# Patient Record
Sex: FEMALE | Race: WHITE | HISPANIC OR LATINO | Employment: OTHER | URBAN - METROPOLITAN AREA
[De-identification: names, ages, dates, MRNs, and addresses within clinical notes are randomized per-mention and may not be internally consistent; named-entity substitution may affect disease eponyms.]

---

## 2017-03-08 RX ORDER — VERAPAMIL HYDROCHLORIDE 240 MG/1
240 TABLET, FILM COATED, EXTENDED RELEASE ORAL NIGHTLY
Qty: 90 TABLET | Refills: 3 | Status: SHIPPED | OUTPATIENT
Start: 2017-03-08 | End: 2017-03-09 | Stop reason: SDUPTHER

## 2017-03-08 NOTE — TELEPHONE ENCOUNTER
----- Message from Elo Almanzar sent at 3/8/2017  8:51 AM CST -----  Contact: /[phone]  Patient needs refill on her verapamil (CALAN-SR) 240 MG CR tablet. Please advise

## 2017-03-09 RX ORDER — VERAPAMIL HYDROCHLORIDE 240 MG/1
240 TABLET, FILM COATED, EXTENDED RELEASE ORAL NIGHTLY
Qty: 90 TABLET | Refills: 3 | Status: SHIPPED | OUTPATIENT
Start: 2017-03-09 | End: 2018-02-27 | Stop reason: SDUPTHER

## 2017-03-09 NOTE — TELEPHONE ENCOUNTER
----- Message from Janell Schmitt sent at 3/8/2017  4:34 PM CST -----  Contact: self, [phone]  States he is calling back in requesting patient's verapamil medication refill. Please advise.

## 2017-07-20 DIAGNOSIS — T50.905A DRUG-INDUCED BRADYCARDIA: ICD-10-CM

## 2017-07-20 DIAGNOSIS — I47.10 SVT (SUPRAVENTRICULAR TACHYCARDIA): ICD-10-CM

## 2017-07-20 DIAGNOSIS — I10 ESSENTIAL HYPERTENSION: ICD-10-CM

## 2017-07-20 DIAGNOSIS — K81.9 CHOLECYSTITIS: ICD-10-CM

## 2017-07-20 DIAGNOSIS — J47.9 BRONCHIECTASIS WITHOUT COMPLICATION: ICD-10-CM

## 2017-07-20 DIAGNOSIS — R00.1 DRUG-INDUCED BRADYCARDIA: ICD-10-CM

## 2017-07-20 NOTE — TELEPHONE ENCOUNTER
----- Message from Janell Schmitt sent at 7/20/2017  4:40 PM CDT -----  Contact: spouse, [phone]  States they checked with pharmacy and they still have not received Atenolol prescription. Please advise.

## 2017-07-21 RX ORDER — ATENOLOL 25 MG/1
25 TABLET ORAL DAILY
Qty: 90 TABLET | Refills: 3 | Status: SHIPPED | OUTPATIENT
Start: 2017-07-21 | End: 2017-07-21 | Stop reason: SDUPTHER

## 2017-07-24 RX ORDER — ATENOLOL 25 MG/1
25 TABLET ORAL DAILY
Qty: 90 TABLET | Refills: 3 | Status: SHIPPED | OUTPATIENT
Start: 2017-07-24 | End: 2018-09-25 | Stop reason: SDUPTHER

## 2017-11-21 DIAGNOSIS — Z12.31 SCREENING MAMMOGRAM, ENCOUNTER FOR: Primary | ICD-10-CM

## 2017-12-04 ENCOUNTER — OFFICE VISIT (OUTPATIENT)
Dept: CARDIOLOGY | Facility: CLINIC | Age: 82
End: 2017-12-04
Payer: MEDICARE

## 2017-12-04 VITALS
SYSTOLIC BLOOD PRESSURE: 138 MMHG | HEART RATE: 74 BPM | OXYGEN SATURATION: 99 % | HEIGHT: 60 IN | BODY MASS INDEX: 17.35 KG/M2 | WEIGHT: 88.38 LBS | DIASTOLIC BLOOD PRESSURE: 69 MMHG

## 2017-12-04 DIAGNOSIS — J47.9 BRONCHIECTASIS WITHOUT COMPLICATION: ICD-10-CM

## 2017-12-04 DIAGNOSIS — R00.1 DRUG-INDUCED BRADYCARDIA: ICD-10-CM

## 2017-12-04 DIAGNOSIS — I47.10 SVT (SUPRAVENTRICULAR TACHYCARDIA): ICD-10-CM

## 2017-12-04 DIAGNOSIS — I10 ESSENTIAL HYPERTENSION: Primary | ICD-10-CM

## 2017-12-04 DIAGNOSIS — T50.905A DRUG-INDUCED BRADYCARDIA: ICD-10-CM

## 2017-12-04 PROCEDURE — 99214 OFFICE O/P EST MOD 30 MIN: CPT | Mod: S$GLB,,, | Performed by: INTERNAL MEDICINE

## 2017-12-04 PROCEDURE — 99999 PR PBB SHADOW E&M-EST. PATIENT-LVL III: CPT | Mod: PBBFAC,,, | Performed by: INTERNAL MEDICINE

## 2017-12-04 RX ORDER — ALBUTEROL SULFATE 0.83 MG/ML
SOLUTION RESPIRATORY (INHALATION)
Status: ON HOLD | COMMUNITY
Start: 2017-11-29 | End: 2019-06-17 | Stop reason: HOSPADM

## 2017-12-04 NOTE — PROGRESS NOTES
Subjective:    Patient ID:  Siomara Flores is a 84 y.o. female who presents for follow-up of Hypertension      HPI  85 y/o Yoruba speaking female, Anabaptist former pt of Dr. Killian. She has a hx of HTN, bronchiectasis treated medically with recent exacerbation, hx SSS with 2* AV block and junctional rhythms with PAC's and short SVT on Holter 2014 while on Dig and improved/resolved once Dig stopped, diastolic dysfunction, mild low TSH who presents for evaluation and f/u. Recently had what she thinks is the flu but is much improved. She is doing well from a cardiac perspective. She denies CP, orthopnea, PND, syncope, LE edema. She has intermittent chronic LUNA, unchanged. She is compliant with her meds.     Review of Systems   Constitution: Positive for weakness. Negative for malaise/fatigue.   HENT: Negative for congestion.    Eyes: Negative for blurred vision.   Cardiovascular: Positive for dyspnea on exertion. Negative for chest pain, claudication, cyanosis, irregular heartbeat, leg swelling, near-syncope, orthopnea, palpitations, paroxysmal nocturnal dyspnea and syncope.   Respiratory: Negative for shortness of breath.    Endocrine: Negative for polyuria.   Hematologic/Lymphatic: Negative for bleeding problem.   Skin: Negative for itching and rash.   Musculoskeletal: Negative for joint swelling, muscle cramps and muscle weakness.   Gastrointestinal: Negative for abdominal pain, hematemesis, hematochezia, melena, nausea and vomiting.   Genitourinary: Negative for dysuria and hematuria.   Neurological: Negative for dizziness, focal weakness, headaches, light-headedness and loss of balance.   Psychiatric/Behavioral: Negative for depression. The patient is not nervous/anxious.         Objective:    Physical Exam   Constitutional: She is oriented to person, place, and time. She appears well-developed and well-nourished.   HENT:   Head: Normocephalic and atraumatic.   Neck: Neck supple. No JVD present.    Cardiovascular: Normal rate and regular rhythm.    Murmur heard.   Systolic murmur is present with a grade of 2/6   Pulses:       Carotid pulses are 2+ on the right side, and 2+ on the left side.       Radial pulses are 2+ on the right side, and 2+ on the left side.        Femoral pulses are 2+ on the right side, and 2+ on the left side.       Dorsalis pedis pulses are 2+ on the right side, and 2+ on the left side.        Posterior tibial pulses are 2+ on the right side, and 2+ on the left side.   Pulmonary/Chest: Effort normal and breath sounds normal.   Abdominal: Soft. Bowel sounds are normal.   Musculoskeletal: She exhibits no edema.   Neurological: She is alert and oriented to person, place, and time.   Skin: Skin is warm and dry.   Psychiatric: She has a normal mood and affect. Her behavior is normal. Thought content normal.         Assessment:       1. Essential hypertension    2. Bronchiectasis without complication    3. SVT (supraventricular tachycardia)    4. Drug-induced bradycardia      85 y/o female with hx and presentation as above. Doing well from a cardiac perspective with no active complaints.       Plan:       -Continue current medical management  -F/u in 1 year

## 2017-12-16 ENCOUNTER — HOSPITAL ENCOUNTER (OUTPATIENT)
Dept: RADIOLOGY | Facility: HOSPITAL | Age: 82
Discharge: HOME | End: 2017-12-16
Attending: SPECIALIST
Payer: MEDICARE

## 2017-12-16 VITALS — BODY MASS INDEX: 17.28 KG/M2 | HEIGHT: 60 IN | WEIGHT: 88 LBS

## 2017-12-16 DIAGNOSIS — Z12.31 SCREENING MAMMOGRAM, ENCOUNTER FOR: ICD-10-CM

## 2017-12-16 PROCEDURE — 77063 BREAST TOMOSYNTHESIS BI: CPT | Mod: 26,,, | Performed by: RADIOLOGY

## 2017-12-16 PROCEDURE — 77067 SCR MAMMO BI INCL CAD: CPT | Mod: 26,,, | Performed by: RADIOLOGY

## 2017-12-16 PROCEDURE — 77067 SCR MAMMO BI INCL CAD: CPT | Mod: TC

## 2018-01-08 ENCOUNTER — HOSPITAL ENCOUNTER (OUTPATIENT)
Dept: RADIOLOGY | Facility: HOSPITAL | Age: 83
Discharge: HOME | End: 2018-01-08
Attending: INTERNAL MEDICINE
Payer: MEDICARE

## 2018-01-08 DIAGNOSIS — J06.9 ACUTE UPPER RESPIRATORY INFECTION: Primary | ICD-10-CM

## 2018-01-08 DIAGNOSIS — J06.9 ACUTE UPPER RESPIRATORY INFECTION: ICD-10-CM

## 2018-01-08 PROCEDURE — 71046 X-RAY EXAM CHEST 2 VIEWS: CPT | Mod: TC,FY

## 2018-01-08 PROCEDURE — 71046 X-RAY EXAM CHEST 2 VIEWS: CPT | Mod: 26,,, | Performed by: RADIOLOGY

## 2018-02-27 RX ORDER — VERAPAMIL HYDROCHLORIDE 240 MG/1
240 TABLET, FILM COATED, EXTENDED RELEASE ORAL NIGHTLY
Qty: 90 TABLET | Refills: 3 | Status: SHIPPED | OUTPATIENT
Start: 2018-02-27 | End: 2019-02-11 | Stop reason: SDUPTHER

## 2018-02-27 NOTE — TELEPHONE ENCOUNTER
----- Message from Alie Gayle sent at 2/27/2018 12:23 PM CST -----  Patient's , Raad, called.   No. [phone]   [facility] in [place] requested refill on Verapamil  240mg, 1 daily, #90.  What is the status.

## 2018-09-25 DIAGNOSIS — R00.1 DRUG-INDUCED BRADYCARDIA: ICD-10-CM

## 2018-09-25 DIAGNOSIS — K81.9 CHOLECYSTITIS: ICD-10-CM

## 2018-09-25 DIAGNOSIS — T50.905A DRUG-INDUCED BRADYCARDIA: ICD-10-CM

## 2018-09-25 DIAGNOSIS — I10 ESSENTIAL HYPERTENSION: ICD-10-CM

## 2018-09-25 DIAGNOSIS — J47.9 BRONCHIECTASIS WITHOUT COMPLICATION: ICD-10-CM

## 2018-09-25 DIAGNOSIS — I47.10 SVT (SUPRAVENTRICULAR TACHYCARDIA): ICD-10-CM

## 2018-09-25 RX ORDER — ATENOLOL 25 MG/1
25 TABLET ORAL DAILY
Qty: 90 TABLET | Refills: 3 | Status: ON HOLD | OUTPATIENT
Start: 2018-09-25 | End: 2019-06-17 | Stop reason: HOSPADM

## 2018-09-25 NOTE — TELEPHONE ENCOUNTER
----- Message from Mayte Almazan sent at 9/25/2018  8:42 AM CDT -----  Contact: [phone]/self  Patient would like a refill of  atenolol (TENORMIN) 25 MG tablet sent to [facility] in [place].. Please advise.

## 2018-12-07 DIAGNOSIS — Z12.31 SCREENING MAMMOGRAM, ENCOUNTER FOR: Primary | ICD-10-CM

## 2019-01-25 ENCOUNTER — OFFICE VISIT (OUTPATIENT)
Dept: CARDIOLOGY | Facility: CLINIC | Age: 84
End: 2019-01-25
Payer: MEDICARE

## 2019-01-25 VITALS
SYSTOLIC BLOOD PRESSURE: 108 MMHG | BODY MASS INDEX: 16.88 KG/M2 | HEIGHT: 60 IN | OXYGEN SATURATION: 99 % | DIASTOLIC BLOOD PRESSURE: 69 MMHG | WEIGHT: 86 LBS | HEART RATE: 83 BPM

## 2019-01-25 DIAGNOSIS — J47.9 BRONCHIECTASIS WITHOUT COMPLICATION: ICD-10-CM

## 2019-01-25 DIAGNOSIS — I10 ESSENTIAL HYPERTENSION: Primary | ICD-10-CM

## 2019-01-25 DIAGNOSIS — I47.10 SVT (SUPRAVENTRICULAR TACHYCARDIA): ICD-10-CM

## 2019-01-25 DIAGNOSIS — K81.9 CHOLECYSTITIS: ICD-10-CM

## 2019-01-25 PROCEDURE — 3074F SYST BP LT 130 MM HG: CPT | Mod: CPTII,S$GLB,, | Performed by: INTERNAL MEDICINE

## 2019-01-25 PROCEDURE — 99214 OFFICE O/P EST MOD 30 MIN: CPT | Mod: S$GLB,,, | Performed by: INTERNAL MEDICINE

## 2019-01-25 PROCEDURE — 1101F PR PT FALLS ASSESS DOC 0-1 FALLS W/OUT INJ PAST YR: ICD-10-PCS | Mod: CPTII,S$GLB,, | Performed by: INTERNAL MEDICINE

## 2019-01-25 PROCEDURE — 1101F PT FALLS ASSESS-DOCD LE1/YR: CPT | Mod: CPTII,S$GLB,, | Performed by: INTERNAL MEDICINE

## 2019-01-25 PROCEDURE — 99999 PR PBB SHADOW E&M-EST. PATIENT-LVL III: ICD-10-PCS | Mod: PBBFAC,,, | Performed by: INTERNAL MEDICINE

## 2019-01-25 PROCEDURE — 3078F DIAST BP <80 MM HG: CPT | Mod: CPTII,S$GLB,, | Performed by: INTERNAL MEDICINE

## 2019-01-25 PROCEDURE — 99214 PR OFFICE/OUTPT VISIT, EST, LEVL IV, 30-39 MIN: ICD-10-PCS | Mod: S$GLB,,, | Performed by: INTERNAL MEDICINE

## 2019-01-25 PROCEDURE — 3078F PR MOST RECENT DIASTOLIC BLOOD PRESSURE < 80 MM HG: ICD-10-PCS | Mod: CPTII,S$GLB,, | Performed by: INTERNAL MEDICINE

## 2019-01-25 PROCEDURE — 99999 PR PBB SHADOW E&M-EST. PATIENT-LVL III: CPT | Mod: PBBFAC,,, | Performed by: INTERNAL MEDICINE

## 2019-01-25 PROCEDURE — 3074F PR MOST RECENT SYSTOLIC BLOOD PRESSURE < 130 MM HG: ICD-10-PCS | Mod: CPTII,S$GLB,, | Performed by: INTERNAL MEDICINE

## 2019-01-26 NOTE — PROGRESS NOTES
Subjective:    Patient ID:  Siomara Flores is a 85 y.o. female who presents for follow-up of Hypertension      HPI     86 y/o Maori speaking female, Catholic former pt of Dr. Killian. She has a hx of HTN, bronchiectasis treated medically, hx SSS with 2* AV block and junctional rhythms with PAC's and short SVT on Holter 2014 while on Dig and improved/resolved once Dig stopped, diastolic dysfunction, mild low TSH who presents for f/u. She is doing well from a cardiac perspective. She denies CP, orthopnea, PND, syncope, LE edema. She has intermittent chronic LUNA, unchanged. She is compliant with her meds.     Review of Systems   Constitution: Negative for weakness and malaise/fatigue.   HENT: Negative for congestion.    Eyes: Negative for blurred vision.   Cardiovascular: Positive for dyspnea on exertion. Negative for chest pain, claudication, cyanosis, irregular heartbeat, leg swelling, near-syncope, orthopnea, palpitations, paroxysmal nocturnal dyspnea and syncope.   Respiratory: Negative for shortness of breath.    Endocrine: Negative for polyuria.   Hematologic/Lymphatic: Negative for bleeding problem.   Skin: Negative for itching and rash.   Musculoskeletal: Negative for joint swelling, muscle cramps and muscle weakness.   Gastrointestinal: Negative for abdominal pain, hematemesis, hematochezia, melena, nausea and vomiting.   Genitourinary: Negative for dysuria and hematuria.   Neurological: Negative for dizziness, focal weakness, headaches, light-headedness and loss of balance.   Psychiatric/Behavioral: Negative for depression. The patient is not nervous/anxious.         Objective:    Physical Exam   Constitutional: She is oriented to person, place, and time. She appears well-developed and well-nourished.   HENT:   Head: Normocephalic and atraumatic.   Neck: Neck supple. No JVD present.   Cardiovascular: Normal rate, regular rhythm and normal heart sounds.   Pulses:       Carotid pulses are 2+ on the  right side, and 2+ on the left side.       Radial pulses are 2+ on the right side, and 2+ on the left side.        Femoral pulses are 2+ on the right side, and 2+ on the left side.       Dorsalis pedis pulses are 2+ on the right side, and 2+ on the left side.        Posterior tibial pulses are 2+ on the right side, and 2+ on the left side.   Pulmonary/Chest: Effort normal and breath sounds normal.   Abdominal: Soft. Bowel sounds are normal.   Musculoskeletal: She exhibits no edema.   Neurological: She is alert and oriented to person, place, and time.   Skin: Skin is warm and dry.   Psychiatric: She has a normal mood and affect. Her behavior is normal. Thought content normal.         Assessment:       1. Essential hypertension    2. SVT (supraventricular tachycardia)    3. Bronchiectasis without complication    4. Cholecystitis      86 y/o pt with hx and presentation as above. Doing well from a cardiac perspective and compensated from a HF perspective. No change to regimen. Discussed the importance of med compliance, heart healthy diet, and regular exercise.          Plan:       -Continue current medical management  -f/u in 1 year

## 2019-02-11 RX ORDER — VERAPAMIL HYDROCHLORIDE 240 MG/1
TABLET, FILM COATED, EXTENDED RELEASE ORAL
Qty: 90 TABLET | Refills: 3 | Status: ON HOLD | OUTPATIENT
Start: 2019-02-11 | End: 2019-06-17 | Stop reason: HOSPADM

## 2019-05-02 ENCOUNTER — HOSPITAL ENCOUNTER (INPATIENT)
Facility: HOSPITAL | Age: 84
LOS: 7 days | Discharge: NURSING, REHAB OR LONG-TERM CARE | DRG: 480 | End: 2019-05-09
Attending: EMERGENCY MEDICINE | Admitting: EMERGENCY MEDICINE
Payer: MEDICARE

## 2019-05-02 DIAGNOSIS — S72.142A CLOSED DISPLACED INTERTROCHANTERIC FRACTURE OF LEFT FEMUR, INITIAL ENCOUNTER: ICD-10-CM

## 2019-05-02 DIAGNOSIS — I10 ESSENTIAL HYPERTENSION: ICD-10-CM

## 2019-05-02 DIAGNOSIS — Z53.1 REFUSAL OF BLOOD TRANSFUSIONS AS PATIENT IS JEHOVAH'S WITNESS: ICD-10-CM

## 2019-05-02 DIAGNOSIS — D62 ACUTE BLOOD LOSS ANEMIA: ICD-10-CM
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
## 2019-05-02 DIAGNOSIS — I95.81 POSTPROCEDURAL HYPOTENSION: ICD-10-CM

## 2019-05-02 DIAGNOSIS — J47.9 BRONCHIECTASIS WITHOUT COMPLICATION: ICD-10-CM

## 2019-05-02 DIAGNOSIS — S72.009A HIP FRACTURE: ICD-10-CM
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
## 2019-05-02 DIAGNOSIS — Z01.811 PRE-OP CHEST EXAM: ICD-10-CM

## 2019-05-02 DIAGNOSIS — D50.9 MICROCYTIC ANEMIA: ICD-10-CM

## 2019-05-02 DIAGNOSIS — S72.002A CLOSED FRACTURE OF LEFT HIP, INITIAL ENCOUNTER: Primary | ICD-10-CM

## 2019-05-02 PROBLEM — S52.502A CLOSED FRACTURE OF LEFT DISTAL RADIUS: Status: ACTIVE | Noted: 2019-05-02

## 2019-05-02 PROBLEM — S42.202A CLOSED FRACTURE OF LEFT PROXIMAL HUMERUS: Status: ACTIVE | Noted: 2019-05-02

## 2019-05-02 LAB
25(OH)D3+25(OH)D2 SERPL-MCNC: 46 NG/ML (ref 30–96)
ABO + RH BLD: NORMAL
ALBUMIN SERPL BCP-MCNC: 3.3 G/DL (ref 3.5–5.2)
ALP SERPL-CCNC: 63 U/L (ref 55–135)
ALT SERPL W/O P-5'-P-CCNC: 9 U/L (ref 10–44)
ANION GAP SERPL CALC-SCNC: 11 MMOL/L (ref 8–16)
AST SERPL-CCNC: 32 U/L (ref 10–40)
BASOPHILS # BLD AUTO: 0.06 K/UL (ref 0–0.2)
BASOPHILS NFR BLD: 0.7 % (ref 0–1.9)
BILIRUB SERPL-MCNC: 0.4 MG/DL (ref 0.1–1)
BILIRUB UR QL STRIP: NEGATIVE
BLD GP AB SCN CELLS X3 SERPL QL: NORMAL
BUN SERPL-MCNC: 14 MG/DL (ref 8–23)
CALCIUM SERPL-MCNC: 9 MG/DL (ref 8.7–10.5)
CHLORIDE SERPL-SCNC: 100 MMOL/L (ref 95–110)
CLARITY UR REFRACT.AUTO: CLEAR
CO2 SERPL-SCNC: 24 MMOL/L (ref 23–29)
COLOR UR AUTO: YELLOW
CREAT SERPL-MCNC: 0.8 MG/DL (ref 0.5–1.4)
DIFFERENTIAL METHOD: ABNORMAL
EOSINOPHIL # BLD AUTO: 0 K/UL (ref 0–0.5)
EOSINOPHIL NFR BLD: 0.5 % (ref 0–8)
ERYTHROCYTE [DISTWIDTH] IN BLOOD BY AUTOMATED COUNT: 12.8 % (ref 11.5–14.5)
EST. GFR  (AFRICAN AMERICAN): >60 ML/MIN/1.73 M^2
EST. GFR  (NON AFRICAN AMERICAN): >60 ML/MIN/1.73 M^2
ESTIMATED AVG GLUCOSE: 114 MG/DL (ref 68–131)
GLUCOSE SERPL-MCNC: 104 MG/DL (ref 70–110)
GLUCOSE UR QL STRIP: NEGATIVE
HBA1C MFR BLD HPLC: 5.6 % (ref 4–5.6)
HCT VFR BLD AUTO: 26.9 % (ref 37–48.5)
HGB BLD-MCNC: 8.4 G/DL (ref 12–16)
HGB UR QL STRIP: NEGATIVE
IMM GRANULOCYTES # BLD AUTO: 0.05 K/UL (ref 0–0.04)
IMM GRANULOCYTES NFR BLD AUTO: 0.6 % (ref 0–0.5)
INR PPP: 1 (ref 0.8–1.2)
KETONES UR QL STRIP: ABNORMAL
LEUKOCYTE ESTERASE UR QL STRIP: NEGATIVE
LYMPHOCYTES # BLD AUTO: 0.9 K/UL (ref 1–4.8)
LYMPHOCYTES NFR BLD: 10.4 % (ref 18–48)
MAGNESIUM SERPL-MCNC: 2.1 MG/DL (ref 1.6–2.6)
MCH RBC QN AUTO: 25.9 PG (ref 27–31)
MCHC RBC AUTO-ENTMCNC: 31.2 G/DL (ref 32–36)
MCV RBC AUTO: 83 FL (ref 82–98)
MONOCYTES # BLD AUTO: 0.5 K/UL (ref 0.3–1)
MONOCYTES NFR BLD: 5.8 % (ref 4–15)
NEUTROPHILS # BLD AUTO: 7.3 K/UL (ref 1.8–7.7)
NEUTROPHILS NFR BLD: 82 % (ref 38–73)
NITRITE UR QL STRIP: NEGATIVE
NRBC BLD-RTO: 0 /100 WBC
PH UR STRIP: 5 [PH] (ref 5–8)
PHOSPHATE SERPL-MCNC: 2.7 MG/DL (ref 2.7–4.5)
PLATELET # BLD AUTO: 232 K/UL (ref 150–350)
PMV BLD AUTO: 9.9 FL (ref 9.2–12.9)
POTASSIUM SERPL-SCNC: 4.1 MMOL/L (ref 3.5–5.1)
PREALB SERPL-MCNC: 13 MG/DL (ref 20–43)
PROT SERPL-MCNC: 7.2 G/DL (ref 6–8.4)
PROT UR QL STRIP: NEGATIVE
PROTHROMBIN TIME: 10.4 SEC (ref 9–12.5)
RBC # BLD AUTO: 3.24 M/UL (ref 4–5.4)
SODIUM SERPL-SCNC: 135 MMOL/L (ref 136–145)
SP GR UR STRIP: 1.01 (ref 1–1.03)
TRANSFERRIN SERPL-MCNC: 320 MG/DL (ref 200–375)
URN SPEC COLLECT METH UR: ABNORMAL
WBC # BLD AUTO: 8.83 K/UL (ref 3.9–12.7)

## 2019-05-02 PROCEDURE — 82306 VITAMIN D 25 HYDROXY: CPT

## 2019-05-02 PROCEDURE — 85025 COMPLETE CBC W/AUTO DIFF WBC: CPT

## 2019-05-02 PROCEDURE — 84466 ASSAY OF TRANSFERRIN: CPT

## 2019-05-02 PROCEDURE — 83735 ASSAY OF MAGNESIUM: CPT

## 2019-05-02 PROCEDURE — 93010 ELECTROCARDIOGRAM REPORT: CPT | Mod: ,,, | Performed by: INTERNAL MEDICINE

## 2019-05-02 PROCEDURE — 93010 EKG 12-LEAD: ICD-10-PCS | Mod: ,,, | Performed by: INTERNAL MEDICINE

## 2019-05-02 PROCEDURE — 99285 PR EMERGENCY DEPT VISIT,LEVEL V: ICD-10-PCS | Mod: ,,, | Performed by: EMERGENCY MEDICINE

## 2019-05-02 PROCEDURE — 93005 ELECTROCARDIOGRAM TRACING: CPT

## 2019-05-02 PROCEDURE — 99285 EMERGENCY DEPT VISIT HI MDM: CPT | Mod: ,,, | Performed by: EMERGENCY MEDICINE

## 2019-05-02 PROCEDURE — 84100 ASSAY OF PHOSPHORUS: CPT

## 2019-05-02 PROCEDURE — 84134 ASSAY OF PREALBUMIN: CPT

## 2019-05-02 PROCEDURE — 12000002 HC ACUTE/MED SURGE SEMI-PRIVATE ROOM

## 2019-05-02 PROCEDURE — 99223 PR INITIAL HOSPITAL CARE,LEVL III: ICD-10-PCS | Mod: ,,, | Performed by: HOSPITALIST

## 2019-05-02 PROCEDURE — 80053 COMPREHEN METABOLIC PANEL: CPT

## 2019-05-02 PROCEDURE — 25000003 PHARM REV CODE 250: Performed by: EMERGENCY MEDICINE

## 2019-05-02 PROCEDURE — 99285 EMERGENCY DEPT VISIT HI MDM: CPT

## 2019-05-02 PROCEDURE — 83036 HEMOGLOBIN GLYCOSYLATED A1C: CPT

## 2019-05-02 PROCEDURE — 86901 BLOOD TYPING SEROLOGIC RH(D): CPT

## 2019-05-02 PROCEDURE — 85610 PROTHROMBIN TIME: CPT

## 2019-05-02 PROCEDURE — 99223 1ST HOSP IP/OBS HIGH 75: CPT | Mod: ,,, | Performed by: HOSPITALIST

## 2019-05-02 PROCEDURE — 81003 URINALYSIS AUTO W/O SCOPE: CPT

## 2019-05-02 RX ORDER — SODIUM CHLORIDE 0.9 % (FLUSH) 0.9 %
10 SYRINGE (ML) INJECTION
Status: DISCONTINUED | OUTPATIENT
Start: 2019-05-02 | End: 2019-05-09 | Stop reason: HOSPADM

## 2019-05-02 RX ORDER — ACETAMINOPHEN 500 MG
1000 TABLET ORAL
Status: COMPLETED | OUTPATIENT
Start: 2019-05-02 | End: 2019-05-02

## 2019-05-02 RX ORDER — ACETAMINOPHEN 500 MG
1000 TABLET ORAL EVERY 8 HOURS
Status: DISPENSED | OUTPATIENT
Start: 2019-05-02 | End: 2019-05-03

## 2019-05-02 RX ORDER — OXYCODONE HYDROCHLORIDE 5 MG/1
10 TABLET ORAL
Status: DISCONTINUED | OUTPATIENT
Start: 2019-05-02 | End: 2019-05-03 | Stop reason: DRUGHIGH

## 2019-05-02 RX ORDER — ONDANSETRON 8 MG/1
8 TABLET, ORALLY DISINTEGRATING ORAL EVERY 8 HOURS PRN
Status: DISCONTINUED | OUTPATIENT
Start: 2019-05-02 | End: 2019-05-09 | Stop reason: HOSPADM

## 2019-05-02 RX ORDER — LOSARTAN POTASSIUM 25 MG/1
100 TABLET ORAL DAILY
Status: DISCONTINUED | OUTPATIENT
Start: 2019-05-03 | End: 2019-05-09 | Stop reason: HOSPADM

## 2019-05-02 RX ORDER — ONDANSETRON 2 MG/ML
4 INJECTION INTRAMUSCULAR; INTRAVENOUS EVERY 12 HOURS PRN
Status: DISCONTINUED | OUTPATIENT
Start: 2019-05-02 | End: 2019-05-09 | Stop reason: HOSPADM

## 2019-05-02 RX ORDER — TIOTROPIUM BROMIDE 18 UG/1
1 CAPSULE ORAL; RESPIRATORY (INHALATION) DAILY
Status: DISCONTINUED | OUTPATIENT
Start: 2019-05-03 | End: 2019-05-09 | Stop reason: HOSPADM

## 2019-05-02 RX ORDER — IBUPROFEN 200 MG
24 TABLET ORAL
Status: DISCONTINUED | OUTPATIENT
Start: 2019-05-02 | End: 2019-05-09 | Stop reason: HOSPADM

## 2019-05-02 RX ORDER — PREGABALIN 75 MG/1
75 CAPSULE ORAL NIGHTLY
Status: DISCONTINUED | OUTPATIENT
Start: 2019-05-02 | End: 2019-05-09 | Stop reason: HOSPADM

## 2019-05-02 RX ORDER — BISACODYL 10 MG
10 SUPPOSITORY, RECTAL RECTAL DAILY PRN
Status: DISCONTINUED | OUTPATIENT
Start: 2019-05-02 | End: 2019-05-09 | Stop reason: HOSPADM

## 2019-05-02 RX ORDER — GLUCAGON 1 MG
1 KIT INJECTION
Status: DISCONTINUED | OUTPATIENT
Start: 2019-05-02 | End: 2019-05-09 | Stop reason: HOSPADM

## 2019-05-02 RX ORDER — VERAPAMIL HYDROCHLORIDE 120 MG/1
120 TABLET, FILM COATED, EXTENDED RELEASE ORAL 2 TIMES DAILY
Status: DISCONTINUED | OUTPATIENT
Start: 2019-05-02 | End: 2019-05-04

## 2019-05-02 RX ORDER — ACETAMINOPHEN 325 MG/1
650 TABLET ORAL EVERY 8 HOURS PRN
Status: DISCONTINUED | OUTPATIENT
Start: 2019-05-02 | End: 2019-05-06

## 2019-05-02 RX ORDER — ATENOLOL 25 MG/1
25 TABLET ORAL DAILY
Status: DISCONTINUED | OUTPATIENT
Start: 2019-05-03 | End: 2019-05-09 | Stop reason: HOSPADM

## 2019-05-02 RX ORDER — IPRATROPIUM BROMIDE AND ALBUTEROL SULFATE 2.5; .5 MG/3ML; MG/3ML
3 SOLUTION RESPIRATORY (INHALATION)
Status: DISCONTINUED | OUTPATIENT
Start: 2019-05-03 | End: 2019-05-09 | Stop reason: HOSPADM

## 2019-05-02 RX ORDER — OXYCODONE HYDROCHLORIDE 5 MG/1
5 TABLET ORAL
Status: DISCONTINUED | OUTPATIENT
Start: 2019-05-02 | End: 2019-05-03

## 2019-05-02 RX ORDER — SODIUM CHLORIDE 0.9 % (FLUSH) 0.9 %
5 SYRINGE (ML) INJECTION
Status: DISCONTINUED | OUTPATIENT
Start: 2019-05-02 | End: 2019-05-09 | Stop reason: HOSPADM

## 2019-05-02 RX ORDER — AMOXICILLIN 250 MG
1 CAPSULE ORAL 2 TIMES DAILY PRN
Status: DISCONTINUED | OUTPATIENT
Start: 2019-05-02 | End: 2019-05-09 | Stop reason: HOSPADM

## 2019-05-02 RX ORDER — ALBUTEROL SULFATE 2.5 MG/.5ML
2.5 SOLUTION RESPIRATORY (INHALATION)
Status: DISCONTINUED | OUTPATIENT
Start: 2019-05-03 | End: 2019-05-02

## 2019-05-02 RX ORDER — RAMELTEON 8 MG/1
8 TABLET ORAL NIGHTLY PRN
Status: DISCONTINUED | OUTPATIENT
Start: 2019-05-02 | End: 2019-05-09 | Stop reason: HOSPADM

## 2019-05-02 RX ORDER — IBUPROFEN 200 MG
16 TABLET ORAL
Status: DISCONTINUED | OUTPATIENT
Start: 2019-05-02 | End: 2019-05-09 | Stop reason: HOSPADM

## 2019-05-02 RX ADMIN — ACETAMINOPHEN 1000 MG: 500 TABLET ORAL at 08:05

## 2019-05-03 ENCOUNTER — ANESTHESIA (OUTPATIENT)
Dept: SURGERY | Facility: HOSPITAL | Age: 84
DRG: 480 | End: 2019-05-03
Payer: MEDICARE

## 2019-05-03 ENCOUNTER — ANESTHESIA EVENT (OUTPATIENT)
Dept: SURGERY | Facility: HOSPITAL | Age: 84
DRG: 480 | End: 2019-05-03
Payer: MEDICARE

## 2019-05-03 PROBLEM — D50.9 MICROCYTIC ANEMIA: Status: ACTIVE | Noted: 2019-05-03

## 2019-05-03 PROBLEM — D62 ACUTE BLOOD LOSS ANEMIA: Status: ACTIVE | Noted: 2019-05-03

## 2019-05-03 LAB
ALBUMIN SERPL BCP-MCNC: 3 G/DL (ref 3.5–5.2)
ALP SERPL-CCNC: 61 U/L (ref 55–135)
ALT SERPL W/O P-5'-P-CCNC: 7 U/L (ref 10–44)
ANION GAP SERPL CALC-SCNC: 7 MMOL/L (ref 8–16)
AST SERPL-CCNC: 24 U/L (ref 10–40)
BASOPHILS # BLD AUTO: 0.04 K/UL (ref 0–0.2)
BASOPHILS # BLD AUTO: 0.05 K/UL (ref 0–0.2)
BASOPHILS NFR BLD: 0.4 % (ref 0–1.9)
BASOPHILS NFR BLD: 0.5 % (ref 0–1.9)
BILIRUB SERPL-MCNC: 0.5 MG/DL (ref 0.1–1)
BUN SERPL-MCNC: 11 MG/DL (ref 8–23)
CALCIUM SERPL-MCNC: 9.2 MG/DL (ref 8.7–10.5)
CHLORIDE SERPL-SCNC: 98 MMOL/L (ref 95–110)
CO2 SERPL-SCNC: 29 MMOL/L (ref 23–29)
CREAT SERPL-MCNC: 0.7 MG/DL (ref 0.5–1.4)
DIFFERENTIAL METHOD: ABNORMAL
DIFFERENTIAL METHOD: ABNORMAL
EOSINOPHIL # BLD AUTO: 0 K/UL (ref 0–0.5)
EOSINOPHIL # BLD AUTO: 0 K/UL (ref 0–0.5)
EOSINOPHIL NFR BLD: 0.1 % (ref 0–8)
EOSINOPHIL NFR BLD: 0.3 % (ref 0–8)
ERYTHROCYTE [DISTWIDTH] IN BLOOD BY AUTOMATED COUNT: 12.8 % (ref 11.5–14.5)
ERYTHROCYTE [DISTWIDTH] IN BLOOD BY AUTOMATED COUNT: 12.9 % (ref 11.5–14.5)
EST. GFR  (AFRICAN AMERICAN): >60 ML/MIN/1.73 M^2
EST. GFR  (NON AFRICAN AMERICAN): >60 ML/MIN/1.73 M^2
GLUCOSE SERPL-MCNC: 108 MG/DL (ref 70–110)
HCT VFR BLD AUTO: 21.8 % (ref 37–48.5)
HCT VFR BLD AUTO: 25.2 % (ref 37–48.5)
HGB BLD-MCNC: 6.9 G/DL (ref 12–16)
HGB BLD-MCNC: 8 G/DL (ref 12–16)
IMM GRANULOCYTES # BLD AUTO: 0.05 K/UL (ref 0–0.04)
IMM GRANULOCYTES # BLD AUTO: 0.1 K/UL (ref 0–0.04)
IMM GRANULOCYTES NFR BLD AUTO: 0.6 % (ref 0–0.5)
IMM GRANULOCYTES NFR BLD AUTO: 0.7 % (ref 0–0.5)
LYMPHOCYTES # BLD AUTO: 1.1 K/UL (ref 1–4.8)
LYMPHOCYTES # BLD AUTO: 1.2 K/UL (ref 1–4.8)
LYMPHOCYTES NFR BLD: 13.6 % (ref 18–48)
LYMPHOCYTES NFR BLD: 8.3 % (ref 18–48)
MAGNESIUM SERPL-MCNC: 2.1 MG/DL (ref 1.6–2.6)
MCH RBC QN AUTO: 25.8 PG (ref 27–31)
MCH RBC QN AUTO: 26.2 PG (ref 27–31)
MCHC RBC AUTO-ENTMCNC: 31.7 G/DL (ref 32–36)
MCHC RBC AUTO-ENTMCNC: 31.7 G/DL (ref 32–36)
MCV RBC AUTO: 81 FL (ref 82–98)
MCV RBC AUTO: 83 FL (ref 82–98)
MONOCYTES # BLD AUTO: 0.8 K/UL (ref 0.3–1)
MONOCYTES # BLD AUTO: 1.1 K/UL (ref 0.3–1)
MONOCYTES NFR BLD: 8.3 % (ref 4–15)
MONOCYTES NFR BLD: 8.5 % (ref 4–15)
NEUTROPHILS # BLD AUTO: 11 K/UL (ref 1.8–7.7)
NEUTROPHILS # BLD AUTO: 6.8 K/UL (ref 1.8–7.7)
NEUTROPHILS NFR BLD: 76.5 % (ref 38–73)
NEUTROPHILS NFR BLD: 82.2 % (ref 38–73)
NRBC BLD-RTO: 0 /100 WBC
NRBC BLD-RTO: 0 /100 WBC
PHOSPHATE SERPL-MCNC: 3.4 MG/DL (ref 2.7–4.5)
PLATELET # BLD AUTO: 182 K/UL (ref 150–350)
PLATELET # BLD AUTO: 202 K/UL (ref 150–350)
PMV BLD AUTO: 10.1 FL (ref 9.2–12.9)
PMV BLD AUTO: 9.9 FL (ref 9.2–12.9)
POTASSIUM SERPL-SCNC: 4.1 MMOL/L (ref 3.5–5.1)
PROT SERPL-MCNC: 6.7 G/DL (ref 6–8.4)
RBC # BLD AUTO: 2.63 M/UL (ref 4–5.4)
RBC # BLD AUTO: 3.1 M/UL (ref 4–5.4)
SODIUM SERPL-SCNC: 134 MMOL/L (ref 136–145)
WBC # BLD AUTO: 13.42 K/UL (ref 3.9–12.7)
WBC # BLD AUTO: 8.83 K/UL (ref 3.9–12.7)

## 2019-05-03 PROCEDURE — 25000003 PHARM REV CODE 250: Performed by: HOSPITALIST

## 2019-05-03 PROCEDURE — 27245 TREAT THIGH FRACTURE: CPT | Mod: LT,,, | Performed by: ORTHOPAEDIC SURGERY

## 2019-05-03 PROCEDURE — D9220A PRA ANESTHESIA: ICD-10-PCS | Mod: ANES,,, | Performed by: ANESTHESIOLOGY

## 2019-05-03 PROCEDURE — 85025 COMPLETE CBC W/AUTO DIFF WBC: CPT

## 2019-05-03 PROCEDURE — D9220A PRA ANESTHESIA: ICD-10-PCS | Mod: CRNA,,, | Performed by: NURSE ANESTHETIST, CERTIFIED REGISTERED

## 2019-05-03 PROCEDURE — C1713 ANCHOR/SCREW BN/BN,TIS/BN: HCPCS | Performed by: ORTHOPAEDIC SURGERY

## 2019-05-03 PROCEDURE — 27800517 HC TRAY,EPIDURAL-CONTINUOUS: Performed by: ANESTHESIOLOGY

## 2019-05-03 PROCEDURE — 99232 PR SUBSEQUENT HOSPITAL CARE,LEVL II: ICD-10-PCS | Mod: ,,, | Performed by: HOSPITALIST

## 2019-05-03 PROCEDURE — 99900035 HC TECH TIME PER 15 MIN (STAT)

## 2019-05-03 PROCEDURE — 63600175 PHARM REV CODE 636 W HCPCS

## 2019-05-03 PROCEDURE — D9220A PRA ANESTHESIA: Mod: CRNA,,, | Performed by: NURSE ANESTHETIST, CERTIFIED REGISTERED

## 2019-05-03 PROCEDURE — 36415 COLL VENOUS BLD VENIPUNCTURE: CPT

## 2019-05-03 PROCEDURE — 27000221 HC OXYGEN, UP TO 24 HOURS

## 2019-05-03 PROCEDURE — 76942 PR U/S GUIDANCE FOR NEEDLE GUIDANCE: ICD-10-PCS | Mod: 26,,, | Performed by: ANESTHESIOLOGY

## 2019-05-03 PROCEDURE — 94761 N-INVAS EAR/PLS OXIMETRY MLT: CPT

## 2019-05-03 PROCEDURE — 25000003 PHARM REV CODE 250: Performed by: NURSE ANESTHETIST, CERTIFIED REGISTERED

## 2019-05-03 PROCEDURE — 99223 PR INITIAL HOSPITAL CARE,LEVL III: ICD-10-PCS | Mod: 57,,, | Performed by: ORTHOPAEDIC SURGERY

## 2019-05-03 PROCEDURE — 27245 PR OPEN FIX INTER/SUBTROCH FX,IMPLNT: ICD-10-PCS | Mod: LT,,, | Performed by: ORTHOPAEDIC SURGERY

## 2019-05-03 PROCEDURE — C1769 GUIDE WIRE: HCPCS | Performed by: ORTHOPAEDIC SURGERY

## 2019-05-03 PROCEDURE — 63600175 PHARM REV CODE 636 W HCPCS: Performed by: STUDENT IN AN ORGANIZED HEALTH CARE EDUCATION/TRAINING PROGRAM

## 2019-05-03 PROCEDURE — 94640 AIRWAY INHALATION TREATMENT: CPT

## 2019-05-03 PROCEDURE — D9220A PRA ANESTHESIA: Mod: ANES,,, | Performed by: ANESTHESIOLOGY

## 2019-05-03 PROCEDURE — 37000008 HC ANESTHESIA 1ST 15 MINUTES: Performed by: ORTHOPAEDIC SURGERY

## 2019-05-03 PROCEDURE — 71000039 HC RECOVERY, EACH ADD'L HOUR: Performed by: ORTHOPAEDIC SURGERY

## 2019-05-03 PROCEDURE — 99223 1ST HOSP IP/OBS HIGH 75: CPT | Mod: 57,,, | Performed by: ORTHOPAEDIC SURGERY

## 2019-05-03 PROCEDURE — 71000033 HC RECOVERY, INTIAL HOUR: Performed by: ORTHOPAEDIC SURGERY

## 2019-05-03 PROCEDURE — 25000242 PHARM REV CODE 250 ALT 637 W/ HCPCS: Performed by: HOSPITALIST

## 2019-05-03 PROCEDURE — 27201423 OPTIME MED/SURG SUP & DEVICES STERILE SUPPLY: Performed by: ORTHOPAEDIC SURGERY

## 2019-05-03 PROCEDURE — 37000009 HC ANESTHESIA EA ADD 15 MINS: Performed by: ORTHOPAEDIC SURGERY

## 2019-05-03 PROCEDURE — 76942 ECHO GUIDE FOR BIOPSY: CPT | Mod: 26,,, | Performed by: ANESTHESIOLOGY

## 2019-05-03 PROCEDURE — 64999 SUPRAINGUINAL FASCIA ILIACA CATHETER: ICD-10-PCS | Mod: 59,LT,, | Performed by: ANESTHESIOLOGY

## 2019-05-03 PROCEDURE — 64999 UNLISTED PX NERVOUS SYSTEM: CPT | Mod: 59,LT,, | Performed by: ANESTHESIOLOGY

## 2019-05-03 PROCEDURE — 63600175 PHARM REV CODE 636 W HCPCS: Performed by: HOSPITALIST

## 2019-05-03 PROCEDURE — 94770 HC EXHALED C02 TEST: CPT

## 2019-05-03 PROCEDURE — 36000711: Performed by: ORTHOPAEDIC SURGERY

## 2019-05-03 PROCEDURE — 36000710: Performed by: ORTHOPAEDIC SURGERY

## 2019-05-03 PROCEDURE — 20000000 HC ICU ROOM

## 2019-05-03 PROCEDURE — 80053 COMPREHEN METABOLIC PANEL: CPT

## 2019-05-03 PROCEDURE — 84100 ASSAY OF PHOSPHORUS: CPT

## 2019-05-03 PROCEDURE — 63600175 PHARM REV CODE 636 W HCPCS: Performed by: NURSE ANESTHETIST, CERTIFIED REGISTERED

## 2019-05-03 PROCEDURE — 99232 SBSQ HOSP IP/OBS MODERATE 35: CPT | Mod: ,,, | Performed by: HOSPITALIST

## 2019-05-03 PROCEDURE — 83735 ASSAY OF MAGNESIUM: CPT

## 2019-05-03 DEVICE — SCREW STRDRV REC T25 5X36 TTNM: Type: IMPLANTABLE DEVICE | Site: FEMUR | Status: FUNCTIONAL

## 2019-05-03 DEVICE — NAIL IM CANN 130 DEG 11X360 L: Type: IMPLANTABLE DEVICE | Site: FEMUR | Status: FUNCTIONAL

## 2019-05-03 RX ORDER — MUPIROCIN 20 MG/G
1 OINTMENT TOPICAL
Status: COMPLETED | OUTPATIENT
Start: 2019-05-03 | End: 2019-05-03

## 2019-05-03 RX ORDER — LIDOCAINE HCL/PF 100 MG/5ML
SYRINGE (ML) INTRAVENOUS
Status: DISCONTINUED | OUTPATIENT
Start: 2019-05-03 | End: 2019-05-03

## 2019-05-03 RX ORDER — ACETAMINOPHEN 500 MG
1000 TABLET ORAL EVERY 6 HOURS
Status: DISPENSED | OUTPATIENT
Start: 2019-05-04 | End: 2019-05-05

## 2019-05-03 RX ORDER — MIDAZOLAM HYDROCHLORIDE 1 MG/ML
INJECTION INTRAMUSCULAR; INTRAVENOUS
Status: DISCONTINUED | OUTPATIENT
Start: 2019-05-03 | End: 2019-05-03

## 2019-05-03 RX ORDER — ROPIVACAINE HYDROCHLORIDE 2 MG/ML
2 INJECTION, SOLUTION EPIDURAL; INFILTRATION; PERINEURAL CONTINUOUS
Status: DISCONTINUED | OUTPATIENT
Start: 2019-05-03 | End: 2019-05-08

## 2019-05-03 RX ORDER — MIDAZOLAM HYDROCHLORIDE 1 MG/ML
0.5 INJECTION INTRAMUSCULAR; INTRAVENOUS
Status: DISCONTINUED | OUTPATIENT
Start: 2019-05-03 | End: 2019-05-03 | Stop reason: HOSPADM

## 2019-05-03 RX ORDER — POLYETHYLENE GLYCOL 3350 17 G/17G
17 POWDER, FOR SOLUTION ORAL DAILY
Status: DISCONTINUED | OUTPATIENT
Start: 2019-05-03 | End: 2019-05-09 | Stop reason: HOSPADM

## 2019-05-03 RX ORDER — FENTANYL CITRATE 50 UG/ML
INJECTION, SOLUTION INTRAMUSCULAR; INTRAVENOUS
Status: DISCONTINUED | OUTPATIENT
Start: 2019-05-03 | End: 2019-05-03

## 2019-05-03 RX ORDER — OXYCODONE HYDROCHLORIDE 5 MG/1
5 TABLET ORAL
Status: DISCONTINUED | OUTPATIENT
Start: 2019-05-03 | End: 2019-05-06

## 2019-05-03 RX ORDER — LIDOCAINE HYDROCHLORIDE 10 MG/ML
1 INJECTION, SOLUTION EPIDURAL; INFILTRATION; INTRACAUDAL; PERINEURAL
Status: DISCONTINUED | OUTPATIENT
Start: 2019-05-03 | End: 2019-05-03 | Stop reason: HOSPADM

## 2019-05-03 RX ORDER — SODIUM CHLORIDE 9 MG/ML
INJECTION, SOLUTION INTRAVENOUS CONTINUOUS PRN
Status: DISCONTINUED | OUTPATIENT
Start: 2019-05-03 | End: 2019-05-03

## 2019-05-03 RX ORDER — FENTANYL CITRATE 50 UG/ML
25 INJECTION, SOLUTION INTRAMUSCULAR; INTRAVENOUS EVERY 5 MIN PRN
Status: DISCONTINUED | OUTPATIENT
Start: 2019-05-03 | End: 2019-05-03 | Stop reason: HOSPADM

## 2019-05-03 RX ORDER — MUPIROCIN 20 MG/G
1 OINTMENT TOPICAL 2 TIMES DAILY
Status: COMPLETED | OUTPATIENT
Start: 2019-05-03 | End: 2019-05-08

## 2019-05-03 RX ORDER — CEFAZOLIN SODIUM 1 G/3ML
2 INJECTION, POWDER, FOR SOLUTION INTRAMUSCULAR; INTRAVENOUS ONCE
Status: DISCONTINUED | OUTPATIENT
Start: 2019-05-03 | End: 2019-05-03 | Stop reason: HOSPADM

## 2019-05-03 RX ORDER — AMOXICILLIN 250 MG
1 CAPSULE ORAL 2 TIMES DAILY
Status: DISCONTINUED | OUTPATIENT
Start: 2019-05-03 | End: 2019-05-09 | Stop reason: HOSPADM

## 2019-05-03 RX ORDER — ROCURONIUM BROMIDE 10 MG/ML
INJECTION, SOLUTION INTRAVENOUS
Status: DISCONTINUED | OUTPATIENT
Start: 2019-05-03 | End: 2019-05-03

## 2019-05-03 RX ORDER — PROPOFOL 10 MG/ML
VIAL (ML) INTRAVENOUS
Status: DISCONTINUED | OUTPATIENT
Start: 2019-05-03 | End: 2019-05-03

## 2019-05-03 RX ORDER — TRANEXAMIC ACID 100 MG/ML
INJECTION, SOLUTION INTRAVENOUS
Status: DISCONTINUED | OUTPATIENT
Start: 2019-05-03 | End: 2019-05-03

## 2019-05-03 RX ORDER — ROPIVACAINE HYDROCHLORIDE 2 MG/ML
INJECTION, SOLUTION EPIDURAL; INFILTRATION; PERINEURAL
Status: COMPLETED
Start: 2019-05-03 | End: 2019-05-03

## 2019-05-03 RX ORDER — MIDAZOLAM HYDROCHLORIDE 1 MG/ML
1 INJECTION INTRAMUSCULAR; INTRAVENOUS EVERY 5 MIN PRN
Status: DISCONTINUED | OUTPATIENT
Start: 2019-05-03 | End: 2019-05-03 | Stop reason: HOSPADM

## 2019-05-03 RX ORDER — PHENYLEPHRINE HYDROCHLORIDE 10 MG/ML
INJECTION INTRAVENOUS
Status: DISCONTINUED | OUTPATIENT
Start: 2019-05-03 | End: 2019-05-03

## 2019-05-03 RX ORDER — METHOCARBAMOL 500 MG/1
1000 TABLET, FILM COATED ORAL EVERY 6 HOURS PRN
Status: DISCONTINUED | OUTPATIENT
Start: 2019-05-03 | End: 2019-05-06

## 2019-05-03 RX ORDER — PHENYLEPHRINE HCL IN 0.9% NACL 20MG/250ML
0.5 PLASTIC BAG, INJECTION (ML) INTRAVENOUS CONTINUOUS
Status: DISCONTINUED | OUTPATIENT
Start: 2019-05-03 | End: 2019-05-07

## 2019-05-03 RX ORDER — ACETAMINOPHEN 10 MG/ML
1000 INJECTION, SOLUTION INTRAVENOUS ONCE
Status: COMPLETED | OUTPATIENT
Start: 2019-05-03 | End: 2019-05-03

## 2019-05-03 RX ADMIN — ROPIVACAINE HYDROCHLORIDE 2 ML/HR: 2 INJECTION, SOLUTION EPIDURAL; INFILTRATION at 03:05

## 2019-05-03 RX ADMIN — PROPOFOL 40 MG: 10 INJECTION, EMULSION INTRAVENOUS at 12:05

## 2019-05-03 RX ADMIN — DEXTROSE 1.5 G: 50 INJECTION, SOLUTION INTRAVENOUS at 01:05

## 2019-05-03 RX ADMIN — FENTANYL CITRATE 25 MCG: 50 INJECTION, SOLUTION INTRAMUSCULAR; INTRAVENOUS at 12:05

## 2019-05-03 RX ADMIN — IPRATROPIUM BROMIDE AND ALBUTEROL SULFATE 3 ML: .5; 3 SOLUTION RESPIRATORY (INHALATION) at 03:05

## 2019-05-03 RX ADMIN — EPOETIN ALFA-EPBX 40000 UNITS: 40000 INJECTION, SOLUTION INTRAVENOUS; SUBCUTANEOUS at 10:05

## 2019-05-03 RX ADMIN — TRANEXAMIC ACID 500 MG: 100 INJECTION, SOLUTION INTRAVENOUS at 02:05

## 2019-05-03 RX ADMIN — SODIUM CHLORIDE 0.5 MCG/KG/MIN: 9 INJECTION, SOLUTION INTRAVENOUS at 01:05

## 2019-05-03 RX ADMIN — ACETAMINOPHEN 1000 MG: 10 INJECTION, SOLUTION INTRAVENOUS at 03:05

## 2019-05-03 RX ADMIN — Medication 0.8 MCG/KG/MIN: at 08:05

## 2019-05-03 RX ADMIN — PHENYLEPHRINE HYDROCHLORIDE 200 MCG: 10 INJECTION INTRAVENOUS at 01:05

## 2019-05-03 RX ADMIN — ROCURONIUM BROMIDE 30 MG: 10 INJECTION, SOLUTION INTRAVENOUS at 12:05

## 2019-05-03 RX ADMIN — SODIUM CHLORIDE: 0.9 INJECTION, SOLUTION INTRAVENOUS at 12:05

## 2019-05-03 RX ADMIN — OXYCODONE HYDROCHLORIDE 10 MG: 10 TABLET ORAL at 08:05

## 2019-05-03 RX ADMIN — VERAPAMIL HYDROCHLORIDE 120 MG: 120 TABLET, FILM COATED, EXTENDED RELEASE ORAL at 08:05

## 2019-05-03 RX ADMIN — PHENYLEPHRINE HYDROCHLORIDE 200 MCG: 10 INJECTION INTRAVENOUS at 12:05

## 2019-05-03 RX ADMIN — MUPIROCIN 1 G: 20 OINTMENT TOPICAL at 09:05

## 2019-05-03 RX ADMIN — LIDOCAINE HYDROCHLORIDE 60 MG: 20 INJECTION, SOLUTION INTRAVENOUS at 12:05

## 2019-05-03 RX ADMIN — MIDAZOLAM HYDROCHLORIDE 0.5 MG: 1 INJECTION, SOLUTION INTRAMUSCULAR; INTRAVENOUS at 12:05

## 2019-05-03 RX ADMIN — ATENOLOL 25 MG: 25 TABLET ORAL at 08:05

## 2019-05-03 RX ADMIN — ROPIVACAINE HYDROCHLORIDE 2 ML/HR: 2 INJECTION, SOLUTION EPIDURAL; INFILTRATION at 09:05

## 2019-05-03 RX ADMIN — MUPIROCIN 1 G: 20 OINTMENT TOPICAL at 12:05

## 2019-05-03 RX ADMIN — SUGAMMADEX 100 MG: 100 INJECTION, SOLUTION INTRAVENOUS at 02:05

## 2019-05-03 RX ADMIN — LOSARTAN POTASSIUM 100 MG: 50 TABLET, FILM COATED ORAL at 08:05

## 2019-05-03 RX ADMIN — SODIUM CHLORIDE, SODIUM GLUCONATE, SODIUM ACETATE, POTASSIUM CHLORIDE, MAGNESIUM CHLORIDE, SODIUM PHOSPHATE, DIBASIC, AND POTASSIUM PHOSPHATE: .53; .5; .37; .037; .03; .012; .00082 INJECTION, SOLUTION INTRAVENOUS at 01:05

## 2019-05-03 RX ADMIN — TRANEXAMIC ACID 500 MG: 100 INJECTION, SOLUTION INTRAVENOUS at 01:05

## 2019-05-03 NOTE — ASSESSMENT & PLAN NOTE
Siomara Flores is a 86 y.o. female with left hip fracture, left distal radius fracture, left proximal humerus fracture closed, NVI. Patient was explained in detail the severity of the injury that was suffered. Patient was explained the risks/benefits/and alternatives to operative management in detail including infection, bleeding, pain, nerve and vascular damage, heterotopic ossification, leg length discrepancies, rotational deformities and they express full understanding. Also, the patient was explained the high mortality, over 30 percent, associated with these fractures. The patient is also  At higher risk as she has a history of SVT and a history of bronchiectasis. She has previously coughed up blood with her lung condition. The patient is a [employer] so she was explained in detail possible requirements of blood products and she declines them. She expresses full understanding of the condition and expresses that they want to proceed with surgery. The patient is admitted to the medicine hip fracture service for optimization of medical comorbidities. Will plan for OR tomorrow. No Guarantees were made, informed consent was obtained. All questions were answered to patient's and family's satisfaction.    -Admitted to medicine hip fracture service   -NPO midnight   -Pain control per primary   -Marked, booked, and consented for surgery   -PT/OT: Bed rest  -DVT PPx: Hold anticoagulation  -Abx: Preop abx ordered   -Labs: pending   -Cuevas: In place, cuevas care   -Iv: ordered for contralateral arm

## 2019-05-03 NOTE — ASSESSMENT & PLAN NOTE
-history of this at home, has had occasional hemoptysis in the past, denies any recently. Continue spiriva, continue duobebs.

## 2019-05-03 NOTE — SUBJECTIVE & OBJECTIVE
Principal Problem:Closed displaced intertrochanteric fracture of left femur    Principal Orthopedic Problem: same    Interval History: Patient seen and examined at bedside.   No acute events overnight.  Pain controlled.  To OR roday for CMN left intertrocjh fx. Also with left prox humerus fracture, left distal radius fracture. Jehovas witness. Hb 8.4       Review of patient's allergies indicates:  No Known Allergies    Current Facility-Administered Medications   Medication    acetaminophen tablet 1,000 mg    acetaminophen tablet 650 mg    albuterol-ipratropium 2.5 mg-0.5 mg/3 mL nebulizer solution 3 mL    atenolol tablet 25 mg    bisacodyl suppository 10 mg    dextrose 50% injection 12.5 g    dextrose 50% injection 25 g    epoetin elizabeth-epbx injection 40,000 Units    glucagon (human recombinant) injection 1 mg    glucose chewable tablet 16 g    glucose chewable tablet 24 g    losartan tablet 100 mg    ondansetron disintegrating tablet 8 mg    ondansetron injection 4 mg    oxyCODONE immediate release tablet 10 mg    oxyCODONE immediate release tablet 5 mg    pregabalin capsule 75 mg    promethazine (PHENERGAN) 6.25 mg in dextrose 5 % 50 mL IVPB    ramelteon tablet 8 mg    senna-docusate 8.6-50 mg per tablet 1 tablet    sodium chloride 0.9% flush 10 mL    sodium chloride 0.9% flush 10 mL    sodium chloride 0.9% flush 5 mL    tiotropium inhalation capsule 18 mcg    verapamil CR tablet 120 mg     Objective:     Vital Signs (Most Recent):  Temp: 96.9 °F (36.1 °C) (05/03/19 0052)  Pulse: 82 (05/03/19 0052)  Resp: 18 (05/03/19 0052)  BP: 128/61 (05/03/19 0052)  SpO2: 95 % (05/03/19 0052) Vital Signs (24h Range):  Temp:  [96.9 °F (36.1 °C)-98.3 °F (36.8 °C)] 96.9 °F (36.1 °C)  Pulse:  [73-88] 82  Resp:  [16-18] 18  SpO2:  [95 %-98 %] 95 %  BP: (110-137)/(60-62) 128/61     Weight: 43.1 kg (95 lb)  Height: 5' (152.4 cm)  Body mass index is 18.55 kg/m².    No intake or output data in the 24 hours ending  05/03/19 0535    Ortho/SPM Exam  Physical Exam:  NAD, A/O x 3.  No focal motor or sensory deficits noted.   Left upper extremity in sling and swathe, removable wrist splint in place    Significant Labs:   CBC:   Recent Labs   Lab 05/02/19 2025   WBC 8.83   HGB 8.4*   HCT 26.9*        CMP:   Recent Labs   Lab 05/02/19 2025   *   K 4.1      CO2 24      BUN 14   CREATININE 0.8   CALCIUM 9.0   PROT 7.2   ALBUMIN 3.3*   BILITOT 0.4   ALKPHOS 63   AST 32   ALT 9*   ANIONGAP 11   EGFRNONAA >60.0     All pertinent labs within the past 24 hours have been reviewed.    Significant Imaging: I have reviewed and interpreted all pertinent imaging results/findings.

## 2019-05-03 NOTE — HOSPITAL COURSE
5/3: admitted overnight for Left femur/Left radial/ Left humerus fracture after mechanical fall. Plan for left hip repair today. Pain still present after only receiving tylenol overnight per patient, didn't know to ask for other pain meds on MAR. Discussed Hg lower than normal baseline (at 8, baseline previously was 12). Received Epo overnight, will check iron/b12/folate tomorrow. Endorses poor nutrition recently due to ill  who passed away a month ago. Discussed risk of bleeding after surgery with low counts to start, discussed she will inevitably drop below Hg of 7 post op where most would be transfused but that she has endorsed not wanting transfusions due to [employer]ology, which still endorses. Received Epo overnight pre-op. Anticoagulation will be risky post op, states she had gi ulcers with aspirin years ago as well. Will discuss more with ortho in regards to risk/benefits post op. NWB LUE to left radial fx in splint. Vit D 46.    5/7 -  POD 4, pt aware Hb is low, and repeats that she does not want a blood transfusion due to [employer] reasons.  I assured her that we would honor her wishes.  She denies SOB, chest pain, or pain related to fractures.  Left arm in sling.   Hb 6/2 -> 6.7.   5/8 - Hb 6.0, stable.  Asymptomatic,  No SOB, No chest pain. Medically ready for dc.  Waiting on placement.    5/9 - walked 5 steps yesterday w PT.  Eating well, slept well,  Hb 6.6.  All is stable.  Accepted to OSNF and will dc today

## 2019-05-03 NOTE — ED PROVIDER NOTES
Encounter Date: 5/2/2019       History     Chief Complaint   Patient presents with    Fall     via EMS, trip and fall 20 minutes ago, left leg shortened and rotated; denies hitting head, LOC; c/o left shoulder pain, left hip pain     Siomara Flores is a 86 y.o. female with past medical history of Bronchiectasis, Hypertension, and Supraventricular tachycardia who presents for mechanical fall. Pt's cement drive way has several shallow holes and her foot got caught in a hole. She tripped and landed on her left shoulder and hip. Denies head injury. Denies LOC, dizziness, HA, vision changes, palpitations, SOB, N/V.         Review of patient's allergies indicates:  No Known Allergies  Past Medical History:   Diagnosis Date    Bronchiectasis without complication     Hypertension     Supraventricular tachycardia      Past Surgical History:   Procedure Laterality Date    CHOLECYSTECTOMY      TONSILLECTOMY       History reviewed. No pertinent family history.  Social History     Tobacco Use    Smoking status: Never Smoker    Smokeless tobacco: Never Used   Substance Use Topics    Alcohol use: No    Drug use: No     Review of Systems   Constitutional:  No Fever  Eyes: No Vision Changes  ENT/Mouth: No sore throat  Cardiovascular:  No Chest Pain  Respiratory:  No SOB  Gastrointestinal:  No Nausea, No Vomiting, No Diarrhea, No abdo pain.  Genitourinary:  No  pain  Musculoskeletal:   + Hip pain and L shoulder pain, No Back Pain, No Neck Pain  Skin:  No skin Lesions  Neuro:  No Weakness, No Numbness, No Paresthesias, No Dizziness, No Headache        Physical Exam     Initial Vitals [05/02/19 1723]   BP Pulse Resp Temp SpO2   110/60 84 16 98.3 °F (36.8 °C) 98 %      MAP       --         Physical Exam    Nursing note and vitals reviewed.  Constitutional: She appears well-developed. No distress.   HENT:   Head: Normocephalic and atraumatic.   Mouth/Throat: Oropharynx is clear and moist.   Eyes: EOM are normal. Pupils are  equal, round, and reactive to light.   Neck: Neck supple. No tracheal deviation present.   No cervical tenderness   Cardiovascular: Normal rate, regular rhythm and intact distal pulses.   Pulses:       Radial pulses are 2+ on the right side, and 2+ on the left side.        Dorsalis pedis pulses are 2+ on the right side, and 2+ on the left side.   Pulmonary/Chest: Breath sounds normal. No respiratory distress. She exhibits no tenderness.   Abdominal: Soft. There is no tenderness.   Musculoskeletal: She exhibits no edema.        Right shoulder: Normal.        Left shoulder: She exhibits decreased range of motion (Pain with passive ROM) and tenderness. She exhibits no swelling and no deformity.        Right elbow: Normal.       Left elbow: She exhibits decreased range of motion (Pain with passive ROM). She exhibits no swelling, no deformity and no laceration. Tenderness found.        Right wrist: Normal.        Left wrist: She exhibits decreased range of motion (mild pain with passive ROM) and bony tenderness. She exhibits no swelling and no laceration.        Left hip: She exhibits decreased range of motion (pain with passive ROM) and tenderness.        Right knee: Normal.        Left knee: She exhibits decreased range of motion (Pain with passive ROM). She exhibits no swelling, no deformity and no laceration. Tenderness found.        Right ankle: Normal.        Left ankle: She exhibits decreased range of motion (Mild Pain with passive ROM). Tenderness.        Cervical back: She exhibits no tenderness.        Thoracic back: She exhibits no tenderness.        Lumbar back: She exhibits no tenderness.   Neurological: She is alert and oriented to person, place, and time. GCS score is 15. GCS eye subscore is 4. GCS verbal subscore is 5. GCS motor subscore is 6.   Skin: Skin is warm and dry. No abrasion and no laceration noted.   Psychiatric: She has a normal mood and affect. Her behavior is normal.         ED Course    Procedures  Labs Reviewed - No data to display  EKG Readings: (Independently Interpreted)   Normal sinus rhythm at 82. No significant ST abnormalities.       Imaging Results    None            Medical Decision Making:   ED Management:  8:25 PM: Discussed case with Ortho and Hospital Medicine. Will admit to Hip Service - Dr. Bowman                Attending Attestation:   Physician Attestation Statement for Resident:  As the supervising MD   Physician Attestation Statement: I have personally seen and examined this patient.   I agree with the above history. -: HPI:   86 year old female with PMHx of supraventricular tachycardia, hypertension, and bronchiectasis presents to the ED following a mechanical fall. Patient states that her cement driveway has multiple holes, one of which she caught her foot in and tripped. Patient states that she landed on her left shoulder and hip, but did not hit her head. Patient denies chest pain, shortness of breath, and abdominal pain.     Patient denies currently taking any anti-coagulants.        As the supervising MD I agree with the above PE.   -: Physical Exam:   GENERAL APPEARANCE: Well developed, well nourished, in no acute distress.   HENT: Normocephalic, atraumatic     EYES: Sclerae anicteric   NECK: Supple, no thyroid enlargement   CARDIOVASCULAR: Regular rate and rhythm without any murmurs, gallops, rubs.   LUNGS: Speaking in full sentences. Breathing comfortably. Auscultation of the lungs revealed normal breath sounds b/l   ABDOMEN: Soft and nontender, no masses, no rebound or guarding   NEUROLOGIC: Alert, interacting normally. No facial droop. Distally neuro intact to left lower and upper extremity.   MSK: Left shoulder and left hip tenderness. Distally neuro-vasc intact. There is no C-T-L spine TTP. No tenderness or deformities along the right upper and lower extremities.   Skin: Warm and dry. No visible rash on exposed areas of skin.     Psych: Mood and affect normal.         As the supervising MD I agree with the above treatment, course, plan, and disposition.   -: MDM:   Mechanical fall with left shoulder and left hip pain. Will xray as appropriate and reassess.     10:40 PM   Found to have left humoral hip fracture.  Also with left proximal humerus fracture.  Will get pre-op labs, consult ortho, and admit to hip fracture team.      MDM Complexity Points:   Problem Points:  1.New problem, with no additional ED work-up planned (maximum of 1) - left hip, left shoulder pain    Data Points:  Review or order clinical lab tests, Review or order radiology test, Decision to obtain old records (in the EHR) and Discuss test with performing physician/consulting physician    Risk:  High Risk                           Clinical Impression:       ICD-10-CM ICD-9-CM   1. Closed fracture of left hip, initial encounter S72.002A 820.8   2. Fall W19.XXXA E888.9   3. Hip fracture S72.009A 820.8   4. Pre-op chest exam Z01.811 V72.82   5. Closed displaced intertrochanteric fracture of left femur, initial encounter S72.142A 820.21   6. Acute blood loss anemia D62 285.1   7. Bronchiectasis without complication J47.9 494.0   8. Fall, initial encounter W19.XXXA E888.9   9. Essential hypertension I10 401.9   10. Microcytic anemia D50.9 280.9   11. Refusal of blood transfusions as patient is Tenriism Z53.1 V62.6   12. Postprocedural hypotension I95.81 458.29                                Andrade Sky MD  05/06/19 1534

## 2019-05-03 NOTE — ASSESSMENT & PLAN NOTE
-baseline Hg of 12 in 2016, admitted with mild microcytosis and Hg of 8.4, patient endorses poor oral intake recently but denies dark stools, blood in stools, hemoptysis from bronchiectasis prior to admit.  -ferritin/b12/folate added on for labs tomorrow, minimize lab draws due to refusal of blood products  -is going to inevitably drop below 7 post op from blood loss anemia. If iron deficient will start replacement pending labs.   -supportive care per [facility] policies and patient preference/wishes. Currently does not want transfusions if needed when discussed with mds in ER and with me today

## 2019-05-03 NOTE — PLAN OF CARE
Patient Hg drop from 8.3 to 6.9 post op. Not unexpected post op. Due to jehovahs witness does not want transfusion. Will give epo again now.

## 2019-05-03 NOTE — SUBJECTIVE & OBJECTIVE
Past Medical History:   Diagnosis Date    Bronchiectasis without complication     Hypertension     Supraventricular tachycardia        Past Surgical History:   Procedure Laterality Date    CHOLECYSTECTOMY      TONSILLECTOMY         Review of patient's allergies indicates:  No Known Allergies    Current Facility-Administered Medications   Medication    sodium chloride 0.9% flush 10 mL     Current Outpatient Medications   Medication Sig    albuterol (PROVENTIL) 2 MG tablet Take 2 mg by mouth 3 (three) times daily.      albuterol (PROVENTIL) 2.5 mg /3 mL (0.083 %) nebulizer solution     atenolol (TENORMIN) 25 MG tablet Take 1 tablet (25 mg total) by mouth once daily.    CALCIUM CARBONATE/VITAMIN D3 (CALCIUM 600 + D,3, ORAL) Take 1 tablet by mouth once daily.    doxycycline (VIBRA-TABS) 100 MG tablet Take 1 tablet by mouth 2 (two) times daily.    IPRATROPIUM BROMIDE, BULK, MISC by Misc.(Non-Drug; Combo Route) route.    losartan (COZAAR) 100 MG tablet Take 100 mg by mouth once daily.      verapamil (CALAN-SR) 240 MG CR tablet TAKE 1 TABLET BY MOUTH IN THE EVENING     Family History     None        Tobacco Use    Smoking status: Never Smoker    Smokeless tobacco: Never Used   Substance and Sexual Activity    Alcohol use: No    Drug use: No    Sexual activity: Not on file     ROS   Per ED ROS 5/2/19  Objective:     Vital Signs (Most Recent):  Temp: 98.3 °F (36.8 °C) (05/02/19 1723)  Pulse: 84 (05/02/19 1723)  Resp: 16 (05/02/19 1723)  BP: 110/60 (05/02/19 1723)  SpO2: 98 % (05/02/19 1723) Vital Signs (24h Range):  Temp:  [98.3 °F (36.8 °C)] 98.3 °F (36.8 °C)  Pulse:  [84] 84  Resp:  [16] 16  SpO2:  [98 %] 98 %  BP: (110)/(60) 110/60     Weight: 43.1 kg (95 lb)  Height: 5' (152.4 cm)  Body mass index is 18.55 kg/m².    No intake or output data in the 24 hours ending 05/02/19 2055    Ortho/SPM Exam    Vitals: Afebrile.  Vital signs stable.  General: No acute distress.  Cardio: Regular rate.  Chest: No  increased work of breathing.    LLE  Shortened, skin intact with mod swelling over lateral hip  Pain with Log roll of leg  No bony TTP throughout  Compartments soft  Painless ROM ankle   SILT Sa/[place]/DP/SP/T  Motor intact TA/SP/DP  1+ DP and PT     RLE:  Skin intact, no deformity  No TTP  Compartments soft  Full painless ROM throughout lower extremity  SILT Sa/[place]/DP/SP/T  Motor intact TA/SP/DP  1+ DP/PT    LUE:   Skin intact  no deformity  no ecchymoses  no swelling  TTP diffusely over shoudler  Compartments soft and compressible  Pain with ROM left wrist, left shoulder, left elbow  SILT M/R/U  Motor intact AIN/PIN/U  Brisk cap refill  Warm well perfused extremities  2+ Radial palpable    RUE:  Skin intact, no deformity noted  No open wounds/abrasions/crepitus  No bony TTP  FROM shoulder, elbow and wrist  SILT M/U/R  Motor intact AIN/PIN/M/U/R   Cap refill < 2s  2+ RP      Spine: No TTP along spine, no step offs palpated. 2+ sacral decubitus ulcers      Significant Labs:   CBC:   Recent Labs   Lab 05/02/19 2025   WBC 8.83   HGB 8.4*   HCT 26.9*        CMP:pending  All pertinent labs within the past 24 hours have been reviewed.    Significant Imaging: I have reviewed all pertinent imaging results/findings.   Xray left hip: Displaced intertrochanteric fracture  Xray left shoulder: Non displaced proximal humerus fracture  Xray left wrist: non displaced distal radius fracture  Xray left forearm, left humerus, left femur left ankle WNL except as above.

## 2019-05-03 NOTE — ANESTHESIA PROCEDURE NOTES
Suprainguinal Fascia Iliaca Catheter    Patient location during procedure: OR   Block not for primary anesthetic.  Reason for block: at surgeon's request and post-op pain management   Post-op Pain Location: L hip pain  Start time: 5/3/2019 12:51 PM  Timeout: 5/3/2019 12:49 PM   End time: 5/3/2019 12:56 PM  Staffing  Anesthesiologist: Campos Jiang MD  Performed: anesthesiologist   Preanesthetic Checklist  Completed: patient identified, site marked, surgical consent, pre-op evaluation, timeout performed, IV checked, risks and benefits discussed and monitors and equipment checked  Peripheral Block  Patient position: supine  Prep: ChloraPrep and site prepped and draped  Patient monitoring: heart rate, cardiac monitor, continuous pulse ox, continuous capnometry and frequent blood pressure checks  Block type: fascia iliaca (Suprainguinal fascia iliaca)  Laterality: left  Injection technique: continuous  Needle  Needle type: Tuohy   Needle gauge: 17 G  Needle length: 3.5 in  Needle localization: anatomical landmarks and ultrasound guidance  Catheter type: spring wound  Catheter size: 19 G  Test dose: lidocaine 1.5% with Epi 1-to-200,000 and negative   -ultrasound image captured on disc.  Assessment  Injection assessment: negative aspiration, negative parasthesia and local visualized surrounding nerve  Paresthesia pain: none  Heart rate change: no  Slow fractionated injection: yes  Additional Notes  VSS.  Patient tolerated procedure well.  30 mL 0.25% ropivicaine used

## 2019-05-03 NOTE — PROGRESS NOTES
Pt arrived to SICU with PACU team. Pt connected to monitor and assessed. Pt is awake and alert to person but not place or time. Pt will follow commands and move bilateral lower extremities, but only fingers on upper extremities. Pt has a sling on the left arm and a perineural catheter in her left hip. Gtts: phenylephrine at 0.8 mck/kg/min and ropivacaine at 2 mg/ml. Pt has thick secretions that require suctioning often. Will continue to monitor pt.

## 2019-05-03 NOTE — OP NOTE
DATE OF PROCEDURE:  05/03/2019    PREOPERATIVE DIAGNOSIS:  Left intertrochanteric femur fracture.    POSTOPERATIVE DIAGNOSIS:  Left intertrochanteric femur fracture.    PROCEDURE PERFORMED:  Cephalomedullary nail fixation of left intertrochanteric   femur fracture, 95028.    SURGEON:  Teodoro Bruce M.D.    ASSISTANT:  Kye Black.    ANESTHESIA:  General endotracheal.    ESTIMATED BLOOD LOSS:  75 mL.    IV FLUIDS:  1500 mL crystalloid.    IMPLANTS:  Synthes trochanteric fixation nail advanced 11 x 360 mm with 80 mm   hip screw and single distal interlocking screw.    INDICATIONS FOR PROCEDURE:  The patient is an 86-year-old female who fell from a   standing height resulting in an osteoporotic pathologic left intertrochanteric   femur fracture.  The patient is a Restorationism and adamantly refuses any   type of blood transfusion and has hemoglobin of 8.4 preoperatively.  We   discussed at length with the patient and she wishes no blood products.  The   risks, benefits, and alternatives of surgery including increased risk for   perioperative complications and death given the desire to not receive blood if   necessary were discussed at length with the patient's family prior to going to   the Operating Room.  Informed consent was obtained.    PROCEDURE IN DETAIL:  The patient was identified in the preoperative holding   area and the site was marked.  Regional analgesia was performed.  The patient   was wheeled in the Operating Room and general endotracheal anesthesia was   induced in the patient's hospital bed.  Preoperative antibiotics were   administered.  The patient was then placed on to the [place] fracture table.  All   bony prominences were well padded and both lower extremities were in traction   boots.  A timeout was undertaken to confirm patient, site, side, surgery,   surgeon and administration of preoperative antibiotics.  All agreed and we   proceeded.    Closed reduction maneuver was performed on the  table getting alignment of the   femur.  The left hip and lower extremity were prepped and draped in sterile   fashion.  A starting incision was made proximal to the greater trochanter.  The   entry guidewire was placed and the entry reamer used.  A reaming marc was passed   distally, measured and a 380 mm nail was appropriate.  A single pass was made   with a 12.5 mm reamer and then the 380 mm nail was placed, but appear to be long   up top going little bit anterior in the femur.  I removed this and placed 360   mm nail, which fit quite nicely and direct lateral visualization distally in the   canal.  I turned my attention proximally to the hip, placed the nail in its   final height and made a separate lateral incision, placed the guidewire for the   hip screw in a center-center position in the femoral head.  This was remeasured   and a screw was placed and the fracture compressed through the insertion handle.    Proximal cephalic anti-rotation screw was tightened and backed off a little   bit to allow for controlled collapse.    Attention was then turned distally and a single distal locking screw was placed   without difficulty.  The wounds were all copiously irrigated with normal saline   solution.  Deep fascia proximally closed with 0 Vicryl suture and all wounds   closed with 3-0 Vicryl inverted sutures in the subcutaneous tissue and 3-0 nylon   suture in the skin.  Sterile dressings were applied.    All instrument and sponge counts were reported correct at the end of the case.    There were no complications.  The patient was returned to supine position on the   hospital bed, extubated, awakened, and taken to Recovery in stable condition.    PLAN FOR THE PATIENT:  She may need physical and occupational therapy,   weightbearing as tolerated.      JOVANA/IN  dd: 05/03/2019 14:47:37 (CDT)  td: 05/03/2019 17:52:55 (CDT)  Doc ID   #[id number]  Job ID #[id number]    CC:

## 2019-05-03 NOTE — ASSESSMENT & PLAN NOTE
-will be an issue post op. epo is allowed, given pre op. Minimize blood draws. Use pediatric tubes if needed

## 2019-05-03 NOTE — SUBJECTIVE & OBJECTIVE
[facility]  Critical Care - Surgery  History & Physical        Code Status: Full Code      SURGERY/PROCEDURE: Procedure(s) (LRB):  INSERTION, INTRAMEDULLARY MARTHA, FEMUR (Left)     CC: Closed displaced intertrochanteric fracture of left femur     SUBJECTIVE:      History of Present Illness:  Ms. 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      She presented to [facility] ED after a fall, and was found to have a left intertrochanteric, left proximal humerus, and left distal radius fracture.      Post-op H/H 6.9/21.8 ( baseline 8.4/26.9).     Interval history  Patient seen and examined at bedside.  No acute events overnight.  Pain controlled.  Off pressors and HDS    PTA Medications   Medication Sig    atenolol (TENORMIN) 25 MG tablet Take 1 tablet (25 mg total) by mouth once daily.    albuterol (PROVENTIL) 2 MG tablet Take 2 mg by mouth 3 (three) times daily.      albuterol (PROVENTIL) 2.5 mg /3 mL (0.083 %) nebulizer solution      CALCIUM CARBONATE/VITAMIN D3 (CALCIUM 600 + D,3, ORAL) Take 1 tablet by mouth once daily.    doxycycline (VIBRA-TABS) 100 MG tablet Take 1 tablet by mouth 2 (two) times daily.    IPRATROPIUM BROMIDE, BULK, MISC by Misc.(Non-Drug; Combo Route) route.    losartan (COZAAR) 100 MG tablet Take 100 mg by mouth once daily.      verapamil (CALAN-SR) 240 MG CR tablet TAKE 1 TABLET BY MOUTH IN THE EVENING     Continuous Infusions:    phenylephrine Stopped (05/04/19 0200)    ropivacaine (PF) 2 mg/ml (0.2%) 2 mL/hr (05/05/19 0600)       Scheduled Meds:    acetaminophen  1,000 mg Oral Q6H    albuterol-ipratropium  3 mL Nebulization Q4H WAKE    atenolol  25 mg Oral Daily    epoetin elizabeth-ebpx (RETACRIT) injection  40,000 Units Subcutaneous Once    losartan  100 mg Oral Daily    mupirocin  1 g Nasal BID    polyethylene glycol  17 g Oral Daily     pregabalin  75 mg Oral QHS    senna-docusate 8.6-50 mg  1 tablet Oral BID    tiotropium  1 capsule Inhalation Daily    verapamil  80 mg Oral TID       PRN Meds: acetaminophen, bisacodyl, dextrose 50%, dextrose 50%, glucagon (human recombinant), glucose, glucose, methocarbamol, ondansetron, ondansetron, oxyCODONE, promethazine (PHENERGAN) IVPB, ramelteon, senna-docusate 8.6-50 mg, sodium chloride 0.9%, sodium chloride 0.9%, sodium chloride 0.9%    Review of patient's allergies indicates:   Allergen Reactions    Aspirin (bulk) Other (See Comments)     Developed gastric ulcer in past when taking aspirin per patient       Past Medical History:   Diagnosis Date    Bronchiectasis without complication     Hypertension     Supraventricular tachycardia      Past Surgical History:   Procedure Laterality Date    CHOLECYSTECTOMY      TONSILLECTOMY       History reviewed. No pertinent family history.  Social History     Tobacco Use    Smoking status: Never Smoker    Smokeless tobacco: Never Used   Substance Use Topics    Alcohol use: No    Drug use: No          Review of Systems:  Constitutional: no fever or chills  Eyes: no visual changes  ENT: no nasal congestion or sore throat  Respiratory: no cough or shortness of breath  Cardiovascular: no chest pain or palpitations  Gastrointestinal: no nausea or vomiting, no abdominal pain or change in bowel habits  Genitourinary: no hematuria or dysuria  Musculoskeletal: no arthralgias or myalgias  Neurological: no seizures or tremors  Behavioral/Psych: no auditory or visual hallucinations    OBJECTIVE:     Vital Signs (Most Recent)  Temp: 98.3 °F (36.8 °C) (05/05/19 0300)  Pulse: 92 (05/05/19 0645)  Resp: (!) 22 (05/05/19 0645)  BP: 138/60 (05/05/19 0600)  SpO2: 100 % (05/05/19 0645)         Physical Exam:  Physical Exam   Constitutional: She is oriented to person, place, and time. She appears well-developed and well-nourished.   Eyes: Pupils are equal, round, and reactive  to light. EOM are normal.   Cardiovascular: Normal rate, regular rhythm and normal heart sounds.   Pulmonary/Chest: Effort normal and breath sounds normal.   Abdominal: Soft. Bowel sounds are normal.   Musculoskeletal: incision dressings c/d/i. Left upper extremity in sling. M/R/U sensation intact. AIN/PIN/ intact  Neurological: She is alert and oriented to person, place, and time.   Skin: Skin is warm and dry.   Psychiatric: She has a normal mood and affect. Her behavior is normal.         Lines/Drains:       Peripheral IV - Single Lumen 05/02/19 2048 20 G Right Forearm (Active)   Site Assessment Clean;Dry;Intact;No redness;No swelling 5/4/2019  3:01 AM   Line Status Infusing 5/4/2019  3:01 AM   Dressing Status Clean;Dry;Intact 5/4/2019  3:01 AM   Dressing Intervention Dressing reinforced 5/4/2019  3:01 AM   Dressing Change Due 05/06/19 5/4/2019  3:01 AM   Site Change Due 05/06/19 5/3/2019  7:01 PM   Reason Not Rotated Not due 5/4/2019  3:01 AM   Number of days: 1            Urethral Catheter 05/03/19 1200 (Active)   Site Assessment Clean;Intact 5/4/2019  3:01 AM   Collection Container Urimeter 5/4/2019  3:01 AM   Securement Method secured to top of thigh w/ adhesive device 5/4/2019  3:01 AM   Reason for Continuing Urinary Catheterization Post operative 5/4/2019  3:01 AM   Output (mL) 35 mL 5/4/2019  6:00 AM   Number of days: 0       Laboratory  ABG: No results for input(s): PH, PO2, PCO2, HCO3, POCSATURATED, BE in the last 24 hours.  BMP:  Recent Labs   Lab 05/04/19  2223      K 4.1      CO2 27   BUN 17   CREATININE 0.7      MG 2.0   PHOS 2.8     LFT:   Lab Results   Component Value Date     (H) 05/04/2019    ALT 51 (H) 05/04/2019    ALKPHOS 193 (H) 05/04/2019    BILITOT 0.6 05/04/2019    ALBUMIN 2.5 (L) 05/04/2019    PROT 6.0 05/04/2019     CBC:   Lab Results   Component Value Date    WBC 11.53 05/04/2019    HGB 6.6 (L) 05/04/2019    HCT 20.9 (L) 05/04/2019    MCV 81 (L) 05/04/2019      05/04/2019     Microbiology last 7d:   Microbiology Results (last 7 days)     ** No results found for the last 168 hours. **          I

## 2019-05-03 NOTE — ED NOTES
Telemetry Verification   Patient placed on Telemetry Box  Verified with War Room  Box # [id number]   Monitor Tech [place]   Rate 90   Rhythm NSR

## 2019-05-03 NOTE — PLAN OF CARE
To OR roday for CMN left intertroch fx. This CM met with patient at bedside. Patient is a  who lives alone has no children, she has a sister-in-law by marriage(Jesi). Patient could not remember Jesi phone number, she ask that I call her friend Minal on file to obtain Jesi number. This CM contact Minal who states she would call back with Jesi ([phone]) number. Patient states she has nieces that check on her but no one to care for her at home. SNF consult  sent OSNF.      [facility] - [place], [place] - [address]  [address]  [place] [address]  Phone: [phone] Fax: [phone]    Payor: [employer] / Plan: [employer] 65 / Product Type: Medicare Advantage /        05/03/19 0905   Discharge Assessment   Assessment Type Discharge Planning Assessment   Confirmed/corrected address and phone number on facesheet? Yes   Assessment information obtained from? Patient   Expected Length of Stay (days) 3   Communicated expected length of stay with patient/caregiver no   Prior to hospitilization cognitive status: Alert/Oriented   Prior to hospitalization functional status: Independent   Current cognitive status: Alert/Oriented   Current Functional Status: Needs Assistance;Assistive Equipment   Facility Arrived From:   (arrived via ambulance from home)   Lives With alone   Able to Return to Prior Arrangements no   Is patient able to care for self after discharge? Unable to determine at this time (comments)   Who are your caregiver(s) and their phone number(s)?   (friend Minal [phone])   Patient's perception of discharge disposition other (comments)  (placement in a facility)   Readmission Within the Last 30 Days no previous admission in last 30 days   Patient currently being followed by outpatient case management? No   Patient currently receives any other outside agency services? No   Equipment Currently Used at Home none   Do you have any problems affording any  of your prescribed medications? No   Is the patient taking medications as prescribed? yes   Does the patient have transportation home? Yes   Transportation Anticipated family or friend will provide   Does the patient receive services at the [facility]mad[facility] Clinic? No   Discharge Plan A Skilled Nursing Facility   Discharge Plan B Rehab   DME Needed Upon Discharge  other (see comments)  (TBD)   Patient/Family in Agreement with Plan yes     Alisa Nguyen RN/BSN/CM  [facility]  [phone]  Ortho/[facility]   SNF.

## 2019-05-03 NOTE — PROGRESS NOTES
[facility]  Orthopedics  Progress Note    Patient Name: Siomara Flores  MRN: [id number]  Admission Date: 5/2/2019  Hospital Length of Stay: 1 days  Attending Provider: Lily Bowman MD  Primary Care Provider: Vince Wray MD  Follow-up For: Procedure(s) (LRB):  ORIF, FRACTURE, FEMUR, INTERTROCHANTERIC- left intertroch- hana table- C arm clock side - synthes (Left)    Post-Operative Day:    Subjective:     Principal Problem:Closed displaced intertrochanteric fracture of left femur    Principal Orthopedic Problem: same    Interval History: Patient seen and examined at bedside.   No acute events overnight.  Pain controlled.  To OR roday for CMN left intertrocjh fx. Also with left prox humerus fracture, left distal radius fracture. Jehovas witness. Hb 8.4       Review of patient's allergies indicates:  No Known Allergies    Current Facility-Administered Medications   Medication    acetaminophen tablet 1,000 mg    acetaminophen tablet 650 mg    albuterol-ipratropium 2.5 mg-0.5 mg/3 mL nebulizer solution 3 mL    atenolol tablet 25 mg    bisacodyl suppository 10 mg    dextrose 50% injection 12.5 g    dextrose 50% injection 25 g    epoetin elizabeth-epbx injection 40,000 Units    glucagon (human recombinant) injection 1 mg    glucose chewable tablet 16 g    glucose chewable tablet 24 g    losartan tablet 100 mg    ondansetron disintegrating tablet 8 mg    ondansetron injection 4 mg    oxyCODONE immediate release tablet 10 mg    oxyCODONE immediate release tablet 5 mg    pregabalin capsule 75 mg    promethazine (PHENERGAN) 6.25 mg in dextrose 5 % 50 mL IVPB    ramelteon tablet 8 mg    senna-docusate 8.6-50 mg per tablet 1 tablet    sodium chloride 0.9% flush 10 mL    sodium chloride 0.9% flush 10 mL    sodium chloride 0.9% flush 5 mL    tiotropium inhalation capsule 18 mcg    verapamil CR tablet 120 mg     Objective:     Vital Signs (Most Recent):  Temp: 96.9 °F (36.1 °C)  (05/03/19 0052)  Pulse: 82 (05/03/19 0052)  Resp: 18 (05/03/19 0052)  BP: 128/61 (05/03/19 0052)  SpO2: 95 % (05/03/19 0052) Vital Signs (24h Range):  Temp:  [96.9 °F (36.1 °C)-98.3 °F (36.8 °C)] 96.9 °F (36.1 °C)  Pulse:  [73-88] 82  Resp:  [16-18] 18  SpO2:  [95 %-98 %] 95 %  BP: (110-137)/(60-62) 128/61     Weight: 43.1 kg (95 lb)  Height: 5' (152.4 cm)  Body mass index is 18.55 kg/m².    No intake or output data in the 24 hours ending 05/03/19 0535    Ortho/SPM Exam  Physical Exam:  NAD, A/O x 3.  No focal motor or sensory deficits noted.   Left upper extremity in sling and swathe, removable wrist splint in place    Significant Labs:   CBC:   Recent Labs   Lab 05/02/19 2025   WBC 8.83   HGB 8.4*   HCT 26.9*        CMP:   Recent Labs   Lab 05/02/19 2025   *   K 4.1      CO2 24      BUN 14   CREATININE 0.8   CALCIUM 9.0   PROT 7.2   ALBUMIN 3.3*   BILITOT 0.4   ALKPHOS 63   AST 32   ALT 9*   ANIONGAP 11   EGFRNONAA >60.0     All pertinent labs within the past 24 hours have been reviewed.    Significant Imaging: I have reviewed and interpreted all pertinent imaging results/findings.    Assessment/Plan:     * Closed displaced intertrochanteric fracture of left femur  Siomara Flores is a 86 y.o. female with left hip fracture, left distal radius fracture, left proximal humerus fracture closed, NVI.     -Admitted to medicine hip fracture service   - Marked and consented, to OR today for CMN left hip.  - NPO, NWB  - Jehovas witness, refuses blood products. Is at higher rish for surgical complicationg given inability to transfuse if needed.   - Labs: Hb 8.4 plt 232 INR 1      Closed fracture of left proximal humerus  See above    Closed fracture of left distal radius  See above          Kye Black MD  Orthopedics  [facility]

## 2019-05-03 NOTE — ASSESSMENT & PLAN NOTE
Assessment & Plan     86 y.o. female w/ a significant PMHx of HTN, bronchiectasis, SVT(stable on verapamil) who presented to ED after a fall; found to have left intertrochanteric, left proximal humerus, and left distal radius fracture. Pt is now s/p Left intramedullary marc for closed displaced intertrochanteric fracture.      Neuro:   -  AAOx3  - Pain control with tylenol, lyrica, oxy 5 prn  - SIFI PNC w/ ropiv     Pulmonary:   - Hx bronchiectasis  - SpO2 100% on RA  - Continue Duonebs     Cardiac:   - HDS  - Hx SVT, HTN  - Cont verapamil, losartan  - pressors off     Renal:    - BUN/Cr 11/0.7  - Monitor UOP          ID:   - Afebrile  - WBC 11; likely 2/2 post-op inflammation  - Cont to monitor     Hem/Onc:   - H/H  6.69 ( baseline 8.4/26.9)  - Avoid IVF if possible per primary  - Pediatric blood draws only     Endocrine:    - BG stable  - Cont to monitor  -  (24); ALT 82 (7) - monitor     Fluids/Electrolytes/Nutrition/GI:   - Replace electrolytes PRN  - Advance diet as tolerated     PPx:  - Bowel: bisacodyl prn, senna doc prn        DISPO:  - Continue SICU care        Critical care was time spent personally by me on the following activities: development of treatment plan with patient or surrogate and bedside caregivers, discussions with consultants, evaluation of patient's response to treatment, examination of patient, ordering and performing treatments and interventions, ordering and review of laboratory studies, ordering and review of radiographic studies, pulse oximetry, re-evaluation of patient's condition. This critical care time did not overlap with that of any other provider or involve time for any procedures.

## 2019-05-03 NOTE — HPI
Siomara Flores is a 86 y.o. female  with history of bronchiectasis, osteoporosis untreated, and SVT and Temple presents with Left intertrochanteric, left proximal humerus, left distal radius fx after fall from standing onto the driveway while checking her mail. she reported immediate pain and inability to bear weight with the left arm or left leg. the patient denies head trauma or LOC. Patient denies numbness or tingling in extremitiy. She denies pain anywhere else. Patient lives at home alone, makes her own medical decisions, and ambulates independently. Does not take anticoagulation medication.  Of note, patient's   one month ago and patient is a Temple and refuses blood products should they be necessary.

## 2019-05-03 NOTE — ASSESSMENT & PLAN NOTE
Siomara Flores is a 86 y.o. female with left hip fracture, left distal radius fracture, left proximal humerus fracture closed, NVI.     -Admitted to medicine hip fracture service   - Marked and consented, to OR today for CMN left hip.  - NPO, NWB  - Jehovas witness, refuses blood products. Is at higher rish for surgical complicationg given inability to transfuse if needed.   - Labs: Hb 8.4 plt 232 INR 1

## 2019-05-03 NOTE — SUBJECTIVE & OBJECTIVE
Interval History: pain not controlled overnight with tylenol. To OR today, see hosp course.    Review of Systems   Constitutional: Positive for activity change and appetite change. Negative for chills, diaphoresis, fever and unexpected weight change.   HENT: Negative for congestion, dental problem, facial swelling, postnasal drip, rhinorrhea, sinus pressure and sinus pain.    Eyes: Negative for photophobia, pain and redness.   Respiratory: Positive for cough. Negative for shortness of breath and wheezing.    Cardiovascular: Negative for chest pain, palpitations and leg swelling.   Gastrointestinal: Negative for abdominal distention, abdominal pain, anal bleeding and blood in stool.   Endocrine: Negative for cold intolerance and heat intolerance.   Genitourinary: Negative for difficulty urinating, dysuria, hematuria, menstrual problem and pelvic pain.   Musculoskeletal: Positive for arthralgias, gait problem and myalgias.   Skin: Positive for wound. Negative for color change and pallor.   Neurological: Negative for dizziness, light-headedness, numbness and headaches.   Hematological: Negative for adenopathy. Does not bruise/bleed easily.   Psychiatric/Behavioral: Positive for dysphoric mood and sleep disturbance. The patient is not nervous/anxious.      Objective:     Vital Signs (Most Recent):  Temp: 96.9 °F (36.1 °C) (05/03/19 0725)  Pulse: 85 (05/03/19 0813)  Resp: 18 (05/03/19 0725)  BP: (!) 142/63 (05/03/19 0725)  SpO2: 97 % (05/03/19 0725) Vital Signs (24h Range):  Temp:  [96.9 °F (36.1 °C)-98.3 °F (36.8 °C)] 96.9 °F (36.1 °C)  Pulse:  [73-88] 85  Resp:  [16-18] 18  SpO2:  [95 %-98 %] 97 %  BP: (110-142)/(60-63) 142/63     Weight: 43.1 kg (95 lb)  Body mass index is 18.55 kg/m².  No intake or output data in the 24 hours ending 05/03/19 1019   Physical Exam   Constitutional: She is oriented to person, place, and time. No distress.   Thin body habitus.    HENT:   Head: Normocephalic and atraumatic.    Mouth/Throat: No oropharyngeal exudate.   Eyes: Pupils are equal, round, and reactive to light. Conjunctivae and EOM are normal. No scleral icterus.   Neck: Normal range of motion. Neck supple. No JVD present. No tracheal deviation present. No thyromegaly present.   Cardiovascular: Normal rate, regular rhythm, normal heart sounds and intact distal pulses.   No murmur heard.  Pulmonary/Chest: Effort normal and breath sounds normal. No stridor. No respiratory distress. She has no wheezes. She exhibits no tenderness.   Abdominal: Soft. Bowel sounds are normal. She exhibits no distension and no mass. There is no tenderness. There is no guarding.   Musculoskeletal:   LUE in sling, LLE shortened compared to RLE, some bruising along hip, tender to palpation.pulses intact. Warm to touch   Lymphadenopathy:     She has no cervical adenopathy.   Neurological: She is alert and oriented to person, place, and time. She displays normal reflexes. No cranial nerve deficit or sensory deficit. She exhibits normal muscle tone.   Skin: Skin is warm and dry. Capillary refill takes less than 2 seconds. She is not diaphoretic. No erythema. No pallor.   Psychiatric: She has a normal mood and affect. Her behavior is normal. Judgment and thought content normal.   Sad when talking about recent death of  but appropriate.       Significant Labs:   CBC:   Recent Labs   Lab 05/02/19 2025 05/03/19  0604   WBC 8.83 8.83   HGB 8.4* 8.0*   HCT 26.9* 25.2*    202     CMP:   Recent Labs   Lab 05/02/19 2025 05/03/19  0604   * 134*   K 4.1 4.1    98   CO2 24 29    108   BUN 14 11   CREATININE 0.8 0.7   CALCIUM 9.0 9.2   PROT 7.2 6.7   ALBUMIN 3.3* 3.0*   BILITOT 0.4 0.5   ALKPHOS 63 61   AST 32 24   ALT 9* 7*   ANIONGAP 11 7*   EGFRNONAA >60.0 >60.0     Coagulation:   Recent Labs   Lab 05/02/19 2025   INR 1.0     Urine Studies:   Recent Labs   Lab 05/02/19 2242   COLORU Yellow   APPEARANCEUA Clear   PHUR 5.0    SPECGRAV 1.010   PROTEINUA Negative   GLUCUA Negative   KETONESU Trace*   BILIRUBINUA Negative   OCCULTUA Negative   NITRITE Negative   LEUKOCYTESUR Negative       Significant Imaging: I have reviewed all pertinent imaging results/findings within the past 24 hours.

## 2019-05-03 NOTE — ED NOTES
Patient identifiers verified and correct for Siomara Flores.  LOC: The patient is awake, alert and aware of environment with an appropriate affect, the patient is oriented x 3 and speaking appropriately.   APPEARANCE: Patient appears comfortable and in no acute distress, patient is clean and well groomed.  SKIN: The skin is warm and dry, color consistent with ethnicity, patient has normal skin turgor and moist mucus membranes, skin intact, no breakdown noted. Pt has petechiae to BLE.   MUSCULOSKELETAL: Patient LLE shortened and rotated pulses intact, no swelling noted, c/o L shoulder pain.   RESPIRATORY: Airway is open and patent, respirations are spontaneous, patient has a normal effort and rate, no accessory muscle use noted, pt placed on continuous pulse ox with O2 sats noted at 98% on 2L/min NC.  CARDIAC: Pt in hallway.  Patient has a normal rate, no edema noted, capillary refill < 3 seconds.   GASTRO: Soft and non tender to palpation, no distention noted, normoactive bowel sounds present in all four quadrants. Pt states bowel movements have been regular.  : Pt denies any pain or frequency with urination.  NEURO: Pt opens eyes spontaneously, behavior appropriate to situation, follows commands, facial expression symmetrical, bilateral hand grasp equal and even, purposeful motor response noted, normal sensation in all extremities when touched with a finger.

## 2019-05-03 NOTE — TRANSFER OF CARE
Anesthesia Transfer of Care Note    Patient: Siomara Flores    Procedure(s) Performed: Procedure(s) (LRB):  INSERTION, INTRAMEDULLARY MARTHA, FEMUR (Left)    Patient location: PACU    Anesthesia Type: general    Transport from OR: Transported from OR on 6-10 L/min O2 by face mask with adequate spontaneous ventilation    Post pain: adequate analgesia    Post assessment: no apparent anesthetic complications and tolerated procedure well    Post vital signs: stable    Level of consciousness: sedated    Nausea/Vomiting: no nausea/vomiting    Complications: none    Transfer of care protocol was followed      Last vitals:   Visit Vitals  /62 (BP Location: Right arm, Patient Position: Lying)   Pulse 87   Temp 36.3 °C (97.4 °F) (Oral)   Resp 16   Ht 5' (1.524 m)   Wt 43.1 kg (95 lb)   SpO2 95%   Breastfeeding? No   BMI 18.55 kg/m²

## 2019-05-03 NOTE — H&P
Hospital Medicine  History and Physical      Patient Name: Siomara Flores  MRN:  [id number]  [facility] Medicine Team: [facility] Irma Jauregui MD  Date of Admission:  5/2/2019     Principal Problem:  Closed displaced intertrochanteric fracture of left femur   Primary Care Physician: Vince Wray MD       History of Present Illness:    Ms. Siomara Flores is a 86 y.o. female Voodoo with bronchiectasis and SVT who presents to the ED for evaluation of left hip pain sustained after a fall.  She was walking in her driveway which is full of cement holes, when she lost her footing and fell, landing on her left hip and left shoulder.  After, she was unable to bear weight or stand.  She denies hitting her head of any LOC.  She denies any numbness or tingling in her extremities.  She endorses pain in her shoulder and her hip.  She denies any chest pain or SOB.  She is a Voodoo, and refuses any blood products.    In the ED she was evaluated by Ortho, where she was found to have a left intertrochanteric, left proximal humerus, and left distal radius fracture.  She was admitted to Hospital Medicine for further management.      Review Of Systems:   Constitutional: Negative for chills, fatigue, fever.   HENT: Negative for sore throat, trouble swallowing.    Eyes: Negative for photophobia, visual disturbance.   Respiratory: Negative for cough, shortness of breath.    Cardiovascular: Negative for chest pain, palpitations, leg swelling.   Gastrointestinal: Negative for abdominal pain, constipation, diarrhea, nausea, vomiting.   Endocrine: Negative for cold intolerance, heat intolerance.   Genitourinary: Negative for dysuria, frequency.   Musculoskeletal: + hip pain  Skin: Negative for rash, wound, erythema   Neurological: Negative for dizziness, syncope, weakness, light-headedness.   Psychiatric/Behavioral: Negative for confusion, hallucinations, anxiety  All other systems reviewed and are  negative.      Past Medical History: Patient has a past medical history of Bronchiectasis without complication, Hypertension, and Supraventricular tachycardia.      Past Surgical History: Patient has a past surgical history that includes Cholecystectomy and Tonsillectomy.      Social History: Patient reports that she has never smoked. She has never used smokeless tobacco. She reports that she does not drink alcohol or use drugs.      Family History: Patient's family history is not on file.      Medications: Scheduled Meds:   acetaminophen  1,000 mg Oral Q8H    [START ON 5/3/2019] albuterol sulfate  2.5 mg Nebulization Q4H WAKE    [START ON 5/3/2019] atenolol  25 mg Oral Daily    epoetin elizabeth-ebpx (RETACRIT) injection  40,000 Units Subcutaneous Once    [START ON 5/3/2019] losartan  100 mg Oral Daily    pregabalin  75 mg Oral QHS    [START ON 5/3/2019] tiotropium  1 capsule Inhalation Daily    verapamil  120 mg Oral BID     Continuous Infusions:  PRN Meds:.acetaminophen, bisacodyl, dextrose 50%, dextrose 50%, glucagon (human recombinant), glucose, glucose, ondansetron, ondansetron, oxyCODONE, oxyCODONE, promethazine (PHENERGAN) IVPB, ramelteon, senna-docusate 8.6-50 mg, sodium chloride 0.9%, sodium chloride 0.9%, sodium chloride 0.9%      Allergies: Patient has No Known Allergies.      Physical Exam:    Temp:  [98 °F (36.7 °C)-98.3 °F (36.8 °C)]   Pulse:  [73-84]   Resp:  [16-18]   BP: (110-128)/(60)   SpO2:  [95 %-98 %]     Constitutional: Appears stated age  Head: Normocephalic and atraumatic.   Mouth/Throat: Oropharynx is clear and moist.   Eyes: EOM are normal. Pupils are equal, round, and reactive to light. No scleral icterus.   Neck: Normal range of motion. Neck supple.   Cardiovascular: Normal heart rate.  Regular heart rhythm.  No murmur heard.  Pulmonary/Chest: Effort normal. No respiratory distress. No wheezes, rales, or rhonchi  Abdominal: Soft. Bowel sounds are normal.  No distension.  No  tenderness  Musculoskeletal: Left hip shortened and externally rotated  Neurological: Alert and oriented to person, place, and time.   Skin: Skin is warm and dry.   Psychiatric: Normal mood and affect. Behavior is normal.       No intake or output data in the 24 hours ending 05/02/19 2145  Recent Labs   Lab 05/02/19 2025   WBC 8.83   HGB 8.4*   HCT 26.9*        Recent Labs   Lab 05/02/19 2025   *   K 4.1      CO2 24   BUN 14   CREATININE 0.8      CALCIUM 9.0   MG 2.1   PHOS 2.7     Recent Labs   Lab 05/02/19 2025   ALKPHOS 63   ALT 9*   AST 32   ALBUMIN 3.3*   PROT 7.2   BILITOT 0.4   INR 1.0      No results for input(s): POCTGLUCOSE in the last 168 hours.  No results for input(s): CPK, CPKMB, MB, TROPONINI in the last 72 hours.    No results for input(s): LACTATE in the last 72 hours.       Surgical Risk Assessment:    Active Cardiac Issues:  Active decompensated heart failure? No   Unstable angina?  No   Significant uncontrolled arrhythmias? No   Severe valvular heart disease-Aortic or Mitral Stenosis? No   Recent MI or coronary revascularization < 30 days? No     Cardiac Risk Factors:  High risk surgery? No   History of CAD/ischemic heart disease? No   History of cerebrovascular disease? No   History of compensated heart failure? No   Type 2 diabetes requiring insulin? No   Serum Creatinine > 2? No   Total cardiac risk factors 0     Functional mets >4    < 4 METs -unable to walk > 2 blocks on level ground without stopping due to symptoms  - eating, dressing, toileting, walking indoors, light housework. POOR   > 4 METs -climbing > 1 flight of stairs without stopping  -walking up hill > 1-2 blocks  -scrubbing floors  -moving furniture  - golf, bowling, dancing or tennis  -running short distance MODERATE to EXCELLENT         Assessment and Plan:    Ms. Siomara Flores is a 86 y.o. female who presented to [facility] on 5/2/2019 with a left femur fracture.    Left Intertrochanteric Femur  Fracture  Left Distal Radius Fracture  Left Proximal Humerus  Fall  · Hip Fracture Pathway initiated  · Orthopedics consulted.  Plan to go to the OR on 5/3.  Ok to proceed to Surgery.  · For high risk of post-op pulmonary complications, scheduled duonebs and incentive spirometry to start after surgery  · DVT prophylaxis for 4 weeks post-op - need to consider lowest dose possible given Congregation and refusal of blood products if needed  · PT/OT to start on POD 1  · Esposito to be removed on POD 2  · For probable senile osteoporosis, check Vitamin D level.  If/for Vit D deficiency and probable senile osteopenia/osteoporosis, start Vit D and Ca, follow up in Ortho clinic per new protocol  · Pain control:  Pregabalin, Oxy prn    Perioperative Risk Assessment:  Patient is at intermediate risk for perioperative cardiopulmonary complications.  Recommend proceed to surgery as planned. We will follow with you during the perioperative period  to manage any active medical issues and prevent postoperative complications.      SVT  · Chronic and stable  · Continue Verapamil 120mg PO BID (home dose 240mg that she takes 1/2 a tablet)  · Continue tele    Bronchietasis  · Chronic and stable  · Continue Spiriva  · Continue duonebs q4h awake  · Incentive spirometry    Essential HTN  · Chronic and stable  · Continue Atenolol 25mg PO daily  · Continue Verapmil 120mg PO BID  · Continue Losartan 100mg PO daily    Congregation and Refusal of Blood Products  · Again refuses blood transfusions - Hgb 8.4 on admit  · Attempt to limit blood draws  · Epo to assist in bone marrow production     Diet:  Regular then NPO at midnight for surgery  GI PPx:  Not indicated  DVT PPx:   No pharmacologic prophylaxis for now since upcoming surgery; SCDs; start Lovenox/heparin/home anticoagulation on POD 1 as above pending blood counts with [facility]  Goals of Care:  Full Code    Disposition:  Placement after surgery when ok with  Ortho    Irma Jauregui MD  [facility]  Cell:  [phone]  Spectra:  [phone]  Pager:  [phone]

## 2019-05-03 NOTE — HPI
Siomara Flores is a 86 y.o. female  with history of HTN and SVT presents with left hip fx after fall from standing. She reported immediate pain and inability to bear weight. The patient denies head trauma. Patient denies numbness or tingling in extremitiy.  Patient lives at home., makes her own medical decisions, and ambulates indepndently. Does not take anticoagulation medication.

## 2019-05-03 NOTE — PROGRESS NOTES
[facility] Hospital Medicine  Progress Note    Patient Name: Siomara Flores  MRN: [id number]  Patient Class: IP- Inpatient   Admission Date: 5/2/2019  Length of Stay: 1 days  Attending Physician: Lily Bowman MD  Primary Care Provider: Vince Wray MD    [facility] Medicine Team: [facility] HOSP MED H Lily Bowman MD    Subjective:     Principal Problem:Closed displaced intertrochanteric fracture of left femur    HPI:  No notes on file    Hospital Course:  5/3: admitted overnight for Left femur/Left radial/ Left humerus fracture after mechanical fall. Plan for left hip repair today. Pain still present after only receiving tylenol overnight per patient, didn't know to ask for other pain meds on MAR. Discussed Hg lower than normal baseline (at 8, baseline previously was 12). Received Epo overnight, will check iron/b12/folate tomorrow. Endorses poor nutrition recently due to ill  who passed away a month ago. Discussed risk of bleeding after surgery with low counts to start, discussed she will inevitably drop below Hg of 7 post op where most would be transfused but that she has endorsed not wanting transfusions due to [employer]ond[employer], which still endorses. Received Epo overnight pre-op. Anticoagulation will be risky post op, states she had gi ulcers with aspirin years ago as well. Will discuss more with ortho in regards to risk/benefits post op. NWB LUE to left radial fx in splint. Vit D 46.    Interval History: pain not controlled overnight with tylenol. To OR today, see hosp course.    Review of Systems   Constitutional: Positive for activity change and appetite change. Negative for chills, diaphoresis, fever and unexpected weight change.   HENT: Negative for congestion, dental problem, facial swelling, postnasal drip, rhinorrhea, sinus pressure and sinus pain.    Eyes: Negative for photophobia, pain and redness.   Respiratory: Positive for cough. Negative for shortness of breath and  wheezing.    Cardiovascular: Negative for chest pain, palpitations and leg swelling.   Gastrointestinal: Negative for abdominal distention, abdominal pain, anal bleeding and blood in stool.   Endocrine: Negative for cold intolerance and heat intolerance.   Genitourinary: Negative for difficulty urinating, dysuria, hematuria, menstrual problem and pelvic pain.   Musculoskeletal: Positive for arthralgias, gait problem and myalgias.   Skin: Positive for wound. Negative for color change and pallor.   Neurological: Negative for dizziness, light-headedness, numbness and headaches.   Hematological: Negative for adenopathy. Does not bruise/bleed easily.   Psychiatric/Behavioral: Positive for dysphoric mood and sleep disturbance. The patient is not nervous/anxious.      Objective:     Vital Signs (Most Recent):  Temp: 96.9 °F (36.1 °C) (05/03/19 0725)  Pulse: 85 (05/03/19 0813)  Resp: 18 (05/03/19 0725)  BP: (!) 142/63 (05/03/19 0725)  SpO2: 97 % (05/03/19 0725) Vital Signs (24h Range):  Temp:  [96.9 °F (36.1 °C)-98.3 °F (36.8 °C)] 96.9 °F (36.1 °C)  Pulse:  [73-88] 85  Resp:  [16-18] 18  SpO2:  [95 %-98 %] 97 %  BP: (110-142)/(60-63) 142/63     Weight: 43.1 kg (95 lb)  Body mass index is 18.55 kg/m².  No intake or output data in the 24 hours ending 05/03/19 1019   Physical Exam   Constitutional: She is oriented to person, place, and time. No distress.   Thin body habitus.    HENT:   Head: Normocephalic and atraumatic.   Mouth/Throat: No oropharyngeal exudate.   Eyes: Pupils are equal, round, and reactive to light. Conjunctivae and EOM are normal. No scleral icterus.   Neck: Normal range of motion. Neck supple. No JVD present. No tracheal deviation present. No thyromegaly present.   Cardiovascular: Normal rate, regular rhythm, normal heart sounds and intact distal pulses.   No murmur heard.  Pulmonary/Chest: Effort normal and breath sounds normal. No stridor. No respiratory distress. She has no wheezes. She exhibits no  tenderness.   Abdominal: Soft. Bowel sounds are normal. She exhibits no distension and no mass. There is no tenderness. There is no guarding.   Musculoskeletal:   LUE in sling, LLE shortened compared to RLE, some bruising along hip, tender to palpation.pulses intact. Warm to touch   Lymphadenopathy:     She has no cervical adenopathy.   Neurological: She is alert and oriented to person, place, and time. She displays normal reflexes. No cranial nerve deficit or sensory deficit. She exhibits normal muscle tone.   Skin: Skin is warm and dry. Capillary refill takes less than 2 seconds. She is not diaphoretic. No erythema. No pallor.   Psychiatric: She has a normal mood and affect. Her behavior is normal. Judgment and thought content normal.   Sad when talking about recent death of  but appropriate.       Significant Labs:   CBC:   Recent Labs   Lab 05/02/19 2025 05/03/19  0604   WBC 8.83 8.83   HGB 8.4* 8.0*   HCT 26.9* 25.2*    202     CMP:   Recent Labs   Lab 05/02/19 2025 05/03/19  0604   * 134*   K 4.1 4.1    98   CO2 24 29    108   BUN 14 11   CREATININE 0.8 0.7   CALCIUM 9.0 9.2   PROT 7.2 6.7   ALBUMIN 3.3* 3.0*   BILITOT 0.4 0.5   ALKPHOS 63 61   AST 32 24   ALT 9* 7*   ANIONGAP 11 7*   EGFRNONAA >60.0 >60.0     Coagulation:   Recent Labs   Lab 05/02/19 2025   INR 1.0     Urine Studies:   Recent Labs   Lab 05/02/19  2242   COLORU Yellow   APPEARANCEUA Clear   PHUR 5.0   SPECGRAV 1.010   PROTEINUA Negative   GLUCUA Negative   KETONESU Trace*   BILIRUBINUA Negative   OCCULTUA Negative   NITRITE Negative   LEUKOCYTESUR Negative       Significant Imaging: I have reviewed all pertinent imaging results/findings within the past 24 hours.    Assessment/Plan:      * Closed displaced intertrochanteric fracture of left femur  -left femur fracture after mechanical fall  -on hip fracture pathway now- tylenol, lyrica, PRN oxycodone for pain control. Surgery planned today with orthopedics.  PT/OT tomorrow  -patient with significant anemia to start with Hg of 8 and refusing blood products due to Pent[employer]al, this will be significant issue in post op period and with expected anticoagulation for 28 days post op. Will have to monitor closely for blood loss and balance risk of DVT ppx vs acute bleeding/risk of blood loss anemia and lack of supportive care with transfusions if active bleeding post op as a potential higher risk situation in post op period that could arise, which I discussed with patient again today  -vit D adequate at 46.  -SNF next week pending medical stability, ref sent to [facility] today    Microcytic anemia  -baseline Hg of 12 in 2016, admitted with mild microcytosis and Hg of 8.4, patient endorses poor oral intake recently but denies dark stools, blood in stools, hemoptysis from bronchiectasis prior to admit.  -ferritin/b12/folate added on for labs tomorrow, minimize lab draws due to refusal of blood products  -is going to inevitably drop below 7 post op from blood loss anemia. If iron deficient will start replacement pending labs.   -supportive care per [facility] policies and patient preference/wishes. Currently does not want transfusions if needed when discussed with mds in ER and with me today      Refusal of blood transfusions as patient is Buddhism  -will be an issue post op. epo is allowed, given pre op. Minimize blood draws. Use pediatric tubes if needed       Closed fracture of left proximal humerus  -splint. nonweight bearing      Closed fracture of left distal radius  -splint in place, nonweight bearing to LUE      Hypertension  -continue home meds- verapamil, atenolol, losartan      Bronchiectasis without complication  -history of this at home, has had occasional hemoptysis in the past, denies any recently. Continue spiriva, continue duobebs.      SVT (supraventricular tachycardia)  -follows with Dr Jiang as outpatient  -continue home verapamil, BB      VTE Risk Mitigation  (From admission, onward)        Ordered     Place CORA hose  Until discontinued      05/02/19 2143     IP VTE HIGH RISK PATIENT  Once      05/02/19 2143        DIspo: to OR today. Hg likely will drop below 7 post op, patient refusing blood due to Mosque. Supportive care in interim, IVF/Epo, checking ferritin- if iron low can consider iron replacement for chronic anemia. Okay for reg diet post op.       Lily Bowman MD  Department of Hospital Medicine   [facility]

## 2019-05-03 NOTE — ED NOTES
Bed: [facility]  Expected date:   Expected time:   Means of arrival:   Comments:  Mark

## 2019-05-03 NOTE — CARE UPDATE
Post op Hg 6.9.  Patient will not accept blood products and needs pressors at present.  Permissive use of pressors with hip fractures has bee shown to be effective compared to large fluid boluses and in this patient, we will need to avoid severely diluting her.  Pediatric/small dose blood draw for labs.  We will put her in the SICU for now.  I've explained to the family and patient that this can severely increase the risk of mortality in her case, but this is their wish.      Teodoro Bruce MD

## 2019-05-03 NOTE — H&P
[facility]  Critical Care - Surgery  History & Physical      Code Status: Full Code     SURGERY/PROCEDURE: Procedure(s) (LRB):  INSERTION, INTRAMEDULLARY MARTHA, FEMUR (Left)    CC: Closed displaced intertrochanteric fracture of left femur    SUBJECTIVE:     History of Present Illness:  Ms. Siomara Flores is a 86 y.o. female Hindu with bronchiectasis and SVT who is s/p Left intramedullary martha for closed displaced intertrochanteric fracture.      She presented to [facility] ED after a fall, and was found to have a left intertrochanteric, left proximal humerus, and left distal radius fracture.     Post-op H/H 6.9/21.8 ( baseline 8.4/26.9). Currently on Smith 0.8mcg/kg/h.    PTA Medications   Medication Sig    atenolol (TENORMIN) 25 MG tablet Take 1 tablet (25 mg total) by mouth once daily.    albuterol (PROVENTIL) 2 MG tablet Take 2 mg by mouth 3 (three) times daily.      albuterol (PROVENTIL) 2.5 mg /3 mL (0.083 %) nebulizer solution     CALCIUM CARBONATE/VITAMIN D3 (CALCIUM 600 + D,3, ORAL) Take 1 tablet by mouth once daily.    doxycycline (VIBRA-TABS) 100 MG tablet Take 1 tablet by mouth 2 (two) times daily.    IPRATROPIUM BROMIDE, BULK, MISC by Misc.(Non-Drug; Combo Route) route.    losartan (COZAAR) 100 MG tablet Take 100 mg by mouth once daily.      verapamil (CALAN-SR) 240 MG CR tablet TAKE 1 TABLET BY MOUTH IN THE EVENING       Continuous Infusions:    phenylephrine 0.8 mcg/kg/min (05/03/19 1600)    ropivacaine (PF) 2 mg/ml (0.2%) 2 mL/hr (05/03/19 1534)       Scheduled Meds:    acetaminophen  1,000 mg Oral Q8H    [START ON 5/4/2019] acetaminophen  1,000 mg Oral Q6H    albuterol-ipratropium  3 mL Nebulization Q4H WAKE    atenolol  25 mg Oral Daily    ceFAZolin (ANCEF) IVPB  2 g Intravenous Once    epoetin elizabeth-ebpx (RETACRIT) injection  40,000 Units Subcutaneous Once    epoetin elizabeth-ebpx (RETACRIT) injection  40,000 Units Subcutaneous Once    losartan  100 mg  Oral Daily    mupirocin  1 g Nasal BID    polyethylene glycol  17 g Oral Daily    pregabalin  75 mg Oral QHS    senna-docusate 8.6-50 mg  1 tablet Oral BID    tiotropium  1 capsule Inhalation Daily    verapamil  120 mg Oral BID       PRN Meds: acetaminophen, bisacodyl, dextrose 50%, dextrose 50%, fentaNYL, fentaNYL, glucagon (human recombinant), glucose, glucose, lidocaine (PF) 10 mg/ml (1%), methocarbamol, midazolam, midazolam, ondansetron, ondansetron, oxyCODONE, promethazine (PHENERGAN) IVPB, ramelteon, senna-docusate 8.6-50 mg, sodium chloride 0.9%, sodium chloride 0.9%, sodium chloride 0.9%, tranexamic acid (CYKLOKAPRON) 3,000 mg in sodium chloride 0.9% 100 mL    Review of patient's allergies indicates:   Allergen Reactions    Aspirin (bulk) Other (See Comments)     Developed gastric ulcer in past when taking aspirin per patient       Past Medical History:   Diagnosis Date    Bronchiectasis without complication     Hypertension     Supraventricular tachycardia      Past Surgical History:   Procedure Laterality Date    CHOLECYSTECTOMY      TONSILLECTOMY       History reviewed. No pertinent family history.  Social History     Tobacco Use    Smoking status: Never Smoker    Smokeless tobacco: Never Used   Substance Use Topics    Alcohol use: No    Drug use: No          Review of Systems:  Constitutional: no fever or chills  Eyes: no visual changes  ENT: no nasal congestion or sore throat  Respiratory: no cough or shortness of breath  Cardiovascular: no chest pain or palpitations  Gastrointestinal: no nausea or vomiting, no abdominal pain or change in bowel habits  Genitourinary: no hematuria or dysuria  Integument/Breast: no rash or pruritis  Hematologic/Lymphatic: no easy bruising or lymphadenopathy  Musculoskeletal: no arthralgias or myalgias  Neurological: no seizures or tremors  Behavioral/Psych: no auditory or visual hallucinations    OBJECTIVE:     Vital Signs (Most Recent)  Temp: 97.9 °F  (36.6 °C) (05/03/19 1445)  Pulse: (!) 56 (05/03/19 1645)  Resp: (!) 7 (05/03/19 1645)  BP: (!) 90/45 (05/03/19 1645)  SpO2: 96 % (05/03/19 1645)      Physical Exam:  Physical Exam   Constitutional: She appears well-developed and well-nourished.   Eyes: Pupils are equal, round, and reactive to light.   Cardiovascular: Normal rate and regular rhythm.   Pulmonary/Chest: Effort normal and breath sounds normal.   Abdominal: Soft. Bowel sounds are normal.   Musculoskeletal: Normal range of motion.   Neurological: She is alert.   Oriented to person only   Skin: Skin is warm and dry.   Psychiatric: She has a normal mood and affect.         Lines/Drains:       Peripheral IV - Single Lumen 05/02/19 2048 20 G Right Forearm (Active)   Site Assessment Clean;Dry;Intact;No redness;No swelling 5/3/2019  3:45 PM   Line Status Infusing 5/3/2019  3:45 PM   Dressing Status Clean;Dry;Intact 5/3/2019  3:45 PM   Dressing Intervention New dressing 5/3/2019 12:05 PM   Number of days: 0       Laboratory  ABG: No results for input(s): PH, PO2, PCO2, HCO3, POCSATURATED, BE in the last 24 hours.  BMP:  Recent Labs   Lab 05/03/19  0604   *   K 4.1   CL 98   CO2 29   BUN 11   CREATININE 0.7      MG 2.1   PHOS 3.4     LFT:   Lab Results   Component Value Date    AST 24 05/03/2019    ALT 7 (L) 05/03/2019    ALKPHOS 61 05/03/2019    BILITOT 0.5 05/03/2019    ALBUMIN 3.0 (L) 05/03/2019    PROT 6.7 05/03/2019     CBC:   Lab Results   Component Value Date    WBC 13.42 (H) 05/03/2019    HGB 6.9 (L) 05/03/2019    HCT 21.8 (L) 05/03/2019    MCV 83 05/03/2019     05/03/2019     Microbiology last 7d:   Microbiology Results (last 7 days)     ** No results found for the last 168 hours. **          Imaging/Diagnostic Results:    ASSESSMENT/PLAN:   86 y.o. female w/ a significant PMHx of HTN, bronchiectasis, SVT(stable on verapamil) who presented to ED after a fall; found to have left intertrochanteric, left proximal humerus, and left distal  radius fracture. Pt is now s/p Left intramedullary marc for closed displaced intertrochanteric fracture.     Neuro:   - Alert, oriented to person only  - Pain control with tylenol, lyrica, oxy 5 prn    Pulmonary:   - Hx bronchiectasis  - SpO2 100% on RA  - Continue Duonebs    Cardiac:   - HDS  - Hx SVT, HTN  - Cont verapamil, losartan  - Smith 0.8mcg/kg/h    Renal:    - BUN/Cr 11/0.7  - Monitor UOP      Intake/Output Summary (Last 24 hours) at 5/3/2019 2016  Last data filed at 5/3/2019 1900  Gross per 24 hour   Intake 2000 ml   Output 855 ml   Net 1145 ml         ID:   - Afebrile  - WBC 13.42; likely 2/2 post-op inflammation  - Cont to monitor    Hem/Onc:   - H/H 6.9/21.8 ( baseline 8.4/26.9)  - Avoid IVF if possible per primary  - Pediatric blood draws only    Endocrine:    - BG stable  - Cont to monitor    Fluids/Electrolytes/Nutrition/GI:   - Replace electrolytes PRN  - Advance diet as tolerated    PPx:  - Bowel: bisacodyl prn, senna doc prn      DISPO:  - Continue SICU care      Critical care was time spent personally by me on the following activities: development of treatment plan with patient or surrogate and bedside caregivers, discussions with consultants, evaluation of patient's response to treatment, examination of patient, ordering and performing treatments and interventions, ordering and review of laboratory studies, ordering and review of radiographic studies, pulse oximetry, re-evaluation of patient's condition. This critical care time did not overlap with that of any other provider or involve time for any procedures.    Diego Guzmán MD  PGY2/CA1  Department of Anesthesiology  Pager: [phone]

## 2019-05-03 NOTE — BRIEF OP NOTE
BRIEF OP NOTE    Preop Dx: Left intertrochanteric femur fracture    Postop Dx: Left intertrochanteric femur fracture    Procedure: Cephalomedullary nail fixation of left intertrochanteric femur fracture - 71874    Surgeon: Teodoro Bruce M.D.    Asst:  Kye Black M.D    Anesthesia: GETA    EBL:  75cc    IVF:  1500cc crystalloid    Implants:   Implant Name Type Inv. Item Serial No.  Lot No. LRB No. Used   NAIL TFNA 130DEG 11MM L STRL - [id number]  NAIL TFNA 130DEG 11MM L STRL  [employer] [id number] Left 1   NAIL IM  DEG 11X360 L - [id number]  NAIL IM  DEG 11X360 L  [employer] [id number] Left 1   TFNA fenestrated screw     [id number] Left 1   SCREW STRDRV REC T25 5X36 TTNM - [id number]  SCREW STRDRV REC T25 5X36 TTNM  [employer]  Left 1   WIRE GUIDE 3.2MM 400MM - [id number]  WIRE GUIDE 3.2MM 400MM  [employer]  Left 2         Specimens: None    Findings: Stable pattern with good compression    Dispo:  To PACU extubated/stable     Dict#  [id number]

## 2019-05-03 NOTE — CONSULTS
[facility]  Orthopedics  Consult Note    Patient Name: Siomara Flores  MRN: [id number]  Admission Date: 2019  Hospital Length of Stay: 0 days  Attending Provider: Lily Bowman MD  Primary Care Provider: Vince Wray MD        Inpatient consult to Orthopedic Surgery  Consult performed by: Jboy Reynolds MD  Consult ordered by: Andrade Sky MD        Subjective:     Principal Problem:Closed displaced intertrochanteric fracture of left femur    Chief Complaint:   Chief Complaint   Patient presents with    Fall     via EMS, trip and fall 20 minutes ago, left leg shortened and rotated; denies hitting head, LOC; c/o left shoulder pain, left hip pain        HPI: Siomara Flores is a 86 y.o. female  with history of bronchiectasis, osteoporosis untreated, HTN controlled with medication, and SVT and is a  Adventist presents with Left intertrochanteric, left proximal humerus, left distal radius fxs after fall from standing onto the driveway while checking her mail. she reported immediate pain and inability to bear weight with the left arm or left leg. the patient denies head trauma or LOC. Patient denies numbness or tingling in extremitiy.  She denies pain anywhere else. Patient lives at home alone, makes her own medical decisions, and ambulates independently. Does  not take anticoagulation medication.  Of note, patient's   one month ago and patient is a Adventist and refuses blood products should they be necessary. Patient does not use home oxygen and denies tobacco or alcohol use.       Past Medical History:   Diagnosis Date    Bronchiectasis without complication     Hypertension     Supraventricular tachycardia        Past Surgical History:   Procedure Laterality Date    CHOLECYSTECTOMY      TONSILLECTOMY         Review of patient's allergies indicates:  No Known Allergies    Current Facility-Administered Medications   Medication    sodium  chloride 0.9% flush 10 mL     Current Outpatient Medications   Medication Sig    albuterol (PROVENTIL) 2 MG tablet Take 2 mg by mouth 3 (three) times daily.      albuterol (PROVENTIL) 2.5 mg /3 mL (0.083 %) nebulizer solution     atenolol (TENORMIN) 25 MG tablet Take 1 tablet (25 mg total) by mouth once daily.    CALCIUM CARBONATE/VITAMIN D3 (CALCIUM 600 + D,3, ORAL) Take 1 tablet by mouth once daily.    doxycycline (VIBRA-TABS) 100 MG tablet Take 1 tablet by mouth 2 (two) times daily.    IPRATROPIUM BROMIDE, BULK, MISC by Misc.(Non-Drug; Combo Route) route.    losartan (COZAAR) 100 MG tablet Take 100 mg by mouth once daily.      verapamil (CALAN-SR) 240 MG CR tablet TAKE 1 TABLET BY MOUTH IN THE EVENING     Family History     None        Tobacco Use    Smoking status: Never Smoker    Smokeless tobacco: Never Used   Substance and Sexual Activity    Alcohol use: No    Drug use: No    Sexual activity: Not on file     ROS   Per ED ROS 5/2/19  Objective:     Vital Signs (Most Recent):  Temp: 98.3 °F (36.8 °C) (05/02/19 1723)  Pulse: 84 (05/02/19 1723)  Resp: 16 (05/02/19 1723)  BP: 110/60 (05/02/19 1723)  SpO2: 98 % (05/02/19 1723) Vital Signs (24h Range):  Temp:  [98.3 °F (36.8 °C)] 98.3 °F (36.8 °C)  Pulse:  [84] 84  Resp:  [16] 16  SpO2:  [98 %] 98 %  BP: (110)/(60) 110/60     Weight: 43.1 kg (95 lb)  Height: 5' (152.4 cm)  Body mass index is 18.55 kg/m².    No intake or output data in the 24 hours ending 05/02/19 2055    Ortho/SPM Exam    Vitals: Afebrile.  Vital signs stable.  General: No acute distress.  Cardio: Regular rate.  Chest: No increased work of breathing.    LLE  Shortened, skin intact with mod swelling over lateral hip  Pain with Log roll of leg  No bony TTP throughout  Compartments soft  Painless ROM ankle   SILT Sa/[place]/DP/SP/T  Motor intact TA/SP/DP  1+ DP and PT     RLE:  Skin intact, no deformity  No TTP  Compartments soft  Full painless ROM throughout lower extremity  SILT  Sa/[place]/DP/SP/T  Motor intact TA/SP/DP  1+ DP/PT    LUE:   Skin intact  no deformity  no ecchymoses  no swelling  TTP diffusely over shoudler  Compartments soft and compressible  Pain with ROM left wrist, left shoulder, left elbow  SILT M/R/U  Motor intact AIN/PIN/U  Brisk cap refill  Warm well perfused extremities  2+ Radial palpable    RUE:  Skin intact, no deformity noted  No open wounds/abrasions/crepitus  No bony TTP  FROM shoulder, elbow and wrist  SILT M/U/R  Motor intact AIN/PIN/M/U/R   Cap refill < 2s  2+ RP      Spine: No TTP along spine, no step offs palpated. no sacral decubitus ulcers      Significant Labs:   CBC:   Recent Labs   Lab 05/02/19 2025   WBC 8.83   HGB 8.4*   HCT 26.9*        CMP:pending  All pertinent labs within the past 24 hours have been reviewed.    Significant Imaging: I have reviewed all pertinent imaging results/findings.   Xray left hip: Displaced intertrochanteric fracture  Xray left shoulder: Non displaced proximal humerus fracture  Xray left wrist: non displaced distal radius fracture  Xray left forearm, left humerus, left femur left ankle WNL except as above.     Assessment/Plan:     * Closed displaced intertrochanteric fracture of left femur  Siomara Flores is a 86 y.o. female with left hip fracture, left distal radius fracture, left proximal humerus fracture closed, NVI. Patient was explained in detail the severity of the injury that was suffered. Patient was explained the risks/benefits/and alternatives to operative management in detail including infection, bleeding, pain, nerve and vascular damage, heterotopic ossification, leg length discrepancies, rotational deformities and they express full understanding. Also, the patient was explained the high mortality, over 30 percent, associated with these fractures. The patient is also  At higher risk as she has a history of SVT and a history of bronchiectasis. She has previously coughed up blood with her lung condition. The  patient is a Druze so she was explained in detail possible requirements of blood products and she declines them. She expresses full understanding of the condition and expresses that they want to proceed with surgery. The patient is admitted to the medicine hip fracture service for optimization of medical comorbidities. Will plan for OR tomorrow. No Guarantees were made, informed consent was obtained. All questions were answered to patient's and family's satisfaction. Left arm placed in sling. Left wrist placed in removable wrist splint and will be NWB LUE.     -Admitted to medicine hip fracture service   -NPO midnight   -Pain control per primary  -Marked, booked, and consented for surgery   -PT/OT: Bed rest, NWB LUE  -DVT PPx: Hold anticoagulation  -Abx: Preop abx ordered   -Labs: H and H 8/26. Other labs pending  -Cuevas: In place, cuevas care   -Iv: ordered for contralateral arm          Joby Reynolds MD  Orthopedics  [facility]

## 2019-05-03 NOTE — ED TRIAGE NOTES
Siomara ELIANA Mark, a 86 y.o. female presents to the ED w/ complaint of L leg shortening and rotation s/p fall in driveway today. Pt reports she landed on her L side. Pt reports pain 8/10. Pt denies LOC, blood thinner use, h/a, or changes in vision, SOB, CP.     Triage note:  Chief Complaint   Patient presents with    Fall     via EMS, trip and fall 20 minutes ago, left leg shortened and rotated; denies hitting head, LOC; c/o left shoulder pain, left hip pain     Review of patient's allergies indicates:  No Known Allergies  Past Medical History:   Diagnosis Date    Bronchiectasis without complication     Hypertension     Supraventricular tachycardia

## 2019-05-03 NOTE — ED NOTES
"During exam with Dr. Reynolds pt voices she's a "faithful" Hinduism and cannot receive blood products if the need for a transfusion should arise. This statement witnessed per this nurse and family.  "

## 2019-05-03 NOTE — ASSESSMENT & PLAN NOTE
-left femur fracture after mechanical fall  -on hip fracture pathway now- tylenol, lyrica, PRN oxycodone for pain control. Surgery planned today with orthopedics. PT/OT tomorrow  -patient with significant anemia to start with Hg of 8 and refusing blood products due to Lutheran, this will be significant issue in post op period and with expected anticoagulation for 28 days post op. Will have to monitor closely for blood loss and balance risk of DVT ppx vs acute bleeding/risk of blood loss anemia and lack of supportive care with transfusions if active bleeding post op as a potential higher risk situation in post op period that could arise, which I discussed with patient again today  -vit D adequate at 46.  -SNF next week pending medical stability, ref sent to [facility] today

## 2019-05-03 NOTE — ED NOTES
Patient on stretcher, Bed placed in low/locked position, side rails up x 2, pt within view of this nurse in hallway,  Patient placed into position of comfort. Patient in NAD and offers no complaints at this time. Will continue to monitor.

## 2019-05-03 NOTE — NURSING TRANSFER
Nursing Transfer Note      5/3/2019     Transfer To: [id number]    Transfer via bed    Transfer with cardiac monitoring and 2L NC    Transported by pct and rn    Medicines sent: IV rosa    Chart send with patient: Yes    Notified: friend    Patient reassessed at: 5/3/19 see flowsheets

## 2019-05-04 LAB
ALBUMIN SERPL BCP-MCNC: 2.5 G/DL (ref 3.5–5.2)
ALBUMIN SERPL BCP-MCNC: 2.5 G/DL (ref 3.5–5.2)
ALP SERPL-CCNC: 193 U/L (ref 55–135)
ALP SERPL-CCNC: 226 U/L (ref 55–135)
ALT SERPL W/O P-5'-P-CCNC: 51 U/L (ref 10–44)
ALT SERPL W/O P-5'-P-CCNC: 82 U/L (ref 10–44)
ANION GAP SERPL CALC-SCNC: 10 MMOL/L (ref 8–16)
ANION GAP SERPL CALC-SCNC: 8 MMOL/L (ref 8–16)
ANISOCYTOSIS BLD QL SMEAR: SLIGHT
AST SERPL-CCNC: 116 U/L (ref 10–40)
AST SERPL-CCNC: 299 U/L (ref 10–40)
BASOPHILS # BLD AUTO: 0.04 K/UL (ref 0–0.2)
BASOPHILS # BLD AUTO: 0.05 K/UL (ref 0–0.2)
BASOPHILS NFR BLD: 0.3 % (ref 0–1.9)
BASOPHILS NFR BLD: 0.4 % (ref 0–1.9)
BILIRUB SERPL-MCNC: 0.6 MG/DL (ref 0.1–1)
BILIRUB SERPL-MCNC: 0.9 MG/DL (ref 0.1–1)
BUN SERPL-MCNC: 14 MG/DL (ref 8–23)
BUN SERPL-MCNC: 17 MG/DL (ref 8–23)
BURR CELLS BLD QL SMEAR: ABNORMAL
CALCIUM SERPL-MCNC: 8.1 MG/DL (ref 8.7–10.5)
CALCIUM SERPL-MCNC: 8.7 MG/DL (ref 8.7–10.5)
CHLORIDE SERPL-SCNC: 103 MMOL/L (ref 95–110)
CHLORIDE SERPL-SCNC: 104 MMOL/L (ref 95–110)
CO2 SERPL-SCNC: 23 MMOL/L (ref 23–29)
CO2 SERPL-SCNC: 27 MMOL/L (ref 23–29)
CREAT SERPL-MCNC: 0.7 MG/DL (ref 0.5–1.4)
CREAT SERPL-MCNC: 0.8 MG/DL (ref 0.5–1.4)
DIFFERENTIAL METHOD: ABNORMAL
DIFFERENTIAL METHOD: ABNORMAL
EOSINOPHIL # BLD AUTO: 0 K/UL (ref 0–0.5)
EOSINOPHIL # BLD AUTO: 0 K/UL (ref 0–0.5)
EOSINOPHIL NFR BLD: 0 % (ref 0–8)
EOSINOPHIL NFR BLD: 0.2 % (ref 0–8)
ERYTHROCYTE [DISTWIDTH] IN BLOOD BY AUTOMATED COUNT: 13.1 % (ref 11.5–14.5)
ERYTHROCYTE [DISTWIDTH] IN BLOOD BY AUTOMATED COUNT: 13.1 % (ref 11.5–14.5)
EST. GFR  (AFRICAN AMERICAN): >60 ML/MIN/1.73 M^2
EST. GFR  (AFRICAN AMERICAN): >60 ML/MIN/1.73 M^2
EST. GFR  (NON AFRICAN AMERICAN): >60 ML/MIN/1.73 M^2
EST. GFR  (NON AFRICAN AMERICAN): >60 ML/MIN/1.73 M^2
FERRITIN SERPL-MCNC: 23 NG/ML (ref 20–300)
GLUCOSE SERPL-MCNC: 103 MG/DL (ref 70–110)
GLUCOSE SERPL-MCNC: 79 MG/DL (ref 70–110)
HCT VFR BLD AUTO: 20.9 % (ref 37–48.5)
HCT VFR BLD AUTO: 21.6 % (ref 37–48.5)
HGB BLD-MCNC: 6.6 G/DL (ref 12–16)
HGB BLD-MCNC: 7 G/DL (ref 12–16)
HYPOCHROMIA BLD QL SMEAR: ABNORMAL
IMM GRANULOCYTES # BLD AUTO: 0.05 K/UL (ref 0–0.04)
IMM GRANULOCYTES # BLD AUTO: 0.08 K/UL (ref 0–0.04)
IMM GRANULOCYTES NFR BLD AUTO: 0.4 % (ref 0–0.5)
IMM GRANULOCYTES NFR BLD AUTO: 0.6 % (ref 0–0.5)
LYMPHOCYTES # BLD AUTO: 1.1 K/UL (ref 1–4.8)
LYMPHOCYTES # BLD AUTO: 1.2 K/UL (ref 1–4.8)
LYMPHOCYTES NFR BLD: 10.4 % (ref 18–48)
LYMPHOCYTES NFR BLD: 8.6 % (ref 18–48)
MAGNESIUM SERPL-MCNC: 2 MG/DL (ref 1.6–2.6)
MAGNESIUM SERPL-MCNC: 2.4 MG/DL (ref 1.6–2.6)
MCH RBC QN AUTO: 25.6 PG (ref 27–31)
MCH RBC QN AUTO: 26.8 PG (ref 27–31)
MCHC RBC AUTO-ENTMCNC: 31.6 G/DL (ref 32–36)
MCHC RBC AUTO-ENTMCNC: 32.4 G/DL (ref 32–36)
MCV RBC AUTO: 81 FL (ref 82–98)
MCV RBC AUTO: 83 FL (ref 82–98)
MONOCYTES # BLD AUTO: 1 K/UL (ref 0.3–1)
MONOCYTES # BLD AUTO: 1.3 K/UL (ref 0.3–1)
MONOCYTES NFR BLD: 11.3 % (ref 4–15)
MONOCYTES NFR BLD: 8.1 % (ref 4–15)
NEUTROPHILS # BLD AUTO: 10.5 K/UL (ref 1.8–7.7)
NEUTROPHILS # BLD AUTO: 8.9 K/UL (ref 1.8–7.7)
NEUTROPHILS NFR BLD: 77.6 % (ref 38–73)
NEUTROPHILS NFR BLD: 82.1 % (ref 38–73)
NRBC BLD-RTO: 0 /100 WBC
NRBC BLD-RTO: 0 /100 WBC
PHOSPHATE SERPL-MCNC: 2.8 MG/DL (ref 2.7–4.5)
PHOSPHATE SERPL-MCNC: 4.2 MG/DL (ref 2.7–4.5)
PLATELET # BLD AUTO: 129 K/UL (ref 150–350)
PLATELET # BLD AUTO: 168 K/UL (ref 150–350)
PLATELET BLD QL SMEAR: ABNORMAL
PMV BLD AUTO: 10.6 FL (ref 9.2–12.9)
PMV BLD AUTO: 11.5 FL (ref 9.2–12.9)
POIKILOCYTOSIS BLD QL SMEAR: ABNORMAL
POTASSIUM SERPL-SCNC: 4.1 MMOL/L (ref 3.5–5.1)
POTASSIUM SERPL-SCNC: 4.7 MMOL/L (ref 3.5–5.1)
PROT SERPL-MCNC: 6 G/DL (ref 6–8.4)
PROT SERPL-MCNC: 6.1 G/DL (ref 6–8.4)
RBC # BLD AUTO: 2.58 M/UL (ref 4–5.4)
RBC # BLD AUTO: 2.61 M/UL (ref 4–5.4)
SODIUM SERPL-SCNC: 136 MMOL/L (ref 136–145)
SODIUM SERPL-SCNC: 139 MMOL/L (ref 136–145)
TARGETS BLD QL SMEAR: ABNORMAL
WBC # BLD AUTO: 11.53 K/UL (ref 3.9–12.7)
WBC # BLD AUTO: 12.75 K/UL (ref 3.9–12.7)

## 2019-05-04 PROCEDURE — 25000003 PHARM REV CODE 250: Performed by: STUDENT IN AN ORGANIZED HEALTH CARE EDUCATION/TRAINING PROGRAM

## 2019-05-04 PROCEDURE — 25000242 PHARM REV CODE 250 ALT 637 W/ HCPCS: Performed by: HOSPITALIST

## 2019-05-04 PROCEDURE — 94640 AIRWAY INHALATION TREATMENT: CPT

## 2019-05-04 PROCEDURE — 25000003 PHARM REV CODE 250: Performed by: HOSPITALIST

## 2019-05-04 PROCEDURE — 63600175 PHARM REV CODE 636 W HCPCS: Performed by: STUDENT IN AN ORGANIZED HEALTH CARE EDUCATION/TRAINING PROGRAM

## 2019-05-04 PROCEDURE — 84100 ASSAY OF PHOSPHORUS: CPT

## 2019-05-04 PROCEDURE — 99223 1ST HOSP IP/OBS HIGH 75: CPT | Mod: GC,,, | Performed by: SURGERY

## 2019-05-04 PROCEDURE — 27000221 HC OXYGEN, UP TO 24 HOURS

## 2019-05-04 PROCEDURE — 20000000 HC ICU ROOM

## 2019-05-04 PROCEDURE — 25000003 PHARM REV CODE 250: Performed by: ANESTHESIOLOGY

## 2019-05-04 PROCEDURE — 83735 ASSAY OF MAGNESIUM: CPT | Mod: 91

## 2019-05-04 PROCEDURE — 85025 COMPLETE CBC W/AUTO DIFF WBC: CPT | Mod: 91

## 2019-05-04 PROCEDURE — 94761 N-INVAS EAR/PLS OXIMETRY MLT: CPT

## 2019-05-04 PROCEDURE — 99223 PR INITIAL HOSPITAL CARE,LEVL III: ICD-10-PCS | Mod: GC,,, | Performed by: SURGERY

## 2019-05-04 PROCEDURE — 80053 COMPREHEN METABOLIC PANEL: CPT

## 2019-05-04 PROCEDURE — 82728 ASSAY OF FERRITIN: CPT

## 2019-05-04 RX ORDER — VERAPAMIL HYDROCHLORIDE 80 MG/1
80 TABLET ORAL 3 TIMES DAILY
Status: DISCONTINUED | OUTPATIENT
Start: 2019-05-04 | End: 2019-05-09 | Stop reason: HOSPADM

## 2019-05-04 RX ADMIN — SODIUM CHLORIDE 250 ML: 0.9 INJECTION, SOLUTION INTRAVENOUS at 10:05

## 2019-05-04 RX ADMIN — STANDARDIZED SENNA CONCENTRATE AND DOCUSATE SODIUM 1 TABLET: 8.6; 5 TABLET, FILM COATED ORAL at 08:05

## 2019-05-04 RX ADMIN — IPRATROPIUM BROMIDE AND ALBUTEROL SULFATE 3 ML: .5; 3 SOLUTION RESPIRATORY (INHALATION) at 07:05

## 2019-05-04 RX ADMIN — ATENOLOL 25 MG: 25 TABLET ORAL at 09:05

## 2019-05-04 RX ADMIN — VERAPAMIL HYDROCHLORIDE 80 MG: 40 TABLET ORAL at 08:05

## 2019-05-04 RX ADMIN — ROPIVACAINE HYDROCHLORIDE 2 ML/HR: 2 INJECTION, SOLUTION EPIDURAL; INFILTRATION at 06:05

## 2019-05-04 RX ADMIN — IPRATROPIUM BROMIDE AND ALBUTEROL SULFATE 3 ML: .5; 3 SOLUTION RESPIRATORY (INHALATION) at 08:05

## 2019-05-04 RX ADMIN — VERAPAMIL HYDROCHLORIDE 80 MG: 40 TABLET ORAL at 03:05

## 2019-05-04 RX ADMIN — MUPIROCIN 1 G: 20 OINTMENT TOPICAL at 09:05

## 2019-05-04 RX ADMIN — VERAPAMIL HYDROCHLORIDE 80 MG: 40 TABLET ORAL at 10:05

## 2019-05-04 RX ADMIN — IPRATROPIUM BROMIDE AND ALBUTEROL SULFATE 3 ML: .5; 3 SOLUTION RESPIRATORY (INHALATION) at 03:05

## 2019-05-04 RX ADMIN — TIOTROPIUM BROMIDE 18 MCG: 18 CAPSULE ORAL; RESPIRATORY (INHALATION) at 12:05

## 2019-05-04 RX ADMIN — LOSARTAN POTASSIUM 100 MG: 50 TABLET, FILM COATED ORAL at 09:05

## 2019-05-04 RX ADMIN — IPRATROPIUM BROMIDE AND ALBUTEROL SULFATE 3 ML: .5; 3 SOLUTION RESPIRATORY (INHALATION) at 11:05

## 2019-05-04 RX ADMIN — MUPIROCIN 1 G: 20 OINTMENT TOPICAL at 08:05

## 2019-05-04 RX ADMIN — OXYCODONE HYDROCHLORIDE 5 MG: 5 TABLET ORAL at 04:05

## 2019-05-04 NOTE — ASSESSMENT & PLAN NOTE
Siomara Flores is a 86 y.o. female POD 1 s/p left CMN doing well now off pressors in SICU with Hb 7 this AM. Mentating well with minimal pain.        - Weight bearing status: WBAT LLE, NWB LUE.   - Pain control: Per APS  - Antibiotics: po ancef  - DVT Prophylaxis: Lovenox to begin today , SCD's at all times when not ambulating.  - PT/OT, will require aggressive PT/OT. 7 days a week  - Lines/Drains: PNC in place  - Dispo: Continue to treat recommend 1 additional night in SICU for close observation

## 2019-05-04 NOTE — PLAN OF CARE
Pt VSS at this time. BP goal maintained, afebrile. AAOx4, following commands and moving all extremities well. Family member at bedside, updated on current condition and POC for am shift. All questions answered and emotional support provided. Pt assisted with weight shift, and encouraged to cough/deep breathe. Remains free of falls and injury for am shift. MD Marnie updated on current condition, vitals, labs, and gtts. No new orders received, will continue to monitor.

## 2019-05-04 NOTE — PROGRESS NOTES
[facility]  Orthopedics  Progress Note    Patient Name: Siomara Flores  MRN: [id number]  Admission Date: 5/2/2019  Hospital Length of Stay: 2 days  Attending Provider: Lily Bowman MD  Primary Care Provider: Vince Wray MD  Follow-up For: Procedure(s) (LRB):  INSERTION, INTRAMEDULLARY MARTHA, FEMUR (Left)    Post-Operative Day: 1 Day Post-Op  Subjective:     Principal Problem:Closed displaced intertrochanteric fracture of left femur    Principal Orthopedic Problem: same    Interval History: Patient seen and examined at bedside. Day 1 s/p left CMN for intertrochanteric hip fracture.   No acute events overnight.  Pain controlled.  Was on .8 robin to start the night but by am was no longer requiring pressor support to maintain MAP > 60. Pediatric blood draw this am with Hb 7. Transaminases mildly elevated but Cr. WNL. Anabaptist members at bedside.       Review of patient's allergies indicates:   Allergen Reactions    Aspirin (bulk) Other (See Comments)     Developed gastric ulcer in past when taking aspirin per patient       Current Facility-Administered Medications   Medication    acetaminophen tablet 1,000 mg    acetaminophen tablet 650 mg    albuterol-ipratropium 2.5 mg-0.5 mg/3 mL nebulizer solution 3 mL    atenolol tablet 25 mg    bisacodyl suppository 10 mg    dextrose 50% injection 12.5 g    dextrose 50% injection 25 g    epoetin elizabeth-epbx injection 40,000 Units    glucagon (human recombinant) injection 1 mg    glucose chewable tablet 16 g    glucose chewable tablet 24 g    losartan tablet 100 mg    methocarbamol tablet 1,000 mg    mupirocin 2 % ointment 1 g    ondansetron disintegrating tablet 8 mg    ondansetron injection 4 mg    oxyCODONE immediate release tablet 5 mg    PHENYLephrine (ROBIN-SYNEPHRINE) 20 mg in sodium chloride 0.9% 250ml (titrating)    polyethylene glycol packet 17 g    pregabalin capsule 75 mg    promethazine (PHENERGAN) 6.25 mg in dextrose 5 % 50  mL IVPB    ramelteon tablet 8 mg    ropivacaine (PF) 2 mg/ml (0.2%) infusion    senna-docusate 8.6-50 mg per tablet 1 tablet    senna-docusate 8.6-50 mg per tablet 1 tablet    sodium chloride 0.9% flush 10 mL    sodium chloride 0.9% flush 10 mL    sodium chloride 0.9% flush 5 mL    tiotropium inhalation capsule 18 mcg    verapamil CR tablet 120 mg     Objective:     Vital Signs (Most Recent):  Temp: 98.6 °F (37 °C) (05/04/19 0300)  Pulse: 85 (05/04/19 0600)  Resp: (!) 29 (05/04/19 0600)  BP: (!) 121/55 (05/04/19 0600)  SpO2: (!) 92 % (05/04/19 0600) Vital Signs (24h Range):  Temp:  [96.9 °F (36.1 °C)-98.6 °F (37 °C)] 98.6 °F (37 °C)  Pulse:  [51-92] 85  Resp:  [7-35] 29  SpO2:  [92 %-100 %] 92 %  BP: ()/(38-63) 121/55     Weight: 43.1 kg (95 lb)  Height: 5' (152.4 cm)  Body mass index is 18.55 kg/m².      Intake/Output Summary (Last 24 hours) at 5/4/2019 0706  Last data filed at 5/4/2019 0600  Gross per 24 hour   Intake 2000 ml   Output 1190 ml   Net 810 ml       Ortho/SPM Exam  Physical Exam:  NAD, A/O x 3.  Wound c/d/i with clean dressing.  No focal motor or sensory deficits noted.    Significant Labs:   CBC:   Recent Labs   Lab 05/03/19  0604 05/03/19  1443 05/04/19  0324   WBC 8.83 13.42* 12.75*   HGB 8.0* 6.9* 7.0*   HCT 25.2* 21.8* 21.6*    182 129*     CMP:   Recent Labs   Lab 05/02/19 2025 05/03/19  0604 05/04/19  0324   * 134* 136   K 4.1 4.1 4.7    98 103   CO2 24 29 23    108 79   BUN 14 11 14   CREATININE 0.8 0.7 0.8   CALCIUM 9.0 9.2 8.1*   PROT 7.2 6.7 6.1   ALBUMIN 3.3* 3.0* 2.5*   BILITOT 0.4 0.5 0.9   ALKPHOS 63 61 226*   AST 32 24 299*   ALT 9* 7* 82*   ANIONGAP 11 7* 10   EGFRNONAA >60.0 >60.0 >60.0     All pertinent labs within the past 24 hours have been reviewed.    Significant Imaging: I have reviewed and interpreted all pertinent imaging results/findings.    Assessment/Plan:     * Closed displaced intertrochanteric fracture of left femur  Siomara MONROY  Mark is a 86 y.o. female POD 1 s/p left CMN doing well now off pressors in SICU with Hb 7 this AM. Mentating well with minimal pain.        - Weight bearing status: WBAT LLE, NWB LUE.   - Pain control: Per APS  - Antibiotics: po ancef  - DVT Prophylaxis: Lovenox to begin today , SCD's at all times when not ambulating.  - PT/OT, will require aggressive PT/OT. 7 days a week  - Lines/Drains: PNC in place  - Dispo: Continue to treat recommend 1 additional night in SICU for close observation. Minimize IVF. Pediatric blood draws only.             Closed fracture of left proximal humerus  See above    Closed fracture of left distal radius  See above          Kye Black MD  Orthopedics  [facility]

## 2019-05-04 NOTE — SUBJECTIVE & OBJECTIVE
Principal Problem:Closed displaced intertrochanteric fracture of left femur    Principal Orthopedic Problem: same    Interval History: Patient seen and examined at bedside. Day 1 s/p left CMN for intertrochanteric hip fracture.   No acute events overnight.  Pain controlled.  Was on .8 robin to start the night but by am was no longer requiring pressor support to maintain MAP > 60. Pediatric blood draw this am with Hb 7. Transaminases mildly elevated but Cr. WNL. Roman Catholic members at bedside.       Review of patient's allergies indicates:   Allergen Reactions    Aspirin (bulk) Other (See Comments)     Developed gastric ulcer in past when taking aspirin per patient       Current Facility-Administered Medications   Medication    acetaminophen tablet 1,000 mg    acetaminophen tablet 650 mg    albuterol-ipratropium 2.5 mg-0.5 mg/3 mL nebulizer solution 3 mL    atenolol tablet 25 mg    bisacodyl suppository 10 mg    dextrose 50% injection 12.5 g    dextrose 50% injection 25 g    epoetin elizabeth-epbx injection 40,000 Units    glucagon (human recombinant) injection 1 mg    glucose chewable tablet 16 g    glucose chewable tablet 24 g    losartan tablet 100 mg    methocarbamol tablet 1,000 mg    mupirocin 2 % ointment 1 g    ondansetron disintegrating tablet 8 mg    ondansetron injection 4 mg    oxyCODONE immediate release tablet 5 mg    PHENYLephrine (ROBIN-SYNEPHRINE) 20 mg in sodium chloride 0.9% 250ml (titrating)    polyethylene glycol packet 17 g    pregabalin capsule 75 mg    promethazine (PHENERGAN) 6.25 mg in dextrose 5 % 50 mL IVPB    ramelteon tablet 8 mg    ropivacaine (PF) 2 mg/ml (0.2%) infusion    senna-docusate 8.6-50 mg per tablet 1 tablet    senna-docusate 8.6-50 mg per tablet 1 tablet    sodium chloride 0.9% flush 10 mL    sodium chloride 0.9% flush 10 mL    sodium chloride 0.9% flush 5 mL    tiotropium inhalation capsule 18 mcg    verapamil CR tablet 120 mg     Objective:     Vital Signs  (Most Recent):  Temp: 98.6 °F (37 °C) (05/04/19 0300)  Pulse: 85 (05/04/19 0600)  Resp: (!) 29 (05/04/19 0600)  BP: (!) 121/55 (05/04/19 0600)  SpO2: (!) 92 % (05/04/19 0600) Vital Signs (24h Range):  Temp:  [96.9 °F (36.1 °C)-98.6 °F (37 °C)] 98.6 °F (37 °C)  Pulse:  [51-92] 85  Resp:  [7-35] 29  SpO2:  [92 %-100 %] 92 %  BP: ()/(38-63) 121/55     Weight: 43.1 kg (95 lb)  Height: 5' (152.4 cm)  Body mass index is 18.55 kg/m².      Intake/Output Summary (Last 24 hours) at 5/4/2019 0706  Last data filed at 5/4/2019 0600  Gross per 24 hour   Intake 2000 ml   Output 1190 ml   Net 810 ml       Ortho/SPM Exam  Physical Exam:  NAD, A/O x 3.  Wound c/d/i with clean dressing.  No focal motor or sensory deficits noted.    Significant Labs:   CBC:   Recent Labs   Lab 05/03/19  0604 05/03/19  1443 05/04/19  0324   WBC 8.83 13.42* 12.75*   HGB 8.0* 6.9* 7.0*   HCT 25.2* 21.8* 21.6*    182 129*     CMP:   Recent Labs   Lab 05/02/19 2025 05/03/19  0604 05/04/19  0324   * 134* 136   K 4.1 4.1 4.7    98 103   CO2 24 29 23    108 79   BUN 14 11 14   CREATININE 0.8 0.7 0.8   CALCIUM 9.0 9.2 8.1*   PROT 7.2 6.7 6.1   ALBUMIN 3.3* 3.0* 2.5*   BILITOT 0.4 0.5 0.9   ALKPHOS 63 61 226*   AST 32 24 299*   ALT 9* 7* 82*   ANIONGAP 11 7* 10   EGFRNONAA >60.0 >60.0 >60.0     All pertinent labs within the past 24 hours have been reviewed.    Significant Imaging: I have reviewed and interpreted all pertinent imaging results/findings.

## 2019-05-04 NOTE — PROGRESS NOTES
Assessment & Plan    86 y.o. female w/ a significant PMHx of HTN, bronchiectasis, SVT(stable on verapamil) who presented to ED after a fall; found to have left intertrochanteric, left proximal humerus, and left distal radius fracture. Pt is now s/p Left intramedullary marc for closed displaced intertrochanteric fracture.      Neuro:   -  AAOx3  - Pain control with tylenol, lyrica, oxy 5 prn  - SIFI PNC w/ ropiv     Pulmonary:   - Hx bronchiectasis  - SpO2 100% on RA  - Continue Duonebs     Cardiac:   - HDS  - Hx SVT, HTN  - Cont verapamil, losartan  - Simth 0.8mcg/kg/h     Renal:    - BUN/Cr 11/0.7  - Monitor UOP          Intake/Output Summary (Last 24 hours) at 5/4/2019 0809  Last data filed at 5/4/2019 0600  Gross per 24 hour   Intake 2000 ml   Output 1190 ml   Net 810 ml           ID:   - Afebrile  - WBC 13.42; likely 2/2 post-op inflammation  - Cont to monitor     Hem/Onc:   - H/H  7.0 ( baseline 8.4/26.9)  - Avoid IVF if possible per primary  - Pediatric blood draws only     Endocrine:    - BG stable  - Cont to monitor  -  (24); ALT 82 (7) - monitor     Fluids/Electrolytes/Nutrition/GI:   - Replace electrolytes PRN  - Advance diet as tolerated     PPx:  - Bowel: bisacodyl prn, senna doc prn        DISPO:  - Continue SICU care        Critical care was time spent personally by me on the following activities: development of treatment plan with patient or surrogate and bedside caregivers, discussions with consultants, evaluation of patient's response to treatment, examination of patient, ordering and performing treatments and interventions, ordering and review of laboratory studies, ordering and review of radiographic studies, pulse oximetry, re-evaluation of patient's condition. This critical care time did not overlap with that of any other provider or involve time for any procedures.     Diego Guzmán MD  PGY2/CA1  Department of Anesthesiology  Pager: [phone]

## 2019-05-04 NOTE — PLAN OF CARE
Care update 1938: Patient seen and examined at the bedside. She is POD 0 from left hip CMN resting comfortably in SICU. She is on .8 rosa with MAP greater than 60. NVI on exam with dressing c/d/i.     Plan: Patient to remain on permissive pressors overnight to avoid excess fluids and hemodilution. Only pediatric blood draws. Discussed with SICU resident on call as well as patient's nurse.  Please call with questions.    Abilio Black M.D. PGY2  Orthopaedic Surgery

## 2019-05-04 NOTE — SUBJECTIVE & OBJECTIVE
[facility]  Critical Care - Surgery  History & Physical        Code Status: Full Code      SURGERY/PROCEDURE: Procedure(s) (LRB):  INSERTION, INTRAMEDULLARY MARTHA, FEMUR (Left)     CC: Closed displaced intertrochanteric fracture of left femur     SUBJECTIVE:      History of Present Illness:  Ms. 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      She presented to [facility] ED after a fall, and was found to have a left intertrochanteric, left proximal humerus, and left distal radius fracture.      Post-op H/H 6.9/21.8 ( baseline 8.4/26.9). Currently on Smith 0.8mcg/kg/h.    PTA Medications   Medication Sig    atenolol (TENORMIN) 25 MG tablet Take 1 tablet (25 mg total) by mouth once daily.    albuterol (PROVENTIL) 2 MG tablet Take 2 mg by mouth 3 (three) times daily.      albuterol (PROVENTIL) 2.5 mg /3 mL (0.083 %) nebulizer solution      CALCIUM CARBONATE/VITAMIN D3 (CALCIUM 600 + D,3, ORAL) Take 1 tablet by mouth once daily.    doxycycline (VIBRA-TABS) 100 MG tablet Take 1 tablet by mouth 2 (two) times daily.    IPRATROPIUM BROMIDE, BULK, MISC by Misc.(Non-Drug; Combo Route) route.    losartan (COZAAR) 100 MG tablet Take 100 mg by mouth once daily.      verapamil (CALAN-SR) 240 MG CR tablet TAKE 1 TABLET BY MOUTH IN THE EVENING     Continuous Infusions:    phenylephrine Stopped (05/04/19 0200)    ropivacaine (PF) 2 mg/ml (0.2%) 2 mL/hr (05/04/19 0700)       Scheduled Meds:    acetaminophen  1,000 mg Oral Q6H    albuterol-ipratropium  3 mL Nebulization Q4H WAKE    atenolol  25 mg Oral Daily    epoetin elizabeth-ebpx (RETACRIT) injection  40,000 Units Subcutaneous Once    losartan  100 mg Oral Daily    mupirocin  1 g Nasal BID    polyethylene glycol  17 g Oral Daily    pregabalin  75 mg Oral QHS    senna-docusate 8.6-50 mg  1 tablet Oral BID    tiotropium  1 capsule  Inhalation Daily    verapamil  120 mg Oral BID       PRN Meds: acetaminophen, bisacodyl, dextrose 50%, dextrose 50%, glucagon (human recombinant), glucose, glucose, methocarbamol, ondansetron, ondansetron, oxyCODONE, promethazine (PHENERGAN) IVPB, ramelteon, senna-docusate 8.6-50 mg, sodium chloride 0.9%, sodium chloride 0.9%, sodium chloride 0.9%    Review of patient's allergies indicates:   Allergen Reactions    Aspirin (bulk) Other (See Comments)     Developed gastric ulcer in past when taking aspirin per patient       Past Medical History:   Diagnosis Date    Bronchiectasis without complication     Hypertension     Supraventricular tachycardia      Past Surgical History:   Procedure Laterality Date    CHOLECYSTECTOMY      TONSILLECTOMY       History reviewed. No pertinent family history.  Social History     Tobacco Use    Smoking status: Never Smoker    Smokeless tobacco: Never Used   Substance Use Topics    Alcohol use: No    Drug use: No          Review of Systems:  Constitutional: no fever or chills  Eyes: no visual changes  ENT: no nasal congestion or sore throat  Respiratory: no cough or shortness of breath  Cardiovascular: no chest pain or palpitations  Gastrointestinal: no nausea or vomiting, no abdominal pain or change in bowel habits  Genitourinary: no hematuria or dysuria  Musculoskeletal: no arthralgias or myalgias  Neurological: no seizures or tremors  Behavioral/Psych: no auditory or visual hallucinations    OBJECTIVE:     Vital Signs (Most Recent)  Temp: 98.6 °F (37 °C) (05/04/19 0300)  Pulse: 85 (05/04/19 0600)  Resp: (!) 29 (05/04/19 0600)  BP: (!) 121/55 (05/04/19 0600)  SpO2: (!) 92 % (05/04/19 0600)         Physical Exam:  Physical Exam   Constitutional: She is oriented to person, place, and time. She appears well-developed and well-nourished.   Eyes: Pupils are equal, round, and reactive to light. EOM are normal.   Cardiovascular: Normal rate, regular rhythm and normal heart  sounds.   Pulmonary/Chest: Effort normal and breath sounds normal.   Abdominal: Soft. Bowel sounds are normal.   Musculoskeletal: Normal range of motion.   Neurological: She is alert and oriented to person, place, and time.   Skin: Skin is warm and dry.   Psychiatric: She has a normal mood and affect. Her behavior is normal.         Lines/Drains:       Peripheral IV - Single Lumen 05/02/19 2048 20 G Right Forearm (Active)   Site Assessment Clean;Dry;Intact;No redness;No swelling 5/4/2019  3:01 AM   Line Status Infusing 5/4/2019  3:01 AM   Dressing Status Clean;Dry;Intact 5/4/2019  3:01 AM   Dressing Intervention Dressing reinforced 5/4/2019  3:01 AM   Dressing Change Due 05/06/19 5/4/2019  3:01 AM   Site Change Due 05/06/19 5/3/2019  7:01 PM   Reason Not Rotated Not due 5/4/2019  3:01 AM   Number of days: 1            Urethral Catheter 05/03/19 1200 (Active)   Site Assessment Clean;Intact 5/4/2019  3:01 AM   Collection Container Urimeter 5/4/2019  3:01 AM   Securement Method secured to top of thigh w/ adhesive device 5/4/2019  3:01 AM   Reason for Continuing Urinary Catheterization Post operative 5/4/2019  3:01 AM   Output (mL) 35 mL 5/4/2019  6:00 AM   Number of days: 0       Laboratory  ABG: No results for input(s): PH, PO2, PCO2, HCO3, POCSATURATED, BE in the last 24 hours.  BMP:  Recent Labs   Lab 05/04/19  0324      K 4.7      CO2 23   BUN 14   CREATININE 0.8   GLU 79   MG 2.4   PHOS 4.2     LFT:   Lab Results   Component Value Date     (H) 05/04/2019    ALT 82 (H) 05/04/2019    ALKPHOS 226 (H) 05/04/2019    BILITOT 0.9 05/04/2019    ALBUMIN 2.5 (L) 05/04/2019    PROT 6.1 05/04/2019     CBC:   Lab Results   Component Value Date    WBC 12.75 (H) 05/04/2019    HGB 7.0 (L) 05/04/2019    HCT 21.6 (L) 05/04/2019    MCV 83 05/04/2019     (L) 05/04/2019     Microbiology last 7d:   Microbiology Results (last 7 days)     ** No results found for the last 168 hours. **          Imaging/Diagnostic  Results:    ASSESSMENT/PLAN:

## 2019-05-04 NOTE — ASSESSMENT & PLAN NOTE
86 y.o. female w/ a significant PMHx of HTN, bronchiectasis, SVT(stable on verapamil) who presented to ED after a fall; found to have left intertrochanteric, left proximal humerus, and left distal radius fracture. Pt is now s/p Left intramedullary marc for closed displaced intertrochanteric fracture.      Neuro:   - AAOx3  - Pain control with tylenol, lyrica, oxy 5 prn  - SIFI PNC w/ ropiv     Pulmonary:   - Hx bronchiectasis  - SpO2 100% on RA  - Continue Duonebs     Cardiac:   - HDS  - Hx SVT, HTN  - Cont verapamil, losartan  - Smith 0.8mcg/kg/h     Renal:    - BUN/Cr 11/0.7  - Monitor UOP          Intake/Output Summary (Last 24 hours) at 5/4/2019 0809  Last data filed at 5/4/2019 0600  Gross per 24 hour   Intake 2000 ml   Output 1190 ml   Net 810 ml           ID:   - Afebrile  - WBC 13.42; likely 2/2 post-op inflammation  - Cont to monitor     Hem/Onc:   - H/H 7.0 ( baseline 8.4/26.9)  - Avoid IVF if possible per primary  - Pediatric blood draws only     Endocrine:    - BG stable  - Cont to monitor  -  (24); ALT 82 (7) - monitor     Fluids/Electrolytes/Nutrition/GI:   - Replace electrolytes PRN  - Advance diet as tolerated     PPx:  - Bowel: bisacodyl prn, senna doc prn        DISPO:  - Continue SICU care        Critical care was time spent personally by me on the following activities: development of treatment plan with patient or surrogate and bedside caregivers, discussions with consultants, evaluation of patient's response to treatment, examination of patient, ordering and performing treatments and interventions, ordering and review of laboratory studies, ordering and review of radiographic studies, pulse oximetry, re-evaluation of patient's condition. This critical care time did not overlap with that of any other provider or involve time for any procedures.     Diego Guzmán MD  PGY2/CA1  Department of Anesthesiology  Pager: [phone]

## 2019-05-04 NOTE — PLAN OF CARE
Outcome: Ongoing (interventions implemented as appropriate)  Dx: Closed displaced intertrochanteric fracture of left femur    Shift Events: Pt. Currently off pressor requirements to maintain MAP >60. and VSS. Pt. On 1L NC sats >92%. Pt. Reoriented this shift, follows commands, and moves all extremities.  Ribocaine remains through epidural.     Neuro: AAO x4    Vital Signs: BP (!) 102/46   Pulse 79   Temp 98.6 °F (37 °C) (Oral)   Resp 15   Ht 5' (1.524 m)   Wt 43.1 kg (95 lb)   SpO2 100%   Breastfeeding? No   BMI 18.55 kg/m²     Diet: NPO    Urine Output: Urinary Catheter ~30 cc/hour - MD aware of UO    Labs/Accuchecks: Labs reviewed with MD Diego. MD aware of inability to obtain folate & B12 lab value due to not enough blood in blood tubes, per lab. . MD aware.    Skin: Elbows, heels, and sacrum free from breakdown.     Pt. Updated on POC. All questions answered. Emotional support provided.

## 2019-05-05 LAB
ANISOCYTOSIS BLD QL SMEAR: SLIGHT
BASOPHILS # BLD AUTO: 0.06 K/UL (ref 0–0.2)
BASOPHILS NFR BLD: 0.5 % (ref 0–1.9)
DIFFERENTIAL METHOD: ABNORMAL
EOSINOPHIL # BLD AUTO: 0 K/UL (ref 0–0.5)
EOSINOPHIL NFR BLD: 0.1 % (ref 0–8)
ERYTHROCYTE [DISTWIDTH] IN BLOOD BY AUTOMATED COUNT: 13.2 % (ref 11.5–14.5)
HCT VFR BLD AUTO: 21.1 % (ref 37–48.5)
HGB BLD-MCNC: 6.8 G/DL (ref 12–16)
HYPOCHROMIA BLD QL SMEAR: ABNORMAL
IMM GRANULOCYTES # BLD AUTO: 0.05 K/UL (ref 0–0.04)
IMM GRANULOCYTES NFR BLD AUTO: 0.4 % (ref 0–0.5)
LYMPHOCYTES # BLD AUTO: 1.1 K/UL (ref 1–4.8)
LYMPHOCYTES NFR BLD: 10 % (ref 18–48)
MCH RBC QN AUTO: 26.4 PG (ref 27–31)
MCHC RBC AUTO-ENTMCNC: 32.2 G/DL (ref 32–36)
MCV RBC AUTO: 82 FL (ref 82–98)
MONOCYTES # BLD AUTO: 1 K/UL (ref 0.3–1)
MONOCYTES NFR BLD: 8.5 % (ref 4–15)
NEUTROPHILS # BLD AUTO: 9.2 K/UL (ref 1.8–7.7)
NEUTROPHILS NFR BLD: 80.5 % (ref 38–73)
NRBC BLD-RTO: 0 /100 WBC
OVALOCYTES BLD QL SMEAR: ABNORMAL
PLATELET # BLD AUTO: ABNORMAL K/UL (ref 150–350)
PMV BLD AUTO: ABNORMAL FL (ref 9.2–12.9)
POIKILOCYTOSIS BLD QL SMEAR: SLIGHT
POLYCHROMASIA BLD QL SMEAR: ABNORMAL
RBC # BLD AUTO: 2.58 M/UL (ref 4–5.4)
WBC # BLD AUTO: 11.4 K/UL (ref 3.9–12.7)

## 2019-05-05 PROCEDURE — 25000003 PHARM REV CODE 250: Performed by: HOSPITALIST

## 2019-05-05 PROCEDURE — 99233 PR SUBSEQUENT HOSPITAL CARE,LEVL III: ICD-10-PCS | Mod: GC,,, | Performed by: SURGERY

## 2019-05-05 PROCEDURE — 63600175 PHARM REV CODE 636 W HCPCS: Performed by: STUDENT IN AN ORGANIZED HEALTH CARE EDUCATION/TRAINING PROGRAM

## 2019-05-05 PROCEDURE — 99233 SBSQ HOSP IP/OBS HIGH 50: CPT | Mod: GC,,, | Performed by: SURGERY

## 2019-05-05 PROCEDURE — 27000221 HC OXYGEN, UP TO 24 HOURS

## 2019-05-05 PROCEDURE — 85025 COMPLETE CBC W/AUTO DIFF WBC: CPT

## 2019-05-05 PROCEDURE — 25000003 PHARM REV CODE 250: Performed by: STUDENT IN AN ORGANIZED HEALTH CARE EDUCATION/TRAINING PROGRAM

## 2019-05-05 PROCEDURE — 99900035 HC TECH TIME PER 15 MIN (STAT)

## 2019-05-05 PROCEDURE — 94640 AIRWAY INHALATION TREATMENT: CPT

## 2019-05-05 PROCEDURE — 94761 N-INVAS EAR/PLS OXIMETRY MLT: CPT

## 2019-05-05 PROCEDURE — 20000000 HC ICU ROOM

## 2019-05-05 PROCEDURE — 25000242 PHARM REV CODE 250 ALT 637 W/ HCPCS: Performed by: HOSPITALIST

## 2019-05-05 RX ORDER — ENOXAPARIN SODIUM 100 MG/ML
30 INJECTION SUBCUTANEOUS EVERY 24 HOURS
Status: DISCONTINUED | OUTPATIENT
Start: 2019-05-05 | End: 2019-05-09 | Stop reason: HOSPADM

## 2019-05-05 RX ADMIN — LOSARTAN POTASSIUM 100 MG: 50 TABLET, FILM COATED ORAL at 08:05

## 2019-05-05 RX ADMIN — VERAPAMIL HYDROCHLORIDE 80 MG: 40 TABLET ORAL at 08:05

## 2019-05-05 RX ADMIN — VERAPAMIL HYDROCHLORIDE 80 MG: 40 TABLET ORAL at 03:05

## 2019-05-05 RX ADMIN — IPRATROPIUM BROMIDE AND ALBUTEROL SULFATE 3 ML: .5; 3 SOLUTION RESPIRATORY (INHALATION) at 09:05

## 2019-05-05 RX ADMIN — MUPIROCIN 1 G: 20 OINTMENT TOPICAL at 08:05

## 2019-05-05 RX ADMIN — POLYETHYLENE GLYCOL 3350 17 G: 17 POWDER, FOR SOLUTION ORAL at 08:05

## 2019-05-05 RX ADMIN — STANDARDIZED SENNA CONCENTRATE AND DOCUSATE SODIUM 1 TABLET: 8.6; 5 TABLET, FILM COATED ORAL at 08:05

## 2019-05-05 RX ADMIN — IPRATROPIUM BROMIDE AND ALBUTEROL SULFATE 3 ML: .5; 3 SOLUTION RESPIRATORY (INHALATION) at 11:05

## 2019-05-05 RX ADMIN — ENOXAPARIN SODIUM 30 MG: 100 INJECTION SUBCUTANEOUS at 05:05

## 2019-05-05 RX ADMIN — ROPIVACAINE HYDROCHLORIDE 2 ML/HR: 2 INJECTION, SOLUTION EPIDURAL; INFILTRATION at 10:05

## 2019-05-05 RX ADMIN — IPRATROPIUM BROMIDE AND ALBUTEROL SULFATE 3 ML: .5; 3 SOLUTION RESPIRATORY (INHALATION) at 08:05

## 2019-05-05 RX ADMIN — IPRATROPIUM BROMIDE AND ALBUTEROL SULFATE 3 ML: .5; 3 SOLUTION RESPIRATORY (INHALATION) at 03:05

## 2019-05-05 RX ADMIN — TIOTROPIUM BROMIDE 18 MCG: 18 CAPSULE ORAL; RESPIRATORY (INHALATION) at 09:05

## 2019-05-05 RX ADMIN — ATENOLOL 25 MG: 25 TABLET ORAL at 08:05

## 2019-05-05 NOTE — PLAN OF CARE
Outcome: Ongoing (interventions implemented as appropriate)  Dx: Closed displaced intertrochanteric fracture of left femur    Shift Events: VSS. Hbg 6.6. Rpt to MD no new orders. Sats 100% on 1L nc. 250cc NS bolus for UO. UO ~30cc/hr.    Neuro: AAO x4    Vital Signs: BP (!) 129/58 (BP Location: Right arm, Patient Position: Lying)   Pulse 89   Temp 98.3 °F (36.8 °C) (Oral)   Resp 19   Ht 5' (1.524 m)   Wt 43.1 kg (95 lb)   SpO2 100%   Breastfeeding? No   BMI 18.55 kg/m²     Diet: Regular Diet     Skin: Elbows, heels free from breakdown.     All questions answered. Emotional support provided.

## 2019-05-05 NOTE — PROGRESS NOTES
[facility]  Orthopedics  Progress Note    Patient Name: Siomara Flores  MRN: [id number]  Admission Date: 5/2/2019  Hospital Length of Stay: 3 days  Attending Provider: Lily Bowman MD  Primary Care Provider: Vince Wray MD  Follow-up For: Procedure(s) (LRB):  INSERTION, INTRAMEDULLARY MARTHA, FEMUR (Left)    Post-Operative Day: 2 Days Post-Op  Subjective:     Principal Problem:Closed displaced intertrochanteric fracture of left femur    Principal Orthopedic Problem: same    Interval History: Patient seen and examined at bedside. Day 2 s/p left CMN for intertrochanteric hip fracture.   No acute events overnight.  Pain controlled.  Hb 6.6 overnight. Did not work with PT yesterday.     Review of patient's allergies indicates:   Allergen Reactions    Aspirin (bulk) Other (See Comments)     Developed gastric ulcer in past when taking aspirin per patient       Current Facility-Administered Medications   Medication    acetaminophen tablet 1,000 mg    acetaminophen tablet 650 mg    albuterol-ipratropium 2.5 mg-0.5 mg/3 mL nebulizer solution 3 mL    atenolol tablet 25 mg    bisacodyl suppository 10 mg    dextrose 50% injection 12.5 g    dextrose 50% injection 25 g    epoetin elizabeth-epbx injection 40,000 Units    glucagon (human recombinant) injection 1 mg    glucose chewable tablet 16 g    glucose chewable tablet 24 g    losartan tablet 100 mg    methocarbamol tablet 1,000 mg    mupirocin 2 % ointment 1 g    ondansetron disintegrating tablet 8 mg    ondansetron injection 4 mg    oxyCODONE immediate release tablet 5 mg    PHENYLephrine (ROBIN-SYNEPHRINE) 20 mg in sodium chloride 0.9% 250ml (titrating)    polyethylene glycol packet 17 g    pregabalin capsule 75 mg    promethazine (PHENERGAN) 6.25 mg in dextrose 5 % 50 mL IVPB    ramelteon tablet 8 mg    ropivacaine (PF) 2 mg/ml (0.2%) infusion    senna-docusate 8.6-50 mg per tablet 1 tablet    senna-docusate 8.6-50 mg per  tablet 1 tablet    sodium chloride 0.9% flush 10 mL    sodium chloride 0.9% flush 10 mL    sodium chloride 0.9% flush 5 mL    tiotropium inhalation capsule 18 mcg    verapamil tablet 80 mg     Objective:     Vital Signs (Most Recent):  Temp: 98.7 °F (37.1 °C) (05/05/19 0700)  Pulse: 94 (05/05/19 0700)  Resp: 20 (05/05/19 0700)  BP: (!) 142/63 (05/05/19 0700)  SpO2: 100 % (05/05/19 0700) Vital Signs (24h Range):  Temp:  [98.3 °F (36.8 °C)-98.8 °F (37.1 °C)] 98.7 °F (37.1 °C)  Pulse:  [] 94  Resp:  [15-50] 20  SpO2:  [94 %-100 %] 100 %  BP: (107-143)/() 142/63     Weight: 43.1 kg (95 lb)  Height: 5' (152.4 cm)  Body mass index is 18.55 kg/m².      Intake/Output Summary (Last 24 hours) at 5/5/2019 0730  Last data filed at 5/5/2019 0700  Gross per 24 hour   Intake 754 ml   Output 895 ml   Net -141 ml       Ortho/SPM Exam    Physical Exam:  NAD, A/O x 3.  Wound c/d/i with clean dressing.  No focal motor or sensory deficits noted.    Significant Labs:   CBC:   Recent Labs   Lab 05/03/19  1443 05/04/19  0324 05/04/19  2223   WBC 13.42* 12.75* 11.53   HGB 6.9* 7.0* 6.6*   HCT 21.8* 21.6* 20.9*    129* 168     CMP:   Recent Labs   Lab 05/04/19  0324 05/04/19  2223    139   K 4.7 4.1    104   CO2 23 27   GLU 79 103   BUN 14 17   CREATININE 0.8 0.7   CALCIUM 8.1* 8.7   PROT 6.1 6.0   ALBUMIN 2.5* 2.5*   BILITOT 0.9 0.6   ALKPHOS 226* 193*   * 116*   ALT 82* 51*   ANIONGAP 10 8   EGFRNONAA >60.0 >60.0     All pertinent labs within the past 24 hours have been reviewed.    Significant Imaging: I have reviewed and interpreted all pertinent imaging results/findings.    Assessment/Plan:     * Closed displaced intertrochanteric fracture of left femur  Siomara Flores is a 86 y.o. female POD 2 s/p left CMN doing well now off pressors in SICU with Hb 6.6 this AM. Mentating well with minimal pain.        - Weight bearing status: WBAT LLE, NWB LUE.   - Pain control: Per APS  - Antibiotics: po  ancef complete  - DVT Prophylaxis: Lovenox 40mg daily, SCD's at all times when not ambulating.  - PT/OT, will require aggressive PT/OT. 7 days a week  - Lines/Drains: PNC in place  - Dispo: Continue to treat, will step down to progressive bed today. Discussed blood products again with patient this am as her HB was 6.6 overnight. Patient does not want to receive blood products and is aware of risk of Hb this low and does not agree to transfusion.   - Pediatric blood draws          Closed fracture of left proximal humerus  See above    Closed fracture of left distal radius  See above          Kye Black MD  Orthopedics  [facility]

## 2019-05-05 NOTE — PLAN OF CARE
[facility] Medicine ICU Acceptance Note    Date of Admit: 5/2/2019  Date of Transfer / Stepdown: 5/5/2019  Andrzej, LOIS/J, L, Onc (IV chemo w/in 1 month), Gyn/Onc, or other special case?: yes back to -  ICU team stepping patient down:N/A  ICU team member giving verbal handoff: N/A  Accepting  team: IM-H    Brief History of Present Illness:      Siomara Flores is a 86F who sustained a left hip fracture from a mechanical fall. Admitted to [facility] prior to admission. Had pre-op anemia baseline Hg of 8, refusal to accept blood products due to Worship, hx of SVT/HTN/ bronchiectasis on BP meds at home prior to admit. Hip fx pathway initiated, went to OR with concerns for blood loss post op in setting of inability to transfuse post op, high risk due to these variables    Hospital/ICU Course:     Left hip fracture- s/p left CMN for hip fracture on 5/3, drop in Hg to 6.9 post op-- decision to watch in ICU due to inability to give blood products, data shows pressors better than IVF to resuscitate in this situation and avoid hemodiluation/third spacing with excess IVF. In ICU patient did well and Hg has leveled off around 6-7 since then. Pain controlled continue hip fracture pathway. WBAT to LLE. On lovenox daily for ppx for 28 days post op- very risk with anticoagulation given if bleed cant give supportive care with blood, lovenox is safest as opposed to NOAC so would continue this, end date June 1. Continue PT/OT. SNF options next week with SW    Left humerus/radial fracture= NWB to LUE. Continue sling    Acute blood loss anemia with new iron deficiency anemia- Hg between 6-7 post op, came in with HG of 8, previous baseline was 12 in 2016. Is iron deficient- ferritin is 23. Will start oral iron replacement. Denies blood in stool or bleeding, has stated a very poor appetite as  was ill, recheck In 6-8 weeks as outpatient and if not improved with oral iron then would consider workup for other causes if patient  wants to pursue. epo PRN for acute blood loss is okay per Critical access hospital policies.   -minimize lab draws, pediatric tubes    HTN- continue losartan, atenolol    Hx of SVT- cont atenolol, verapamil    Hx of bronchiectasis- spiriva, PRN duonebs      Consultants and Procedures:     Consultants:  Ortho, ICU    Procedures:    Hip fx repair    Transfer Information:     Diet:  regular    Physical Activity:  WBAT to LLE, NWB to LUE        To Do / Pending Studies / Follow ups:    Start iron  EPO PRN  Minimize lab draws, peds tubes        Patient has been accepted by Hospital Medicine Team H, who will assume care of the patient upon arrival to the floor from the ICU. Please contact ICU team with any concerns prior to arrival. Please contact Hospital Medicine at [phone] or [phone] (please do NOT leave a voicemail) when patient arrives to the floor.    Lily Bowman MD

## 2019-05-05 NOTE — PLAN OF CARE
Problem: Adult Inpatient Plan of Care  Goal: Plan of Care Review  Johava Witness    Hx: HTN, CVA, SVT    5/2: Fell @ home - ED  5/3: Left marc for closed displaced hip fracture - admit SICU on rosa.    MAP > 60   Outcome: Ongoing (interventions implemented as appropriate)  No acute events this shift. Pt remains AOx4, GAMEZ, generalized weakness. NSR 90s, BP WNL, Hgb 6.8. Sats % on 1L O2 via NC. Esposito remains in place per MD order, 30-60cc UOP per hr. No BM this shift. Assisted w q2hr turning, L arm sling remains in place, non-WB to L arm. Transfer orders remain in place pending available bed. POC reviewed with patient, all questions/concerns addressed.

## 2019-05-05 NOTE — SUBJECTIVE & OBJECTIVE
Principal Problem:Closed displaced intertrochanteric fracture of left femur    Principal Orthopedic Problem: same    Interval History: Patient seen and examined at bedside. Day 2 s/p left CMN for intertrochanteric hip fracture.   No acute events overnight.  Pain controlled.  Hb 6.6 overnight. Did not work with PT yesterday.     Review of patient's allergies indicates:   Allergen Reactions    Aspirin (bulk) Other (See Comments)     Developed gastric ulcer in past when taking aspirin per patient       Current Facility-Administered Medications   Medication    acetaminophen tablet 1,000 mg    acetaminophen tablet 650 mg    albuterol-ipratropium 2.5 mg-0.5 mg/3 mL nebulizer solution 3 mL    atenolol tablet 25 mg    bisacodyl suppository 10 mg    dextrose 50% injection 12.5 g    dextrose 50% injection 25 g    epoetin elizabeth-epbx injection 40,000 Units    glucagon (human recombinant) injection 1 mg    glucose chewable tablet 16 g    glucose chewable tablet 24 g    losartan tablet 100 mg    methocarbamol tablet 1,000 mg    mupirocin 2 % ointment 1 g    ondansetron disintegrating tablet 8 mg    ondansetron injection 4 mg    oxyCODONE immediate release tablet 5 mg    PHENYLephrine (ROBIN-SYNEPHRINE) 20 mg in sodium chloride 0.9% 250ml (titrating)    polyethylene glycol packet 17 g    pregabalin capsule 75 mg    promethazine (PHENERGAN) 6.25 mg in dextrose 5 % 50 mL IVPB    ramelteon tablet 8 mg    ropivacaine (PF) 2 mg/ml (0.2%) infusion    senna-docusate 8.6-50 mg per tablet 1 tablet    senna-docusate 8.6-50 mg per tablet 1 tablet    sodium chloride 0.9% flush 10 mL    sodium chloride 0.9% flush 10 mL    sodium chloride 0.9% flush 5 mL    tiotropium inhalation capsule 18 mcg    verapamil tablet 80 mg     Objective:     Vital Signs (Most Recent):  Temp: 98.7 °F (37.1 °C) (05/05/19 0700)  Pulse: 94 (05/05/19 0700)  Resp: 20 (05/05/19 0700)  BP: (!) 142/63 (05/05/19 0700)  SpO2: 100 % (05/05/19 0700)  Vital Signs (24h Range):  Temp:  [98.3 °F (36.8 °C)-98.8 °F (37.1 °C)] 98.7 °F (37.1 °C)  Pulse:  [] 94  Resp:  [15-50] 20  SpO2:  [94 %-100 %] 100 %  BP: (107-143)/() 142/63     Weight: 43.1 kg (95 lb)  Height: 5' (152.4 cm)  Body mass index is 18.55 kg/m².      Intake/Output Summary (Last 24 hours) at 5/5/2019 0730  Last data filed at 5/5/2019 0700  Gross per 24 hour   Intake 754 ml   Output 895 ml   Net -141 ml       Ortho/SPM Exam    Physical Exam:  NAD, A/O x 3.  Wound c/d/i with clean dressing.  No focal motor or sensory deficits noted.    Significant Labs:   CBC:   Recent Labs   Lab 05/03/19  1443 05/04/19  0324 05/04/19  2223   WBC 13.42* 12.75* 11.53   HGB 6.9* 7.0* 6.6*   HCT 21.8* 21.6* 20.9*    129* 168     CMP:   Recent Labs   Lab 05/04/19  0324 05/04/19  2223    139   K 4.7 4.1    104   CO2 23 27   GLU 79 103   BUN 14 17   CREATININE 0.8 0.7   CALCIUM 8.1* 8.7   PROT 6.1 6.0   ALBUMIN 2.5* 2.5*   BILITOT 0.9 0.6   ALKPHOS 226* 193*   * 116*   ALT 82* 51*   ANIONGAP 10 8   EGFRNONAA >60.0 >60.0     All pertinent labs within the past 24 hours have been reviewed.    Significant Imaging: I have reviewed and interpreted all pertinent imaging results/findings.

## 2019-05-05 NOTE — ANESTHESIA POSTPROCEDURE EVALUATION
Anesthesia Post Evaluation    Patient: Siomara Flores    Procedure(s) Performed: Procedure(s) (LRB):  INSERTION, INTRAMEDULLARY MARTHA, FEMUR (Left)    Final Anesthesia Type: general  Patient location during evaluation: PACU  Patient participation: Yes- Able to Participate  Level of consciousness: awake and alert  Post-procedure vital signs: reviewed and stable  Pain management: adequate  Airway patency: patent  PONV status at discharge: No PONV  Anesthetic complications: no      Cardiovascular status: blood pressure returned to baseline  Respiratory status: unassisted  Hydration status: euvolemic  Follow-up not needed.          Vitals Value Taken Time   /61 5/5/2019  9:02 AM   Temp 37.1 °C (98.7 °F) 5/5/2019  7:00 AM   Pulse 89 5/5/2019  9:47 AM   Resp 18 5/5/2019  9:47 AM   SpO2 100 % 5/5/2019  9:47 AM   Vitals shown include unvalidated device data.      Event Time     Out of Recovery 05/03/2019 18:07:20          Pain/Darnell Score: Pain Rating Prior to Med Admin: 0 (5/5/2019  9:00 AM)

## 2019-05-05 NOTE — ANESTHESIA POST-OP PAIN MANAGEMENT
Acute Pain Service Progress Note    Siomara Flores is a 86 y.o., female, [id number].    Surgery:  Insertion Intramedullary Ever, Femur (Left)    Post Op Day #: 2    Catheter type: perineural  SIFI    Infusion type: Ropivacaine 0.2%  2 mL/hr basal with 10 mL boluses per hour    Problem List:    Active Hospital Problems    Diagnosis  POA    *Closed displaced intertrochanteric fracture of left femur [S72.142A]  Yes    Microcytic anemia [D50.9]  Yes    Acute blood loss anemia [D62]  No    Fall [W19.XXXA]  Yes    Closed fracture of left distal radius [S52.502A]  Yes    Closed fracture of left proximal humerus [S42.202A]  Yes    Refusal of blood transfusions as patient is Buddhist [Z53.1]  Not Applicable    Hypertension [I10]  Yes    SVT (supraventricular tachycardia) [I47.1]  Yes    Bronchiectasis without complication [J47.9]  Yes      Resolved Hospital Problems   No resolved problems to display.       Subjective:     General appearance of alert, oriented, no complaints   Pain with rest: 2    Numbers   Pain with movement: 2    Numbers   Side Effects    1. Pruritis No    2. Nausea No    3. Motor Blockade No, 0=Ability to raise lower extremities off bed    4. Sedation No, 1=awake and alert    Objective:     Catheter site clean, dry, intact      Vitals   Vitals:    05/05/19 1000   BP: 132/60   Pulse: 87   Resp: 17   Temp:         Labs    Admission on 05/02/2019   No results displayed because visit has over 200 results.           Meds   Current Facility-Administered Medications   Medication Dose Route Frequency Provider Last Rate Last Dose    acetaminophen tablet 1,000 mg  1,000 mg Oral Q6H Irma Jauregui MD        acetaminophen tablet 650 mg  650 mg Oral Q8H PRN Irma Jauregui MD        albuterol-ipratropium 2.5 mg-0.5 mg/3 mL nebulizer solution 3 mL  3 mL Nebulization Q4H WAKE Irma Jauregui MD   3 mL at 05/05/19 0805    atenolol tablet 25 mg  25 mg Oral Daily Irma Jauregui MD   25 mg at  05/05/19 0836    bisacodyl suppository 10 mg  10 mg Rectal Daily PRN Irma Jauregui MD        dextrose 50% injection 12.5 g  12.5 g Intravenous PRN Irma Jauregui MD        dextrose 50% injection 25 g  25 g Intravenous PRN Irma Jauregui MD        enoxaparin injection 30 mg  30 mg Subcutaneous Daily Kye Black MD        epoetin elizabeth-epbx injection 40,000 Units  40,000 Units Subcutaneous Once Irma Jauregui MD        glucagon (human recombinant) injection 1 mg  1 mg Intramuscular PRN Irma Jauregui MD        glucose chewable tablet 16 g  16 g Oral PRN Irma Jauregui MD        glucose chewable tablet 24 g  24 g Oral PRN Irma Jauregui MD        losartan tablet 100 mg  100 mg Oral Daily Irma Jauregui MD   100 mg at 05/05/19 0836    methocarbamol tablet 1,000 mg  1,000 mg Oral Q6H PRN Irma Jauregui MD        mupirocin 2 % ointment 1 g  1 g Nasal BID Irma Jauregui MD   1 g at 05/05/19 0839    ondansetron disintegrating tablet 8 mg  8 mg Oral Q8H PRN Irma Jauergui MD        ondansetron injection 4 mg  4 mg Intravenous Q12H PRN Irma Jauregui MD        oxyCODONE immediate release tablet 5 mg  5 mg Oral Q3H PRN Campos Jiang MD   5 mg at 05/04/19 1600    PHENYLephrine (ROBIN-SYNEPHRINE) 20 mg in sodium chloride 0.9% 250ml (titrating)  0.5 mcg/kg/min Intravenous Continuous Diego Guzmán MD   Stopped at 05/04/19 0200    polyethylene glycol packet 17 g  17 g Oral Daily Irma Jauregui MD   17 g at 05/05/19 0839    pregabalin capsule 75 mg  75 mg Oral QHS Irma Jauregui MD   Stopped at 05/03/19 2100    promethazine (PHENERGAN) 6.25 mg in dextrose 5 % 50 mL IVPB  6.25 mg Intravenous Q6H PRN Irma Jauregui MD        ramelteon tablet 8 mg  8 mg Oral Nightly PRN Irma Jauregui MD        ropivacaine (PF) 2 mg/ml (0.2%) infusion  2 mL/hr Perineural Continuous Vy Radha George MD 2 mL/hr at 05/05/19 1000 2 mL/hr at 05/05/19 1000    senna-docusate 8.6-50 mg per tablet 1  tablet  1 tablet Oral BID PRN Irma Jauregui MD        senna-docusate 8.6-50 mg per tablet 1 tablet  1 tablet Oral BID Irma Jauregui MD   1 tablet at 05/05/19 0839    sodium chloride 0.9% flush 10 mL  10 mL Intravenous PRN Lanette Barton MD        sodium chloride 0.9% flush 10 mL  10 mL Intravenous PRN Irma Jauregui MD        sodium chloride 0.9% flush 5 mL  5 mL Intravenous PRN Irma Jauregui MD        tiotropium inhalation capsule 18 mcg  1 capsule Inhalation Daily Irma Jauregui MD   18 mcg at 05/05/19 0921    verapamil tablet 80 mg  80 mg Oral TID Fausto Santos MD   80 mg at 05/05/19 0836        Anticoagulant dose: Lovenox 30 mg daily     Assessment:     Pain control adequate    Plan:     Patient doing well, continue present treatment. Continue PNC with multimodals    Evaluator Jeevan Lua

## 2019-05-05 NOTE — ASSESSMENT & PLAN NOTE
Siomara Flores is a 86 y.o. female POD 2 s/p left CMN doing well now off pressors in SICU with Hb 6.6 this AM. Mentating well with minimal pain.        - Weight bearing status: WBAT LLE, NWB LUE.   - Pain control: Per APS  - Antibiotics: po ancef complete  - DVT Prophylaxis: Lovenox 40mg daily, SCD's at all times when not ambulating.  - PT/OT, will require aggressive PT/OT. 7 days a week  - Lines/Drains: PNC in place  - Dispo: Continue to treat, will step down to progressive bed today. Discussed blood products again with patient this am as her HB was 6.6 overnight. Patient does not want to receive blood products and is aware of risk of Hb this low and does not agree to transfusion.

## 2019-05-05 NOTE — PROGRESS NOTES
[facility]  Critical Care - Surgery  Progress Note    Patient Name: Siomara Flores  MRN: [id number]  Admission Date: 5/2/2019  Hospital Length of Stay: 3 days  Code Status: Full Code  Attending Provider: Lily Bowman MD  Primary Care Provider: Vince Wray MD   Principal Problem: Closed displaced intertrochanteric fracture of left femur    Subjective:     Hospital/ICU Course:  No notes on file    [facility]  Critical Care - Surgery  History & Physical        Code Status: Full Code      SURGERY/PROCEDURE: Procedure(s) (LRB):  INSERTION, INTRAMEDULLARY MARTHA, FEMUR (Left)     CC: Closed displaced intertrochanteric fracture of left femur     SUBJECTIVE:      History of Present Illness:  Ms. 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      [place] [facility] [place], and was found to have a left intertrochanteric, left proximal humerus, and left distal radius fracture.      Post-op H/H 6.9/21.8 ( baseline 8.4/26.9).      Interval history  Patient seen and examined at bedside.  No acute events overnight.  Pain controlled.  Off pressors and HDS    PTA Medications   Medication Sig    atenolol (TENORMIN) 25 MG tablet Take 1 tablet (25 mg total) by mouth once daily.    albuterol (PROVENTIL) 2 MG tablet Take 2 mg by mouth 3 (three) times daily.      albuterol (PROVENTIL) 2.5 mg /3 mL (0.083 %) nebulizer solution      CALCIUM CARBONATE/VITAMIN D3 (CALCIUM 600 + D,3, ORAL) Take 1 tablet by mouth once daily.    doxycycline (VIBRA-TABS) 100 MG tablet Take 1 tablet by mouth 2 (two) times daily.    IPRATROPIUM BROMIDE, BULK, MISC by Misc.(Non-Drug; Combo Route) route.    losartan (COZAAR) 100 MG tablet Take 100 mg by mouth once daily.      verapamil (CALAN-SR) 240 MG CR tablet TAKE 1 TABLET BY MOUTH IN THE EVENING     Continuous  Infusions:    phenylephrine Stopped (05/04/19 0200)    ropivacaine (PF) 2 mg/ml (0.2%) 2 mL/hr (05/05/19 0600)       Scheduled Meds:    acetaminophen  1,000 mg Oral Q6H    albuterol-ipratropium  3 mL Nebulization Q4H WAKE    atenolol  25 mg Oral Daily    epoetin elizabeth-ebpx (RETACRIT) injection  40,000 Units Subcutaneous Once    losartan  100 mg Oral Daily    mupirocin  1 g Nasal BID    polyethylene glycol  17 g Oral Daily    pregabalin  75 mg Oral QHS    senna-docusate 8.6-50 mg  1 tablet Oral BID    tiotropium  1 capsule Inhalation Daily    verapamil  80 mg Oral TID       PRN Meds: acetaminophen, bisacodyl, dextrose 50%, dextrose 50%, glucagon (human recombinant), glucose, glucose, methocarbamol, ondansetron, ondansetron, oxyCODONE, promethazine (PHENERGAN) IVPB, ramelteon, senna-docusate 8.6-50 mg, sodium chloride 0.9%, sodium chloride 0.9%, sodium chloride 0.9%    Review of patient's allergies indicates:   Allergen Reactions    Aspirin (bulk) Other (See Comments)     Developed gastric ulcer in past when taking aspirin per patient       Past Medical History:   Diagnosis Date    Bronchiectasis without complication     Hypertension     Supraventricular tachycardia      Past Surgical History:   Procedure Laterality Date    CHOLECYSTECTOMY      TONSILLECTOMY       History reviewed. No pertinent family history.  Social History     Tobacco Use    Smoking status: Never Smoker    Smokeless tobacco: Never Used   Substance Use Topics    Alcohol use: No    Drug use: No          Review of Systems:  Constitutional: no fever or chills  Eyes: no visual changes  ENT: no nasal congestion or sore throat  Respiratory: no cough or shortness of breath  Cardiovascular: no chest pain or palpitations  Gastrointestinal: no nausea or vomiting, no abdominal pain or change in bowel habits  Genitourinary: no hematuria or dysuria  Musculoskeletal: no arthralgias or myalgias  Neurological: no seizures or  tremors  Behavioral/Psych: no auditory or visual hallucinations    OBJECTIVE:     Vital Signs (Most Recent)  Temp: 98.3 °F (36.8 °C) (05/05/19 0300)  Pulse: 92 (05/05/19 0645)  Resp: (!) 22 (05/05/19 0645)  BP: 138/60 (05/05/19 0600)  SpO2: 100 % (05/05/19 0645)         Physical Exam:  Physical Exam   Constitutional: She is oriented to person, place, and time. She appears well-developed and well-nourished.   Eyes: Pupils are equal, round, and reactive to light. EOM are normal.   Cardiovascular: Normal rate, regular rhythm and normal heart sounds.   Pulmonary/Chest: Effort normal and breath sounds normal.   Abdominal: Soft. Bowel sounds are normal.   Musculoskeletal: incision dressings c/d/i. Left upper extremity in sling. M/R/U sensation intact. AIN/PIN/ intact  Neurological: She is alert and oriented to person, place, and time.   Skin: Skin is warm and dry.   Psychiatric: She has a normal mood and affect. Her behavior is normal.         Lines/Drains:       Peripheral IV - Single Lumen 05/02/19 2048 20 G Right Forearm (Active)   Site Assessment Clean;Dry;Intact;No redness;No swelling 5/4/2019  3:01 AM   Line Status Infusing 5/4/2019  3:01 AM   Dressing Status Clean;Dry;Intact 5/4/2019  3:01 AM   Dressing Intervention Dressing reinforced 5/4/2019  3:01 AM   Dressing Change Due 05/06/19 5/4/2019  3:01 AM   Site Change Due 05/06/19 5/3/2019  7:01 PM   Reason Not Rotated Not due 5/4/2019  3:01 AM   Number of days: 1            Urethral Catheter 05/03/19 1200 (Active)   Site Assessment Clean;Intact 5/4/2019  3:01 AM   Collection Container Urimeter 5/4/2019  3:01 AM   Securement Method secured to top of thigh w/ adhesive device 5/4/2019  3:01 AM   Reason for Continuing Urinary Catheterization Post operative 5/4/2019  3:01 AM   Output (mL) 35 mL 5/4/2019  6:00 AM   Number of days: 0       Laboratory  ABG: No results for input(s): PH, PO2, PCO2, HCO3, POCSATURATED, BE in the last 24 hours.  BMP:  Recent Labs   Lab  05/04/19  2223      K 4.1      CO2 27   BUN 17   CREATININE 0.7      MG 2.0   PHOS 2.8     LFT:   Lab Results   Component Value Date     (H) 05/04/2019    ALT 51 (H) 05/04/2019    ALKPHOS 193 (H) 05/04/2019    BILITOT 0.6 05/04/2019    ALBUMIN 2.5 (L) 05/04/2019    PROT 6.0 05/04/2019     CBC:   Lab Results   Component Value Date    WBC 11.53 05/04/2019    HGB 6.6 (L) 05/04/2019    HCT 20.9 (L) 05/04/2019    MCV 81 (L) 05/04/2019     05/04/2019     Microbiology last 7d:   Microbiology Results (last 7 days)     ** No results found for the last 168 hours. **          I    Assessment/Plan:     * Closed displaced intertrochanteric fracture of left femur  Assessment & Plan     86 y.o. female w/ a significant PMHx of HTN, bronchiectasis, SVT(stable on verapamil) who presented to ED after a fall; found to have left intertrochanteric, left proximal humerus, and left distal radius fracture. Pt is now s/p Left intramedullary marc for closed displaced intertrochanteric fracture.      Neuro:   -  AAOx3  - Pain control with tylenol, lyrica, oxy 5 prn  - SIFI PNC w/ ropiv     Pulmonary:   - Hx bronchiectasis  - SpO2 100% on RA  - Continue Duonebs     Cardiac:   - HDS  - Hx SVT, HTN  - Cont verapamil, losartan  - pressors off     Renal:    - BUN/Cr 11/0.7  - Monitor UOP          ID:   - Afebrile  - WBC 11; likely 2/2 post-op inflammation  - Cont to monitor     Hem/Onc:   - H/H   6.69 ( baseline 8.4/26.9)  - Avoid IVF if possible per primary  - Pediatric blood draws only     Endocrine:    - BG stable  - Cont to monitor  -  (24); ALT 82 (7) - monitor     Fluids/Electrolytes/Nutrition/GI:   - Replace electrolytes PRN  - Advance diet as tolerated     PPx:  - Bowel: bisacodyl prn, senna doc prn        DISPO:  - Continue SICU care        Critical care was time spent personally by me on the following activities: development of treatment plan with patient or surrogate and bedside caregivers,  discussions with consultants, evaluation of patient's response to treatment, examination of patient, ordering and performing treatments and interventions, ordering and review of laboratory studies, ordering and review of radiographic studies, pulse oximetry, re-evaluation of patient's condition. This critical care time did not overlap with that of any other provider or involve time for any procedures.                 Critical care was time spent personally by me on the following activities: development of treatment plan with patient or surrogate and bedside caregivers, discussions with consultants, evaluation of patient's response to treatment, examination of patient, ordering and performing treatments and interventions, ordering and review of laboratory studies, ordering and review of radiographic studies, pulse oximetry, re-evaluation of patient's condition.  This critical care time did not overlap with that of any other provider or involve time for any procedures.     Joby Reynolds MD  Critical Care - Surgery  [facility]

## 2019-05-05 NOTE — PROGRESS NOTES
MD Luisito notified of Hbg of 6.6. No new orders given at this time. VSS. MD aware of early lab draw due to patient wishes to not be stuck multiple times and lack of access.

## 2019-05-06 LAB
ALBUMIN SERPL BCP-MCNC: 2.2 G/DL (ref 3.5–5.2)
ALP SERPL-CCNC: 146 U/L (ref 55–135)
ALT SERPL W/O P-5'-P-CCNC: 30 U/L (ref 10–44)
ANION GAP SERPL CALC-SCNC: 9 MMOL/L (ref 8–16)
AST SERPL-CCNC: 45 U/L (ref 10–40)
BASOPHILS # BLD AUTO: 0.04 K/UL (ref 0–0.2)
BASOPHILS NFR BLD: 0.4 % (ref 0–1.9)
BILIRUB SERPL-MCNC: 0.5 MG/DL (ref 0.1–1)
BUN SERPL-MCNC: 12 MG/DL (ref 8–23)
CALCIUM SERPL-MCNC: 8.3 MG/DL (ref 8.7–10.5)
CHLORIDE SERPL-SCNC: 101 MMOL/L (ref 95–110)
CO2 SERPL-SCNC: 29 MMOL/L (ref 23–29)
CREAT SERPL-MCNC: 0.5 MG/DL (ref 0.5–1.4)
DIFFERENTIAL METHOD: ABNORMAL
EOSINOPHIL # BLD AUTO: 0.1 K/UL (ref 0–0.5)
EOSINOPHIL NFR BLD: 0.7 % (ref 0–8)
ERYTHROCYTE [DISTWIDTH] IN BLOOD BY AUTOMATED COUNT: 13.1 % (ref 11.5–14.5)
EST. GFR  (AFRICAN AMERICAN): >60 ML/MIN/1.73 M^2
EST. GFR  (NON AFRICAN AMERICAN): >60 ML/MIN/1.73 M^2
GLUCOSE SERPL-MCNC: 98 MG/DL (ref 70–110)
HCT VFR BLD AUTO: 18.8 % (ref 37–48.5)
HGB BLD-MCNC: 6.2 G/DL (ref 12–16)
IMM GRANULOCYTES # BLD AUTO: 0.07 K/UL (ref 0–0.04)
IMM GRANULOCYTES NFR BLD AUTO: 0.6 % (ref 0–0.5)
LACTATE SERPL-SCNC: 2.1 MMOL/L (ref 0.5–2.2)
LYMPHOCYTES # BLD AUTO: 1.2 K/UL (ref 1–4.8)
LYMPHOCYTES NFR BLD: 11 % (ref 18–48)
MAGNESIUM SERPL-MCNC: 1.8 MG/DL (ref 1.6–2.6)
MCH RBC QN AUTO: 25.9 PG (ref 27–31)
MCHC RBC AUTO-ENTMCNC: 33 G/DL (ref 32–36)
MCV RBC AUTO: 79 FL (ref 82–98)
MONOCYTES # BLD AUTO: 1.1 K/UL (ref 0.3–1)
MONOCYTES NFR BLD: 10 % (ref 4–15)
NEUTROPHILS # BLD AUTO: 8.7 K/UL (ref 1.8–7.7)
NEUTROPHILS NFR BLD: 77.3 % (ref 38–73)
NRBC BLD-RTO: 0 /100 WBC
PHOSPHATE SERPL-MCNC: 1.8 MG/DL (ref 2.7–4.5)
PLATELET # BLD AUTO: ABNORMAL K/UL (ref 150–350)
PMV BLD AUTO: ABNORMAL FL (ref 9.2–12.9)
POTASSIUM SERPL-SCNC: 3.5 MMOL/L (ref 3.5–5.1)
PROT SERPL-MCNC: 5.7 G/DL (ref 6–8.4)
RBC # BLD AUTO: 2.39 M/UL (ref 4–5.4)
SODIUM SERPL-SCNC: 139 MMOL/L (ref 136–145)
WBC # BLD AUTO: 11.19 K/UL (ref 3.9–12.7)

## 2019-05-06 PROCEDURE — 11000001 HC ACUTE MED/SURG PRIVATE ROOM

## 2019-05-06 PROCEDURE — 25000003 PHARM REV CODE 250: Performed by: STUDENT IN AN ORGANIZED HEALTH CARE EDUCATION/TRAINING PROGRAM

## 2019-05-06 PROCEDURE — 63600175 PHARM REV CODE 636 W HCPCS: Performed by: STUDENT IN AN ORGANIZED HEALTH CARE EDUCATION/TRAINING PROGRAM

## 2019-05-06 PROCEDURE — 99231 PR SUBSEQUENT HOSPITAL CARE,LEVL I: ICD-10-PCS | Mod: ,,, | Performed by: ANESTHESIOLOGY

## 2019-05-06 PROCEDURE — 99231 SBSQ HOSP IP/OBS SF/LOW 25: CPT | Mod: ,,, | Performed by: ANESTHESIOLOGY

## 2019-05-06 PROCEDURE — 99900035 HC TECH TIME PER 15 MIN (STAT)

## 2019-05-06 PROCEDURE — 80053 COMPREHEN METABOLIC PANEL: CPT

## 2019-05-06 PROCEDURE — 83605 ASSAY OF LACTIC ACID: CPT

## 2019-05-06 PROCEDURE — 25000003 PHARM REV CODE 250: Performed by: ANESTHESIOLOGY

## 2019-05-06 PROCEDURE — 25000003 PHARM REV CODE 250: Performed by: HOSPITALIST

## 2019-05-06 PROCEDURE — 97166 OT EVAL MOD COMPLEX 45 MIN: CPT

## 2019-05-06 PROCEDURE — 99900026 HC AIRWAY MAINTENANCE (STAT)

## 2019-05-06 PROCEDURE — 94640 AIRWAY INHALATION TREATMENT: CPT

## 2019-05-06 PROCEDURE — 25000242 PHARM REV CODE 250 ALT 637 W/ HCPCS: Performed by: HOSPITALIST

## 2019-05-06 PROCEDURE — 97530 THERAPEUTIC ACTIVITIES: CPT

## 2019-05-06 PROCEDURE — 94761 N-INVAS EAR/PLS OXIMETRY MLT: CPT

## 2019-05-06 PROCEDURE — 85025 COMPLETE CBC W/AUTO DIFF WBC: CPT

## 2019-05-06 PROCEDURE — 97162 PT EVAL MOD COMPLEX 30 MIN: CPT

## 2019-05-06 PROCEDURE — 83735 ASSAY OF MAGNESIUM: CPT

## 2019-05-06 PROCEDURE — 84100 ASSAY OF PHOSPHORUS: CPT

## 2019-05-06 RX ORDER — POTASSIUM CHLORIDE 20 MEQ/1
40 TABLET, EXTENDED RELEASE ORAL ONCE
Status: COMPLETED | OUTPATIENT
Start: 2019-05-06 | End: 2019-05-06

## 2019-05-06 RX ORDER — METHOCARBAMOL 500 MG/1
1000 TABLET, FILM COATED ORAL EVERY 6 HOURS PRN
Status: DISCONTINUED | OUTPATIENT
Start: 2019-05-06 | End: 2019-05-09 | Stop reason: HOSPADM

## 2019-05-06 RX ORDER — ACETAMINOPHEN 500 MG
1000 TABLET ORAL EVERY 8 HOURS
Status: DISCONTINUED | OUTPATIENT
Start: 2019-05-06 | End: 2019-05-06

## 2019-05-06 RX ORDER — ACETAMINOPHEN 500 MG
1000 TABLET ORAL EVERY 8 HOURS
Status: COMPLETED | OUTPATIENT
Start: 2019-05-06 | End: 2019-05-09

## 2019-05-06 RX ORDER — FERROUS SULFATE 325(65) MG
325 TABLET, DELAYED RELEASE (ENTERIC COATED) ORAL 3 TIMES DAILY
Status: DISCONTINUED | OUTPATIENT
Start: 2019-05-06 | End: 2019-05-09 | Stop reason: HOSPADM

## 2019-05-06 RX ADMIN — PREGABALIN 75 MG: 75 CAPSULE ORAL at 09:05

## 2019-05-06 RX ADMIN — POTASSIUM CHLORIDE 40 MEQ: 1500 TABLET, EXTENDED RELEASE ORAL at 08:05

## 2019-05-06 RX ADMIN — FERROUS SULFATE TAB EC 325 MG (65 MG FE EQUIVALENT) 325 MG: 325 (65 FE) TABLET DELAYED RESPONSE at 09:05

## 2019-05-06 RX ADMIN — IPRATROPIUM BROMIDE AND ALBUTEROL SULFATE 3 ML: .5; 3 SOLUTION RESPIRATORY (INHALATION) at 07:05

## 2019-05-06 RX ADMIN — VERAPAMIL HYDROCHLORIDE 80 MG: 40 TABLET ORAL at 09:05

## 2019-05-06 RX ADMIN — MUPIROCIN 1 G: 20 OINTMENT TOPICAL at 09:05

## 2019-05-06 RX ADMIN — STANDARDIZED SENNA CONCENTRATE AND DOCUSATE SODIUM 1 TABLET: 8.6; 5 TABLET, FILM COATED ORAL at 08:05

## 2019-05-06 RX ADMIN — VERAPAMIL HYDROCHLORIDE 80 MG: 40 TABLET ORAL at 02:05

## 2019-05-06 RX ADMIN — LOSARTAN POTASSIUM 100 MG: 50 TABLET, FILM COATED ORAL at 08:05

## 2019-05-06 RX ADMIN — ATENOLOL 25 MG: 25 TABLET ORAL at 08:05

## 2019-05-06 RX ADMIN — MUPIROCIN 1 G: 20 OINTMENT TOPICAL at 08:05

## 2019-05-06 RX ADMIN — POLYETHYLENE GLYCOL 3350 17 G: 17 POWDER, FOR SOLUTION ORAL at 08:05

## 2019-05-06 RX ADMIN — STANDARDIZED SENNA CONCENTRATE AND DOCUSATE SODIUM 1 TABLET: 8.6; 5 TABLET, FILM COATED ORAL at 09:05

## 2019-05-06 RX ADMIN — IPRATROPIUM BROMIDE AND ALBUTEROL SULFATE 3 ML: .5; 3 SOLUTION RESPIRATORY (INHALATION) at 11:05

## 2019-05-06 RX ADMIN — IPRATROPIUM BROMIDE AND ALBUTEROL SULFATE 3 ML: .5; 3 SOLUTION RESPIRATORY (INHALATION) at 08:05

## 2019-05-06 RX ADMIN — VERAPAMIL HYDROCHLORIDE 80 MG: 40 TABLET ORAL at 08:05

## 2019-05-06 RX ADMIN — TIOTROPIUM BROMIDE 18 MCG: 18 CAPSULE ORAL; RESPIRATORY (INHALATION) at 11:05

## 2019-05-06 RX ADMIN — ACETAMINOPHEN 1000 MG: 500 TABLET ORAL at 01:05

## 2019-05-06 RX ADMIN — ACETAMINOPHEN 1000 MG: 500 TABLET ORAL at 09:05

## 2019-05-06 RX ADMIN — ENOXAPARIN SODIUM 30 MG: 100 INJECTION SUBCUTANEOUS at 05:05

## 2019-05-06 RX ADMIN — IPRATROPIUM BROMIDE AND ALBUTEROL SULFATE 3 ML: .5; 3 SOLUTION RESPIRATORY (INHALATION) at 03:05

## 2019-05-06 RX ADMIN — ROPIVACAINE HYDROCHLORIDE 2 ML/HR: 2 INJECTION, SOLUTION EPIDURAL; INFILTRATION at 09:05

## 2019-05-06 RX ADMIN — SODIUM GLYCOLATE 20 MMOL: 216 INJECTION, SOLUTION INTRAVENOUS at 05:05

## 2019-05-06 RX ADMIN — ROPIVACAINE HYDROCHLORIDE 2 ML/HR: 2 INJECTION, SOLUTION EPIDURAL; INFILTRATION at 03:05

## 2019-05-06 NOTE — PLAN OF CARE
Problem: Physical Therapy Goal  Goal: Physical Therapy Goal  Goals to be met by: 19    Patient will increase functional independence with mobility by performin. Supine to sit with MInimal Assistance -not met  2. Sit to stand transfer with Contact Guard Assistance with AD. - not met  3. Bed to chair transfer with Contact Guard Assistance using AD -not met  4. Gait  x 50 feet with Contact Guard Assistance using AD . -not met    Evaluation completed and goals appropriate. Yanely Clay, PT 2019

## 2019-05-06 NOTE — PT/OT/SLP EVAL
Physical Therapy Evaluation and treatment    Patient Name:  Siomara Flores   MRN:  [id number]    Recommendations:     Discharge Recommendations:  nursing facility, skilled   Discharge Equipment Recommendations: (will determine DME Needs closer to discharge)   Barriers to discharge: Decreased caregiver support pt lives alone    Assessment:     Siomara Flores is a 86 y.o. female admitted with a medical diagnosis of Closed displaced intertrochanteric fracture of left femur.  She presents with the following impairments/functional limitations:  gait instability, impaired endurance, impaired balance, impaired functional mobilty, decreased lower extremity function pt beverly treatment well and will benefit from skilled PT 5x/wk to progress physically. Pt will need SNF placement to maximize rehab potential. Pt is s/p Cephalomedullary nail fixation L femur 5/3/19.    Rehab Prognosis: Good; patient would benefit from acute skilled PT services to address these deficits and reach maximum level of function.    Recent Surgery: Procedure(s) (LRB):  INSERTION, INTRAMEDULLARY MARTHA, FEMUR (Left) 3 Days Post-Op    Plan:     During this hospitalization, patient to be seen 5 x/week to address the identified rehab impairments via gait training, therapeutic activities and progress toward the following goals:    · Plan of Care Expires:  06/04/19    Subjective     Chief Complaint: pt c/o pain during treatment.   Patient/Family Comments/goals:  To get better and go home.   Pain/Comfort:  · Pain Rating 1: 8/10  · Location - Side 1: Left(arm and hip)  · Pain Rating Post-Intervention 1: 8/10    Patients cultural, spiritual, Quaker conflicts given the current situation: no    Living Environment:  Pt lives alone in 1 story slab.   Prior to admission, patients level of function was Independent.  Equipment used at home: walker, rolling, grab bar.  DME owned (not currently used): none.  Upon discharge, patient will have assistance from possibly  sister in law.    Objective:     Communicated with nurse prior to session.  Patient found supine with telemetry, blood pressure cuff, pulse ox (continuous), cuevas catheter, perineural catheter  upon PT entry to room.    General Precautions: Standard, fall   Orthopedic Precautions:LLE weight bearing as tolerated, LUE non weight bearing   Braces: Sling and swathe     Exams:  · Cognitive Exam:  Patient is oriented to Person, Place, Time and Situation  · RLE ROM: WFL  · RLE Strength: WFL  · LLE ROM: WFL  · LLE Strength: WFL    Functional Mobility:  · Bed Mobility:   Pt needed verbal cues for hand placement and sequencing for functional mobility.   · Rolling Right: total assistance  · Supine to Sit: total assistance  ·   · Transfers:     · Sit to Stand:  minimum assistance with hand-held assist  · Bed to Chair: minimum assistance with  hand-held assist  using  Stand Pivot      AM-PAC 6 CLICK MOBILITY  Total Score:13     Patient left up in chair with all lines intact and call button in reach.    GOALS:   Multidisciplinary Problems     Physical Therapy Goals        Problem: Physical Therapy Goal    Goal Priority Disciplines Outcome Goal Variances Interventions   Physical Therapy Goal     PT, PT/OT      Description:  Goals to be met by: 19    Patient will increase functional independence with mobility by performin. Supine to sit with MInimal Assistance -not met  2. Sit to stand transfer with Contact Guard Assistance with AD. - not met  3. Bed to chair transfer with Contact Guard Assistance using AD -not met  4. Gait  x 50 feet with Contact Guard Assistance using AD . -not met                      History:     Past Medical History:   Diagnosis Date    Bronchiectasis without complication     Hypertension     Supraventricular tachycardia        Past Surgical History:   Procedure Laterality Date    CHOLECYSTECTOMY      INSERTION, INTRAMEDULLARY MARTHA, FEMUR Left 5/3/2019    Performed by Teodoro Bruce MD at  [facility] OR 2ND [facility]    TONSILLECTOMY         Time Tracking:     PT Received On: 05/06/19  PT Start Time: 1249     PT Stop Time: 1305  PT Total Time (min): 16 min     Billable Minutes: Evaluation 8 min and Therapeutic Activity 8 min      Yanely Clay, PT  05/06/2019

## 2019-05-06 NOTE — PLAN OF CARE
Problem: Occupational Therapy Goal  Goal: Occupational Therapy Goal  Goals to be met by: 5/20/19     Patient will increase functional independence with ADLs by performing:    UE Dressing with Set-up Assistance.  LE Dressing with Moderate Assistance and Assistive Devices as needed.  Grooming while standing at sink with Minimal Assistance.  Toileting from bedside commode with Minimal Assistance for hygiene and clothing management.   Rolling to Bilateral with Moderate Assistance.   Supine to sit with Moderate Assistance.  Toilet transfer to bedside commode with Minimal Assistance.    Outcome: Ongoing (interventions implemented as appropriate)  Eval completed; goals established     Comments: Initiate OT MASON Thomas OT  5/6/2019

## 2019-05-06 NOTE — ANESTHESIA POST-OP PAIN MANAGEMENT
Acute Pain Service Progress Note    Siomara Flores is a 86 y.o., female, [id number].    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       Subjective:     General appearance of alert, oriented, no complaints   Pain with rest: 0   Pain with movement: 6    Side Effects    1. Pruritis No    2. Nausea No    3. Motor Blockade No, 0=Ability to raise lower extremities off bed    4. Sedation No, 1=awake and alert    Objective:     Catheter site clean, dry, intact      Vitals   Vitals:    05/06/19 1100   BP: (!) 114/56   Pulse: 87   Resp: 17   Temp: 36.9 °C (98.5 °F)        Labs    Admission on 05/02/2019   No results displayed because visit has over 200 results.           Meds   Current Facility-Administered Medications   Medication Dose Route Frequency Provider Last Rate Last Dose    acetaminophen tablet 1,000 mg  1,000 mg Oral Q8H Niki Mendoza MD        albuterol-ipratropium 2.5 mg-0.5 mg/3 mL nebulizer solution 3 mL  3 mL Nebulization Q4H WAKE Irma Jauregui MD   3 mL at 05/06/19 0735    atenolol tablet 25 mg  25 mg Oral Daily Irma Jauregui MD   25 mg at 05/06/19 0800    bisacodyl suppository  10 mg  10 mg Rectal Daily PRN Irma Jauregui MD        dextrose 50% injection 12.5 g  12.5 g Intravenous PRN Irma Jauregui MD        dextrose 50% injection 25 g  25 g Intravenous PRN Irma Jauregui MD        enoxaparin injection 30 mg  30 mg Subcutaneous Daily Kye Black MD   30 mg at 05/05/19 1733    epoetin elizabeth-epbx injection 40,000 Units  40,000 Units Subcutaneous Once Irma Jauregui MD        glucagon (human recombinant) injection 1 mg  1 mg Intramuscular PRN Irma Jauregui MD        glucose chewable tablet 16 g  16 g Oral PRN Irma Jauregui MD        glucose chewable tablet 24 g  24 g Oral PRN Irma Jauregui MD        losartan tablet 100 mg  100 mg Oral Daily Irma Jauregui MD   100 mg at 05/06/19 0800    methocarbamol tablet 1,000 mg  1,000 mg Oral Q6H PRN Niki Mendoza MD        mupirocin 2 % ointment 1 g  1 g Nasal BID Irma Jauregui MD   1 g at 05/06/19 0800    ondansetron disintegrating tablet 8 mg  8 mg Oral Q8H PRN Irma Jauregui MD        ondansetron injection 4 mg  4 mg Intravenous Q12H PRN Irma Jauregui MD        PHENYLephrine (ROBIN-SYNEPHRINE) 20 mg in sodium chloride 0.9% 250ml (titrating)  0.5 mcg/kg/min Intravenous Continuous Diego Guzmán MD   Stopped at 05/04/19 0200    polyethylene glycol packet 17 g  17 g Oral Daily Irma Jauregui MD   17 g at 05/06/19 0800    pregabalin capsule 75 mg  75 mg Oral QHS Irma Jauregui MD   Stopped at 05/03/19 2100    promethazine (PHENERGAN) 6.25 mg in dextrose 5 % 50 mL IVPB  6.25 mg Intravenous Q6H PRN Irma Jauregui MD        ramelteon tablet 8 mg  8 mg Oral Nightly PRN Irma Jauregui MD        ropivacaine (PF) 2 mg/ml (0.2%) infusion  2 mL/hr Perineural Continuous Dora George MD 2 mL/hr at 05/06/19 1100 2 mL/hr at 05/06/19 1100    senna-docusate 8.6-50 mg per tablet 1 tablet  1 tablet Oral BID PRN Irma Jauregui MD        senna-docusate 8.6-50 mg per tablet 1 tablet  1 tablet Oral BID Irma PAUL  MD Juventino   1 tablet at 05/06/19 0800    sodium chloride 0.9% flush 10 mL  10 mL Intravenous PRN Lanette Barton MD        sodium chloride 0.9% flush 10 mL  10 mL Intravenous PRN Irma Jauregui MD        sodium chloride 0.9% flush 5 mL  5 mL Intravenous PRN Irma Jauregui MD        tiotropium inhalation capsule 18 mcg  1 capsule Inhalation Daily Irma Jauregui MD   18 mcg at 05/05/19 0921    verapamil tablet 80 mg  80 mg Oral TID Fausto Santos MD   80 mg at 05/06/19 0800        Anticoagulant dose: Lovenox 30 mg daily     Assessment:     Pain control adequate    Plan:     Patient doing well, continue present treatment. Continue PNC with multimodals. Scheduled tylenol 1 g PO Q8. Discontinued oxycodone. Would consider low dose tramadol if additional pain adjunct needed.     Evaluator Maicol Garibay        I have reviewed and concur with the resident's history, physical, assessment, and plan.  I have personally interviewed and examined the patient at bedside.  See below addendum for my evaluation and additional findings.    Patient doing very well - no opioids since Saturday and she is resting comfortably on exam - oxycodone dc'd and methocarbamol ordered to be given prn pain as first line agent.  If patient needs further management would consider tramadol.  Catheter in tact - continue current therapy

## 2019-05-06 NOTE — SUBJECTIVE & OBJECTIVE
Interval History/Significant Events: Patient seen and examined at bedside.  No acute events overnight.  Pain controlled. Esposito pulled this am. hgb critically low to 6.6. Shinto refusing blood products    Follow-up For: Procedure(s) (LRB):  INSERTION, INTRAMEDULLARY MARTHA, FEMUR (Left)    Post-Operative Day: 3 Days Post-Op    Objective:     Vital Signs (Most Recent):  Temp: 99.2 °F (37.3 °C) (05/06/19 0301)  Pulse: 87 (05/06/19 0630)  Resp: 15 (05/06/19 0630)  BP: (!) 150/64 (05/06/19 0600)  SpO2: 100 % (05/06/19 0630) Vital Signs (24h Range):  Temp:  [98.4 °F (36.9 °C)-99.2 °F (37.3 °C)] 99.2 °F (37.3 °C)  Pulse:  [] 87  Resp:  [14-36] 15  SpO2:  [98 %-100 %] 100 %  BP: (116-150)/(52-64) 150/64     Weight: 43.1 kg (95 lb)  Body mass index is 18.55 kg/m².      Intake/Output Summary (Last 24 hours) at 5/6/2019 0645  Last data filed at 5/6/2019 0600  Gross per 24 hour   Intake 1219 ml   Output 1080 ml   Net 139 ml       Physical Exam  Constitutional: She is oriented to person, place, and time. She appears well-developed and well-nourished.   Eyes: Pupils are equal, round, and reactive to light. EOM are normal.   Cardiovascular: Normal rate, regular rhythm and normal heart sounds.   Pulmonary/Chest: Effort normal and breath sounds normal.   Abdominal: Soft. Bowel sounds are normal.   Musculoskeletal: incision dressings c/d/i. Left upper extremity in sling. M/R/U sensation intact. AIN/PIN/ intact  Neurological: She is alert and oriented to person, place, and time.   Skin: Skin is warm and dry.   Psychiatric: She has a normal mood and affect. Her behavior is normal.       Vents:  Oxygen Concentration (%): 24 (05/05/19 2152)    Lines/Drains/Airways     Epidural Line                 Perineural Analgesia/Anesthesia Assessment (using dermatomes) 05/03/19 1251 2 days          Peripheral Intravenous Line                 Peripheral IV - Single Lumen 05/05/19 0653 20 G Anterior;Right Upper Arm less than 1 day                 Significant Labs:    CBC/Anemia Profile:  Recent Labs   Lab 05/04/19  2223 05/05/19  1233 05/06/19  0313   WBC 11.53 11.40 11.19   HGB 6.6* 6.8* 6.2*   HCT 20.9* 21.1* 18.8*    SEE COMMENT SEE COMMENT   MCV 81* 82 79*   RDW 13.1 13.2 13.1        Chemistries:  Recent Labs   Lab 05/04/19 2223 05/06/19  0313    139   K 4.1 3.5    101   CO2 27 29   BUN 17 12   CREATININE 0.7 0.5   CALCIUM 8.7 8.3*   ALBUMIN 2.5* 2.2*   PROT 6.0 5.7*   BILITOT 0.6 0.5   ALKPHOS 193* 146*   ALT 51* 30   * 45*   MG 2.0 1.8   PHOS 2.8 1.8*       BMP:   Recent Labs   Lab 05/06/19  0313   GLU 98      K 3.5      CO2 29   BUN 12   CREATININE 0.5   CALCIUM 8.3*   MG 1.8     CMP:   Recent Labs   Lab 05/04/19 2223 05/06/19  0313    139   K 4.1 3.5    101   CO2 27 29    98   BUN 17 12   CREATININE 0.7 0.5   CALCIUM 8.7 8.3*   PROT 6.0 5.7*   ALBUMIN 2.5* 2.2*   BILITOT 0.6 0.5   ALKPHOS 193* 146*   * 45*   ALT 51* 30   ANIONGAP 8 9   EGFRNONAA >60.0 >60.0       Significant Imaging:  I have reviewed all pertinent imaging results/findings within the past 24 hours.

## 2019-05-06 NOTE — PT/OT/SLP EVAL
Occupational Therapy   Evaluation    Name: Siomara Flores  MRN: [id number]  Admitting Diagnosis:  Closed displaced intertrochanteric fracture of left femur 3 Days Post-Op    Recommendations:     Discharge Recommendations: nursing facility, skilled  Discharge Equipment Recommendations:  (TBD)  Barriers to discharge:  Decreased caregiver support    Assessment:     Siomara Flores is a 86 y.o. female with a medical diagnosis of Closed displaced intertrochanteric fracture of left femur.  She presents with WBAT LLE and NWB LUE impairing mobility and self care. Pt c/o pain with movement, however able to stand and t/f to chair with min A. Performance deficits affecting function: weakness, impaired endurance, impaired balance, gait instability, decreased upper extremity function, decreased lower extremity function, impaired self care skills, impaired functional mobilty, pain, orthopedic precautions.      Rehab Prognosis: Fair; patient would benefit from acute skilled OT services to address these deficits and reach maximum level of function.       Plan:     Patient to be seen 4 x/week to address the above listed problems via self-care/home management, therapeutic activities, therapeutic exercises  · Plan of Care Expires: 06/05/19  · Plan of Care Reviewed with: patient    Subjective     Chief Complaint: pain  Patient/Family Comments/goals: return to PLOF    Occupational Profile:  Living Environment: Pt lives alone in 1SH with 0STE. Home has tub/shower with grab bar  Previous level of function: PTA, pt reports independence with all ADL and IADL. She was not using any AD for ambulation or ADL. Injury result of fall which pt stated happened when walking on her driveway  Roles and Routines: sister-in-law  Equipment Used at Home:  walker, rolling, grab bar  Assistance upon Discharge: pt report her sister-in-law can assist as needed    Pain/Comfort:  · Pain Rating 1: 8/10  · Location - Side 1: Left  · Location - Orientation 1:  generalized  · Location 1: arm(and hip)  · Pain Addressed 1: Reposition, Distraction, Cessation of Activity  · Pain Rating Post-Intervention 1: 8/10    Patients cultural, spiritual, Methodist conflicts given the current situation: no    Objective:     Communicated with: RN prior to session.  Patient found HOB elevated with telemetry, pulse ox (continuous), blood pressure cuff, perineural catheter, cuevas catheter upon OT entry to room.    General Precautions: Standard, fall   Orthopedic Precautions:LUE non weight bearing, LLE weight bearing as tolerated   Braces: Sling and swathe     Occupational Performance:    Bed Mobility:    · Patient completed Supine to Sit with total assistance    Functional Mobility/Transfers:  · Patient completed Sit <> Stand Transfer with minimum assistance  with  no assistive device   · Patient completed Bed <> Chair Transfer using Stand Pivot technique with minimum assistance with no assistive device  · Functional Mobility: Pt able to take 2 small steps to chair with min A using no AD    Activities of Daily Living:  · Upper Body Dressing: maximal assistance to doff/don gown while seated EOB; max A to correct positioning of LUE sling    Cognitive/Visual Perceptual:  Cognitive/Psychosocial Skills:     -       Oriented to: Person, Place, Time and Situation   -       Follows Commands/attention:Follows multistep  commands  -       Communication: clear/fluent  -       Memory: No Deficits noted  -       Safety awareness/insight to disability: intact   -       Mood/Affect/Coping skills/emotional control: Appropriate to situation  Visual/Perceptual:      -Pt wears reading glasses; hearing intact    Physical Exam:  Balance:    -       good sitting balance; fair standing balance  Postural examination/scapula alignment:    -       Rounded shoulders  -       Forward head  Skin integrity: Visible skin intact  Edema:  None noted  Sensation:    -       Intact  Dominant hand:    -       R hand  Upper  Extremity Range of Motion:     -       Right Upper Extremity: WFL  -       Left Upper Extremity: SUZANNE, LUE in sling and NWB  Upper Extremity Strength:    -       Right Upper Extremity: grossly 4-/5  -       Left Upper Extremity: SUZANNE, LUE in sling and NWB   Strength:    -       Right Upper Extremity: WFL  -       Left Upper Extremity: WFL  Fine Motor Coordination:    -       Intact  Gross motor coordination:   WFL    AMPAC 6 Click ADL:  AMPAC Total Score: 15    Treatment & Education:  Pt educated on role of OT/POC  Pt educated on importance of ambulation/UIC  White board/communication board updated  Education:    Patient left up in chair with all lines intact, call button in reach and RN notified    GOALS:   Multidisciplinary Problems     Occupational Therapy Goals        Problem: Occupational Therapy Goal    Goal Priority Disciplines Outcome Interventions   Occupational Therapy Goal     OT, PT/OT Ongoing (interventions implemented as appropriate)    Description:  Goals to be met by: 5/20/19     Patient will increase functional independence with ADLs by performing:    UE Dressing with Set-up Assistance.  LE Dressing with Moderate Assistance and Assistive Devices as needed.  Grooming while standing at sink with Minimal Assistance.  Toileting from bedside commode with Minimal Assistance for hygiene and clothing management.   Rolling to Bilateral with Moderate Assistance.   Supine to sit with Moderate Assistance.  Toilet transfer to bedside commode with Minimal Assistance.                      History:     Past Medical History:   Diagnosis Date    Bronchiectasis without complication     Hypertension     Supraventricular tachycardia        Past Surgical History:   Procedure Laterality Date    CHOLECYSTECTOMY      INSERTION, INTRAMEDULLARY MARTHA, FEMUR Left 5/3/2019    Performed by Teodoro Bruce MD at [facility] OR 2ND FLR    TONSILLECTOMY         Time Tracking:     OT Date of Treatment: 05/06/19  OT Start Time:  1245  OT Stop Time: 1302  OT Total Time (min): 17 min    Billable Minutes:Evaluation 17    Nolvia Thomas OT  5/6/2019

## 2019-05-06 NOTE — SUBJECTIVE & OBJECTIVE
Principal Problem:Closed displaced intertrochanteric fracture of left femur    Principal Orthopedic Problem: same    Interval History: Patient seen and examined at bedside. Day 3 s/p left CMN for intertrochanteric hip fracture.   No acute events overnight.  Pain controlled.  Hb 6.2 this am. Stood and transferred with PT today.     Review of patient's allergies indicates:   Allergen Reactions    Aspirin (bulk) Other (See Comments)     Developed gastric ulcer in past when taking aspirin per patient       Current Facility-Administered Medications   Medication    acetaminophen tablet 1,000 mg    albuterol-ipratropium 2.5 mg-0.5 mg/3 mL nebulizer solution 3 mL    atenolol tablet 25 mg    bisacodyl suppository 10 mg    dextrose 50% injection 12.5 g    dextrose 50% injection 25 g    enoxaparin injection 30 mg    epoetin elizabeth-epbx injection 40,000 Units    ferrous sulfate EC tablet 325 mg    glucagon (human recombinant) injection 1 mg    glucose chewable tablet 16 g    glucose chewable tablet 24 g    losartan tablet 100 mg    methocarbamol tablet 1,000 mg    mupirocin 2 % ointment 1 g    ondansetron disintegrating tablet 8 mg    ondansetron injection 4 mg    PHENYLephrine (ROBIN-SYNEPHRINE) 20 mg in sodium chloride 0.9% 250ml (titrating)    polyethylene glycol packet 17 g    pregabalin capsule 75 mg    promethazine (PHENERGAN) 6.25 mg in dextrose 5 % 50 mL IVPB    ramelteon tablet 8 mg    ropivacaine (PF) 2 mg/ml (0.2%) infusion    senna-docusate 8.6-50 mg per tablet 1 tablet    senna-docusate 8.6-50 mg per tablet 1 tablet    sodium chloride 0.9% flush 10 mL    sodium chloride 0.9% flush 10 mL    sodium chloride 0.9% flush 5 mL    tiotropium inhalation capsule 18 mcg    verapamil tablet 80 mg     Objective:     Vital Signs (Most Recent):  Temp: 98.5 °F (36.9 °C) (05/06/19 1100)  Pulse: 86 (05/06/19 1300)  Resp: (!) 31 (05/06/19 1300)  BP: (!) 103/53 (05/06/19 1300)  SpO2: (!) 94 % (05/06/19  1300) Vital Signs (24h Range):  Temp:  [98.4 °F (36.9 °C)-99.2 °F (37.3 °C)] 98.5 °F (36.9 °C)  Pulse:  [] 86  Resp:  [12-44] 31  SpO2:  [84 %-100 %] 94 %  BP: ()/(46-64) 103/53     Weight: 43.1 kg (95 lb)  Height: 5' (152.4 cm)  Body mass index is 18.55 kg/m².      Intake/Output Summary (Last 24 hours) at 5/6/2019 1345  Last data filed at 5/6/2019 1200  Gross per 24 hour   Intake 1121 ml   Output 1045 ml   Net 76 ml       Ortho/SPM Exam    Physical Exam:  NAD, A/O x 3.  Wound c/d/i with clean dressing.  No focal motor or sensory deficits noted.    Significant Labs:   CBC:   Recent Labs   Lab 05/04/19  2223 05/05/19  1233 05/06/19  0313   WBC 11.53 11.40 11.19   HGB 6.6* 6.8* 6.2*   HCT 20.9* 21.1* 18.8*    SEE COMMENT SEE COMMENT     CMP:   Recent Labs   Lab 05/04/19  2223 05/06/19  0313    139   K 4.1 3.5    101   CO2 27 29    98   BUN 17 12   CREATININE 0.7 0.5   CALCIUM 8.7 8.3*   PROT 6.0 5.7*   ALBUMIN 2.5* 2.2*   BILITOT 0.6 0.5   ALKPHOS 193* 146*   * 45*   ALT 51* 30   ANIONGAP 8 9   EGFRNONAA >60.0 >60.0     All pertinent labs within the past 24 hours have been reviewed.    Significant Imaging: I have reviewed and interpreted all pertinent imaging results/findings.

## 2019-05-06 NOTE — ASSESSMENT & PLAN NOTE
Siomara Flores is a 86 y.o. female POD 2 s/p left CMN doing well now off pressors in SICU with Hb 6.2 this AM. Have not been able to get progressive bed the past couple days. She has been doing well so we will transfer to floor today.       - Weight bearing status: WBAT LLE, NWB LUE.   - Pain control: Per APS  - Antibiotics: po ancef complete  - DVT Prophylaxis: Lovenox 30mg daily, SCD's at all times when not ambulating.  - PT/OT, will require aggressive PT/OT. 7 days a week  - Lines/Drains: PNC in place  - Dispo: Continue to treat, will step down to floor today. Needs aggressive PT/OT and SNF placement.

## 2019-05-06 NOTE — PLAN OF CARE
Problem: Adult Inpatient Plan of Care  Goal: Plan of Care Review  Johava Witness    Hx: HTN, CVA, SVT    5/2: Fell @ home - ED  5/3: Left marc for closed displaced hip fracture - admit SICU on rosa.    MAP > 60   Outcome: Ongoing (interventions implemented as appropriate)  POC reviewed with Pt. AAOX4. Pt O2 sats 100% on 1L NC. Pts HR has been 80s NSR. Pts SBP has been 130s-140s. Pt Esposito intact with 30-75cc/hr. Esposito removed this AM. Pt due to void by 11:30AM. Pt Left leg CDI. Pt Left arm in sling. Perineural cath intact with ropivacaine at 2ml/hr. MD aware of low H/H and electrolytes. No new orders given. Labs daily. Phos replaced. Pt denies pain. Awaiting PT to see Pt. Transfer orders in. VSS. Will continue to monitor.

## 2019-05-06 NOTE — ADDENDUM NOTE
Addendum  created 05/06/19 1229 by Niki Mendoza MD    Charge Capture section accepted, Sign clinical note

## 2019-05-06 NOTE — PLAN OF CARE
Patient still in ICU awaiting floor bed. Per notes, pain controlled and oxycodone discontinued and robaxin 1st line for pain, and consider tramadol if needed in future if uncontrolled pain.    Hg 6.2. Crit care team aware. Refuses blood due to [employer]. Once she steps down, will likely give more Epo. Started PO iron now as she is iron deficient. This wont cause Hg to rise acutely but she needs it long term as baseline Hg was 12 and was 8 on this admission. May consider IV iron upon stepdown, need to review if is okay with [facility] policies but I believe it is.    Once patient on floor will be on med team IM-H as primary.  See step down note from 5/5 for more information.    [facility]

## 2019-05-06 NOTE — PHYSICIAN QUERY
PT Name: Siomara Flores  MR #: [id number]    Physician Query Form - Perfusion Diagnosis Clarification     CDS/: Jo Ann Dye RN               Contact information:[email or web address]  This form is a permanent document in the medical record.     Query Date: May 6, 2019    By submitting this query, we are merely seeking further clarification of documentation. Please utilize your independent clinical judgment when addressing the question(s) below.    The medical record contains the following:    Indicators   Supporting Clinical Findings   Location in Medical Record   x Acute Illness (e.g. AMI, Sepsis, etc.)  left trochanteric, left proximal humerus and left distal radius fracture, Acute blood loss anemia with new iron deficiency anemia-   Hospital Medicine note 5/5    Acidosis documented     x ABGs / Labs  came in with HG of 8, previous baseline was 12 in 2016.  Is iron deficient- ferritin is 23 Hospital Medicine note 5/5   x Vital Signs -128/60   HR 73-84  BP 80/38  BP 89/41  BP 88/44  BP 87/44  BP 99/48  /61  /53   H&P  Vital signs 5/3@1445  Vital signs 5/3@1900  Vital signs 5/3@2145  Vital signs 5/3@2330  Vital signs 5/4@0245  Vital signs 5/5@2014  Vital signs 5/6@1245   x Hypotension or Low Blood Pressure documented Postprocedural hypotension  Anesthesia post-op pain management addendum    Altered Mental Status or Confusion      Diaphoresis, Cold Extremities or Cyanosis      Oliguria      Medication/Treatment:  -Vasopressors  -Inotropic Drugs  -IV Fluids  -Cardiac Assist Devices  -Hemodynamic Monitoring  -Blood/Blood Products Phenylephrine infusion  Sodium chloride 0.9% 250 mL IVF bolus  epoetin elizabeth-epbx injection 40,000 Units  MAR 5/3-4  MAR 5/4  MAR 5/3   x Other: Mu-ism  H&P     Provider, please specify diagnosis or diagnoses associated with above clinical findings.    [ X  ] Hypovolemic Shock   [   ] Other Shock (please specify):   [   ] Shock Unspecified   [   ]  Other Condition (please specify):   [  ] Clinically Undetermined         Please document in your progress notes daily for the duration of treatment until resolved and include in your discharge summary.

## 2019-05-06 NOTE — ASSESSMENT & PLAN NOTE
Assessment & Plan     86 y.o. female w/ a significant PMHx of HTN, bronchiectasis, SVT(stable on verapamil) who presented to ED after a fall; found to have left intertrochanteric, left proximal humerus, and left distal radius fracture. Pt is now s/p Left intramedullary marc for closed displaced intertrochanteric fracture.      Neuro:   -  AAOx3  - Pain control with tylenol, lyrica, oxy 5 prn  - SIFI PNC w/ ropiv     Pulmonary:   - Hx bronchiectasis  - SpO2 100% on RA  - Continue Duonebs     Cardiac:   - HDS  - Hx SVT, HTN  - Cont verapamil, losartan  - pressors off     Renal:    - BUN/Cr 12/.5  - Monitor UOP          ID:   - Afebrile  - WBC 11; likely 2/2 post-op inflammation  - Cont to monitor     Hem/Onc:   - H/H  6.69 ( baseline 8.4/26.9)  - Avoid IVF if possible per primary  - Pediatric blood draws only     Endocrine:    - BG stable  - Cont to monitor  -  (24); ALT 82 (7) - monitor     Fluids/Electrolytes/Nutrition/GI:   - Replace electrolytes PRN  - Advance diet as tolerated  - Tolerating diet well     PPx:  - Bowel: bisacodyl prn, senna doc prn        DISPO:  - Continue SICU care, stepdown orders in place        Critical care was time spent personally by me on the following activities: development of treatment plan with patient or surrogate and bedside caregivers, discussions with consultants, evaluation of patient's response to treatment, examination of patient, ordering and performing treatments and interventions, ordering and review of laboratory studies, ordering and review of radiographic studies, pulse oximetry, re-evaluation of patient's condition. This critical care time did not overlap with that of any other provider or involve time for any procedures.

## 2019-05-06 NOTE — PROGRESS NOTES
[facility]  Orthopedics  Progress Note    Patient Name: Siomara Flores  MRN: [id number]  Admission Date: 5/2/2019  Hospital Length of Stay: 4 days  Attending Provider: Lily Bowman MD  Primary Care Provider: Vince Wray MD  Follow-up For: Procedure(s) (LRB):  INSERTION, INTRAMEDULLARY MARTHA, FEMUR (Left)    Post-Operative Day: 3 Days Post-Op  Subjective:     Principal Problem:Closed displaced intertrochanteric fracture of left femur    Principal Orthopedic Problem: same    Interval History: Patient seen and examined at bedside. Day 3 s/p left CMN for intertrochanteric hip fracture.   No acute events overnight.  Pain controlled.  Hb 6.2 this am. Stood and transferred with PT today.     Review of patient's allergies indicates:   Allergen Reactions    Aspirin (bulk) Other (See Comments)     Developed gastric ulcer in past when taking aspirin per patient       Current Facility-Administered Medications   Medication    acetaminophen tablet 1,000 mg    albuterol-ipratropium 2.5 mg-0.5 mg/3 mL nebulizer solution 3 mL    atenolol tablet 25 mg    bisacodyl suppository 10 mg    dextrose 50% injection 12.5 g    dextrose 50% injection 25 g    enoxaparin injection 30 mg    epoetin elizabeth-epbx injection 40,000 Units    ferrous sulfate EC tablet 325 mg    glucagon (human recombinant) injection 1 mg    glucose chewable tablet 16 g    glucose chewable tablet 24 g    losartan tablet 100 mg    methocarbamol tablet 1,000 mg    mupirocin 2 % ointment 1 g    ondansetron disintegrating tablet 8 mg    ondansetron injection 4 mg    PHENYLephrine (ROBIN-SYNEPHRINE) 20 mg in sodium chloride 0.9% 250ml (titrating)    polyethylene glycol packet 17 g    pregabalin capsule 75 mg    promethazine (PHENERGAN) 6.25 mg in dextrose 5 % 50 mL IVPB    ramelteon tablet 8 mg    ropivacaine (PF) 2 mg/ml (0.2%) infusion    senna-docusate 8.6-50 mg per tablet 1 tablet    senna-docusate 8.6-50 mg per tablet 1  tablet    sodium chloride 0.9% flush 10 mL    sodium chloride 0.9% flush 10 mL    sodium chloride 0.9% flush 5 mL    tiotropium inhalation capsule 18 mcg    verapamil tablet 80 mg     Objective:     Vital Signs (Most Recent):  Temp: 98.5 °F (36.9 °C) (05/06/19 1100)  Pulse: 86 (05/06/19 1300)  Resp: (!) 31 (05/06/19 1300)  BP: (!) 103/53 (05/06/19 1300)  SpO2: (!) 94 % (05/06/19 1300) Vital Signs (24h Range):  Temp:  [98.4 °F (36.9 °C)-99.2 °F (37.3 °C)] 98.5 °F (36.9 °C)  Pulse:  [] 86  Resp:  [12-44] 31  SpO2:  [84 %-100 %] 94 %  BP: ()/(46-64) 103/53     Weight: 43.1 kg (95 lb)  Height: 5' (152.4 cm)  Body mass index is 18.55 kg/m².      Intake/Output Summary (Last 24 hours) at 5/6/2019 1345  Last data filed at 5/6/2019 1200  Gross per 24 hour   Intake 1121 ml   Output 1045 ml   Net 76 ml       Ortho/SPM Exam    Physical Exam:  NAD, A/O x 3.  Wound c/d/i with clean dressing.  No focal motor or sensory deficits noted.    Significant Labs:   CBC:   Recent Labs   Lab 05/04/19  2223 05/05/19  1233 05/06/19  0313   WBC 11.53 11.40 11.19   HGB 6.6* 6.8* 6.2*   HCT 20.9* 21.1* 18.8*    SEE COMMENT SEE COMMENT     CMP:   Recent Labs   Lab 05/04/19  2223 05/06/19  0313    139   K 4.1 3.5    101   CO2 27 29    98   BUN 17 12   CREATININE 0.7 0.5   CALCIUM 8.7 8.3*   PROT 6.0 5.7*   ALBUMIN 2.5* 2.2*   BILITOT 0.6 0.5   ALKPHOS 193* 146*   * 45*   ALT 51* 30   ANIONGAP 8 9   EGFRNONAA >60.0 >60.0     All pertinent labs within the past 24 hours have been reviewed.    Significant Imaging: I have reviewed and interpreted all pertinent imaging results/findings.    Assessment/Plan:     * Closed displaced intertrochanteric fracture of left femur  Siomara ELIANA Mark is a 86 y.o. female POD 2 s/p left CMN doing well now off pressors in SICU with Hb 6.2 this AM. Have not been able to get progressive bed the past couple days. She has been doing well so we will transfer to floor today.        - Weight bearing status: WBAT LLE, NWB LUE.   - Pain control: Per APS  - Antibiotics: po ancef complete  - DVT Prophylaxis: Lovenox 30mg daily, SCD's at all times when not ambulating.  - PT/OT, will require aggressive PT/OT. 7 days a week  - Lines/Drains: PNC in place  - Dispo: Continue to treat, will step down to floor today. Needs aggressive PT/OT and SNF placement.             Closed fracture of left proximal humerus  See above    Closed fracture of left distal radius  See above          Kye Black MD  Orthopedics  [facility]

## 2019-05-06 NOTE — PHYSICIAN QUERY
PT Name: Siomara Flores  MR #: [id number]    Physician Query Form - Nutrition Clarification     CDS/: Jo Ann Dye RN               Contact information: [email or web address]    This form is a permanent document in the medical record.     Query Date: May 6, 2019    By submitting this query, we are merely seeking further clarification of documentation.. Please utilize your independent clinical judgment when addressing the question(s) below.    The Medical record contains the following:   Indicators  Supporting Clinical Findings Location in Medical Record   x % of Estimated Energy Intake over a time frame from p.o., TF, or TPN No intake recorded for 5/2-3  0% intake recorded for 0900 and 1200, 75% intake @1700  25% intake at 0900  25% intake @ 0700; 50% intake @1100 Intake/output record  Intake/output record 5/4  Intake/output record 5/5  Intake/output record 5/6     x Weight Status over a time frame poor nutrition recently due to ill  who passed away a month ago.      Hospital Medicine PN 5/3    Subcutaneous Fat and/or Muscle Loss      Fluid Accumulation or Edema      Reduced  Strength     x Wt / BMI / Usual Body Weight Weight: 43.1 kg (95 lb)  Body mass index is 18.55 kg/m². Hospital Medicine PN 5/3    Delayed Wound Healing / Failure to Thrive     x Acute or Chronic Illness Bronchiectasis, HTN, left trochanteric, left proximal humerus and left distal radius fracture H&P    Medication      Treatment      Other       AND / ASPEN Clinical Characteristics (October 2011)  A minimum of two characteristics is recommended for diagnosing either moderate or severe malnutrition   Mild Malnutrition Moderate Malnutrition Severe Malnutrition   Energy Intake from p.o., TF or TPN. < 75% intake of estimated energy needs for less than 7 days < 75% intake of estimated energy needs for greater than 7 days < 50% intake of estimated energy needs for > 5 days   Weight Loss 1-2% in 1 month  5% in 3 months  7.5% in  6 months  10% in 1 year 1-2 % in 1 week  5% in 1 month  7.5% in 3 months  10% in 6 months  20% in 1 year > 2% in 1 week  > 5% in 1 month  > 7.5% in 3 months  > 10% in 6 months  > 20% in 1 year   Physical Findings     None *Mild subcutaneous fat and/or muscle loss  *Mild fluid accumulation  *Stage II decubitus  *Surgical wound or non-healing wound *Mod/severe subcutaneous fat and/or muscle loss  *Mod/severe fluid accumulation  *Stage III or IV decubitus  *Non-healing surgical wound     Provider, please specify diagnosis or diagnoses associated with above clinical findings.    [  ] Abnormal Weight Loss   [ X ] Underweight   [  ] Other Nutritional Diagnosis (please specify):    [  ] Other:    [  ] Clinically Undetermined       Please document in your progress notes daily for the duration of treatment until resolved and include in your discharge summary.

## 2019-05-06 NOTE — PROGRESS NOTES
[facility]  Critical Care - Surgery  Progress Note    Patient Name: Siomara Flores  MRN: [id number]  Admission Date: 5/2/2019  Hospital Length of Stay: 4 days  Code Status: Full Code  Attending Provider: Lily Bowman MD  Primary Care Provider: Vince Wray MD   Principal Problem: Closed displaced intertrochanteric fracture of left femur    Subjective:     Hospital/ICU Course:  No notes on file    Interval History/Significant Events: Patient seen and examined at bedside.  No acute events overnight.  Pain controlled. Esposito pulled this am. hgb critically low to 6.6. Jain refusing blood products    Follow-up For: Procedure(s) (LRB):  INSERTION, INTRAMEDULLARY MARTHA, FEMUR (Left)    Post-Operative Day: 3 Days Post-Op    Objective:     Vital Signs (Most Recent):  Temp: 99.2 °F (37.3 °C) (05/06/19 0301)  Pulse: 87 (05/06/19 0630)  Resp: 15 (05/06/19 0630)  BP: (!) 150/64 (05/06/19 0600)  SpO2: 100 % (05/06/19 0630) Vital Signs (24h Range):  Temp:  [98.4 °F (36.9 °C)-99.2 °F (37.3 °C)] 99.2 °F (37.3 °C)  Pulse:  [] 87  Resp:  [14-36] 15  SpO2:  [98 %-100 %] 100 %  BP: (116-150)/(52-64) 150/64     Weight: 43.1 kg (95 lb)  Body mass index is 18.55 kg/m².      Intake/Output Summary (Last 24 hours) at 5/6/2019 0645  Last data filed at 5/6/2019 0600  Gross per 24 hour   Intake 1219 ml   Output 1080 ml   Net 139 ml       Physical Exam  Constitutional: She is oriented to person, place, and time. She appears well-developed and well-nourished.   Eyes: Pupils are equal, round, and reactive to light. EOM are normal.   Cardiovascular: Normal rate, regular rhythm and normal heart sounds.   Pulmonary/Chest: Effort normal and breath sounds normal.   Abdominal: Soft. Bowel sounds are normal.   Musculoskeletal: incision dressings c/d/i. Left upper extremity in sling. M/R/U sensation intact. AIN/PIN/ intact  Neurological: She is alert and oriented to person, place, and time.   Skin: Skin is warm  and dry.   Psychiatric: She has a normal mood and affect. Her behavior is normal.       Vents:  Oxygen Concentration (%): 24 (05/05/19 2152)    Lines/Drains/Airways     Epidural Line                 Perineural Analgesia/Anesthesia Assessment (using dermatomes) 05/03/19 1251 2 days          Peripheral Intravenous Line                 Peripheral IV - Single Lumen 05/05/19 0653 20 G Anterior;Right Upper Arm less than 1 day                Significant Labs:    CBC/Anemia Profile:  Recent Labs   Lab 05/04/19 2223 05/05/19  1233 05/06/19  0313   WBC 11.53 11.40 11.19   HGB 6.6* 6.8* 6.2*   HCT 20.9* 21.1* 18.8*    SEE COMMENT SEE COMMENT   MCV 81* 82 79*   RDW 13.1 13.2 13.1        Chemistries:  Recent Labs   Lab 05/04/19 2223 05/06/19  0313    139   K 4.1 3.5    101   CO2 27 29   BUN 17 12   CREATININE 0.7 0.5   CALCIUM 8.7 8.3*   ALBUMIN 2.5* 2.2*   PROT 6.0 5.7*   BILITOT 0.6 0.5   ALKPHOS 193* 146*   ALT 51* 30   * 45*   MG 2.0 1.8   PHOS 2.8 1.8*       BMP:   Recent Labs   Lab 05/06/19  0313   GLU 98      K 3.5      CO2 29   BUN 12   CREATININE 0.5   CALCIUM 8.3*   MG 1.8     CMP:   Recent Labs   Lab 05/04/19 2223 05/06/19  0313    139   K 4.1 3.5    101   CO2 27 29    98   BUN 17 12   CREATININE 0.7 0.5   CALCIUM 8.7 8.3*   PROT 6.0 5.7*   ALBUMIN 2.5* 2.2*   BILITOT 0.6 0.5   ALKPHOS 193* 146*   * 45*   ALT 51* 30   ANIONGAP 8 9   EGFRNONAA >60.0 >60.0       Significant Imaging:  I have reviewed all pertinent imaging results/findings within the past 24 hours.    Assessment/Plan:     * Closed displaced intertrochanteric fracture of left femur  Assessment & Plan     86 y.o. female w/ a significant PMHx of HTN, bronchiectasis, SVT(stable on verapamil) who presented to ED after a fall; found to have left intertrochanteric, left proximal humerus, and left distal radius fracture. Pt is now s/p Left intramedullary marc for closed displaced intertrochanteric  fracture.      Neuro:   -  AAOx3  - Pain control with tylenol, lyrica, oxy 5 prn  - SIFI PNC w/ ropiv     Pulmonary:   - Hx bronchiectasis  - SpO2 100% on RA  - Continue Duonebs     Cardiac:   - HDS  - Hx SVT, HTN  - Cont verapamil, losartan  - pressors off     Renal:    - BUN/Cr 12/.5  - Monitor UOP          ID:   - Afebrile  - WBC 11; likely 2/2 post-op inflammation  - Cont to monitor     Hem/Onc:   - H/H  6.69 ( baseline 8.4/26.9)  - Avoid IVF if possible per primary  - Pediatric blood draws only     Endocrine:    - BG stable  - Cont to monitor  -  (24); ALT 82 (7) - monitor     Fluids/Electrolytes/Nutrition/GI:   - Replace electrolytes PRN  - Advance diet as tolerated  - Tolerating diet well     PPx:  - Bowel: bisacodyl prn, senna doc prn        DISPO:  - Continue SICU care, stepdown orders in place        Critical care was time spent personally by me on the following activities: development of treatment plan with patient or surrogate and bedside caregivers, discussions with consultants, evaluation of patient's response to treatment, examination of patient, ordering and performing treatments and interventions, ordering and review of laboratory studies, ordering and review of radiographic studies, pulse oximetry, re-evaluation of patient's condition. This critical care time did not overlap with that of any other provider or involve time for any procedures.              Critical care was time spent personally by me on the following activities: development of treatment plan with patient or surrogate and bedside caregivers, discussions with consultants, evaluation of patient's response to treatment, examination of patient, ordering and performing treatments and interventions, ordering and review of laboratory studies, ordering and review of radiographic studies, pulse oximetry, re-evaluation of patient's condition.  This critical care time did not overlap with that of any other provider or involve time for  any procedures.     Joby Reynolds MD  Critical Care - Surgery  [facility]

## 2019-05-07 LAB
ALBUMIN SERPL BCP-MCNC: 2 G/DL (ref 3.5–5.2)
ALP SERPL-CCNC: 120 U/L (ref 55–135)
ALT SERPL W/O P-5'-P-CCNC: 20 U/L (ref 10–44)
ANION GAP SERPL CALC-SCNC: 10 MMOL/L (ref 8–16)
AST SERPL-CCNC: 30 U/L (ref 10–40)
BASOPHILS # BLD AUTO: 0.03 K/UL (ref 0–0.2)
BASOPHILS NFR BLD: 0.5 % (ref 0–1.9)
BILIRUB SERPL-MCNC: 0.4 MG/DL (ref 0.1–1)
BUN SERPL-MCNC: 10 MG/DL (ref 8–23)
CALCIUM SERPL-MCNC: 8.5 MG/DL (ref 8.7–10.5)
CHLORIDE SERPL-SCNC: 101 MMOL/L (ref 95–110)
CO2 SERPL-SCNC: 27 MMOL/L (ref 23–29)
CREAT SERPL-MCNC: 0.5 MG/DL (ref 0.5–1.4)
DIFFERENTIAL METHOD: ABNORMAL
EOSINOPHIL # BLD AUTO: 0.2 K/UL (ref 0–0.5)
EOSINOPHIL NFR BLD: 2.8 % (ref 0–8)
ERYTHROCYTE [DISTWIDTH] IN BLOOD BY AUTOMATED COUNT: 13.5 % (ref 11.5–14.5)
EST. GFR  (AFRICAN AMERICAN): >60 ML/MIN/1.73 M^2
EST. GFR  (NON AFRICAN AMERICAN): >60 ML/MIN/1.73 M^2
GLUCOSE SERPL-MCNC: 113 MG/DL (ref 70–110)
HCT VFR BLD AUTO: 21.9 % (ref 37–48.5)
HGB BLD-MCNC: 6.7 G/DL (ref 12–16)
IMM GRANULOCYTES # BLD AUTO: 0.01 K/UL (ref 0–0.04)
IMM GRANULOCYTES NFR BLD AUTO: 0.2 % (ref 0–0.5)
LYMPHOCYTES # BLD AUTO: 0.8 K/UL (ref 1–4.8)
LYMPHOCYTES NFR BLD: 12.1 % (ref 18–48)
MAGNESIUM SERPL-MCNC: 1.7 MG/DL (ref 1.6–2.6)
MCH RBC QN AUTO: 25.9 PG (ref 27–31)
MCHC RBC AUTO-ENTMCNC: 30.6 G/DL (ref 32–36)
MCV RBC AUTO: 85 FL (ref 82–98)
MONOCYTES # BLD AUTO: 0.6 K/UL (ref 0.3–1)
MONOCYTES NFR BLD: 9.8 % (ref 4–15)
NEUTROPHILS # BLD AUTO: 4.7 K/UL (ref 1.8–7.7)
NEUTROPHILS NFR BLD: 74.6 % (ref 38–73)
NRBC BLD-RTO: 0 /100 WBC
PHOSPHATE SERPL-MCNC: 2 MG/DL (ref 2.7–4.5)
PLATELET # BLD AUTO: 213 K/UL (ref 150–350)
PMV BLD AUTO: 10.7 FL (ref 9.2–12.9)
POTASSIUM SERPL-SCNC: 3.6 MMOL/L (ref 3.5–5.1)
PROT SERPL-MCNC: 5.6 G/DL (ref 6–8.4)
RBC # BLD AUTO: 2.59 M/UL (ref 4–5.4)
SODIUM SERPL-SCNC: 138 MMOL/L (ref 136–145)
WBC # BLD AUTO: 6.35 K/UL (ref 3.9–12.7)

## 2019-05-07 PROCEDURE — 97530 THERAPEUTIC ACTIVITIES: CPT

## 2019-05-07 PROCEDURE — 94640 AIRWAY INHALATION TREATMENT: CPT

## 2019-05-07 PROCEDURE — 63600175 PHARM REV CODE 636 W HCPCS: Performed by: STUDENT IN AN ORGANIZED HEALTH CARE EDUCATION/TRAINING PROGRAM

## 2019-05-07 PROCEDURE — 25000003 PHARM REV CODE 250: Performed by: HOSPITALIST

## 2019-05-07 PROCEDURE — 25000003 PHARM REV CODE 250: Performed by: STUDENT IN AN ORGANIZED HEALTH CARE EDUCATION/TRAINING PROGRAM

## 2019-05-07 PROCEDURE — 84100 ASSAY OF PHOSPHORUS: CPT

## 2019-05-07 PROCEDURE — 11000001 HC ACUTE MED/SURG PRIVATE ROOM

## 2019-05-07 PROCEDURE — 99233 PR SUBSEQUENT HOSPITAL CARE,LEVL III: ICD-10-PCS | Mod: ,,, | Performed by: HOSPITALIST

## 2019-05-07 PROCEDURE — 99900035 HC TECH TIME PER 15 MIN (STAT)

## 2019-05-07 PROCEDURE — 83735 ASSAY OF MAGNESIUM: CPT

## 2019-05-07 PROCEDURE — 25000242 PHARM REV CODE 250 ALT 637 W/ HCPCS: Performed by: STUDENT IN AN ORGANIZED HEALTH CARE EDUCATION/TRAINING PROGRAM

## 2019-05-07 PROCEDURE — 97535 SELF CARE MNGMENT TRAINING: CPT

## 2019-05-07 PROCEDURE — 80053 COMPREHEN METABOLIC PANEL: CPT

## 2019-05-07 PROCEDURE — 36415 COLL VENOUS BLD VENIPUNCTURE: CPT

## 2019-05-07 PROCEDURE — 94799 UNLISTED PULMONARY SVC/PX: CPT

## 2019-05-07 PROCEDURE — 99233 SBSQ HOSP IP/OBS HIGH 50: CPT | Mod: ,,, | Performed by: HOSPITALIST

## 2019-05-07 PROCEDURE — 94761 N-INVAS EAR/PLS OXIMETRY MLT: CPT

## 2019-05-07 PROCEDURE — 25000003 PHARM REV CODE 250: Performed by: ANESTHESIOLOGY

## 2019-05-07 PROCEDURE — 85025 COMPLETE CBC W/AUTO DIFF WBC: CPT

## 2019-05-07 RX ORDER — SODIUM,POTASSIUM PHOSPHATES 280-250MG
1 POWDER IN PACKET (EA) ORAL
Status: DISCONTINUED | OUTPATIENT
Start: 2019-05-07 | End: 2019-05-09 | Stop reason: HOSPADM

## 2019-05-07 RX ORDER — HYDROCODONE BITARTRATE AND ACETAMINOPHEN 500; 5 MG/1; MG/1
TABLET ORAL
Status: DISCONTINUED | OUTPATIENT
Start: 2019-05-07 | End: 2019-05-09 | Stop reason: HOSPADM

## 2019-05-07 RX ADMIN — FERROUS SULFATE TAB EC 325 MG (65 MG FE EQUIVALENT) 325 MG: 325 (65 FE) TABLET DELAYED RESPONSE at 02:05

## 2019-05-07 RX ADMIN — MUPIROCIN 1 G: 20 OINTMENT TOPICAL at 09:05

## 2019-05-07 RX ADMIN — FERROUS SULFATE TAB EC 325 MG (65 MG FE EQUIVALENT) 325 MG: 325 (65 FE) TABLET DELAYED RESPONSE at 09:05

## 2019-05-07 RX ADMIN — POTASSIUM & SODIUM PHOSPHATES POWDER PACK 280-160-250 MG 1 PACKET: 280-160-250 PACK at 05:05

## 2019-05-07 RX ADMIN — POLYETHYLENE GLYCOL 3350 17 G: 17 POWDER, FOR SOLUTION ORAL at 09:05

## 2019-05-07 RX ADMIN — PREGABALIN 75 MG: 75 CAPSULE ORAL at 09:05

## 2019-05-07 RX ADMIN — IPRATROPIUM BROMIDE AND ALBUTEROL SULFATE 3 ML: .5; 3 SOLUTION RESPIRATORY (INHALATION) at 08:05

## 2019-05-07 RX ADMIN — IPRATROPIUM BROMIDE AND ALBUTEROL SULFATE 3 ML: .5; 3 SOLUTION RESPIRATORY (INHALATION) at 07:05

## 2019-05-07 RX ADMIN — VERAPAMIL HYDROCHLORIDE 80 MG: 40 TABLET ORAL at 02:05

## 2019-05-07 RX ADMIN — TIOTROPIUM BROMIDE 18 MCG: 18 CAPSULE ORAL; RESPIRATORY (INHALATION) at 09:05

## 2019-05-07 RX ADMIN — VERAPAMIL HYDROCHLORIDE 80 MG: 40 TABLET ORAL at 09:05

## 2019-05-07 RX ADMIN — IPRATROPIUM BROMIDE AND ALBUTEROL SULFATE 3 ML: .5; 3 SOLUTION RESPIRATORY (INHALATION) at 04:05

## 2019-05-07 RX ADMIN — ENOXAPARIN SODIUM 30 MG: 100 INJECTION SUBCUTANEOUS at 05:05

## 2019-05-07 RX ADMIN — POTASSIUM & SODIUM PHOSPHATES POWDER PACK 280-160-250 MG 1 PACKET: 280-160-250 PACK at 09:05

## 2019-05-07 RX ADMIN — ACETAMINOPHEN 1000 MG: 500 TABLET ORAL at 11:05

## 2019-05-07 RX ADMIN — ATENOLOL 25 MG: 25 TABLET ORAL at 09:05

## 2019-05-07 RX ADMIN — ACETAMINOPHEN 1000 MG: 500 TABLET ORAL at 02:05

## 2019-05-07 RX ADMIN — IPRATROPIUM BROMIDE AND ALBUTEROL SULFATE 3 ML: .5; 3 SOLUTION RESPIRATORY (INHALATION) at 12:05

## 2019-05-07 RX ADMIN — ROPIVACAINE HYDROCHLORIDE 2 ML/HR: 2 INJECTION, SOLUTION EPIDURAL; INFILTRATION at 11:05

## 2019-05-07 RX ADMIN — ACETAMINOPHEN 1000 MG: 500 TABLET ORAL at 06:05

## 2019-05-07 RX ADMIN — STANDARDIZED SENNA CONCENTRATE AND DOCUSATE SODIUM 1 TABLET: 8.6; 5 TABLET, FILM COATED ORAL at 09:05

## 2019-05-07 RX ADMIN — ROPIVACAINE HYDROCHLORIDE 2 ML/HR: 2 INJECTION, SOLUTION EPIDURAL; INFILTRATION at 04:05

## 2019-05-07 NOTE — PROGRESS NOTES
[facility]  Orthopedics  Progress Note    Patient Name: Siomara Flores  MRN: [id number]  Admission Date: 5/2/2019  Hospital Length of Stay: 5 days  Attending Provider: Lily Bowman MD  Primary Care Provider: Vince Wray MD  Follow-up For: Procedure(s) (LRB):  INSERTION, INTRAMEDULLARY MARTHA, FEMUR (Left)    Post-Operative Day: 4 Days Post-Op  Subjective:     Principal Problem:Closed displaced intertrochanteric fracture of left femur    Principal Orthopedic Problem: same    Interval History: Patient seen and examined at bedside. Day 3 s/p left CMN for intertrochanteric hip fracture.   No acute events overnight.  Pain controlled.  Hb 6.2 yesterday. Worked twice with PT yesterday. Was able to transfer and take several steps.     Review of patient's allergies indicates:   Allergen Reactions    Aspirin (bulk) Other (See Comments)     Developed gastric ulcer in past when taking aspirin per patient       Current Facility-Administered Medications   Medication    acetaminophen tablet 1,000 mg    albuterol-ipratropium 2.5 mg-0.5 mg/3 mL nebulizer solution 3 mL    atenolol tablet 25 mg    bisacodyl suppository 10 mg    dextrose 50% injection 12.5 g    dextrose 50% injection 25 g    enoxaparin injection 30 mg    epoetin elizabeth-epbx injection 40,000 Units    ferrous sulfate EC tablet 325 mg    glucagon (human recombinant) injection 1 mg    glucose chewable tablet 16 g    glucose chewable tablet 24 g    losartan tablet 100 mg    methocarbamol tablet 1,000 mg    mupirocin 2 % ointment 1 g    ondansetron disintegrating tablet 8 mg    ondansetron injection 4 mg    polyethylene glycol packet 17 g    pregabalin capsule 75 mg    promethazine (PHENERGAN) 6.25 mg in dextrose 5 % 50 mL IVPB    ramelteon tablet 8 mg    ropivacaine (PF) 2 mg/ml (0.2%) infusion    senna-docusate 8.6-50 mg per tablet 1 tablet    senna-docusate 8.6-50 mg per tablet 1 tablet    sodium chloride 0.9% flush 10  mL    sodium chloride 0.9% flush 10 mL    sodium chloride 0.9% flush 5 mL    tiotropium inhalation capsule 18 mcg    verapamil tablet 80 mg     Objective:     Vital Signs (Most Recent):  Temp: 97.1 °F (36.2 °C) (05/06/19 2319)  Pulse: 93 (05/06/19 2319)  Resp: 18 (05/06/19 2319)  BP: (!) 121/56 (05/06/19 2319)  SpO2: (!) 94 % (05/06/19 2319) Vital Signs (24h Range):  Temp:  [97.1 °F (36.2 °C)-98.6 °F (37 °C)] 97.1 °F (36.2 °C)  Pulse:  [] 93  Resp:  [12-44] 18  SpO2:  [84 %-100 %] 94 %  BP: ()/(44-65) 121/56     Weight: 43.1 kg (95 lb)  Height: 5' (152.4 cm)  Body mass index is 18.55 kg/m².      Intake/Output Summary (Last 24 hours) at 5/7/2019 0538  Last data filed at 5/6/2019 2100  Gross per 24 hour   Intake 914 ml   Output 915 ml   Net -1 ml       Ortho/SPM Exam    Physical Exam:  NAD, A/O x 3.  Wound c/d/i with clean dressing.  No focal motor or sensory deficits noted.    Significant Labs:   CBC:   Recent Labs   Lab 05/05/19  1233 05/06/19  0313   WBC 11.40 11.19   HGB 6.8* 6.2*   HCT 21.1* 18.8*   PLT SEE COMMENT SEE COMMENT     CMP:   Recent Labs   Lab 05/06/19  0313      K 3.5      CO2 29   GLU 98   BUN 12   CREATININE 0.5   CALCIUM 8.3*   PROT 5.7*   ALBUMIN 2.2*   BILITOT 0.5   ALKPHOS 146*   AST 45*   ALT 30   ANIONGAP 9   EGFRNONAA >60.0     All pertinent labs within the past 24 hours have been reviewed.    Significant Imaging: I have reviewed and interpreted all pertinent imaging results/findings.    Assessment/Plan:     * Closed displaced intertrochanteric fracture of left femur  Siomara Flores is a 86 y.o. female POD 4 s/p left CMN doing well now on floor doing well.     - Weight bearing status: WBAT LLE, NWB LUE.   - Pain control: Per APS  - Antibiotics: po ancef complete  - DVT Prophylaxis: Lovenox 30mg daily, SCD's at all times when not ambulating.  - PT/OT, will require aggressive PT/OT. 7 days a week  - Lines/Drains: PNC in place  - Dispo: Continue to treat, will step  down to floor today. Needs aggressive PT/OT and SNF placement. Will discuss SNF with SW today.             Closed fracture of left proximal humerus  See above    Closed fracture of left distal radius  See above          Kye Black MD  Orthopedics  [facility] Note:  I agree with the resident's assessment and plan.    Teodoro Bruce MD

## 2019-05-07 NOTE — PLAN OF CARE
Problem: Adult Inpatient Plan of Care  Goal: Plan of Care Review  Johava Witness    Hx: HTN, CVA, SVT    5/2: Fell @ home - ED  5/3: Left marc for closed displaced hip fracture - admit SICU on rosa.    MAP > 60   Outcome: Ongoing (interventions implemented as appropriate)  No acute distress noted.  No complaints of pain voiced at this time.  Q2H rounding in progress.  Call light in reach.  Fall precautions in place.  Educated pt to call for assistance.  Vitals stable at this time.

## 2019-05-07 NOTE — NURSING TRANSFER
Nursing Transfer Note      5/6/2019     Transfer From: SICU to Room 509    Transfer via bed    Transfer with cardiac monitoring    Transported by RN x1 PCT    Medicines sent: Ropivicaine perineural     Chart send with patient: Yes    Notified: son    Patient reassessed at: 5/6/19 6542    Upon arrival to floor: cardiac monitor applied, patient oriented to room, call bell in reach and bed in lowest position    Report Given to Mimi Stevens RN

## 2019-05-07 NOTE — PLAN OF CARE
SW met with patient at bedside to discuss discharge plan in regard to SNF placement. SW provided patient with a SNF list and sent referrals via [facility]. SW will follow up.        Elen Masters LMSW  [facility]   [id number]

## 2019-05-07 NOTE — SUBJECTIVE & OBJECTIVE
Principal Problem:Closed displaced intertrochanteric fracture of left femur    Principal Orthopedic Problem: same    Interval History: Patient seen and examined at bedside. Day 3 s/p left CMN for intertrochanteric hip fracture.   No acute events overnight.  Pain controlled.  Hb 6.2 yesterday. Worked twice with PT yesterday. Was able to transfer and take several steps.     Review of patient's allergies indicates:   Allergen Reactions    Aspirin (bulk) Other (See Comments)     Developed gastric ulcer in past when taking aspirin per patient       Current Facility-Administered Medications   Medication    acetaminophen tablet 1,000 mg    albuterol-ipratropium 2.5 mg-0.5 mg/3 mL nebulizer solution 3 mL    atenolol tablet 25 mg    bisacodyl suppository 10 mg    dextrose 50% injection 12.5 g    dextrose 50% injection 25 g    enoxaparin injection 30 mg    epoetin elizabeth-epbx injection 40,000 Units    ferrous sulfate EC tablet 325 mg    glucagon (human recombinant) injection 1 mg    glucose chewable tablet 16 g    glucose chewable tablet 24 g    losartan tablet 100 mg    methocarbamol tablet 1,000 mg    mupirocin 2 % ointment 1 g    ondansetron disintegrating tablet 8 mg    ondansetron injection 4 mg    polyethylene glycol packet 17 g    pregabalin capsule 75 mg    promethazine (PHENERGAN) 6.25 mg in dextrose 5 % 50 mL IVPB    ramelteon tablet 8 mg    ropivacaine (PF) 2 mg/ml (0.2%) infusion    senna-docusate 8.6-50 mg per tablet 1 tablet    senna-docusate 8.6-50 mg per tablet 1 tablet    sodium chloride 0.9% flush 10 mL    sodium chloride 0.9% flush 10 mL    sodium chloride 0.9% flush 5 mL    tiotropium inhalation capsule 18 mcg    verapamil tablet 80 mg     Objective:     Vital Signs (Most Recent):  Temp: 97.1 °F (36.2 °C) (05/06/19 2319)  Pulse: 93 (05/06/19 2319)  Resp: 18 (05/06/19 2319)  BP: (!) 121/56 (05/06/19 2319)  SpO2: (!) 94 % (05/06/19 2319) Vital Signs (24h Range):  Temp:  [97.1 °F  (36.2 °C)-98.6 °F (37 °C)] 97.1 °F (36.2 °C)  Pulse:  [] 93  Resp:  [12-44] 18  SpO2:  [84 %-100 %] 94 %  BP: ()/(44-65) 121/56     Weight: 43.1 kg (95 lb)  Height: 5' (152.4 cm)  Body mass index is 18.55 kg/m².      Intake/Output Summary (Last 24 hours) at 5/7/2019 0538  Last data filed at 5/6/2019 2100  Gross per 24 hour   Intake 914 ml   Output 915 ml   Net -1 ml       Ortho/SPM Exam    Physical Exam:  NAD, A/O x 3.  Wound c/d/i with clean dressing.  No focal motor or sensory deficits noted.    Significant Labs:   CBC:   Recent Labs   Lab 05/05/19  1233 05/06/19  0313   WBC 11.40 11.19   HGB 6.8* 6.2*   HCT 21.1* 18.8*   PLT SEE COMMENT SEE COMMENT     CMP:   Recent Labs   Lab 05/06/19  0313      K 3.5      CO2 29   GLU 98   BUN 12   CREATININE 0.5   CALCIUM 8.3*   PROT 5.7*   ALBUMIN 2.2*   BILITOT 0.5   ALKPHOS 146*   AST 45*   ALT 30   ANIONGAP 9   EGFRNONAA >60.0     All pertinent labs within the past 24 hours have been reviewed.    Significant Imaging: I have reviewed and interpreted all pertinent imaging results/findings.

## 2019-05-07 NOTE — PROGRESS NOTES
[facility] Hospital Medicine  Progress Note    Patient Name: Siomara Flores  MRN: [id number]  Patient Class: IP- Inpatient   Admission Date: 5/2/2019  Length of Stay: 5 days  Attending Physician: Kacey Casas MD  Primary Care Provider: Vince Wray MD    [facility] Medicine Team: [facility] Kacey Casas MD    Subjective:     Principal Problem:Closed displaced intertrochanteric fracture of left femur    HPI:  Ms. Siomara Flores is a 86 y.o. female Congregation with bronchiectasis and SVT who presents to the ED for evaluation of left hip pain sustained after a fall. She was walking in her driveway which is full of cement holes, when she lost her footing and fell, landing on her left hip and left shoulder. After, she was unable to bear weight or stand. She denies hitting her head of any LOC. She denies any numbness or tingling in her extremities. She endorses pain in her shoulder and her hip. She denies any chest pain or SOB. She is a Congregation, and refuses any blood products.     In the ED she was evaluated by Ortho, where she was found to have a left intertrochanteric, left proximal humerus, and left distal radius fracture. She was admitted to Hospital Medicine for further management.       Hospital Course:  5/3: admitted overnight for Left femur/Left radial/ Left humerus fracture after mechanical fall. Plan for left hip repair today. Pain still present after only receiving tylenol overnight per patient, didn't know to ask for other pain meds on MAR. Discussed Hg lower than normal baseline (at 8, baseline previously was 12). Received Epo overnight, will check iron/b12/folate tomorrow. Endorses poor nutrition recently due to ill  who passed away a month ago. Discussed risk of bleeding after surgery with low counts to start, discussed she will inevitably drop below Hg of 7 post op where most would be transfused but that she has endorsed not wanting transfusions  due to Shinto, which still endorses. Received Epo overnight pre-op. Anticoagulation will be risky post op, states she had gi ulcers with aspirin years ago as well. Will discuss more with ortho in regards to risk/benefits post op. NWB LUE to left radial fx in splint. Vit D 46.    5/7 -  POD 4, pt aware Hb is low, and repeats that she does not want a blood transfusion due to Adventism reasons.  I assured her that we would honor her wishes.  She denies SOB, chest pain, or pain related to fractures.  Left arm in sling.   Hb 6/2 -> 6.7.     Interval History: pain well controlled    Review of Systems   Constitutional: Negative for activity change, fatigue and fever.   HENT: Negative for congestion and trouble swallowing.    Eyes: Negative for visual disturbance.   Respiratory: Negative for cough, choking, chest tightness and shortness of breath.    Cardiovascular: Negative for chest pain, palpitations and leg swelling.   Gastrointestinal: Negative for abdominal distention, abdominal pain, blood in stool, constipation, diarrhea and nausea.   Genitourinary: Negative for difficulty urinating and dysuria.   Musculoskeletal: Negative for arthralgias, back pain and myalgias.   Neurological: Negative for dizziness, facial asymmetry and headaches.   Psychiatric/Behavioral: Negative for agitation, behavioral problems and confusion.     Objective:     Vital Signs (Most Recent):  Temp: 97.2 °F (36.2 °C) (05/07/19 1229)  Pulse: 88 (05/07/19 1229)  Resp: 18 (05/07/19 1229)  BP: 138/63 (05/07/19 1229)  SpO2: 97 % (05/07/19 1229) Vital Signs (24h Range):  Temp:  [96 °F (35.6 °C)-98.6 °F (37 °C)] 97.2 °F (36.2 °C)  Pulse:  [72-96] 88  Resp:  [14-39] 18  SpO2:  [90 %-100 %] 97 %  BP: ()/(44-65) 138/63     Weight: 43.1 kg (95 lb)  Body mass index is 18.55 kg/m².    Intake/Output Summary (Last 24 hours) at 5/7/2019 1244  Last data filed at 5/6/2019 2100  Gross per 24 hour   Intake 492 ml   Output 565 ml   Net -73 ml      Physical  Exam   Constitutional: She is oriented to person, place, and time. She appears well-developed and well-nourished. No distress.   Frail , comfortable   HENT:   Head: Normocephalic and atraumatic.   Mouth/Throat: Oropharynx is clear and moist.   Eyes: Pupils are equal, round, and reactive to light. Conjunctivae and EOM are normal. No scleral icterus.   Neck: Normal range of motion. Neck supple. No thyromegaly present.   Cardiovascular: Normal rate, regular rhythm and normal heart sounds. Exam reveals no friction rub.   No murmur heard.  Pulmonary/Chest: Effort normal and breath sounds normal. No respiratory distress. She has no wheezes.   Abdominal: Soft. Bowel sounds are normal. She exhibits no distension and no mass. There is no tenderness. There is no guarding.   Musculoskeletal: Normal range of motion. She exhibits no edema or deformity.   Left hip dressing clean and intact   Lymphadenopathy:     She has no cervical adenopathy.   Neurological: She is alert and oriented to person, place, and time.   Skin: Skin is warm and dry. She is not diaphoretic.   Psychiatric: She has a normal mood and affect. Her behavior is normal. Thought content normal.       Significant Labs:   CBC:   Recent Labs   Lab 05/06/19  0313 05/07/19  0909   WBC 11.19 6.35   HGB 6.2* 6.7*   HCT 18.8* 21.9*   PLT SEE COMMENT 213     CMP:   Recent Labs   Lab 05/06/19  0313 05/07/19  0909    138   K 3.5 3.6    101   CO2 29 27   GLU 98 113*   BUN 12 10   CREATININE 0.5 0.5   CALCIUM 8.3* 8.5*   PROT 5.7* 5.6*   ALBUMIN 2.2* 2.0*   BILITOT 0.5 0.4   ALKPHOS 146* 120   AST 45* 30   ALT 30 20   ANIONGAP 9 10   EGFRNONAA >60.0 >60.0           Assessment/Plan:      * Closed displaced intertrochanteric fracture of left femur  -left femur fracture after mechanical fall  -on hip fracture pathway now- tylenol, lyrica, PRN oxycodone for pain control. Surgery planned today with orthopedics. PT/OT tomorrow  -patient with significant anemia to start  with Hg of 8 and refusing blood products due to Orthodox, this will be significant issue in post op period and with expected anticoagulation for 28 days post op. Will have to monitor closely for blood loss and balance risk of DVT ppx vs acute bleeding/risk of blood loss anemia and lack of supportive care with transfusions if active bleeding post op as a potential higher risk situation in post op period that could arise, which I discussed with patient again today  -vit D adequate at 46.  -SNF next week pending medical stability, ref sent to [facility] today    5/7- POD4, Hb improved to 6.7.  Pt does not want blood ( Jahova's witness),  Medically ready for dc.  Monitor cbc.  Boost for nutrition.  Continue PT/OT.  Discussed need for lovenox for DVT prophylaxis x 26 days  w patient.  It should cause her to bleed.  Discussed bed mobility exerecises as well.     Postprocedural hypotension  5/7 - stable, 138/63      Acute blood loss anemia  Expected after fractures      Microcytic anemia  -baseline Hg of 12 in 2016, admitted with mild microcytosis and Hg of 8.4, patient endorses poor oral intake recently but denies dark stools, blood in stools, hemoptysis from bronchiectasis prior to admit.  -ferritin/b12/folate added on for labs tomorrow, minimize lab draws due to refusal of blood products  -is going to inevitably drop below 7 post op from blood loss anemia. If iron deficient will start replacement pending labs.   -supportive care per [facility] policies and patient preference/wishes. Currently does not want transfusions if needed when discussed with mds in ER and with me today  5/7 - Hb 6.7      Refusal of blood transfusions as patient is Yarsanism  -will be an issue post op. epo is allowed, given pre op. Minimize blood draws. Use pediatric tubes if needed       Closed fracture of left proximal humerus  -splint. nonweight bearing  F/u ortho      Closed fracture of left distal radius  -splint in place, nonweight bearing  to LUE      Fall  Needs fall precautions here and at home      Hypertension  -continue home meds- verapamil, atenolol, losartan  5/7 - stable      Bronchiectasis without complication  -history of this at home, has had occasional hemoptysis in the past, denies any recently. Continue spiriva, continue duobebs.  5/7 - stable      SVT (supraventricular tachycardia)  -follows with Dr Jiang as outpatient  -continue home verapamil, atenolol 25 daily      VTE Risk Mitigation (From admission, onward)        Ordered     enoxaparin injection 30 mg  Daily      05/05/19 0810     Place CORA hose  Until discontinued      05/02/19 2143     IP VTE HIGH RISK PATIENT  Once      05/02/19 2143              Kacey Casas MD  Department of Hospital Medicine   [facility]

## 2019-05-07 NOTE — PT/OT/SLP PROGRESS
Occupational Therapy   Treatment    Name: Siomara Flores  MRN: [id number]  Admitting Diagnosis:  Closed displaced intertrochanteric fracture of left femur  4 Days Post-Op    Recommendations:     Discharge Recommendations: nursing facility, skilled  Discharge Equipment Recommendations:  (TBD)  Barriers to discharge:  Decreased caregiver support    Assessment:     Siomara Flores is a 86 y.o. female with a medical diagnosis of Closed displaced intertrochanteric fracture of left femur.  She was able to perform supine/sit T/F c mod A and sit/stand and bed/chair T/F c min A and hemiwalker.  Able to perform UB dressing c total assist.  Pt's L knee cyndi during T/F.  Performance deficits affecting function are weakness, impaired endurance, impaired self care skills, impaired functional mobilty, orthopedic precautions.     Rehab Prognosis:  Good; patient would benefit from acute skilled OT services to address these deficits and reach maximum level of function.       Plan:     Patient to be seen 4 x/week to address the above listed problems via self-care/home management, therapeutic activities, therapeutic exercises  · Plan of Care Expires: 06/05/19  · Plan of Care Reviewed with: patient    Subjective     Pain/Comfort:  · Pain Rating 1: 0/10    Objective:     Communicated with: RN prior to session.  Patient found supine with telemetry, pulse ox (continuous), blood pressure cuff, perineural catheter, cuevas catheter upon OT entry to room.    General Precautions: Standard, fall   Orthopedic Precautions:LLE weight bearing as tolerated, LUE non weight bearing   Braces: Sling and swathe     Occupational Performance:     Bed Mobility:    · Patient completed Supine to Sit with moderate assistance     Functional Mobility/Transfers:  · Patient completed Sit <> Stand Transfer with minimum assistance  with  hemiwalker   · Patient completed Bed <> Chair Transfer using Stand Pivot technique with minimum assistance with  hemiwalker      Activities of Daily Living:  · Upper Body Dressing: total assistance to don hospital gown and sling and swathe      [facility] 6 Click ADL: 15        Patient left up in chair with all lines intact, call button in reach and RN notifiedEducation:      GOALS:   Multidisciplinary Problems     Occupational Therapy Goals        Problem: Occupational Therapy Goal    Goal Priority Disciplines Outcome Interventions   Occupational Therapy Goal     OT, PT/OT Ongoing (interventions implemented as appropriate)    Description:  Goals to be met by: 5/20/19     Patient will increase functional independence with ADLs by performing:    UE Dressing with Set-up Assistance.  LE Dressing with Moderate Assistance and Assistive Devices as needed.  Grooming while standing at sink with Minimal Assistance.  Toileting from bedside commode with Minimal Assistance for hygiene and clothing management.   Rolling to Bilateral with Moderate Assistance.   Supine to sit with Moderate Assistance.  Toilet transfer to bedside commode with Minimal Assistance.                      Time Tracking:     OT Date of Treatment: 05/07/19  OT Start Time: 1000  OT Stop Time: 1030  OT Total Time (min): 30 min    Billable Minutes:Self Care/Home Management 15  Therapeutic Activity 15    DAGMAR Keller  5/7/2019

## 2019-05-07 NOTE — SUBJECTIVE & OBJECTIVE
Interval History: pain well controlled    Review of Systems   Constitutional: Negative for activity change, fatigue and fever.   HENT: Negative for congestion and trouble swallowing.    Eyes: Negative for visual disturbance.   Respiratory: Negative for cough, choking, chest tightness and shortness of breath.    Cardiovascular: Negative for chest pain, palpitations and leg swelling.   Gastrointestinal: Negative for abdominal distention, abdominal pain, blood in stool, constipation, diarrhea and nausea.   Genitourinary: Negative for difficulty urinating and dysuria.   Musculoskeletal: Negative for arthralgias, back pain and myalgias.   Neurological: Negative for dizziness, facial asymmetry and headaches.   Psychiatric/Behavioral: Negative for agitation, behavioral problems and confusion.     Objective:     Vital Signs (Most Recent):  Temp: 97.2 °F (36.2 °C) (05/07/19 1229)  Pulse: 88 (05/07/19 1229)  Resp: 18 (05/07/19 1229)  BP: 138/63 (05/07/19 1229)  SpO2: 97 % (05/07/19 1229) Vital Signs (24h Range):  Temp:  [96 °F (35.6 °C)-98.6 °F (37 °C)] 97.2 °F (36.2 °C)  Pulse:  [72-96] 88  Resp:  [14-39] 18  SpO2:  [90 %-100 %] 97 %  BP: ()/(44-65) 138/63     Weight: 43.1 kg (95 lb)  Body mass index is 18.55 kg/m².    Intake/Output Summary (Last 24 hours) at 5/7/2019 1244  Last data filed at 5/6/2019 2100  Gross per 24 hour   Intake 492 ml   Output 565 ml   Net -73 ml      Physical Exam   Constitutional: She is oriented to person, place, and time. She appears well-developed and well-nourished. No distress.   Frail , comfortable   HENT:   Head: Normocephalic and atraumatic.   Mouth/Throat: Oropharynx is clear and moist.   Eyes: Pupils are equal, round, and reactive to light. Conjunctivae and EOM are normal. No scleral icterus.   Neck: Normal range of motion. Neck supple. No thyromegaly present.   Cardiovascular: Normal rate, regular rhythm and normal heart sounds. Exam reveals no friction rub.   No murmur  heard.  Pulmonary/Chest: Effort normal and breath sounds normal. No respiratory distress. She has no wheezes.   Abdominal: Soft. Bowel sounds are normal. She exhibits no distension and no mass. There is no tenderness. There is no guarding.   Musculoskeletal: Normal range of motion. She exhibits no edema or deformity.   Left hip dressing clean and intact   Lymphadenopathy:     She has no cervical adenopathy.   Neurological: She is alert and oriented to person, place, and time.   Skin: Skin is warm and dry. She is not diaphoretic.   Psychiatric: She has a normal mood and affect. Her behavior is normal. Thought content normal.       Significant Labs:   CBC:   Recent Labs   Lab 05/06/19  0313 05/07/19  0909   WBC 11.19 6.35   HGB 6.2* 6.7*   HCT 18.8* 21.9*   PLT SEE COMMENT 213     CMP:   Recent Labs   Lab 05/06/19  0313 05/07/19  0909    138   K 3.5 3.6    101   CO2 29 27   GLU 98 113*   BUN 12 10   CREATININE 0.5 0.5   CALCIUM 8.3* 8.5*   PROT 5.7* 5.6*   ALBUMIN 2.2* 2.0*   BILITOT 0.5 0.4   ALKPHOS 146* 120   AST 45* 30   ALT 30 20   ANIONGAP 9 10   EGFRNONAA >60.0 >60.0

## 2019-05-07 NOTE — PLAN OF CARE
Problem: Adult Inpatient Plan of Care  Goal: Plan of Care Review  Johava Witness    Hx: HTN, CVA, SVT    5/2: Fell @ home - ED  5/3: Left marc for closed displaced hip fracture - admit SICU on rosa.    MAP > 60   Outcome: Ongoing (interventions implemented as appropriate)  AOOx4. Pt in bed resting. no complaints of N/V, pain. In NADn. VS as charted. Q2h rounding for safety and assistance. No falls noted this shift. Call bell in reach. Bed in lowest position. Regular diet tolerated. Will continue to monitor.

## 2019-05-07 NOTE — ADDENDUM NOTE
Addendum  created 05/07/19 1241 by Niki Mendoza MD    Charge Capture section accepted, Sign clinical note

## 2019-05-07 NOTE — PLAN OF CARE
Problem: Occupational Therapy Goal  Goal: Occupational Therapy Goal  Goals to be met by: 5/20/19     Patient will increase functional independence with ADLs by performing:    UE Dressing with Set-up Assistance.  LE Dressing with Moderate Assistance and Assistive Devices as needed.  Grooming while standing at sink with Minimal Assistance.  Toileting from bedside commode with Minimal Assistance for hygiene and clothing management.   Rolling to Bilateral with Moderate Assistance.   Supine to sit with Moderate Assistance.  Toilet transfer to bedside commode with Minimal Assistance.     Cont. POC.

## 2019-05-07 NOTE — HPI
Ms. Siomara Flores is a 86 y.o. female Orthodox with bronchiectasis and SVT who presents to the ED for evaluation of left hip pain sustained after a fall. She was walking in her driveway which is full of cement holes, when she lost her footing and fell, landing on her left hip and left shoulder. After, she was unable to bear weight or stand. She denies hitting her head of any LOC. She denies any numbness or tingling in her extremities. She endorses pain in her shoulder and her hip. She denies any chest pain or SOB. She is a Orthodox, and refuses any blood products.     In the ED she was evaluated by Ortho, where she was found to have a left intertrochanteric, left proximal humerus, and left distal radius fracture. She was admitted to Hospital Medicine for further management.

## 2019-05-07 NOTE — ANESTHESIA POST-OP PAIN MANAGEMENT
Acute Pain Service Progress Note    Siomara Flores is a 86 y.o., female, [id number].    Surgery:  Insertion Intramedullary Ever, Femur (Left)    Post Op Day #: 4    Catheter type: perineural  SIFI    Infusion type: Ropivacaine 0.2%  2 mL/hr basal with 10 mL boluses per hour    Problem List:    Active Hospital Problems    Diagnosis  POA    *Closed displaced intertrochanteric fracture of left femur [S72.142A]  Yes    Postprocedural hypotension [I95.81]  Yes    Microcytic anemia [D50.9]  Yes    Acute blood loss anemia [D62]  No    Fall [W19.XXXA]  Yes    Closed fracture of left distal radius [S52.502A]  Yes    Closed fracture of left proximal humerus [S42.202A]  Yes    Refusal of blood transfusions as patient is Lutheran [Z53.1]  Not Applicable    Hypertension [I10]  Yes    SVT (supraventricular tachycardia) [I47.1]  Yes    Bronchiectasis without complication [J47.9]  Yes      Resolved Hospital Problems   No resolved problems to display.       Subjective:     General appearance of alert, oriented, no complaints   Pain with rest: 0   Pain with movement: 5    Side Effects    1. Pruritis No    2. Nausea No    3. Motor Blockade No, 0=Ability to raise lower extremities off bed    4. Sedation No, 1=awake and alert    Objective:     Catheter site clean, dry, intact      Vitals   Vitals:    05/07/19 0858   BP:    Pulse: 91   Resp: 18   Temp:         Labs    Admission on 05/02/2019   No results displayed because visit has over 200 results.           Meds   Current Facility-Administered Medications   Medication Dose Route Frequency Provider Last Rate Last Dose    0.9%  NaCl infusion (for blood administration)   Intravenous Q24H PRN Kacey Casas MD        acetaminophen tablet 1,000 mg  1,000 mg Oral Q8H Niki Mendoza MD   1,000 mg at 05/07/19 0614    albuterol-ipratropium 2.5 mg-0.5 mg/3 mL nebulizer solution 3 mL  3 mL Nebulization Q4H WAKE Joby Reynolds MD   3 mL at 05/07/19 0858    atenolol  tablet 25 mg  25 mg Oral Daily Joby Reynolds MD   25 mg at 05/07/19 0912    bisacodyl suppository 10 mg  10 mg Rectal Daily PRN Joby Reynolds MD        dextrose 50% injection 12.5 g  12.5 g Intravenous PRN Joby Reynolds MD        dextrose 50% injection 25 g  25 g Intravenous PRN Joby Reynolds MD        enoxaparin injection 30 mg  30 mg Subcutaneous Daily Joby Reynolds MD   30 mg at 05/06/19 1700    epoetin elizabeth-epbx injection 40,000 Units  40,000 Units Subcutaneous Once Joby Reynolds MD        ferrous sulfate EC tablet 325 mg  325 mg Oral TID Lily Bowman MD   325 mg at 05/07/19 0912    glucagon (human recombinant) injection 1 mg  1 mg Intramuscular PRN Joby Reynolds MD        glucose chewable tablet 16 g  16 g Oral PRN Joby Reynolds MD        glucose chewable tablet 24 g  24 g Oral PRN Joby Reynolds MD        losartan tablet 100 mg  100 mg Oral Daily Joby Reynolds MD   Stopped at 05/07/19 0900    methocarbamol tablet 1,000 mg  1,000 mg Oral Q6H PRN Niki Mendoza MD        mupirocin 2 % ointment 1 g  1 g Nasal BID Joby Reynolds MD   1 g at 05/07/19 0911    ondansetron disintegrating tablet 8 mg  8 mg Oral Q8H PRN Joby Reynolds MD        ondansetron injection 4 mg  4 mg Intravenous Q12H PRN Joby Reynolds MD        polyethylene glycol packet 17 g  17 g Oral Daily Joby Reynolds MD   17 g at 05/07/19 0911    potassium, sodium phosphates 280-160-250 mg packet 1 packet  1 packet Oral QID (WM & HS) Kacey Casas MD   1 packet at 05/07/19 0912    pregabalin capsule 75 mg  75 mg Oral QHS Joby Reynolds MD   75 mg at 05/06/19 2115    promethazine (PHENERGAN) 6.25 mg in dextrose 5 % 50 mL IVPB  6.25 mg Intravenous Q6H PRN Joby Reynolds MD        ramelteon tablet 8 mg  8 mg Oral Nightly PRN Joby Reynolds MD        ropivacaine (PF) 2 mg/ml (0.2%) infusion  2 mL/hr  Perineural Continuous Joby Reynolds MD 2 mL/hr at 05/06/19 2200 2 mL/hr at 05/06/19 2200    senna-docusate 8.6-50 mg per tablet 1 tablet  1 tablet Oral BID PRN Joby Reynolds MD        senna-docusate 8.6-50 mg per tablet 1 tablet  1 tablet Oral BID Joby Reynolds MD   1 tablet at 05/07/19 0912    sodium chloride 0.9% flush 10 mL  10 mL Intravenous PRN Joby Reynolds MD        sodium chloride 0.9% flush 10 mL  10 mL Intravenous PRN Joby Reynolds MD        sodium chloride 0.9% flush 5 mL  5 mL Intravenous PRN Joby Reynolds MD        tiotropium inhalation capsule 18 mcg  1 capsule Inhalation Daily Joby Reynolds MD   18 mcg at 05/07/19 0911    verapamil tablet 80 mg  80 mg Oral TID Joby Reynolds MD   80 mg at 05/07/19 0942        Anticoagulant dose: Lovenox 30 mg daily     Assessment:     Pain control adequate    Plan:     Patient doing well, continue present treatment. Continue PNC with multimodals. Will bolus through PNC if needed PRN. Would consider low dose tramadol if additional pain adjunct needed.     Evaluator Hermes Woodruff    I have reviewed and concur with the resident's history, physical, assessment, and plan.  I have personally interviewed and examined the patient at bedside.  See below addendum for my evaluation and additional findings.    Patient doing well- continue current therapy.  No plans for discharge yet.  Will follow up CM and SW and pause and pull likely tomorrow or thursday

## 2019-05-07 NOTE — ASSESSMENT & PLAN NOTE
-history of this at home, has had occasional hemoptysis in the past, denies any recently. Continue spiriva, continue duobebs.  5/7 - stable

## 2019-05-07 NOTE — PLAN OF CARE
Informed by team patient is medically ready for placement in SNF once accepted.       05/07/19 1441   Post-Acute Status   Post-Acute Authorization Placement   Post-Acute Placement Status Referrals Sent     Alisa Nguyen RN/BSN/CM  [facility]  [phone]  Ortho/IMK/IM

## 2019-05-07 NOTE — PLAN OF CARE
Problem: Physical Therapy Goal  Goal: Physical Therapy Goal  Goals to be met by: 19    Patient will increase functional independence with mobility by performin. Supine to sit with MInimal Assistance -not met  2. Sit to stand transfer with Contact Guard Assistance with AD. - not met  3. Bed to chair transfer with Contact Guard Assistance using AD -not met  4. Gait  x 50 feet with Contact Guard Assistance using AD . -not met     Outcome: Ongoing (interventions implemented as appropriate)  Pt tolerated session well today with no complications. Pt ambulated a short distance today with (A) of papi-walker and therapist. Pt noted to have knee buckling on (R) LE during stance phase of gait and was provided blocking by PT to prevent LOB. Pt able to sit up in chair at end of session and perform (B) LE therex with PT. Pt reported no pain with exercises. Pt progressing well with therapy POC and remains appropriate for SNF placement.

## 2019-05-07 NOTE — ASSESSMENT & PLAN NOTE
Siomara Flores is a 86 y.o. female POD 4 s/p left CMN doing well now on floor doing well.     - Weight bearing status: WBAT LLE, NWB LUE.   - Pain control: Per APS  - Antibiotics: po ancef complete  - DVT Prophylaxis: Lovenox 30mg daily, SCD's at all times when not ambulating.  - PT/OT, will require aggressive PT/OT. 7 days a week  - Lines/Drains: PNC in place  - Dispo: Continue to treat, will step down to floor today. Needs aggressive PT/OT and SNF placement. Will discuss SNF with SW today.

## 2019-05-07 NOTE — NURSING
Pt unable to void post 8hr cuevas removal. Denies pressure or fullness to bladder. Bladder scan volume: 250ml. Straight cath volume: 300ml. WCTM.

## 2019-05-07 NOTE — ASSESSMENT & PLAN NOTE
-baseline Hg of 12 in 2016, admitted with mild microcytosis and Hg of 8.4, patient endorses poor oral intake recently but denies dark stools, blood in stools, hemoptysis from bronchiectasis prior to admit.  -ferritin/b12/folate added on for labs tomorrow, minimize lab draws due to refusal of blood products  -is going to inevitably drop below 7 post op from blood loss anemia. If iron deficient will start replacement pending labs.   -supportive care per [facility] policies and patient preference/wishes. Currently does not want transfusions if needed when discussed with mds in ER and with me today  5/7 - Hb 6.7

## 2019-05-07 NOTE — PT/OT/SLP PROGRESS
Physical Therapy Treatment    Patient Name:  Siomara Flores   MRN:  [id number]    Recommendations:     Discharge Recommendations:  nursing facility, skilled   Discharge Equipment Recommendations: (TBD pending progress)   Barriers to discharge: Decreased caregiver support    Assessment:     Siomara Flores is a 86 y.o. female admitted with a medical diagnosis of Closed displaced intertrochanteric fracture of left femur.  She presents with the following impairments/functional limitations:  weakness, impaired endurance, impaired self care skills, gait instability, impaired functional mobilty, pain, impaired joint extensibility, decreased lower extremity function, decreased ROM, orthopedic precautions, impaired balance.    -Pt tolerated session well today with no complications. Pt ambulated a short distance today with (A) of papi-walker and therapist. Pt noted to have knee buckling on (L) LE during stance phase of gait and was provided blocking by PT to prevent LOB. Pt able to sit up in chair at end of session and perform (B) LE therex with PT. Pt reported no pain with exercises. Pt progressing well with therapy POC and remains appropriate for SNF placement.    Rehab Prognosis: Good; patient would benefit from acute skilled PT services to address these deficits and reach maximum level of function.    Recent Surgery: Procedure(s) (LRB):  INSERTION, INTRAMEDULLARY MARTHA, FEMUR (Left) 4 Days Post-Op    Plan:     During this hospitalization, patient to be seen 5 x/week to address the identified rehab impairments via gait training, therapeutic activities, therapeutic exercises and progress toward the following goals:    · Plan of Care Expires:  06/04/19    Subjective     Chief Complaint: impaired mobility  Patient/Family Comments/goals: return home to OF  Pain/Comfort:  · Pain Rating 1: other (see comments)(did not rate)  · Location - Side 1: Left  · Location - Orientation 1: generalized  · Location 1: arm  · Pain Addressed  1: Distraction, Cessation of Activity, Reposition      Objective:     Communicated with nsg prior to session.  Patient found supine with perineural catheter, FCD upon PT entry to room.     General Precautions: Standard, fall   Orthopedic Precautions:LLE weight bearing as tolerated, LUE non weight bearing   Braces: Sling and swathe     Functional Mobility:  · Bed Mobility:     · Scooting: minimum assistance  · Supine to Sit: moderate assistance  · Transfers:     · Sit to Stand:  minimum assistance with hemiwalker  · Gait: 5' using papi-walker and mod (A). Pt cued for sequencing using papi-walker and was also given blocking at (L) knee to prevent buckling      AM-PAC 6 CLICK MOBILITY  Turning over in bed (including adjusting bedclothes, sheets and blankets)?: 3  Sitting down on and standing up from a chair with arms (e.g., wheelchair, bedside commode, etc.): 3  Moving from lying on back to sitting on the side of the bed?: 3  Moving to and from a bed to a chair (including a wheelchair)?: 3  Need to walk in hospital room?: 2  Climbing 3-5 steps with a railing?: 1  Basic Mobility Total Score: 15       Therapeutic Activities and Exercises:   -Pt performed TKR protocol exercises x20 reps of:  A. AP  B. Heel slides-AAROM  C. Hip abd/add-AAROM    -Pt educated on:  A. PT POC and role of PT  B. Importance of OOB activity to improve functional outcomes after surgery  C. Maintaining WBing status on affected LE during mobility  D. DME mgmt and t/f sequencing  E. Gait sequencing  F. Performing HEP to reduce the risk of developing blood clots         Patient left up in chair with all lines intact, call button in reach and nsg notified..    GOALS:   Multidisciplinary Problems     Physical Therapy Goals        Problem: Physical Therapy Goal    Goal Priority Disciplines Outcome Goal Variances Interventions   Physical Therapy Goal     PT, PT/OT Ongoing (interventions implemented as appropriate)     Description:  Goals to be met by:  19    Patient will increase functional independence with mobility by performin. Supine to sit with MInimal Assistance -not met  2. Sit to stand transfer with Contact Guard Assistance with AD. - not met  3. Bed to chair transfer with Contact Guard Assistance using AD -not met  4. Gait  x 50 feet with Contact Guard Assistance using AD . -not met                      Time Tracking:     PT Received On: 19  PT Start Time: 1332     PT Stop Time: 1349  PT Total Time (min): 17 min     Billable Minutes: Therapeutic Activity 17    Treatment Type: Treatment  PT/PTA: PT     PTA Visit Number: 0     Chas Roa, PT  2019

## 2019-05-07 NOTE — ASSESSMENT & PLAN NOTE
-left femur fracture after mechanical fall  -on hip fracture pathway now- tylenol, lyrica, PRN oxycodone for pain control. Surgery planned today with orthopedics. PT/OT tomorrow  -patient with significant anemia to start with Hg of 8 and refusing blood products due to Jehovah's witness, this will be significant issue in post op period and with expected anticoagulation for 28 days post op. Will have to monitor closely for blood loss and balance risk of DVT ppx vs acute bleeding/risk of blood loss anemia and lack of supportive care with transfusions if active bleeding post op as a potential higher risk situation in post op period that could arise, which I discussed with patient again today  -vit D adequate at 46.  -SNF next week pending medical stability, ref sent to O-SNF today    5/7- POD4, Hb improved to 6.7.  Pt does not want blood ( Jahova's witness),  Medically ready for dc.  Monitor cbc.  Boost for nutrition.  Continue PT/OT.  Discussed need for lovenox for DVT prophylaxis x 26 days  w patient.  It should cause her to bleed.  Discussed bed mobility exerecises as well.

## 2019-05-08 ENCOUNTER — TELEPHONE (OUTPATIENT)
Dept: ORTHOPEDICS | Facility: CLINIC | Age: 84
End: 2019-05-08

## 2019-05-08 PROBLEM — R33.9 URINARY RETENTION: Status: ACTIVE | Noted: 2019-05-08

## 2019-05-08 PROBLEM — E83.39 HYPOPHOSPHATEMIA: Status: ACTIVE | Noted: 2019-05-08

## 2019-05-08 LAB
ALBUMIN SERPL BCP-MCNC: 2 G/DL (ref 3.5–5.2)
ALP SERPL-CCNC: 106 U/L (ref 55–135)
ALT SERPL W/O P-5'-P-CCNC: 17 U/L (ref 10–44)
ANION GAP SERPL CALC-SCNC: 7 MMOL/L (ref 8–16)
AST SERPL-CCNC: 22 U/L (ref 10–40)
BASOPHILS # BLD AUTO: 0.05 K/UL (ref 0–0.2)
BASOPHILS NFR BLD: 0.9 % (ref 0–1.9)
BILIRUB SERPL-MCNC: 0.4 MG/DL (ref 0.1–1)
BUN SERPL-MCNC: 11 MG/DL (ref 8–23)
CALCIUM SERPL-MCNC: 8.2 MG/DL (ref 8.7–10.5)
CHLORIDE SERPL-SCNC: 99 MMOL/L (ref 95–110)
CO2 SERPL-SCNC: 32 MMOL/L (ref 23–29)
CREAT SERPL-MCNC: 0.5 MG/DL (ref 0.5–1.4)
DIFFERENTIAL METHOD: ABNORMAL
EOSINOPHIL # BLD AUTO: 0.3 K/UL (ref 0–0.5)
EOSINOPHIL NFR BLD: 5.1 % (ref 0–8)
ERYTHROCYTE [DISTWIDTH] IN BLOOD BY AUTOMATED COUNT: 13.8 % (ref 11.5–14.5)
EST. GFR  (AFRICAN AMERICAN): >60 ML/MIN/1.73 M^2
EST. GFR  (NON AFRICAN AMERICAN): >60 ML/MIN/1.73 M^2
GLUCOSE SERPL-MCNC: 93 MG/DL (ref 70–110)
HCT VFR BLD AUTO: 19.3 % (ref 37–48.5)
HGB BLD-MCNC: 6 G/DL (ref 12–16)
IMM GRANULOCYTES # BLD AUTO: 0.02 K/UL (ref 0–0.04)
IMM GRANULOCYTES NFR BLD AUTO: 0.4 % (ref 0–0.5)
LYMPHOCYTES # BLD AUTO: 1.2 K/UL (ref 1–4.8)
LYMPHOCYTES NFR BLD: 21.4 % (ref 18–48)
MAGNESIUM SERPL-MCNC: 1.6 MG/DL (ref 1.6–2.6)
MCH RBC QN AUTO: 25.5 PG (ref 27–31)
MCHC RBC AUTO-ENTMCNC: 31.1 G/DL (ref 32–36)
MCV RBC AUTO: 82 FL (ref 82–98)
MONOCYTES # BLD AUTO: 0.6 K/UL (ref 0.3–1)
MONOCYTES NFR BLD: 10.3 % (ref 4–15)
NEUTROPHILS # BLD AUTO: 3.4 K/UL (ref 1.8–7.7)
NEUTROPHILS NFR BLD: 61.9 % (ref 38–73)
NRBC BLD-RTO: 1 /100 WBC
PHOSPHATE SERPL-MCNC: 3.3 MG/DL (ref 2.7–4.5)
PLATELET # BLD AUTO: 196 K/UL (ref 150–350)
PMV BLD AUTO: 10.7 FL (ref 9.2–12.9)
POTASSIUM SERPL-SCNC: 3.5 MMOL/L (ref 3.5–5.1)
PROT SERPL-MCNC: 5.4 G/DL (ref 6–8.4)
RBC # BLD AUTO: 2.35 M/UL (ref 4–5.4)
SODIUM SERPL-SCNC: 138 MMOL/L (ref 136–145)
WBC # BLD AUTO: 5.52 K/UL (ref 3.9–12.7)

## 2019-05-08 PROCEDURE — 36415 COLL VENOUS BLD VENIPUNCTURE: CPT

## 2019-05-08 PROCEDURE — 99900035 HC TECH TIME PER 15 MIN (STAT)

## 2019-05-08 PROCEDURE — 94761 N-INVAS EAR/PLS OXIMETRY MLT: CPT

## 2019-05-08 PROCEDURE — 97535 SELF CARE MNGMENT TRAINING: CPT

## 2019-05-08 PROCEDURE — 80053 COMPREHEN METABOLIC PANEL: CPT

## 2019-05-08 PROCEDURE — 85025 COMPLETE CBC W/AUTO DIFF WBC: CPT

## 2019-05-08 PROCEDURE — 63600175 PHARM REV CODE 636 W HCPCS: Performed by: STUDENT IN AN ORGANIZED HEALTH CARE EDUCATION/TRAINING PROGRAM

## 2019-05-08 PROCEDURE — 99233 SBSQ HOSP IP/OBS HIGH 50: CPT | Mod: ,,, | Performed by: HOSPITALIST

## 2019-05-08 PROCEDURE — 25000242 PHARM REV CODE 250 ALT 637 W/ HCPCS: Performed by: STUDENT IN AN ORGANIZED HEALTH CARE EDUCATION/TRAINING PROGRAM

## 2019-05-08 PROCEDURE — 94640 AIRWAY INHALATION TREATMENT: CPT

## 2019-05-08 PROCEDURE — 25000003 PHARM REV CODE 250: Performed by: ANESTHESIOLOGY

## 2019-05-08 PROCEDURE — 25000003 PHARM REV CODE 250: Performed by: STUDENT IN AN ORGANIZED HEALTH CARE EDUCATION/TRAINING PROGRAM

## 2019-05-08 PROCEDURE — 94799 UNLISTED PULMONARY SVC/PX: CPT

## 2019-05-08 PROCEDURE — 83735 ASSAY OF MAGNESIUM: CPT

## 2019-05-08 PROCEDURE — 11000001 HC ACUTE MED/SURG PRIVATE ROOM

## 2019-05-08 PROCEDURE — 25000003 PHARM REV CODE 250: Performed by: HOSPITALIST

## 2019-05-08 PROCEDURE — 84100 ASSAY OF PHOSPHORUS: CPT

## 2019-05-08 PROCEDURE — 99233 PR SUBSEQUENT HOSPITAL CARE,LEVL III: ICD-10-PCS | Mod: ,,, | Performed by: HOSPITALIST

## 2019-05-08 PROCEDURE — 97530 THERAPEUTIC ACTIVITIES: CPT

## 2019-05-08 RX ADMIN — IPRATROPIUM BROMIDE AND ALBUTEROL SULFATE 3 ML: .5; 3 SOLUTION RESPIRATORY (INHALATION) at 08:05

## 2019-05-08 RX ADMIN — ACETAMINOPHEN 1000 MG: 500 TABLET ORAL at 10:05

## 2019-05-08 RX ADMIN — FERROUS SULFATE TAB EC 325 MG (65 MG FE EQUIVALENT) 325 MG: 325 (65 FE) TABLET DELAYED RESPONSE at 03:05

## 2019-05-08 RX ADMIN — POTASSIUM & SODIUM PHOSPHATES POWDER PACK 280-160-250 MG 1 PACKET: 280-160-250 PACK at 08:05

## 2019-05-08 RX ADMIN — STANDARDIZED SENNA CONCENTRATE AND DOCUSATE SODIUM 1 TABLET: 8.6; 5 TABLET, FILM COATED ORAL at 10:05

## 2019-05-08 RX ADMIN — VERAPAMIL HYDROCHLORIDE 80 MG: 40 TABLET ORAL at 03:05

## 2019-05-08 RX ADMIN — MUPIROCIN 1 G: 20 OINTMENT TOPICAL at 08:05

## 2019-05-08 RX ADMIN — FERROUS SULFATE TAB EC 325 MG (65 MG FE EQUIVALENT) 325 MG: 325 (65 FE) TABLET DELAYED RESPONSE at 10:05

## 2019-05-08 RX ADMIN — ROPIVACAINE HYDROCHLORIDE 2 ML/HR: 2 INJECTION, SOLUTION EPIDURAL; INFILTRATION at 08:05

## 2019-05-08 RX ADMIN — VERAPAMIL HYDROCHLORIDE 80 MG: 40 TABLET ORAL at 10:05

## 2019-05-08 RX ADMIN — VERAPAMIL HYDROCHLORIDE 80 MG: 40 TABLET ORAL at 09:05

## 2019-05-08 RX ADMIN — IPRATROPIUM BROMIDE AND ALBUTEROL SULFATE 3 ML: .5; 3 SOLUTION RESPIRATORY (INHALATION) at 11:05

## 2019-05-08 RX ADMIN — ENOXAPARIN SODIUM 30 MG: 100 INJECTION SUBCUTANEOUS at 05:05

## 2019-05-08 RX ADMIN — IPRATROPIUM BROMIDE AND ALBUTEROL SULFATE 3 ML: .5; 3 SOLUTION RESPIRATORY (INHALATION) at 09:05

## 2019-05-08 RX ADMIN — POTASSIUM & SODIUM PHOSPHATES POWDER PACK 280-160-250 MG 1 PACKET: 280-160-250 PACK at 05:05

## 2019-05-08 RX ADMIN — LOSARTAN POTASSIUM 100 MG: 50 TABLET, FILM COATED ORAL at 08:05

## 2019-05-08 RX ADMIN — POLYETHYLENE GLYCOL 3350 17 G: 17 POWDER, FOR SOLUTION ORAL at 08:05

## 2019-05-08 RX ADMIN — PREGABALIN 75 MG: 75 CAPSULE ORAL at 10:05

## 2019-05-08 RX ADMIN — IPRATROPIUM BROMIDE AND ALBUTEROL SULFATE 3 ML: .5; 3 SOLUTION RESPIRATORY (INHALATION) at 03:05

## 2019-05-08 RX ADMIN — POTASSIUM & SODIUM PHOSPHATES POWDER PACK 280-160-250 MG 1 PACKET: 280-160-250 PACK at 12:05

## 2019-05-08 RX ADMIN — FERROUS SULFATE TAB EC 325 MG (65 MG FE EQUIVALENT) 325 MG: 325 (65 FE) TABLET DELAYED RESPONSE at 08:05

## 2019-05-08 RX ADMIN — STANDARDIZED SENNA CONCENTRATE AND DOCUSATE SODIUM 1 TABLET: 8.6; 5 TABLET, FILM COATED ORAL at 08:05

## 2019-05-08 RX ADMIN — POTASSIUM & SODIUM PHOSPHATES POWDER PACK 280-160-250 MG 1 PACKET: 280-160-250 PACK at 10:05

## 2019-05-08 RX ADMIN — ATENOLOL 25 MG: 25 TABLET ORAL at 08:05

## 2019-05-08 RX ADMIN — ACETAMINOPHEN 1000 MG: 500 TABLET ORAL at 02:05

## 2019-05-08 RX ADMIN — ACETAMINOPHEN 1000 MG: 500 TABLET ORAL at 06:05

## 2019-05-08 RX ADMIN — TIOTROPIUM BROMIDE 18 MCG: 18 CAPSULE ORAL; RESPIRATORY (INHALATION) at 10:05

## 2019-05-08 NOTE — ADDENDUM NOTE
Addendum  created 05/08/19 1308 by Raisa Rojas RN    LDA properties accepted, Sign clinical note

## 2019-05-08 NOTE — PLAN OF CARE
Problem: Adult Inpatient Plan of Care  Goal: Plan of Care Review  Johava Witness    Hx: HTN, CVA, SVT    5/2: Fell @ home - ED  5/3: Left marc for closed displaced hip fracture - admit SICU on rosa.    MAP > 60   Outcome: Ongoing (interventions implemented as appropriate)  Pt verbalized understanding plan of care. Frequent assessments done and fall precautions maintained. Pain and discomfort controlled. Will continue to monitor.

## 2019-05-08 NOTE — NURSING
Pt placed on bedpan and was not able to urinate. Pt then bladder scanned and 500 +. Pt straight cath 700ml

## 2019-05-08 NOTE — ANESTHESIA POST-OP PAIN MANAGEMENT
Acute Pain Service Progress Note    Siomara Flores is a 86 y.o., female, [id number].    Surgery:  Insertion Intramedullary Ever, Femur (Left)    Post Op Day #: 5    Catheter type: perineural  SIFI    Infusion type: Ropivacaine 0.2%  2 mL/hr basal with 10 mL boluses per hour    Problem List:    Active Hospital Problems    Diagnosis  POA    *Closed displaced intertrochanteric fracture of left femur [S72.142A]  Yes    Urinary retention [R33.9]  No    Hypophosphatemia [E83.39]  No    Postprocedural hypotension [I95.81]  Yes    Microcytic anemia [D50.9]  Yes    Acute blood loss anemia [D62]  No    Fall [W19.XXXA]  Yes    Closed fracture of left distal radius [S52.502A]  Yes    Closed fracture of left proximal humerus [S42.202A]  Yes    Refusal of blood transfusions as patient is Roman Catholic [Z53.1]  Not Applicable    Hypertension [I10]  Yes    SVT (supraventricular tachycardia) [I47.1]  Yes    Bronchiectasis without complication [J47.9]  Yes      Resolved Hospital Problems   No resolved problems to display.       Subjective:     General appearance of alert, oriented, no complaints   Pain with rest: 0   Pain with movement: 5    Side Effects    1. Pruritis No    2. Nausea No    3. Motor Blockade No, 0=Ability to raise lower extremities off bed    4. Sedation No, 1=awake and alert    Objective:     Catheter site clean, dry, intact      Vitals   Vitals:    05/08/19 1158   BP:    Pulse: 83   Resp: (!) 24   Temp:         Labs    Admission on 05/02/2019   No results displayed because visit has over 200 results.           Meds   Current Facility-Administered Medications   Medication Dose Route Frequency Provider Last Rate Last Dose    0.9%  NaCl infusion (for blood administration)   Intravenous Q24H PRN Kacey Casas MD        acetaminophen tablet 1,000 mg  1,000 mg Oral Q8H Niki Mendoza MD   1,000 mg at 05/08/19 0646    albuterol-ipratropium 2.5 mg-0.5 mg/3 mL nebulizer solution 3 mL  3 mL Nebulization  Q4H WAKE Joby Reynolds MD   3 mL at 05/08/19 1158    atenolol tablet 25 mg  25 mg Oral Daily Joby Reynolds MD   25 mg at 05/08/19 0847    bisacodyl suppository 10 mg  10 mg Rectal Daily PRN Joby Reynolds MD        dextrose 50% injection 12.5 g  12.5 g Intravenous PRN Joby Reynolds MD        dextrose 50% injection 25 g  25 g Intravenous PRN Joby Reynolds MD        enoxaparin injection 30 mg  30 mg Subcutaneous Daily Joby Reynolds MD   30 mg at 05/07/19 1709    epoetin elizabeth-epbx injection 40,000 Units  40,000 Units Subcutaneous Once Joby Reynolds MD        ferrous sulfate EC tablet 325 mg  325 mg Oral TID Lily Bowman MD   325 mg at 05/08/19 0848    glucagon (human recombinant) injection 1 mg  1 mg Intramuscular PRN Joby Reynolds MD        glucose chewable tablet 16 g  16 g Oral PRN Joby Reynolds MD        glucose chewable tablet 24 g  24 g Oral PRN Joby Reynolds MD        losartan tablet 100 mg  100 mg Oral Daily Joby Reynolds MD   100 mg at 05/08/19 0848    methocarbamol tablet 1,000 mg  1,000 mg Oral Q6H PRN Niki Mendoza MD        ondansetron disintegrating tablet 8 mg  8 mg Oral Q8H PRN Joby Reynolds MD        ondansetron injection 4 mg  4 mg Intravenous Q12H PRN Joby Reynolds MD        polyethylene glycol packet 17 g  17 g Oral Daily Joby Reynolds MD   17 g at 05/08/19 0846    potassium, sodium phosphates 280-160-250 mg packet 1 packet  1 packet Oral QID (WM & HS) Kacey Casas MD   1 packet at 05/08/19 0845    pregabalin capsule 75 mg  75 mg Oral QHS Joby Reynolds MD   75 mg at 05/07/19 2100    promethazine (PHENERGAN) 6.25 mg in dextrose 5 % 50 mL IVPB  6.25 mg Intravenous Q6H PRN Joby Reynolds MD        ramelteon tablet 8 mg  8 mg Oral Nightly PRN Joby Reynolds MD        senna-docusate 8.6-50 mg per tablet 1 tablet  1 tablet Oral BID PRN Joby  Shaquille Reynolds MD        senna-docusate 8.6-50 mg per tablet 1 tablet  1 tablet Oral BID Joby Reynolds MD   1 tablet at 05/08/19 0848    sodium chloride 0.9% flush 10 mL  10 mL Intravenous PRN Joby Reynolds MD        sodium chloride 0.9% flush 10 mL  10 mL Intravenous PRN Joby Reynolds MD        sodium chloride 0.9% flush 5 mL  5 mL Intravenous PRN Joby Reynolds MD        tiotropium inhalation capsule 18 mcg  1 capsule Inhalation Daily oJby Reynolds MD   18 mcg at 05/08/19 1048    verapamil tablet 80 mg  80 mg Oral TID Joby Reynolds MD   80 mg at 05/07/19 2109        Anticoagulant dose: Lovenox 30 mg daily     Assessment:     Pain control adequate    Plan:     Pain well controlled. Will pause and pull catheter today. Continue multimodal regimen.     Evaluator Maicol Garibay    I have reviewed and concur with the resident's history, physical, assessment, and plan.  I have personally interviewed and examined the patient at bedside.  See below addendum for my evaluation and additional findings.    Patient doing very well.  POD#5.  Plan to pause catheter and if still comfortable will remove after a few hours.  Pain control per primary team.

## 2019-05-08 NOTE — ASSESSMENT & PLAN NOTE
-history of this at home, has had occasional hemoptysis in the past, denies any recently. Continue spiriva, continue duobebs.  5/8 - stable

## 2019-05-08 NOTE — PROGRESS NOTES
[facility] Hospital Medicine  Progress Note    Patient Name: Siomara Flores  MRN: [id number]  Patient Class: IP- Inpatient   Admission Date: 5/2/2019  Length of Stay: 6 days  Attending Physician: Kacey Casas MD  Primary Care Provider: Vince Wray MD    [facility] Medicine Team: [facility] Kacey Casas MD    Subjective:     Principal Problem:Closed displaced intertrochanteric fracture of left femur    HPI:  Ms. Siomara Flores is a 86 y.o. female Spiritism with bronchiectasis and SVT who presents to the ED for evaluation of left hip pain sustained after a fall. She was walking in her driveway which is full of cement holes, when she lost her footing and fell, landing on her left hip and left shoulder. After, she was unable to bear weight or stand. She denies hitting her head of any LOC. She denies any numbness or tingling in her extremities. She endorses pain in her shoulder and her hip. She denies any chest pain or SOB. She is a Spiritism, and refuses any blood products.     In the ED she was evaluated by Ortho, where she was found to have a left intertrochanteric, left proximal humerus, and left distal radius fracture. She was admitted to Hospital Medicine for further management.       Hospital Course:  5/3: admitted overnight for Left femur/Left radial/ Left humerus fracture after mechanical fall. Plan for left hip repair today. Pain still present after only receiving tylenol overnight per patient, didn't know to ask for other pain meds on MAR. Discussed Hg lower than normal baseline (at 8, baseline previously was 12). Received Epo overnight, will check iron/b12/folate tomorrow. Endorses poor nutrition recently due to ill  who passed away a month ago. Discussed risk of bleeding after surgery with low counts to start, discussed she will inevitably drop below Hg of 7 post op where most would be transfused but that she has endorsed not wanting transfusions  due to Amish, which still endorses. Received Epo overnight pre-op. Anticoagulation will be risky post op, states she had gi ulcers with aspirin years ago as well. Will discuss more with ortho in regards to risk/benefits post op. NWB LUE to left radial fx in splint. Vit D 46.    5/7 -  POD 4, pt aware Hb is low, and repeats that she does not want a blood transfusion due to Denominational reasons.  I assured her that we would honor her wishes.  She denies SOB, chest pain, or pain related to fractures.  Left arm in sling.   Hb 6/2 -> 6.7.   5/8 - Hb 6.0, stable.  Asymptomatic,  No SOB, No chest pain. Medically ready for dc.  Waiting on placement.      Interval History: pain well controlled    Review of Systems   Constitutional: Negative for activity change, fatigue and fever.   HENT: Negative for congestion and trouble swallowing.    Eyes: Negative for visual disturbance.   Respiratory: Negative for cough, choking, chest tightness and shortness of breath.    Cardiovascular: Negative for chest pain, palpitations and leg swelling.   Gastrointestinal: Negative for abdominal distention, abdominal pain, blood in stool, constipation, diarrhea and nausea.   Genitourinary: Negative for difficulty urinating and dysuria.   Musculoskeletal: Negative for arthralgias, back pain and myalgias.   Neurological: Negative for dizziness, facial asymmetry and headaches.   Psychiatric/Behavioral: Negative for agitation, behavioral problems and confusion.     Objective:     Vital Signs (Most Recent):  Temp: 98.2 °F (36.8 °C) (05/08/19 0723)  Pulse: 78 (05/08/19 1048)  Resp: 18 (05/08/19 1048)  BP: 120/62 (05/08/19 0723)  SpO2: (!) 94 % (05/08/19 1048) Vital Signs (24h Range):  Temp:  [97 °F (36.1 °C)-98.2 °F (36.8 °C)] 98.2 °F (36.8 °C)  Pulse:  [78-95] 78  Resp:  [15-24] 18  SpO2:  [93 %-97 %] 94 %  BP: (120-138)/(57-63) 120/62     Weight: 43.1 kg (95 lb)  Body mass index is 18.55 kg/m².    Intake/Output Summary (Last 24 hours) at 5/8/2019  1148  Last data filed at 5/7/2019 1800  Gross per 24 hour   Intake 350 ml   Output 300 ml   Net 50 ml      Physical Exam   Constitutional: She is oriented to person, place, and time. She appears well-developed and well-nourished. No distress.   Frail , comfortable   HENT:   Head: Normocephalic and atraumatic.   Mouth/Throat: Oropharynx is clear and moist.   Eyes: Pupils are equal, round, and reactive to light. Conjunctivae and EOM are normal. No scleral icterus.   Neck: Normal range of motion. Neck supple. No thyromegaly present.   Cardiovascular: Normal rate, regular rhythm and normal heart sounds. Exam reveals no friction rub.   No murmur heard.  Pulmonary/Chest: Effort normal and breath sounds normal. No respiratory distress. She has no wheezes.   Abdominal: Soft. Bowel sounds are normal. She exhibits no distension and no mass. There is no tenderness. There is no guarding.   Musculoskeletal: Normal range of motion. She exhibits no edema or deformity.   Left hip dressing clean and intact   Lymphadenopathy:     She has no cervical adenopathy.   Neurological: She is alert and oriented to person, place, and time.   Skin: Skin is warm and dry. She is not diaphoretic.   Psychiatric: She has a normal mood and affect. Her behavior is normal. Thought content normal.       Significant Labs:   CBC:   Recent Labs   Lab 05/07/19  0909 05/08/19  0342   WBC 6.35 5.52   HGB 6.7* 6.0*   HCT 21.9* 19.3*    196     CMP:   Recent Labs   Lab 05/07/19  0909 05/08/19  0342    138   K 3.6 3.5    99   CO2 27 32*   * 93   BUN 10 11   CREATININE 0.5 0.5   CALCIUM 8.5* 8.2*   PROT 5.6* 5.4*   ALBUMIN 2.0* 2.0*   BILITOT 0.4 0.4   ALKPHOS 120 106   AST 30 22   ALT 20 17   ANIONGAP 10 7*   EGFRNONAA >60.0 >60.0           Assessment/Plan:      * Closed displaced intertrochanteric fracture of left femur  -left femur fracture after mechanical fall  -on hip fracture pathway now- tylenol, lyrica, PRN oxycodone for pain  control. Surgery planned today with orthopedics. PT/OT tomorrow  -patient with significant anemia to start with Hg of 8 and refusing blood products due to Jain, this will be significant issue in post op period and with expected anticoagulation for 28 days post op. Will have to monitor closely for blood loss and balance risk of DVT ppx vs acute bleeding/risk of blood loss anemia and lack of supportive care with transfusions if active bleeding post op as a potential higher risk situation in post op period that could arise, which I discussed with patient again today  -vit D adequate at 46.  -SNF next week pending medical stability, ref sent to [facility]-SNF today    5/7- POD4, Hb improved to 6.7.  Pt does not want blood ( Jahova's witness),  Medically ready for dc.  Monitor cbc.  Boost for nutrition.  Continue PT/OT.  Discussed need for lovenox for DVT prophylaxis x 26 days  w patient.  It should cause her to bleed.  Discussed bed mobility exerecises as well.   5/8 - stable, continue  post care plan: PT/OT. Lovenox, multi-modal pain management - can dc ropivacaine.      Closed fracture of left distal radius  -splint in place, nonweight bearing to LUE      Closed fracture of left proximal humerus  -splint. nonweight bearing  F/u ortho      Refusal of blood transfusions as patient is Sabianism  -will be an issue post op. epo is allowed, given pre op. Minimize blood draws. Use pediatric tubes if needed   5/8 - Hb 6.0;  Jb north is at bedside offering patient support and will follw her once she goes home.       Acute blood loss anemia  Expected after fractures      Bronchiectasis without complication  -history of this at home, has had occasional hemoptysis in the past, denies any recently. Continue spiriva, continue duobebs.  5/8 - stable      Urinary retention  5/8 - requiring strait caths to empty bladder q 6 hours.       Hypertension  -continue home meds- verapamil, atenolol, losartan  5/8 -  stable      Postprocedural hypotension  5/8 - stable, 138/63 -> 120/62      Microcytic anemia  -baseline Hg of 12 in 2016, admitted with mild microcytosis and Hg of 8.4, patient endorses poor oral intake recently but denies dark stools, blood in stools, hemoptysis from bronchiectasis prior to admit.  -ferritin/b12/folate added on for labs tomorrow, minimize lab draws due to refusal of blood products  -is going to inevitably drop below 7 post op from blood loss anemia. If iron deficient will start replacement pending labs.   -supportive care per [facility] policies and patient preference/wishes. Currently does not want transfusions if needed when discussed with mds in ER and with me today  5/7 - Hb 6.7  5/8 - Hb 6.0      Fall  Needs fall precautions here and at home, possible for the rest of her life.  Caregivers at bedside were informed.      SVT (supraventricular tachycardia)  -follows with Dr Jiang as outpatient  -continue home verapamil, atenolol 25 daily      VTE Risk Mitigation (From admission, onward)        Ordered     enoxaparin injection 30 mg  Daily      05/05/19 0810     Place CORA hose  Until discontinued      05/02/19 2143     IP VTE HIGH RISK PATIENT  Once      05/02/19 2143              Kacey Casas MD  Department of Hospital Medicine   [facility]

## 2019-05-08 NOTE — SUBJECTIVE & OBJECTIVE
Interval History: pain well controlled    Review of Systems   Constitutional: Negative for activity change, fatigue and fever.   HENT: Negative for congestion and trouble swallowing.    Eyes: Negative for visual disturbance.   Respiratory: Negative for cough, choking, chest tightness and shortness of breath.    Cardiovascular: Negative for chest pain, palpitations and leg swelling.   Gastrointestinal: Negative for abdominal distention, abdominal pain, blood in stool, constipation, diarrhea and nausea.   Genitourinary: Negative for difficulty urinating and dysuria.   Musculoskeletal: Negative for arthralgias, back pain and myalgias.   Neurological: Negative for dizziness, facial asymmetry and headaches.   Psychiatric/Behavioral: Negative for agitation, behavioral problems and confusion.     Objective:     Vital Signs (Most Recent):  Temp: 98.2 °F (36.8 °C) (05/08/19 0723)  Pulse: 78 (05/08/19 1048)  Resp: 18 (05/08/19 1048)  BP: 120/62 (05/08/19 0723)  SpO2: (!) 94 % (05/08/19 1048) Vital Signs (24h Range):  Temp:  [97 °F (36.1 °C)-98.2 °F (36.8 °C)] 98.2 °F (36.8 °C)  Pulse:  [78-95] 78  Resp:  [15-24] 18  SpO2:  [93 %-97 %] 94 %  BP: (120-138)/(57-63) 120/62     Weight: 43.1 kg (95 lb)  Body mass index is 18.55 kg/m².    Intake/Output Summary (Last 24 hours) at 5/8/2019 1148  Last data filed at 5/7/2019 1800  Gross per 24 hour   Intake 350 ml   Output 300 ml   Net 50 ml      Physical Exam   Constitutional: She is oriented to person, place, and time. She appears well-developed and well-nourished. No distress.   Frail , comfortable   HENT:   Head: Normocephalic and atraumatic.   Mouth/Throat: Oropharynx is clear and moist.   Eyes: Pupils are equal, round, and reactive to light. Conjunctivae and EOM are normal. No scleral icterus.   Neck: Normal range of motion. Neck supple. No thyromegaly present.   Cardiovascular: Normal rate, regular rhythm and normal heart sounds. Exam reveals no friction rub.   No murmur  heard.  Pulmonary/Chest: Effort normal and breath sounds normal. No respiratory distress. She has no wheezes.   Abdominal: Soft. Bowel sounds are normal. She exhibits no distension and no mass. There is no tenderness. There is no guarding.   Musculoskeletal: Normal range of motion. She exhibits no edema or deformity.   Left hip dressing clean and intact   Lymphadenopathy:     She has no cervical adenopathy.   Neurological: She is alert and oriented to person, place, and time.   Skin: Skin is warm and dry. She is not diaphoretic.   Psychiatric: She has a normal mood and affect. Her behavior is normal. Thought content normal.       Significant Labs:   CBC:   Recent Labs   Lab 05/07/19  0909 05/08/19  0342   WBC 6.35 5.52   HGB 6.7* 6.0*   HCT 21.9* 19.3*    196     CMP:   Recent Labs   Lab 05/07/19  0909 05/08/19  0342    138   K 3.6 3.5    99   CO2 27 32*   * 93   BUN 10 11   CREATININE 0.5 0.5   CALCIUM 8.5* 8.2*   PROT 5.6* 5.4*   ALBUMIN 2.0* 2.0*   BILITOT 0.4 0.4   ALKPHOS 120 106   AST 30 22   ALT 20 17   ANIONGAP 10 7*   EGFRNONAA >60.0 >60.0

## 2019-05-08 NOTE — TELEPHONE ENCOUNTER
[phone]    Spoke with pt's next door neighbor.  Neighbor received appointment letter in the mail for pt.  Explained to neighbor that this is for pt's current sx.  Pt is scheduled for post op visit on 5/17 at 130 pm.   Neighbor verbalized understanding

## 2019-05-08 NOTE — ASSESSMENT & PLAN NOTE
Needs fall precautions here and at home, possible for the rest of her life.  Caregivers at bedside were informed.

## 2019-05-08 NOTE — ASSESSMENT & PLAN NOTE
-left femur fracture after mechanical fall  -on hip fracture pathway now- tylenol, lyrica, PRN oxycodone for pain control. Surgery planned today with orthopedics. PT/OT tomorrow  -patient with significant anemia to start with Hg of 8 and refusing blood products due to [employer], this will be significant issue in post op period and with expected anticoagulation for 28 days post op. Will have to monitor closely for blood loss and balance risk of DVT ppx vs acute bleeding/risk of blood loss anemia and lack of supportive care with transfusions if active bleeding post op as a potential higher risk situation in post op period that could arise, which I discussed with patient again today  -vit D adequate at 46.  -SNF next week pending medical stability, ref sent to O-SNF today    5/7- POD4, Hb improved to 6.7.  Pt does not want blood ( Jahova's witness),  Medically ready for dc.  Monitor cbc.  Boost for nutrition.  Continue PT/OT.  Discussed need for lovenox for DVT prophylaxis x 26 days  w patient.  It should cause her to bleed.  Discussed bed mobility exerecises as well.   5/8 - stable, continue  post care plan: PT/OT. Lovenox, multi-modal pain management - can dc ropivacaine.

## 2019-05-08 NOTE — PT/OT/SLP PROGRESS
Occupational Therapy   Treatment    Name: Siomara Flores  MRN: [id number]  Admitting Diagnosis:  Closed displaced intertrochanteric fracture of left femur  5 Days Post-Op    Recommendations:     Discharge Recommendations: nursing facility, skilled  Discharge Equipment Recommendations:  (TBD)  Barriers to discharge:  Decreased caregiver support    Assessment:     Siomara Flores is a 86 y.o. female with a medical diagnosis of Closed displaced intertrochanteric fracture of left femur.  She was able to perform supine/sit T/F c mod A and sit/stand and bed/chair T/F c min A and hemiwalker.  Able to perform UB dressing c max A. Performance deficits affecting function are weakness, impaired endurance, impaired self care skills, impaired functional mobilty, orthopedic precautions.     Rehab Prognosis:  Good; patient would benefit from acute skilled OT services to address these deficits and reach maximum level of function.       Plan:     Patient to be seen 4 x/week to address the above listed problems via self-care/home management, therapeutic activities, therapeutic exercises  · Plan of Care Expires: 06/05/19  · Plan of Care Reviewed with: patient, friend    Subjective     Pain/Comfort:  · Pain Rating 1: 5/10    Objective:     Communicated with: RN prior to session.  Patient found supine with perineural catheter, FCD upon OT entry to room.    General Precautions: Standard, fall   Orthopedic Precautions:LLE weight bearing as tolerated, LUE non weight bearing   Braces: Sling and swathe     Occupational Performance:     Bed Mobility:    · Patient completed Supine to Sit with moderate assistance     Functional Mobility/Transfers:  · Patient completed Sit <> Stand Transfer with minimum assistance  with  hemiwalker   · Patient completed Bed <> Chair Transfer using Stand Pivot technique with minimum assistance with hemiwalker      Activities of Daily Living:  · Upper Body Dressing: maximal assistance to don hospital gown.  Also  donned sling and swathe.      [facility] 6 Click ADL: 14        Patient left up in chair with all lines intact, call button in reach, RN notified and friends presentEducation:      GOALS:   Multidisciplinary Problems     Occupational Therapy Goals        Problem: Occupational Therapy Goal    Goal Priority Disciplines Outcome Interventions   Occupational Therapy Goal     OT, PT/OT Ongoing (interventions implemented as appropriate)    Description:  Goals to be met by: 5/20/19     Patient will increase functional independence with ADLs by performing:    UE Dressing with Set-up Assistance.  LE Dressing with Moderate Assistance and Assistive Devices as needed.  Grooming while standing at sink with Minimal Assistance.  Toileting from bedside commode with Minimal Assistance for hygiene and clothing management.   Rolling to Bilateral with Moderate Assistance.   Supine to sit with Moderate Assistance.  Toilet transfer to bedside commode with Minimal Assistance.                      Time Tracking:     OT Date of Treatment: 05/08/19  OT Start Time: 0845  OT Stop Time: 0910  OT Total Time (min): 25 min    Billable Minutes:Self Care/Home Management 10  Therapeutic Activity 15    DAGMAR Keller  5/8/2019

## 2019-05-08 NOTE — ADDENDUM NOTE
Addendum  created 05/08/19 1312 by Niki Mendoza MD    Charge Capture section accepted, Sign clinical note

## 2019-05-08 NOTE — ASSESSMENT & PLAN NOTE
-baseline Hg of 12 in 2016, admitted with mild microcytosis and Hg of 8.4, patient endorses poor oral intake recently but denies dark stools, blood in stools, hemoptysis from bronchiectasis prior to admit.  -ferritin/b12/folate added on for labs tomorrow, minimize lab draws due to refusal of blood products  -is going to inevitably drop below 7 post op from blood loss anemia. If iron deficient will start replacement pending labs.   -supportive care per [facility] policies and patient preference/wishes. Currently does not want transfusions if needed when discussed with mds in ER and with me today  5/7 - Hb 6.7  5/8 - Hb 6.0

## 2019-05-08 NOTE — ASSESSMENT & PLAN NOTE
-will be an issue post op. epo is allowed, given pre op. Minimize blood draws. Use pediatric tubes if needed   5/8 - Hb 6.0;  Jb north is at bedside offering patient support and will follw her once she goes home.

## 2019-05-09 ENCOUNTER — HOSPITAL ENCOUNTER (INPATIENT)
Facility: HOSPITAL | Age: 84
LOS: 40 days | Discharge: HOME HEALTH | DRG: 535 | End: 2019-06-18
Attending: INTERNAL MEDICINE | Admitting: INTERNAL MEDICINE
Payer: MEDICARE

## 2019-05-09 VITALS
SYSTOLIC BLOOD PRESSURE: 117 MMHG | WEIGHT: 95 LBS | BODY MASS INDEX: 18.65 KG/M2 | RESPIRATION RATE: 17 BRPM | HEART RATE: 92 BPM | OXYGEN SATURATION: 95 % | DIASTOLIC BLOOD PRESSURE: 59 MMHG | HEIGHT: 60 IN | TEMPERATURE: 99 F
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
## 2019-05-09 DIAGNOSIS — D49.2 ABNORMAL SKIN GROWTH: Primary | ICD-10-CM

## 2019-05-09 DIAGNOSIS — S72.142A CLOSED DISPLACED INTERTROCHANTERIC FRACTURE OF LEFT FEMUR: ICD-10-CM

## 2019-05-09 DIAGNOSIS — L60.2 ONYCHOGRYPOSIS OF TOENAIL: ICD-10-CM

## 2019-05-09 LAB
ALBUMIN SERPL BCP-MCNC: 2.1 G/DL (ref 3.5–5.2)
ALP SERPL-CCNC: 112 U/L (ref 55–135)
ALT SERPL W/O P-5'-P-CCNC: 15 U/L (ref 10–44)
ANION GAP SERPL CALC-SCNC: 9 MMOL/L (ref 8–16)
AST SERPL-CCNC: 24 U/L (ref 10–40)
BASOPHILS # BLD AUTO: 0.07 K/UL (ref 0–0.2)
BASOPHILS NFR BLD: 0.8 % (ref 0–1.9)
BILIRUB SERPL-MCNC: 0.5 MG/DL (ref 0.1–1)
BUN SERPL-MCNC: 9 MG/DL (ref 8–23)
CALCIUM SERPL-MCNC: 8.3 MG/DL (ref 8.7–10.5)
CHLORIDE SERPL-SCNC: 98 MMOL/L (ref 95–110)
CO2 SERPL-SCNC: 31 MMOL/L (ref 23–29)
CREAT SERPL-MCNC: 0.5 MG/DL (ref 0.5–1.4)
DIFFERENTIAL METHOD: ABNORMAL
EOSINOPHIL # BLD AUTO: 0.5 K/UL (ref 0–0.5)
EOSINOPHIL NFR BLD: 5.5 % (ref 0–8)
ERYTHROCYTE [DISTWIDTH] IN BLOOD BY AUTOMATED COUNT: 13.9 % (ref 11.5–14.5)
EST. GFR  (AFRICAN AMERICAN): >60 ML/MIN/1.73 M^2
EST. GFR  (NON AFRICAN AMERICAN): >60 ML/MIN/1.73 M^2
GLUCOSE SERPL-MCNC: 87 MG/DL (ref 70–110)
HCT VFR BLD AUTO: 20.7 % (ref 37–48.5)
HGB BLD-MCNC: 6.6 G/DL (ref 12–16)
IMM GRANULOCYTES # BLD AUTO: 0.04 K/UL (ref 0–0.04)
IMM GRANULOCYTES NFR BLD AUTO: 0.5 % (ref 0–0.5)
LYMPHOCYTES # BLD AUTO: 1.4 K/UL (ref 1–4.8)
LYMPHOCYTES NFR BLD: 16.7 % (ref 18–48)
MAGNESIUM SERPL-MCNC: 1.6 MG/DL (ref 1.6–2.6)
MCH RBC QN AUTO: 26.2 PG (ref 27–31)
MCHC RBC AUTO-ENTMCNC: 31.9 G/DL (ref 32–36)
MCV RBC AUTO: 82 FL (ref 82–98)
MONOCYTES # BLD AUTO: 0.7 K/UL (ref 0.3–1)
MONOCYTES NFR BLD: 8.2 % (ref 4–15)
NEUTROPHILS # BLD AUTO: 5.7 K/UL (ref 1.8–7.7)
NEUTROPHILS NFR BLD: 68.3 % (ref 38–73)
NRBC BLD-RTO: 1 /100 WBC
PHOSPHATE SERPL-MCNC: 4 MG/DL (ref 2.7–4.5)
PLATELET # BLD AUTO: 238 K/UL (ref 150–350)
PMV BLD AUTO: 11 FL (ref 9.2–12.9)
POTASSIUM SERPL-SCNC: 3.7 MMOL/L (ref 3.5–5.1)
PROT SERPL-MCNC: 5.8 G/DL (ref 6–8.4)
RBC # BLD AUTO: 2.52 M/UL (ref 4–5.4)
SODIUM SERPL-SCNC: 138 MMOL/L (ref 136–145)
WBC # BLD AUTO: 8.4 K/UL (ref 3.9–12.7)

## 2019-05-09 PROCEDURE — 25000242 PHARM REV CODE 250 ALT 637 W/ HCPCS: Performed by: STUDENT IN AN ORGANIZED HEALTH CARE EDUCATION/TRAINING PROGRAM

## 2019-05-09 PROCEDURE — 25000003 PHARM REV CODE 250: Performed by: STUDENT IN AN ORGANIZED HEALTH CARE EDUCATION/TRAINING PROGRAM

## 2019-05-09 PROCEDURE — 84100 ASSAY OF PHOSPHORUS: CPT

## 2019-05-09 PROCEDURE — 94761 N-INVAS EAR/PLS OXIMETRY MLT: CPT

## 2019-05-09 PROCEDURE — 11000004 HC SNF PRIVATE

## 2019-05-09 PROCEDURE — 25000242 PHARM REV CODE 250 ALT 637 W/ HCPCS: Performed by: HOSPITALIST

## 2019-05-09 PROCEDURE — 63600175 PHARM REV CODE 636 W HCPCS: Performed by: HOSPITALIST

## 2019-05-09 PROCEDURE — 85025 COMPLETE CBC W/AUTO DIFF WBC: CPT

## 2019-05-09 PROCEDURE — 83735 ASSAY OF MAGNESIUM: CPT

## 2019-05-09 PROCEDURE — 94640 AIRWAY INHALATION TREATMENT: CPT

## 2019-05-09 PROCEDURE — 97116 GAIT TRAINING THERAPY: CPT

## 2019-05-09 PROCEDURE — 97535 SELF CARE MNGMENT TRAINING: CPT

## 2019-05-09 PROCEDURE — 97530 THERAPEUTIC ACTIVITIES: CPT

## 2019-05-09 PROCEDURE — 80053 COMPREHEN METABOLIC PANEL: CPT

## 2019-05-09 PROCEDURE — 99239 HOSP IP/OBS DSCHRG MGMT >30: CPT | Mod: ,,, | Performed by: HOSPITALIST

## 2019-05-09 PROCEDURE — 99239 PR HOSPITAL DISCHARGE DAY,>30 MIN: ICD-10-PCS | Mod: ,,, | Performed by: HOSPITALIST

## 2019-05-09 PROCEDURE — 25000003 PHARM REV CODE 250: Performed by: HOSPITALIST

## 2019-05-09 PROCEDURE — 36415 COLL VENOUS BLD VENIPUNCTURE: CPT

## 2019-05-09 PROCEDURE — 94799 UNLISTED PULMONARY SVC/PX: CPT

## 2019-05-09 PROCEDURE — 25000003 PHARM REV CODE 250: Performed by: ANESTHESIOLOGY

## 2019-05-09 RX ORDER — AMOXICILLIN 250 MG
1 CAPSULE ORAL 2 TIMES DAILY PRN
Status: CANCELLED | OUTPATIENT
Start: 2019-05-09

## 2019-05-09 RX ORDER — ATENOLOL 25 MG/1
25 TABLET ORAL DAILY
Status: DISCONTINUED | OUTPATIENT
Start: 2019-05-10 | End: 2019-05-15

## 2019-05-09 RX ORDER — CALCIUM CARBONATE 200(500)MG
500 TABLET,CHEWABLE ORAL 2 TIMES DAILY PRN
Status: DISCONTINUED | OUTPATIENT
Start: 2019-05-09 | End: 2019-06-18 | Stop reason: HOSPADM

## 2019-05-09 RX ORDER — ATENOLOL 25 MG/1
25 TABLET ORAL DAILY
Status: CANCELLED | OUTPATIENT
Start: 2019-05-10

## 2019-05-09 RX ORDER — AMOXICILLIN 250 MG
1 CAPSULE ORAL 2 TIMES DAILY PRN
Status: DISCONTINUED | OUTPATIENT
Start: 2019-05-09 | End: 2019-06-18 | Stop reason: HOSPADM

## 2019-05-09 RX ORDER — RAMELTEON 8 MG/1
8 TABLET ORAL NIGHTLY PRN
Status: CANCELLED | OUTPATIENT
Start: 2019-05-09

## 2019-05-09 RX ORDER — ENOXAPARIN SODIUM 100 MG/ML
30 INJECTION SUBCUTANEOUS EVERY 24 HOURS
Status: DISCONTINUED | OUTPATIENT
Start: 2019-05-09 | End: 2019-06-18 | Stop reason: HOSPADM

## 2019-05-09 RX ORDER — ACETAMINOPHEN 325 MG/1
650 TABLET ORAL EVERY 6 HOURS PRN
Status: DISCONTINUED | OUTPATIENT
Start: 2019-05-09 | End: 2019-05-14

## 2019-05-09 RX ORDER — CALCIUM CARBONATE 200(500)MG
500 TABLET,CHEWABLE ORAL 2 TIMES DAILY PRN
Status: CANCELLED | OUTPATIENT
Start: 2019-05-09

## 2019-05-09 RX ORDER — RAMELTEON 8 MG/1
8 TABLET ORAL NIGHTLY PRN
Status: DISCONTINUED | OUTPATIENT
Start: 2019-05-09 | End: 2019-05-09

## 2019-05-09 RX ORDER — AMOXICILLIN 250 MG
1 CAPSULE ORAL 2 TIMES DAILY
Status: DISCONTINUED | OUTPATIENT
Start: 2019-05-09 | End: 2019-06-18 | Stop reason: HOSPADM

## 2019-05-09 RX ORDER — LOSARTAN POTASSIUM 25 MG/1
100 TABLET ORAL DAILY
Status: CANCELLED | OUTPATIENT
Start: 2019-05-10

## 2019-05-09 RX ORDER — POLYETHYLENE GLYCOL 3350 17 G/17G
17 POWDER, FOR SOLUTION ORAL DAILY
Status: CANCELLED | OUTPATIENT
Start: 2019-05-10

## 2019-05-09 RX ORDER — ACETAMINOPHEN 325 MG/1
650 TABLET ORAL EVERY 6 HOURS PRN
Status: CANCELLED | OUTPATIENT
Start: 2019-05-09

## 2019-05-09 RX ORDER — IPRATROPIUM BROMIDE AND ALBUTEROL SULFATE 2.5; .5 MG/3ML; MG/3ML
3 SOLUTION RESPIRATORY (INHALATION)
Status: CANCELLED | OUTPATIENT
Start: 2019-05-09

## 2019-05-09 RX ORDER — LOSARTAN POTASSIUM 50 MG/1
100 TABLET ORAL DAILY
Status: DISCONTINUED | OUTPATIENT
Start: 2019-05-10 | End: 2019-05-10

## 2019-05-09 RX ORDER — IBUPROFEN 200 MG
16 TABLET ORAL
Status: CANCELLED | OUTPATIENT
Start: 2019-05-09

## 2019-05-09 RX ORDER — FERROUS SULFATE 325(65) MG
325 TABLET, DELAYED RELEASE (ENTERIC COATED) ORAL 3 TIMES DAILY
Status: DISCONTINUED | OUTPATIENT
Start: 2019-05-09 | End: 2019-06-18 | Stop reason: HOSPADM

## 2019-05-09 RX ORDER — TIOTROPIUM BROMIDE 18 UG/1
1 CAPSULE ORAL; RESPIRATORY (INHALATION) DAILY
Status: DISCONTINUED | OUTPATIENT
Start: 2019-05-10 | End: 2019-06-18 | Stop reason: HOSPADM

## 2019-05-09 RX ORDER — AMOXICILLIN 250 MG
1 CAPSULE ORAL 2 TIMES DAILY
Status: CANCELLED | OUTPATIENT
Start: 2019-05-09

## 2019-05-09 RX ORDER — ENOXAPARIN SODIUM 100 MG/ML
30 INJECTION SUBCUTANEOUS EVERY 24 HOURS
Status: CANCELLED | OUTPATIENT
Start: 2019-05-09

## 2019-05-09 RX ORDER — IBUPROFEN 200 MG
24 TABLET ORAL
Status: DISCONTINUED | OUTPATIENT
Start: 2019-05-09 | End: 2019-06-18 | Stop reason: HOSPADM

## 2019-05-09 RX ORDER — RAMELTEON 8 MG/1
8 TABLET ORAL NIGHTLY PRN
Status: DISCONTINUED | OUTPATIENT
Start: 2019-05-09 | End: 2019-06-18 | Stop reason: HOSPADM

## 2019-05-09 RX ORDER — IBUPROFEN 200 MG
24 TABLET ORAL
Status: CANCELLED | OUTPATIENT
Start: 2019-05-09

## 2019-05-09 RX ORDER — GLUCAGON 1 MG
1 KIT INJECTION
Status: DISCONTINUED | OUTPATIENT
Start: 2019-05-09 | End: 2019-06-18 | Stop reason: HOSPADM

## 2019-05-09 RX ORDER — VERAPAMIL HYDROCHLORIDE 80 MG/1
80 TABLET ORAL 3 TIMES DAILY
Status: DISCONTINUED | OUTPATIENT
Start: 2019-05-09 | End: 2019-06-18 | Stop reason: HOSPADM

## 2019-05-09 RX ORDER — VERAPAMIL HYDROCHLORIDE 80 MG/1
80 TABLET ORAL 3 TIMES DAILY
Status: CANCELLED | OUTPATIENT
Start: 2019-05-09

## 2019-05-09 RX ORDER — FERROUS SULFATE 325(65) MG
325 TABLET, DELAYED RELEASE (ENTERIC COATED) ORAL 3 TIMES DAILY
Status: CANCELLED | OUTPATIENT
Start: 2019-05-09

## 2019-05-09 RX ORDER — BISACODYL 10 MG
10 SUPPOSITORY, RECTAL RECTAL DAILY PRN
Status: CANCELLED | OUTPATIENT
Start: 2019-05-09

## 2019-05-09 RX ORDER — IPRATROPIUM BROMIDE AND ALBUTEROL SULFATE 2.5; .5 MG/3ML; MG/3ML
3 SOLUTION RESPIRATORY (INHALATION)
Status: DISCONTINUED | OUTPATIENT
Start: 2019-05-09 | End: 2019-05-13

## 2019-05-09 RX ORDER — BISACODYL 10 MG
10 SUPPOSITORY, RECTAL RECTAL DAILY PRN
Status: DISCONTINUED | OUTPATIENT
Start: 2019-05-09 | End: 2019-06-18 | Stop reason: HOSPADM

## 2019-05-09 RX ORDER — IBUPROFEN 200 MG
16 TABLET ORAL
Status: DISCONTINUED | OUTPATIENT
Start: 2019-05-09 | End: 2019-06-18 | Stop reason: HOSPADM

## 2019-05-09 RX ORDER — TIOTROPIUM BROMIDE 18 UG/1
1 CAPSULE ORAL; RESPIRATORY (INHALATION) DAILY
Status: CANCELLED | OUTPATIENT
Start: 2019-05-10

## 2019-05-09 RX ORDER — GLUCAGON 1 MG
1 KIT INJECTION
Status: CANCELLED | OUTPATIENT
Start: 2019-05-09

## 2019-05-09 RX ORDER — POLYETHYLENE GLYCOL 3350 17 G/17G
17 POWDER, FOR SOLUTION ORAL DAILY
Status: DISCONTINUED | OUTPATIENT
Start: 2019-05-10 | End: 2019-06-18 | Stop reason: HOSPADM

## 2019-05-09 RX ADMIN — FERROUS SULFATE TAB EC 325 MG (65 MG FE EQUIVALENT) 325 MG: 325 (65 FE) TABLET DELAYED RESPONSE at 10:05

## 2019-05-09 RX ADMIN — IPRATROPIUM BROMIDE AND ALBUTEROL SULFATE 3 ML: .5; 3 SOLUTION RESPIRATORY (INHALATION) at 09:05

## 2019-05-09 RX ADMIN — IPRATROPIUM BROMIDE AND ALBUTEROL SULFATE 3 ML: .5; 3 SOLUTION RESPIRATORY (INHALATION) at 12:05

## 2019-05-09 RX ADMIN — VERAPAMIL HYDROCHLORIDE 80 MG: 40 TABLET ORAL at 10:05

## 2019-05-09 RX ADMIN — POTASSIUM & SODIUM PHOSPHATES POWDER PACK 280-160-250 MG 1 PACKET: 280-160-250 PACK at 10:05

## 2019-05-09 RX ADMIN — IPRATROPIUM BROMIDE AND ALBUTEROL SULFATE 3 ML: .5; 3 SOLUTION RESPIRATORY (INHALATION) at 08:05

## 2019-05-09 RX ADMIN — ENOXAPARIN SODIUM 30 MG: 100 INJECTION SUBCUTANEOUS at 10:05

## 2019-05-09 RX ADMIN — ACETAMINOPHEN 650 MG: 325 TABLET ORAL at 10:05

## 2019-05-09 RX ADMIN — ACETAMINOPHEN 1000 MG: 500 TABLET ORAL at 06:05

## 2019-05-09 RX ADMIN — STANDARDIZED SENNA CONCENTRATE AND DOCUSATE SODIUM 1 TABLET: 8.6; 5 TABLET, FILM COATED ORAL at 10:05

## 2019-05-09 RX ADMIN — IPRATROPIUM BROMIDE AND ALBUTEROL SULFATE 3 ML: .5; 3 SOLUTION RESPIRATORY (INHALATION) at 05:05

## 2019-05-09 RX ADMIN — TIOTROPIUM BROMIDE 18 MCG: 18 CAPSULE ORAL; RESPIRATORY (INHALATION) at 09:05

## 2019-05-09 RX ADMIN — ATENOLOL 25 MG: 25 TABLET ORAL at 10:05

## 2019-05-09 RX ADMIN — SENNOSIDES AND DOCUSATE SODIUM 1 TABLET: 8.6; 5 TABLET ORAL at 10:05

## 2019-05-09 RX ADMIN — VERAPAMIL HYDROCHLORIDE 80 MG: 80 TABLET, FILM COATED ORAL at 10:05

## 2019-05-09 RX ADMIN — POLYETHYLENE GLYCOL 3350 17 G: 17 POWDER, FOR SOLUTION ORAL at 10:05

## 2019-05-09 NOTE — ASSESSMENT & PLAN NOTE
-will be an issue post op. epo is allowed, given pre op. Minimize blood draws. Use pediatric tubes if needed   5/8 - Hb 6.0;  Jb north is at bedside offering patient support and will follw her once she goes home.   5/9 - hb 6.6, contineu Fe tablets

## 2019-05-09 NOTE — PLAN OF CARE
Patient to discharge to OSNF on this evening. Transportation has been arranged.     Future Appointments   Date Time Provider Department Center   5/17/2019  1:30 PM Mirna Crews PA-C [facility] ORTHO [address]       [id number] - [place], [place] - [address]  [address]  [place] [address]  Phone: [phone] Fax: [phone]    Payor: [employer] MEDICARE / Plan: [employer] 65 / Product Type: Medicare Advantage /         05/09/19 1422   Final Note   Assessment Type Final Discharge Note   Anticipated Discharge Disposition SNF   What phone number can be called within the next 1-3 days to see how you are doing after discharge?   (friend Minal [phone])   Hospital Follow Up  Appt(s) scheduled? Yes   Discharge plans and expectations educations in teach back method with documentation complete? Yes  (per staff nurse)   Right Care Referral Info   Post Acute Recommendation SNF / Sub-Acute Rehab   Referral Type   (SNF)   Facility Name   (OS)     Alisa Nguyen RN/BSN/CM  [facility]  [phone]  Ortho/IMK/IM

## 2019-05-09 NOTE — PLAN OF CARE
Problem: Occupational Therapy Goal  Goal: Occupational Therapy Goal  Goals to be met by: 5/20/19     Patient will increase functional independence with ADLs by performing:    UE Dressing with Set-up Assistance.  LE Dressing with Moderate Assistance and Assistive Devices as needed.  Grooming while standing at sink with Minimal Assistance.  Toileting from bedside commode with Minimal Assistance for hygiene and clothing management. - progressing  Rolling to Bilateral with Moderate Assistance.   Supine to sit with Moderate Assistance. MET 5/9  UPDATED: supine to sit with CGA  Toilet transfer to bedside commode with Minimal Assistance. MET 5/9  UPDATED: BSC t/f with CGA     Outcome: Ongoing (interventions implemented as appropriate)  Goals met for supine>sit and BSC t/f; pt progressing well toward remaining goals    Comments: Continue OT POC     Nolvia Thomas OT  5/9/2019

## 2019-05-09 NOTE — PLAN OF CARE
Problem: Physical Therapy Goal  Goal: Physical Therapy Goal  Goals to be met by: 19    Patient will increase functional independence with mobility by performin. Supine to sit with MInimal Assistance -MET       Updated: Supine <> Sit with stand by assistance   2. Sit to stand transfer with Contact Guard Assistance with AD. - not met  3. Bed to chair transfer with Contact Guard Assistance using AD -not met  4. Gait  x 50 feet with Contact Guard Assistance using AD . -not met     Outcome: Ongoing (interventions implemented as appropriate)  Goals remain appropriate; continue current POC, progressing as tolerated.     Ayanna Elliott, PT, DPT   2019  Pager: [phone]

## 2019-05-09 NOTE — NURSING
Delay in lovenox admin, medication not verified by pharmacist yet. Will notify oncoming nurse need to admin

## 2019-05-09 NOTE — PLAN OF CARE
Patient will discharge to OSNF today. KAY set up transportation via [facility] and patient will transport by wheelchair at 3:30 pm. Nurse call report to [phone]

## 2019-05-09 NOTE — ASSESSMENT & PLAN NOTE
-baseline Hg of 12 in 2016, admitted with mild microcytosis and Hg of 8.4, patient endorses poor oral intake recently but denies dark stools, blood in stools, hemoptysis from bronchiectasis prior to admit.  -ferritin/b12/folate added on for labs tomorrow, minimize lab draws due to refusal of blood products  -is going to inevitably drop below 7 post op from blood loss anemia. If iron deficient will start replacement pending labs.   -supportive care per [facility] policies and patient preference/wishes. Currently does not want transfusions if needed when discussed with mds in ER and with me today  5/7 - Hb 6.7  5/9 - Hb 6.0 -> 6.6

## 2019-05-09 NOTE — ASSESSMENT & PLAN NOTE
-history of this at home, has had occasional hemoptysis in the past, denies any recently. Continue spiriva, continue duobebs.  5/9 - stable, continue resp treatments

## 2019-05-09 NOTE — ASSESSMENT & PLAN NOTE
-left femur fracture after mechanical fall  -on hip fracture pathway now- tylenol, lyrica, PRN oxycodone for pain control. Surgery planned today with orthopedics. PT/OT tomorrow  -patient with significant anemia to start with Hg of 8 and refusing blood products due to [employer]v[place], this will be significant issue in post op period and with expected anticoagulation for 28 days post op. Will have to monitor closely for blood loss and balance risk of DVT ppx vs acute bleeding/risk of blood loss anemia and lack of supportive care with transfusions if active bleeding post op as a potential higher risk situation in post op period that could arise, which I discussed with patient again today  -vit D adequate at 46.  -SNF next week pending medical stability, ref sent to O-SNF today    5/7- POD4, Hb improved to 6.7.  Pt does not want blood ( Jahova's witness),  Medically ready for dc.  Monitor cbc.  Boost for nutrition.  Continue PT/OT.  Discussed need for lovenox for DVT prophylaxis x 26 days  w patient.  It should cause her to bleed.  Discussed bed mobility exerecises as well.   5/8 - stable, continue  post care plan: PT/OT. Lovenox, multi-modal pain management - can dc ropivacaine.    5/9 - dc to OSNF, continue lovenox for26 days, PT.OT.  Monitor HB weekly, continue FE tabets

## 2019-05-09 NOTE — PT/OT/SLP PROGRESS
Occupational Therapy   Co-Treatment    Name: Siomara Flores  MRN: [id number]  Admitting Diagnosis:  Closed displaced intertrochanteric fracture of left femur  6 Days Post-Op    Recommendations:     Discharge Recommendations: nursing facility, skilled  Discharge Equipment Recommendations:  (TBD)  Barriers to discharge:  Decreased caregiver support    Assessment:     Siomara Flores is a 86 y.o. female with a medical diagnosis of Closed displaced intertrochanteric fracture of left femur.  She presents with pain in LLE and WB precautions in LUE impairing mobility and self care task completion. Pt able to complete stand pivot t/f with min A and ambulate with mod A using papi walker. Performance deficits affecting function are weakness, impaired endurance, impaired balance, gait instability, impaired self care skills, impaired functional mobilty, pain, orthopedic precautions, decreased lower extremity function, decreased upper extremity function.     Rehab Prognosis:  Fair; patient would benefit from acute skilled OT services to address these deficits and reach maximum level of function.       Plan:     Patient to be seen 4 x/week to address the above listed problems via self-care/home management, therapeutic activities, therapeutic exercises  · Plan of Care Expires: 06/05/19  · Plan of Care Reviewed with: patient    Subjective     Pain/Comfort:  · Pain Rating 1: 7/10  · Location - Side 1: Left  · Location - Orientation 1: generalized  · Location 1: leg  · Pain Addressed 1: Reposition, Distraction, Cessation of Activity  · Pain Rating Post-Intervention 1: 7/10    Objective:     Communicated with: RN prior to session.  Patient found HOB elevated with (no lines connected) upon OT entry to room.    General Precautions: Standard, fall   Orthopedic Precautions:LLE weight bearing as tolerated, LUE non weight bearing   Braces: Sling and swathe(LUE)     Occupational Performance:     Bed Mobility:    · Patient completed  Scooting/Bridging with minimum assistance  · Patient completed Supine to Sit with minimum assistance     Functional Mobility/Transfers:  · Patient completed Sit <> Stand Transfer with minimum assistance from bed and BSC, and moderate assistance from chair with hemiwalker   · Patient completed Bed <> Chair Transfer using Stand Pivot technique with minimum assistance with no assistive device  · Patient completed Toilet Transfer Stand Pivot technique with minimum assistance with no AD  · Functional Mobility: Pt ambulate 5 ft + 6 ft with min A using hemiwalker and with chair follow    Activities of Daily Living:  · Upper Body Dressing: maximal assistance to doff/don gown while seated EOB and to adjust sling  · Toileting: maximal assistance for hygiene in standing      [facility]    Treatment & Education:  Pt educated on role of OT/POC  Pt educated on importance of ambulation/UIC  White board/communication board updated    Patient left up in chair with all lines intact and call button in reachEducation:      GOALS:   Multidisciplinary Problems     Occupational Therapy Goals        Problem: Occupational Therapy Goal    Goal Priority Disciplines Outcome Interventions   Occupational Therapy Goal     OT, PT/OT Ongoing (interventions implemented as appropriate)    Description:  Goals to be met by: 5/20/19     Patient will increase functional independence with ADLs by performing:    UE Dressing with Set-up Assistance.  LE Dressing with Moderate Assistance and Assistive Devices as needed.  Grooming while standing at sink with Minimal Assistance.  Toileting from bedside commode with Minimal Assistance for hygiene and clothing management. - progressing  Rolling to Bilateral with Moderate Assistance.   Supine to sit with Moderate Assistance. MET 5/9  UPDATED: supine to sit with CGA  Toilet transfer to bedside commode with Minimal Assistance. MET 5/9  UPDATED: BSC t/f with CGA                       Time Tracking:     OT  Date of Treatment: 05/09/19  OT Start Time: 0953  OT Stop Time: 1018  OT Total Time (min): 25 min    Billable Minutes:Self Care/Home Management 10  Therapeutic Activity 15    Nolvia Thomas OT  5/9/2019

## 2019-05-09 NOTE — DISCHARGE SUMMARY
[facility] Hospital Medicine  Discharge Summary      Patient Name: Siomara Flores  MRN: [id number]  Admission Date: 5/2/2019  Hospital Length of Stay: 7 days  Discharge Date and Time: No discharge date for patient encounter.  Attending Physician: Kacey Casas MD   Discharging Provider: Kacey Casas MD  Primary Care Provider: Vince Wray MD  [facility] Medicine Team: [facility] MED  Kacey Casas MD    HPI:   Ms. Siomara Flores is a 86 y.o. female Gnosticism with bronchiectasis and SVT who presents to the ED for evaluation of left hip pain sustained after a fall. She was walking in her driveway which is full of cement holes, when she lost her footing and fell, landing on her left hip and left shoulder. After, she was unable to bear weight or stand. She denies hitting her head of any LOC. She denies any numbness or tingling in her extremities. She endorses pain in her shoulder and her hip. She denies any chest pain or SOB. She is a Gnosticism, and refuses any blood products.     In the ED she was evaluated by Ortho, where she was found to have a left intertrochanteric, left proximal humerus, and left distal radius fracture. She was admitted to Hospital Medicine for further management.       Procedure(s) (LRB):  INSERTION, INTRAMEDULLARY MARTHA, FEMUR (Left)      Hospital Course:   5/3: admitted overnight for Left femur/Left radial/ Left humerus fracture after mechanical fall. Plan for left hip repair today. Pain still present after only receiving tylenol overnight per patient, didn't know to ask for other pain meds on MAR. Discussed Hg lower than normal baseline (at 8, baseline previously was 12). Received Epo overnight, will check iron/b12/folate tomorrow. Endorses poor nutrition recently due to ill  who passed away a month ago. Discussed risk of bleeding after surgery with low counts to start, discussed she will inevitably drop below Hg of 7 post op where most  would be transfused but that she has endorsed not wanting transfusions due to Buddhism, which still endorses. Received Epo overnight pre-op. Anticoagulation will be risky post op, states she had gi ulcers with aspirin years ago as well. Will discuss more with ortho in regards to risk/benefits post op. NWB LUE to left radial fx in splint. Vit D 46.    5/7 -  POD 4, pt aware Hb is low, and repeats that she does not want a blood transfusion due to Worship reasons.  I assured her that we would honor her wishes.  She denies SOB, chest pain, or pain related to fractures.  Left arm in sling.   Hb 6/2 -> 6.7.   5/8 - Hb 6.0, stable.  Asymptomatic,  No SOB, No chest pain. Medically ready for dc.  Waiting on placement.    5/9 - walked 5 steps yesterday w PT.  Eating well, slept well,  Hb 6.6.  All is stable.  Accepted to [facility] and will dc today     Consults:   Consults (From admission, onward)        Status Ordering Provider     Inpatient consult to Orthopedic Surgery  Once     Provider:  (Not yet assigned)    Completed BRAULIO GIPSON     Inpatient consult to [facility]  Once     Provider:  (Not yet assigned)    Acknowledged PIERO DOBBS     Inpatient consult to Social Work/Case Management  Once     Provider:  (Not yet assigned)    Acknowledged PIERO DOBBS     Inpatient consult to Social Work/Case Management  Once     Provider:  (Not yet assigned)    Acknowledged PIERO DOBBS          * Closed displaced intertrochanteric fracture of left femur  -left femur fracture after mechanical fall  -on hip fracture pathway now- tylenol, lyrica, PRN oxycodone for pain control. Surgery planned today with orthopedics. PT/OT tomorrow  -patient with significant anemia to start with Hg of 8 and refusing blood products due to Buddhism, this will be significant issue in post op period and with expected anticoagulation for 28 days post op. Will have to monitor closely for blood loss and balance risk of DVT ppx vs  acute bleeding/risk of blood loss anemia and lack of supportive care with transfusions if active bleeding post op as a potential higher risk situation in post op period that could arise, which I discussed with patient again today  -vit D adequate at 46.  -SNF next week pending medical stability, ref sent to [facility] today    5/7- POD4, Hb improved to 6.7.  Pt does not want blood ( Jahova's witness),  Medically ready for dc.  Monitor cbc.  Boost for nutrition.  Continue PT/OT.  Discussed need for lovenox for DVT prophylaxis x 26 days  w patient.  It should cause her to bleed.  Discussed bed mobility exerecises as well.   5/8 - stable, continue  post care plan: PT/OT. Lovenox, multi-modal pain management - can dc ropivacaine.    5/9 - dc to [facility], continue lovenox for26 days, PT.OT.  Monitor HB weekly, continue FE tabets    Hypophosphatemia  neutraphos po qid  5/9 - resolved      Closed fracture of left distal radius  -splint in place, nonweight bearing to LUE  F/u orthopedics      Closed fracture of left proximal humerus  -splint. nonweight bearing  F/u ortho      Refusal of blood transfusions as patient is Mandaeism  -will be an issue post op. epo is allowed, given pre op. Minimize blood draws. Use pediatric tubes if needed   5/8 - Hb 6.0;  Jb north is at bedside offering patient support and will follw her once she goes home.   5/9 - hb 6.6, contineu Fe tablets    Acute blood loss anemia  Expected after fractures      Bronchiectasis without complication  -history of this at home, has had occasional hemoptysis in the past, denies any recently. Continue spiriva, continue duobebs.  5/9 - stable, continue resp treatments      Urinary retention  5/9 - requiring strait caths to empty bladder q 6 hours.       Hypertension  -continue home meds- verapamil, atenolol, losartan  5/9 - stable      Postprocedural hypotension  5/8 - stable, 138/63 -> 120/62  5/9 - stable      Microcytic anemia  -baseline Hg of 12 in  2016, admitted with mild microcytosis and Hg of 8.4, patient endorses poor oral intake recently but denies dark stools, blood in stools, hemoptysis from bronchiectasis prior to admit.  -ferritin/b12/folate added on for labs tomorrow, minimize lab draws due to refusal of blood products  -is going to inevitably drop below 7 post op from blood loss anemia. If iron deficient will start replacement pending labs.   -supportive care per [facility] policies and patient preference/wishes. Currently does not want transfusions if needed when discussed with mds in ER and with me today  5/7 - Hb 6.7  5/9 - Hb 6.0 -> 6.6      Fall  Needs fall precautions here and at home, possible for the rest of her life.  Caregivers at bedside were informed.      SVT (supraventricular tachycardia)  -follows with Dr Jiang as outpatient  -continue home verapamil, atenolol 25 daily      Final Active Diagnoses:    Diagnosis Date Noted POA    PRINCIPAL PROBLEM:  Closed displaced intertrochanteric fracture of left femur [S72.142A] 05/02/2019 Yes    Hypophosphatemia [E83.39] 05/08/2019 No    Closed fracture of left distal radius [S52.502A] 05/02/2019 Yes    Closed fracture of left proximal humerus [S42.202A] 05/02/2019 Yes    Refusal of blood transfusions as patient is Episcopal [Z53.1] 05/02/2019 Not Applicable    Acute blood loss anemia [D62] 05/03/2019 No    Bronchiectasis without complication [J47.9] 08/01/2012 Yes    Urinary retention [R33.9] 05/08/2019 No    Hypertension [I10] 06/18/2014 Yes    Postprocedural hypotension [I95.81]  Yes    Microcytic anemia [D50.9] 05/03/2019 Yes    Fall [W19.XXXA] 05/02/2019 Yes    SVT (supraventricular tachycardia) [I47.1] 08/01/2012 Yes      Problems Resolved During this Admission:       Discharged Condition: good    Disposition:     Follow Up:    Patient Instructions:   No discharge procedures on file.    Significant Diagnostic Studies: Labs:   CMP   Recent Labs   Lab 05/08/19  0342  05/09/19  0422    138   K 3.5 3.7   CL 99 98   CO2 32* 31*   GLU 93 87   BUN 11 9   CREATININE 0.5 0.5   CALCIUM 8.2* 8.3*   PROT 5.4* 5.8*   ALBUMIN 2.0* 2.1*   BILITOT 0.4 0.5   ALKPHOS 106 112   AST 22 24   ALT 17 15   ANIONGAP 7* 9   ESTGFRAFRICA >60.0 >60.0   EGFRNONAA >60.0 >60.0    and CBC   Recent Labs   Lab 05/08/19  0342 05/09/19  0422   WBC 5.52 8.40   HGB 6.0* 6.6*   HCT 19.3* 20.7*    238       Pending Diagnostic Studies:     None         Medications:  Transfer Medications (for Discharge Readmit only):   Current Facility-Administered Medications   Medication Dose Route Frequency Provider Last Rate Last Dose    0.9%  NaCl infusion (for blood administration)   Intravenous Q24H PRN Kacey Casas MD        albuterol-ipratropium 2.5 mg-0.5 mg/3 mL nebulizer solution 3 mL  3 mL Nebulization Q4H WAKE Joby Reynolds MD   3 mL at 05/09/19 0939    atenolol tablet 25 mg  25 mg Oral Daily Joby Reynolds MD   25 mg at 05/08/19 0847    bisacodyl suppository 10 mg  10 mg Rectal Daily PRN Joby Reynolds MD        dextrose 50% injection 12.5 g  12.5 g Intravenous PRN Joby Reynolds MD        dextrose 50% injection 25 g  25 g Intravenous PRN Joby Reynolds MD        enoxaparin injection 30 mg  30 mg Subcutaneous Daily Joby Reynolds MD   30 mg at 05/08/19 1736    epoetin elizabeth-epbx injection 40,000 Units  40,000 Units Subcutaneous Once Joby Reynolds MD        ferrous sulfate EC tablet 325 mg  325 mg Oral TID Lily Bowman MD   325 mg at 05/08/19 2200    glucagon (human recombinant) injection 1 mg  1 mg Intramuscular PRN Joby Reynolds MD        glucose chewable tablet 16 g  16 g Oral PRN Joby Reynolds MD        glucose chewable tablet 24 g  24 g Oral PRN Joby Reynolds MD        losartan tablet 100 mg  100 mg Oral Daily Joby Reynolds MD   100 mg at 05/08/19 0848    methocarbamol tablet 1,000 mg  1,000 mg  Oral Q6H PRN Niki Mendoza MD        ondansetron disintegrating tablet 8 mg  8 mg Oral Q8H PRN Joby Reynolds MD        ondansetron injection 4 mg  4 mg Intravenous Q12H PRN Joby Reynolds MD        polyethylene glycol packet 17 g  17 g Oral Daily Joby Reynolds MD   17 g at 05/08/19 0846    potassium, sodium phosphates 280-160-250 mg packet 1 packet  1 packet Oral QID (WM & HS) Kacey Casas MD   1 packet at 05/08/19 2200    pregabalin capsule 75 mg  75 mg Oral QHS Joby Reynolsd MD   75 mg at 05/08/19 2200    promethazine (PHENERGAN) 6.25 mg in dextrose 5 % 50 mL IVPB  6.25 mg Intravenous Q6H PRN Joby Reynolds MD        ramelteon tablet 8 mg  8 mg Oral Nightly PRN Joby Reynolds MD        senna-docusate 8.6-50 mg per tablet 1 tablet  1 tablet Oral BID PRN Joby Reynolds MD        senna-docusate 8.6-50 mg per tablet 1 tablet  1 tablet Oral BID Joby Reynolds MD   1 tablet at 05/08/19 2200    sodium chloride 0.9% flush 10 mL  10 mL Intravenous PRN Joby Reynolds MD        sodium chloride 0.9% flush 10 mL  10 mL Intravenous PRN Joby Reynolds MD        sodium chloride 0.9% flush 5 mL  5 mL Intravenous PRN Joby Reynolds MD        tiotropium inhalation capsule 18 mcg  1 capsule Inhalation Daily Joby Reynolds MD   18 mcg at 05/08/19 1048    verapamil tablet 80 mg  80 mg Oral TID Joby Reynolds MD   80 mg at 05/08/19 2200       Indwelling Lines/Drains at time of discharge:   Lines/Drains/Airways          None          Time spent on the discharge of patient: 35  minutes  Patient was seen and examined on the date of discharge and determined to be suitable for discharge.         Kacey Casas MD  Department of Hospital Medicine  [facility]

## 2019-05-09 NOTE — NURSING
Report giving to Ms Sasha Yates at [facility] . Patient left via wheelchair with transport . No noted distress

## 2019-05-09 NOTE — PT/OT/SLP PROGRESS
"Physical Therapy Treatment    Patient Name:  Siomara Flores   MRN:  [id number]    Recommendations:     Discharge Recommendations: skilled nursing facility  Discharge Equipment Recommendations: (TBD at next level of care)   Barriers to discharge: Decreased caregiver support; fall risk     Assessment:     Siomara Flores is a 86 y.o. female admitted with a medical diagnosis of closed displaced intertrochanteric fracture of left femur. Pt remains motivated to participate in therapy and regain independence. Ms. Flores tolerated progression of gait training, and demonstrated improvement in performance of bed mobility.  She presents with the following impairments/functional limitations:  weakness, impaired endurance, gait instability, impaired functional mobilty, impaired self care skills, impaired balance, decreased safety awareness, pain, decreased lower extremity function, decreased upper extremity function, orthopedic precautions. Pt would benefit from continued PT intervention to address listed functional deficits, reduce fall risk and maximize return to PLOF.     Rehab Prognosis: Good; patient would benefit from acute skilled PT services to address these deficits and reach maximum level of function.      Recent Surgery: Procedure(s) (LRB):  INSERTION, INTRAMEDULLARY MARTHA, FEMUR (Left) 6 Days Post-Op    Plan:     During this hospitalization, patient to be seen 5 x/week to address the identified rehab impairments via gait training, therapeutic activities, therapeutic exercises, neuromuscular re-education and progress toward the following goals:    · Plan of Care Expires:  06/04/19    Subjective     Pt pleasant and motivated to participate in therapy.     Chief Complaint: pain  Patient/Family Comments/goals: return home; per pt: "I want to get well"   Pain/Comfort:  · Pain Rating 1: 6/10  · Location - Side 1: Left  · Location 1: leg  · Pain Addressed 1: Reposition, Distraction  · Pain Rating Post-Intervention 1: (Pt " reported increased pain with weight bearing; no pain at rest.)      Objective:     Communicated with RN prior to session.  Patient found supine with peripheral IV upon PT entry to room.     General Precautions: Standard, fall   Orthopedic Precautions:LLE weight bearing as tolerated, LUE non weight bearing   Braces: (LUE sling and swathe)     Functional Mobility:    Bed Mobility:   · Supine > Sit: minimum assistance   · Sit > Supine: N/T  · Scooting: towards EOB in sitting with minimum assistance     Transfers:   · Sit <> Stand Transfer: minimum assistance from EOB x 1 trial with papi-walker;  moderate assistance from bedside chair x 2 trials; minimum assistance from bedside commode x 1 trial with no AD   · Bed <> Chair: moderate assistance with papi-walker x 1 trial, with no AD x 1 trial                 Gait:  · Distance: ~5 ft. + ~6 ft.   · Assistance level: moderate assistance for balance, weight shifting, LLE advancement and management of papi-walker   · Assistive Device: papi-walker on R   · Gait Assessment: step-to gait pattern, antalgic gait, narrow base of support, decreased step length, generalized instability     Therapeutic Activities and Exercises:  Therapeutic activities aimed to:  -  Reinforce education re: safety with mobility, orthopedic precautions, and activity modifications. Pt requiring total A to correctly adjust sling and swathe.   -  increase pt's independence, safety, and efficiency with bed mobility and functional transfers. See above for assistance levels.   - improve functional balance and endurance through performance of above listed activities. Pt able to urinate and have BM while seated on BSC, tolerated standing with  Mod A x ~2 minutes for cleaning.     Therapist facilitated progression of gait training to improve gait stability, endurance, and independence with functional ambulation. See above for details. Chair follow provided 2/2 fall risk.    Patient left reclined in chair with all  lines intact, call button in reach and RN notified.    AM-PAC 6 CLICK MOBILITY  Turning over in bed (including adjusting bedclothes, sheets and blankets)?: 3  Sitting down on and standing up from a chair with arms (e.g., wheelchair, bedside commode, etc.): 2  Moving from lying on back to sitting on the side of the bed?: 3  Moving to and from a bed to a chair (including a wheelchair)?: 2  Need to walk in hospital room?: 2  Climbing 3-5 steps with a railing?: 1  Basic Mobility Total Score: 13     GOALS:   Multidisciplinary Problems     Physical Therapy Goals        Problem: Physical Therapy Goal    Goal Priority Disciplines Outcome Goal Variances Interventions   Physical Therapy Goal     PT, PT/OT Ongoing (interventions implemented as appropriate)     Description:  Goals to be met by: 19    Patient will increase functional independence with mobility by performin. Supine to sit with MInimal Assistance -MET       Updated: Supine <> Sit with stand by assistance   2. Sit to stand transfer with Contact Guard Assistance with AD. - not met  3. Bed to chair transfer with Contact Guard Assistance using AD -not met  4. Gait  x 50 feet with Contact Guard Assistance using AD . -not met                       Time Tracking:     PT Received On: 19  PT Start Time: 52     PT Stop Time: 1019  PT Total Time (min): 27 min     Billable Minutes: Gait Training 15 min and Therapeutic Activity 10 min    Treatment Type: Treatment  PT/PTA: PT     PTA Visit Number: 0     Ayanna Elliott PT, DPT   2019  Pager: [phone]

## 2019-05-10 PROBLEM — E43 SEVERE MALNUTRITION: Status: ACTIVE | Noted: 2019-05-10

## 2019-05-10 PROCEDURE — 94799 UNLISTED PULMONARY SVC/PX: CPT

## 2019-05-10 PROCEDURE — 97116 GAIT TRAINING THERAPY: CPT

## 2019-05-10 PROCEDURE — 97535 SELF CARE MNGMENT TRAINING: CPT

## 2019-05-10 PROCEDURE — 97165 OT EVAL LOW COMPLEX 30 MIN: CPT

## 2019-05-10 PROCEDURE — 94640 AIRWAY INHALATION TREATMENT: CPT

## 2019-05-10 PROCEDURE — 63600175 PHARM REV CODE 636 W HCPCS: Performed by: HOSPITALIST

## 2019-05-10 PROCEDURE — 97161 PT EVAL LOW COMPLEX 20 MIN: CPT

## 2019-05-10 PROCEDURE — 25000003 PHARM REV CODE 250: Performed by: HOSPITALIST

## 2019-05-10 PROCEDURE — 11000004 HC SNF PRIVATE

## 2019-05-10 PROCEDURE — 25000003 PHARM REV CODE 250: Performed by: NURSE PRACTITIONER

## 2019-05-10 PROCEDURE — 97530 THERAPEUTIC ACTIVITIES: CPT

## 2019-05-10 PROCEDURE — 25000242 PHARM REV CODE 250 ALT 637 W/ HCPCS: Performed by: HOSPITALIST

## 2019-05-10 PROCEDURE — 97802 MEDICAL NUTRITION INDIV IN: CPT

## 2019-05-10 RX ORDER — LOSARTAN POTASSIUM 50 MG/1
50 TABLET ORAL DAILY
Status: DISCONTINUED | OUTPATIENT
Start: 2019-05-11 | End: 2019-05-13

## 2019-05-10 RX ADMIN — IPRATROPIUM BROMIDE AND ALBUTEROL SULFATE 3 ML: .5; 3 SOLUTION RESPIRATORY (INHALATION) at 04:05

## 2019-05-10 RX ADMIN — IPRATROPIUM BROMIDE AND ALBUTEROL SULFATE 3 ML: .5; 3 SOLUTION RESPIRATORY (INHALATION) at 07:05

## 2019-05-10 RX ADMIN — TIOTROPIUM BROMIDE 18 MCG: 18 CAPSULE ORAL; RESPIRATORY (INHALATION) at 09:05

## 2019-05-10 RX ADMIN — VERAPAMIL HYDROCHLORIDE 80 MG: 80 TABLET, FILM COATED ORAL at 02:05

## 2019-05-10 RX ADMIN — IPRATROPIUM BROMIDE AND ALBUTEROL SULFATE 3 ML: .5; 3 SOLUTION RESPIRATORY (INHALATION) at 12:05

## 2019-05-10 RX ADMIN — ENOXAPARIN SODIUM 30 MG: 100 INJECTION SUBCUTANEOUS at 05:05

## 2019-05-10 RX ADMIN — FERROUS SULFATE TAB EC 325 MG (65 MG FE EQUIVALENT) 325 MG: 325 (65 FE) TABLET DELAYED RESPONSE at 08:05

## 2019-05-10 RX ADMIN — VERAPAMIL HYDROCHLORIDE 80 MG: 80 TABLET, FILM COATED ORAL at 08:05

## 2019-05-10 RX ADMIN — FERROUS SULFATE TAB EC 325 MG (65 MG FE EQUIVALENT) 325 MG: 325 (65 FE) TABLET DELAYED RESPONSE at 09:05

## 2019-05-10 RX ADMIN — FERROUS SULFATE TAB EC 325 MG (65 MG FE EQUIVALENT) 325 MG: 325 (65 FE) TABLET DELAYED RESPONSE at 02:05

## 2019-05-10 NOTE — PT/OT/SLP EVAL
Occupational Therapy  Evaluation/Treatment    Siomara Flores   MRN: [id number]   Admitting Diagnosis:  Closed displaced intertrochanteric fracture of left femur        OT Date of Treatment: 05/10/19              Billable Minutes:  Evaluation 15  Self Care/Home Management 55    Diagnosis: Closed displaced intertrochanteric fracture of left femur    Past Medical History:   Diagnosis Date    Bronchiectasis without complication     Hypertension     Supraventricular tachycardia       Past Surgical History:   Procedure Laterality Date    CHOLECYSTECTOMY      INSERTION, INTRAMEDULLARY MARTHA, FEMUR Left 5/3/2019    Performed by Teodoro Bruce MD at [facility] OR [address] [place] [address]    TONSILLECTOMY           General Precautions: Standard, fall  Orthopedic Precautions: LLE weight bearing as tolerated, LUE non weight bearing  Braces: Sling and swathe(L UE; Patient also has a L wrist brace. )          Patient History:  Lives With: alone  Living Arrangements: house  Home Accessibility: stairs to enter home(1 threshold)  Home Layout: Able to live on 1st floor  Transportation Anticipated: family or friend will provide  Living Environment Comment: Patient lives alone in a 1 SH with 1 threshold to enter. Patient has a tub/shower combo with grab bar (Patient may have a tub bench from ). Patient's  passed away April 1. Patient has a regular toilet with grab bar. Patient reports she has a RW from her . Patient cooks and does laundry. Patient reports she has help for cleaning. Patient does not drive.     Prior level of function:    Bed Mobility/Transfers: independent  Grooming: independent  Bathing: independent  Upper Body Dressing: independent  Lower Body Dressing: independent  Toileting: independent        Dominant hand: right    Subjective:  Communicated with patient prior to session.    Chief Complaint: pain with activity  Patient/Family stated goals: to go home    Pain/Comfort  Pain Rating 1: 6/10  Location - Side 1:  Left  Location - Orientation 1: generalized  Location 1: (Arm and leg)  Pain Addressed 1: Distraction, Reposition  Pain Rating Post-Intervention 1: 6/10    Objective:        Cognitive Exam:  Oriented to: Person, Place, Time and Situation  Follows Commands/attention: Follows multistep  commands  Communication: clear/fluent  Memory:  No Deficits noted  Safety awareness/insight to disability: intact  Coping skills/emotional control: Appropriate to situation    Visual/perceptual:  Intact    Physical Exam:  Postural examination/scapula alignment:    -       Rounded shoulders  -       Forward head  Skin integrity: Visible skin intact  Edema: L hand (digits swollen) while in wrist brace      Sensation:      -       Intact    Upper Extremity Range of Motion:  Right Upper Extremity: WFL  Left Upper Extremity: Unable to test (L UE in sling and NWB)    Upper Extremity Strength:  Right Upper Extremity: 4-/5  Left Upper Extremity: Unable to test (L UE in sling and NWB)   Strength: WFL on Right.     Fine motor coordination:      -       Intact on R hand    Gross motor coordination: WFL    Occupational Performance:    Bed Mobility:    · Patient completed Supine to Sit with maximal assistance /c HOB elevated and using handrail    Functional Mobility/Transfers:  · Patient completed Sit <> Stand Transfer with moderate assistance  with  hand-held assist   · Patient completed Bed >w/cTransfer using Stand Pivot technique with moderate assistance with hand-held assist  · Patient completed Toilet Transfer bedside chair<>BSC Stand Pivot technique with moderate assistance with  hand-held assist    Activities of Daily Living:  · Feeding:  setup    · Grooming: moderate assistance oral care, washing face, and combing hair seated in w/c at sink  · Bathing: maximal assistance spongebath from w/c level at sink  · Upper Body Dressing: maximal assistance [place] and donning front gown. Donning back gown. [place] and donning sling and swathe.    · Lower Body Dressing: total assistance Donning pants. [place] and donning socks.  · Toileting: total assistance      [facility] 6 Click:  [facility] Total Score: 11    Additional Treatment:  Role of OT and POC  Safety  ADL retraining  Discharge planning    Patient left up in chair with call button in reach and all needs met.     Assessment:  Siomara Flores is a 86 y.o. female with a medical diagnosis of Closed displaced intertrochanteric fracture of left femur.    Rehab identified problem list/impairments: weakness, impaired endurance, impaired self care skills, impaired functional mobilty, gait instability, impaired balance, decreased upper extremity function, decreased lower extremity function, pain, orthopedic precautions    Rehab potential is good    Activity tolerance: Good    Discharge recommendations: home with home health     Barriers to discharge: Decreased caregiver support     Equipment recommendations: walker, papi, commode, tub bench(Need to confirm with family (Patient reports she has equipment from her ))     GOALS:   Multidisciplinary Problems     Occupational Therapy Goals        Problem: Occupational Therapy Goal    Goal Priority Disciplines Outcome Interventions   Occupational Therapy Goal     OT, PT/OT Ongoing (interventions implemented as appropriate)    Description:  Goals to be met by: 5/23/2019     Patient will increase functional independence with ADLs by performing:    UE Dressing with Minimal Assistance.  LE Dressing with Minimal Assistance.  Grooming while standing at sink with Stand-by Assistance.  Toileting from bedside commode with Minimal Assistance for hygiene and clothing management.   Bathing with Minimal Assistance.  Supine to sit with Stand-by Assistance /c HOB flat and no handrails.  Stand pivot transfers with Contact Guard Assistance.  Toilet transfer to bedside commode with Contact Guard Assistance.  Right Upper extremity exercise program 3 x 10 reps per handout, with  independence.  Patient will complete a standing activity for 8 min with S in order to perform self care tasks.                     PLAN: Patient to be seen 5 x/week to address the above listed problems via therapeutic exercises, therapeutic activities, self-care/home management  Plan of Care expires: 06/10/19  Plan of Care reviewed with: patient    DAGMAR Tate  05/10/2019

## 2019-05-10 NOTE — NURSING
Report rec'd earlier from [place], nurse [facility] ext [phone]. Pt arrived via hospital w/c transport van. AAO, left hip surgical aquacel intact x 3 sites. MERARI sling in place, bruising with edema noted. Rt buttock red. Oriented to SNF and instructed on safety to call for assist, call light in reach

## 2019-05-10 NOTE — PT/OT/SLP EVAL
PhysicalTherapy   Evaluation    Siomara Flores   MRN: [id number]     PT Received On: 05/10/19  PT Start Time: 1124     PT Stop Time: 1205    PT Total Time (min): 41 min       Billable Minutes:  Evaluation 10, Gait Training 10, Therapeutic Activity 11 and Therapeutic Exercise 10= eval+31    Diagnosis: <principal problem not specified>s/p s/p left CMN 5/3/19 and WBAT and  LUE NWB due to Closed fracture of left proximal humerus and Closed fracture of left distal radius with sling and swathe and left wrist immobilizer      Past Medical History:   Diagnosis Date    Bronchiectasis without complication     Hypertension     Supraventricular tachycardia       Past Surgical History:   Procedure Laterality Date    CHOLECYSTECTOMY      INSERTION, INTRAMEDULLARY MARTHA, FEMUR Left 5/3/2019    Performed by Teodoro Bruce MD at [facility] OR [address] [place] [address]    TONSILLECTOMY           General Precautions: Standard, fall  Orthopedic Precautions: LLE weight bearing as tolerated, LUE non weight bearing   Braces: Sling and swathe(LUE; pt also has a wrist brace)         Patient History:  Lives With: alone  Living Arrangements: house  Home Accessibility: stairs to enter home  Home Layout: Able to live on 1st floor  Stair Railings at Home: none  Transportation Anticipated: family or friend will provide  Living Environment Comment: lives alone in [facility] /  [place]. PTA independent.   19. May have walker from , but would be too tall.  Equipment Currently Used at Home: grab bar  DME owned (not currently used): may have old walker from  but would be too tall    Previous Level of Function:  Ambulation Skills: independent  Transfer Skills: independent  ADL Skills: independent    Subjective:  Communicated with patient prior to session.  Agreeable to session  Chief Complaint: can't walk  Patient goals: return [facility] to [facility]    Pain/Comfort  Pain Rating 1: 6/10  Location - Side 1: Left  Location - Orientation 1: generalized  Location  1: shoulder  Pain Addressed 1: Distraction  Pain Rating Post-Intervention 1: 5/10    Objective:  Patient found seated in bedside chair with      Cognitive Exam:  Oriented to: Person, Place, Time and Situation  Follows Commands/attention: Follows one-step commands  Communication: clear/fluent  Safety awareness/insight to disability: intact    Physical Exam:  Postural examination/scapula alignment:    -       Rounded shoulders    Skin integrity: Visible skin intact  Edema: Mild LLE/UE    Sensation:   Grossly intact LIght touch    Upper Extremity Range of Motion:  Right Upper Extremity: WFL  Left Upper Extremity: immobilized in sling and swath (and wrist splint)    Upper Extremity Strength:  Right Upper Extremity: WFL  Left Upper Extremity: not tested    Lower Extremity Range of Motion:  Right Lower Extremity: WFL  Left Lower Extremity: HF apprx 90*; knee and ankle WFL    Lower Extremity Strength:  Right Lower Extremity: WFL  Left Lower Extremity: knee, ankle grossly WFL       Gross motor coordination: WFL      AM-PAC 6 CLICK MOBILITY  Total Score:11  Bed Mobility:  Sit>Supine:on mat w/ max assistance  Supine>Sit: on mat w/ max assistance    Transfers:  Sit<>Stand: to/from chair w/ max assistance; to/from w/c, mat w/ HW and max assistance  Stand Pivot Transfer: w/c>mat w/ HW and max assistance ; SPT chair>w/c w/o AD w/ max assistance  Increased assistance for use of HW in transfers and for balance/stability    Gait:  Amb w/ HW and max assistance 2 feet, 4 feet. HW on right. Assist to advance, place HW, for support and for sequence. Decreased advancement of LLE and decreased weight acceptance on LLE.     Advanced Gait:  Stairs: unable  Curb Step: unable    Wheelchair Mobility:  Patient unable to propel     Therex:  Quad sets,   Glute sets,   Ankle  Pumps,   hip abduction/adduction,  heelslides,   SAQ,   x20 reps w/ assist as needed      Balance:  Stands w/ HW and mod assistance  Sits EOM w/ SBA    Additional  Treatment:  Patient educated on PT POC; gait and trf tech; use of sling and swathe.    Patient left up in chair with all lines intact and call button in reach.    Assessment:  Siomara Flores is a 86 y.o. female with a medical diagnosis of <principal problem not specified>. s/p left CMN 5/3/19 and WBAT and  LUE NWB due to Closed fracture of left proximal humerus and Closed fracture of left distal radius with sling and swathe and left wrist immobilizer after fall at home. Patient w/ deficits noted. PLOF independent w/o AD. Patient will benefit from skilled PT to address deficits and improve safety and functional mobility to allow patient to progress to safe discharge home.  .    Rehab identified problem list/impairments: weakness, impaired endurance, impaired functional mobilty, impaired self care skills, gait instability, impaired balance, decreased upper extremity function, decreased lower extremity function, pain, orthopedic precautions    Rehab potential is good.    Activity tolerance: Good    Discharge recommendations: home with home health     Barriers to discharge: Inaccessible home environment, Decreased caregiver support    Equipment recommendations: walker, papi, commode, tub bench(HW or QC pending progress)     GOALS:   Multidisciplinary Problems     Physical Therapy Goals        Problem: Physical Therapy Goal    Goal Priority Disciplines Outcome Goal Variances Interventions   Physical Therapy Goal     PT, PT/OT      Description:  Goals to be met by: 14 days 2019     Patient will increase functional independence with mobility by performin. Supine to sit with supervision  2. Sit to supine with supervision  3. Sit to stand transfer with Stand-by Assistance  4. Bed to chair transfer with Stand-by Assistance using hemiwalker or least restrictive assistive device  5. Gait  x 100 feet with Stand-by Assistance using  hemiwalker or least restrictive assistive device.   6. Ascend/Descend 4 inch curb  step with Contact Guard Assistance using  hemiwalker or least restrictive assistive device.  7. Stand for 3 minutes with Contact Guard Assistance using  hemiwalker or least restrictive assistive device, intermittently,  and perform an activity  8. Lower extremity exercise program x20 reps per handout, with assistance as needed and gym therex                      PLAN:    Patient to be seen 5 x/week  to address the above listed problems via gait training, therapeutic activities, therapeutic exercises, wheelchair management/training  Plan of Care Expires: 06/09/19    Darlene Jaimes, PT 5/10/2019

## 2019-05-10 NOTE — PLAN OF CARE
Problem: Occupational Therapy Goal  Goal: Occupational Therapy Goal  Goals to be met by: 5/23/2019     Patient will increase functional independence with ADLs by performing:    UE Dressing with Minimal Assistance.  LE Dressing with Minimal Assistance.  Grooming while standing at sink with Stand-by Assistance.  Toileting from bedside commode with Minimal Assistance for hygiene and clothing management.   Bathing with Minimal Assistance.  Supine to sit with Stand-by Assistance /c HOB flat and no handrails.  Stand pivot transfers with Contact Guard Assistance.  Toilet transfer to bedside commode with Contact Guard Assistance.  Right Upper extremity exercise program 3 x 10 reps per handout, with independence.  Patient will complete a standing activity for 8 min with S in order to perform self care tasks.   Outcome: Ongoing (interventions implemented as appropriate)  Patient's goals are set.   DAGMAR Tate  5/10/2019

## 2019-05-10 NOTE — PLAN OF CARE
Problem: Physical Therapy Goal  Goal: Physical Therapy Goal  Goals to be met by: 14 days 2019     Patient will increase functional independence with mobility by performin. Supine to sit with supervision  2. Sit to supine with supervision  3. Sit to stand transfer with Stand-by Assistance  4. Bed to chair transfer with Stand-by Assistance using hemiwalker or least restrictive assistive device  5. Gait  x 100 feet with Stand-by Assistance using  hemiwalker or least restrictive assistive device.   6. Ascend/Descend 4 inch curb step with Contact Guard Assistance using  hemiwalker or least restrictive assistive device.  7. Stand for 3 minutes with Contact Guard Assistance using  hemiwalker or least restrictive assistive device, intermittently,  and perform an activity  8. Lower extremity exercise program x20 reps per handout, with assistance as needed and gym therex    PT eval completed. Darlene Jaimes, PT 5/10/2019

## 2019-05-10 NOTE — PT/OT/SLP DISCHARGE
Physical Therapy Discharge Summary    Name: Siomara Flores  MRN: [id number]   Principal Problem: Closed displaced intertrochanteric fracture of left femur     Patient Discharged from acute Physical Therapy on 19.  Please refer to prior PT noted date on 19 for functional status.     Assessment:     Patient has not met goals.    Objective:     GOALS:   Multidisciplinary Problems     Physical Therapy Goals     Not on file          Multidisciplinary Problems (Resolved)        Problem: Physical Therapy Goal    Goal Priority Disciplines Outcome Goal Variances Interventions   Physical Therapy Goal   (Resolved)     PT, PT/OT Outcome(s) achieved     Description:  Goals to be met by: 19    Patient will increase functional independence with mobility by performin. Supine to sit with MInimal Assistance -MET       Updated: Supine <> Sit with stand by assistance   2. Sit to stand transfer with Contact Guard Assistance with AD. - not met  3. Bed to chair transfer with Contact Guard Assistance using AD -not met  4. Gait  x 50 feet with Contact Guard Assistance using AD . -not met                       Reasons for Discontinuation of Therapy Services  Transfer to alternate level of care.      Plan:     Patient Discharged to: Skilled Nursing Facility.    Ayanna Elliott, PT  5/10/2019

## 2019-05-11 PROCEDURE — 11000004 HC SNF PRIVATE

## 2019-05-11 PROCEDURE — 94761 N-INVAS EAR/PLS OXIMETRY MLT: CPT

## 2019-05-11 PROCEDURE — 97110 THERAPEUTIC EXERCISES: CPT

## 2019-05-11 PROCEDURE — 25000242 PHARM REV CODE 250 ALT 637 W/ HCPCS: Performed by: HOSPITALIST

## 2019-05-11 PROCEDURE — 94640 AIRWAY INHALATION TREATMENT: CPT

## 2019-05-11 PROCEDURE — 25000003 PHARM REV CODE 250: Performed by: HOSPITALIST

## 2019-05-11 PROCEDURE — 97116 GAIT TRAINING THERAPY: CPT

## 2019-05-11 PROCEDURE — 99900035 HC TECH TIME PER 15 MIN (STAT)

## 2019-05-11 PROCEDURE — 94799 UNLISTED PULMONARY SVC/PX: CPT

## 2019-05-11 PROCEDURE — 97530 THERAPEUTIC ACTIVITIES: CPT

## 2019-05-11 PROCEDURE — 63600175 PHARM REV CODE 636 W HCPCS: Performed by: HOSPITALIST

## 2019-05-11 PROCEDURE — 25000003 PHARM REV CODE 250: Performed by: NURSE PRACTITIONER

## 2019-05-11 RX ADMIN — SENNOSIDES AND DOCUSATE SODIUM 1 TABLET: 8.6; 5 TABLET ORAL at 09:05

## 2019-05-11 RX ADMIN — ATENOLOL 25 MG: 25 TABLET ORAL at 09:05

## 2019-05-11 RX ADMIN — ENOXAPARIN SODIUM 30 MG: 100 INJECTION SUBCUTANEOUS at 04:05

## 2019-05-11 RX ADMIN — IPRATROPIUM BROMIDE AND ALBUTEROL SULFATE 3 ML: .5; 3 SOLUTION RESPIRATORY (INHALATION) at 07:05

## 2019-05-11 RX ADMIN — VERAPAMIL HYDROCHLORIDE 80 MG: 80 TABLET, FILM COATED ORAL at 03:05

## 2019-05-11 RX ADMIN — VERAPAMIL HYDROCHLORIDE 80 MG: 80 TABLET, FILM COATED ORAL at 09:05

## 2019-05-11 RX ADMIN — FERROUS SULFATE TAB EC 325 MG (65 MG FE EQUIVALENT) 325 MG: 325 (65 FE) TABLET DELAYED RESPONSE at 09:05

## 2019-05-11 RX ADMIN — IPRATROPIUM BROMIDE AND ALBUTEROL SULFATE 3 ML: .5; 3 SOLUTION RESPIRATORY (INHALATION) at 11:05

## 2019-05-11 RX ADMIN — IPRATROPIUM BROMIDE AND ALBUTEROL SULFATE 3 ML: .5; 3 SOLUTION RESPIRATORY (INHALATION) at 03:05

## 2019-05-11 RX ADMIN — LOSARTAN POTASSIUM 50 MG: 50 TABLET, FILM COATED ORAL at 09:05

## 2019-05-11 RX ADMIN — TIOTROPIUM BROMIDE 18 MCG: 18 CAPSULE ORAL; RESPIRATORY (INHALATION) at 08:05

## 2019-05-11 RX ADMIN — ACETAMINOPHEN 650 MG: 325 TABLET ORAL at 08:05

## 2019-05-11 RX ADMIN — FERROUS SULFATE TAB EC 325 MG (65 MG FE EQUIVALENT) 325 MG: 325 (65 FE) TABLET DELAYED RESPONSE at 03:05

## 2019-05-11 NOTE — PT/OT/SLP PROGRESS
"Physical Therapy  Treatment    Siomara Flores   MRN: [id number]   Admitting Diagnosis: (L) femur CMN, (L) humerus fx, and (R) distal radius fx    PT Received On: 05/11/19        Billable Minutes:  Gait Training 18, Therapeutic Activity 15, Therapeutic Exercise 15 and Total Time 48    Treatment Type: Treatment  PT/PTA: PT     PTA Visit Number: 0       General Precautions: Standard, fall  Orthopedic Precautions: LLE weight bearing as tolerated, LUE non weight bearing   Braces: Sling and swathe(LUE; pt also has a wrist brace)    Subjective:  Communicated with patient prior to session.  Pt agreeable to session.     Pain/Comfort  Pain Rating 1: (did not rate but reported "a little" pain)  Location - Side 1: Left  Location - Orientation 1: generalized  Location 1: hip  Pain Addressed 1: Pre-medicate for activity, Reposition    Objective:  Patient found in bedside chair.       AM-PAC 6 CLICK MOBILITY  Total Score:11    Bed Mobility:  Not performed    Transfers:  Sit<>Stand: to/from w/c (4 trials) w/ HW and ModA to rise  Stand Pivot Transfer: bedside chair>w/c and w/c<>nustep; all w/o AD w/ MaxA for stability w/ pivot due to posterior lean  Cueing/inc'd time required for forward scoot/lean and hand/foot placement    Gait:  Amb 2 trials (4ft each) w/ HW and ModA for stability due to posterior lean  3 point gait  Cueing for sequencing and weight shifting     Therex:  Seated BLE therex 2x10 reps (HF, LAQ, AP)    Balance:  Standing card matching activity (matching colors only) w/ RUE support on counter for stability, Min/CGA for stability  2 trials - each ~90s  Limited by fatigue    Additional Treatment:  Recumbent cross  w/ RUE and BLE x6min to inc overall strength and endurance    Patient left up in chair with call button in reach.    Assessment:  Siomara Flores is a 86 y.o. female with a medical diagnosis of (L) femur CMN, (L) humerus fx, and (R) distal radius fx.  Pt beverly session well w/ good participation despite " pain. She is showing good progress w/ mobility and was able to improve transfers and slightly inc gait distance. She responds well to PT cues for transfer techniques, standing balance, and gait mechanics. Pt will continue PT POC.    Rehab identified problem list/impairments: weakness, impaired endurance, impaired functional mobilty, impaired self care skills, gait instability, impaired balance, decreased upper extremity function, decreased lower extremity function, pain, orthopedic precautions    Rehab potential is good.    Activity tolerance: Good    Discharge recommendations: home with home health     Barriers to discharge: Inaccessible home environment, Decreased caregiver support    Equipment recommendations: walker, papi, commode, tub bench(HW or QC pending progress)     GOALS:   Multidisciplinary Problems     Physical Therapy Goals        Problem: Physical Therapy Goal    Goal Priority Disciplines Outcome Goal Variances Interventions   Physical Therapy Goal     PT, PT/OT Ongoing (interventions implemented as appropriate)     Description:  Goals to be met by: 14 days 2019     Patient will increase functional independence with mobility by performin. Supine to sit with supervision  2. Sit to supine with supervision  3. Sit to stand transfer with Stand-by Assistance  4. Bed to chair transfer with Stand-by Assistance using hemiwalker or least restrictive assistive device  5. Gait  x 100 feet with Stand-by Assistance using  hemiwalker or least restrictive assistive device.   6. Ascend/Descend 4 inch curb step with Contact Guard Assistance using  hemiwalker or least restrictive assistive device.  7. Stand for 3 minutes with Contact Guard Assistance using  hemiwalker or least restrictive assistive device, intermittently,  and perform an activity  8. Lower extremity exercise program x20 reps per handout, with assistance as needed and gym therex                      PLAN:    Patient to be seen 5 x/week  to  address the above listed problems via gait training, therapeutic activities, therapeutic exercises, wheelchair management/training  Plan of Care expires: 06/09/19  Plan of Care reviewed with: patient    Josy PINA Josi, PT  05/11/2019

## 2019-05-11 NOTE — PLAN OF CARE
Problem: Adult Inpatient Plan of Care  Goal: Plan of Care Review  Outcome: Ongoing (interventions implemented as appropriate)   Severe malnutrition  Malnutrition in the context of Social/Environmental Circumstances     Related to (etiology):  Starvation and lack of self care while caregiver, grief response, reduced appetite and po intake     Signs and Symptoms (as evidenced by):  Energy Intake: <75% of estimated energy requirement for > 3 months  Body Fat Depletion: moderate depletion of orbitals and triceps and buccal area  Muscle Mass Depletion: severe depletion of clavicle region and scapular region moderate at temples, hands and calf  Weight Loss: > 7.5% % x < 3 months  Fluid Accumulation: mild  Recommendation/Intervention: Continue regular diet, honor food intolerances, recommend change boost breeze to boost plus TID no chocolate  per pt request.  Goals: PO to meet 50% of needs with ONS by next RD visit  Nutrition Goal Status: new

## 2019-05-11 NOTE — PLAN OF CARE
Problem: Adult Inpatient Plan of Care  Goal: Plan of Care Review  Outcome: Ongoing (interventions implemented as appropriate)     05/11/19 1842   Plan of Care Review   Plan of Care Reviewed With patient       Problem: Fall Injury Risk  Goal: Absence of Fall and Fall-Related Injury  Outcome: Ongoing (interventions implemented as appropriate)  Intervention: Identify and Manage Contributors to Fall Injury Risk     05/11/19 1842   Manage Acute Allergic Reaction   Medication Review/Management medications reviewed   Identify and Manage Contributors to Fall Injury Risk   Self-Care Promotion independence encouraged         Problem: Skin Injury Risk Increased  Goal: Skin Health and Integrity  Outcome: Ongoing (interventions implemented as appropriate)  Intervention: Optimize Skin Protection     05/11/19 1842   Prevent Additional Skin Injury   Head of Bed (HOB) HOB at 30 degrees   Pressure Reduction Devices heel offloading device utilized   Pressure Reduction Techniques heels elevated off bed;frequent weight shift encouraged         Comments: Patient monitored every 1 to 2 hours for pain and safety.  Safety maintained.  Call  Light and persoanl items in reach.Patient instructed to call for assistance.

## 2019-05-11 NOTE — CONSULTS
[facility] - Skilled Nursing Care  Adult Nutrition  Consult Note    SUMMARY     Recommendations    Recommendation/Intervention: Continue regular diet, honor food intolerances, recommend change boost breeze to boost plus TID per pt request.  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  Nutrition Discharge Planning: Dc on regular diet with ONS of choice for weight gain and wound healing    Nutrition Risk Screen    Nutrition Risk Screen: no indicators present    Nutrition/Diet History    Patient Reported Diet/Restrictions/Preferences: general  Typical Food/Fluid Intake: lactose free milk, is willing to try boost plus rather than boost breeze  Food Preferences: sensitive stomach, will not eat beans, or cruciferous vegetable, corse foods,   Spiritual, Cultural Beliefs, Uatsdin Practices, Values that Affect Care: yes  Food Allergies: NKFA  Factors Affecting Nutritional Intake: altered gastrointestinal function, cultural/Mu-ism beliefs, decreased appetite(may have been too distracted or too exhausted to eat and take care of herself)    Anthropometrics    Temp: 97.9 °F (36.6 °C)  Height Method: Stated  Height: 5' (152.4 cm)  Height (inches): 60 in  Weight Method: Standard Scale  Weight: 36.3 kg (80 lb 0.4 oz)  Weight (lb): 80.03 lb  Ideal Body Weight (IBW), Female: 100 lb  % Ideal Body Weight, Female (lb): 80.03 lb  BMI (Calculated): 15.7  BMI Grade: less than 16 protein-energy malnutrition grade III  Weight Loss: unintentional  Usual Body Weight (UBW),  k.8 kg  % Usual Body Weight: 87.02  % Weight Change From Usual Weight: -13.16 %       Lab/Procedures/Meds    Pertinent Labs Reviewed: reviewed  Pertinent Labs Comments: Hg 6.6, Hct 20.7, Mg 1.6,   Pertinent Medications Reviewed: reviewed  Pertinent Medications Comments: Fe, senna-docusate, lovenox, polyethylene gycol,          Estimated/Assessed Needs    Weight Used For Calorie Calculations: 42 kg (92 lb 9.5 oz)(UBW in elderly )  Energy Calorie Requirements (kcal): 7805-7472  Energy Need Method: Kcal/kg(30-35 kcal/kg)  Protein Requirements: 55-63gm  Weight Used For Protein Calculations: 42 kg (92 lb 9.5 oz)(x 1.3-1.5 2/2 malnutrition)  Fluid Requirements (mL): per MD  Estimated Fluid Requirement Method: RDA Method  RDA Method (mL): 1260  CHO Requirement: -      Nutrition Prescription Ordered    Current Diet Order: Regular , lactose free milk. no beans , no gaseous vegetables, no beans,   Nutrition Order Comments: recommend boost plus TID no chocolate  Oral Nutrition Supplement: Boost breeze TID    Evaluation of Received Nutrient/Fluid Intake    Energy Calories Required: not meeting needs  Protein Required: not meeting needs  Fluid Required: not meeting needs  Tolerance: tolerating  % Intake of Estimated Energy Needs: 25 - 50 %  % Meal Intake: 50 - 75 %    Nutrition Risk    Level of Risk/Frequency of Follow-up: high     Assessment and Plan    Severe malnutrition  Malnutrition in the context of Social/Environmental Circumstances    Related to (etiology):  Starvation and lack of self care while caregiver, grief response, reduced appetite and po intake    Signs and Symptoms (as evidenced by):  Energy Intake: <75% of estimated energy requirement for > 3 months  Body Fat Depletion: moderate depletion of orbitals and triceps and buccal area  Muscle Mass Depletion: severe depletion of clavicle region and scapular region moderate at temples, hands and calf  Weight Loss: > 7.5% % x < 3 months  Fluid Accumulation:  mild    Interventions/Recommendations (treatment strategy):  Regular diet  Commercial beverage- Boost plus TID no chocolate    Nutrition Diagnosis Status:  New               Monitor and Evaluation    Food and Nutrient Intake: food and beverage intake  Physical Activity and Function: nutrition-related ADLs and IADLs  Anthropometric Measurements: weight change  Biochemical Data, Medical Tests and Procedures: electrolyte and renal panel  Nutrition-Focused Physical Findings: overall appearance     Malnutrition Assessment  Malnutrition Type: social/environmental circumstances  Energy Intake: severe energy intake  Skin (Micronutrient): dry  Hair/Scalp (Micronutrient): dull, dry  Eyes (Micronutrient): conjunctiva dull  Teeth (Micronutrient): (plates)  Neck/Chest (Micronutrient): bony prominence, muscle wasting, subcutaneous fat loss  Musculoskeletal/Lower Extremities: edema, joints painful, muscle wasting, subcutaneous fat loss       Weight Loss (Malnutrition): greater than 7.5% in 3 months  Subcutaneous Fat (Malnutrition): severe depletion  Muscle Mass (Malnutrition): severe depletion  Fluid Accumulation (Malnutrition): mild   Orbital Region (Subcutaneous Fat Loss): moderate depletion  Upper Arm Region (Subcutaneous Fat Loss): moderate depletion  Thoracic and Lumbar Region: moderate depletion   [place] Region (Muscle Loss): moderate depletion  Clavicle Bone Region (Muscle Loss): severe depletion  Clavicle and Acromion Bone Region (Muscle Loss): severe depletion  Scapular Bone Region (Muscle Loss): severe depletion  Dorsal Hand (Muscle Loss): moderate depletion  Posterior Calf Region (Muscle Loss): mild depletion            Severe Weight Loss (Malnutrition): greater than 7.5% in 3 months    Nutrition Follow-Up    RD Follow-up?: Yes

## 2019-05-11 NOTE — PLAN OF CARE
Problem: Physical Therapy Goal  Goal: Physical Therapy Goal  Goals to be met by: 14 days 2019     Patient will increase functional independence with mobility by performin. Supine to sit with supervision  2. Sit to supine with supervision  3. Sit to stand transfer with Stand-by Assistance  4. Bed to chair transfer with Stand-by Assistance using hemiwalker or least restrictive assistive device  5. Gait  x 100 feet with Stand-by Assistance using  hemiwalker or least restrictive assistive device.   6. Ascend/Descend 4 inch curb step with Contact Guard Assistance using  hemiwalker or least restrictive assistive device.  7. Stand for 3 minutes with Contact Guard Assistance using  hemiwalker or least restrictive assistive device, intermittently,  and perform an activity  8. Lower extremity exercise program x20 reps per handout, with assistance as needed and gym therex     Outcome: Ongoing (interventions implemented as appropriate)  LTGs remain appropriate. Pt will continue PT POC.    Josy Prater, PT  2019

## 2019-05-11 NOTE — PLAN OF CARE
Problem: Fall Injury Risk  Goal: Absence of Fall and Fall-Related Injury    Intervention: Promote Injury-Free Environment     05/11/19 0804   Optimize [place] and Functional Mobility   Environmental Safety Modification assistive device/personal items within reach   Optimize Balance and Safe Activity   Safety Promotion/Fall Prevention assistive device/personal item within reach

## 2019-05-11 NOTE — ASSESSMENT & PLAN NOTE
Malnutrition in the context of Social/Environmental Circumstances    Related to (etiology):  Starvation and lack of self care while caregiver, grief response, reduced appetite and po intake    Signs and Symptoms (as evidenced by):  Energy Intake: <75% of estimated energy requirement for > 3 months  Body Fat Depletion: moderate depletion of orbitals and triceps and buccal area  Muscle Mass Depletion: severe depletion of clavicle region and scapular region moderate at temples, hands and calf  Weight Loss:  7.% % x > 3 months( 1/25/19 86# compared to current wt 80#)   ( 13% weight change from usual weight )  Fluid Accumulation: mild    Interventions/Recommendations (treatment strategy):  Regular diet  Commercial beverage- Boost plus TID no chocolate    Nutrition Diagnosis Status:  New

## 2019-05-12 PROCEDURE — 94799 UNLISTED PULMONARY SVC/PX: CPT

## 2019-05-12 PROCEDURE — 25000003 PHARM REV CODE 250: Performed by: NURSE PRACTITIONER

## 2019-05-12 PROCEDURE — 63600175 PHARM REV CODE 636 W HCPCS: Performed by: HOSPITALIST

## 2019-05-12 PROCEDURE — 94640 AIRWAY INHALATION TREATMENT: CPT

## 2019-05-12 PROCEDURE — 94761 N-INVAS EAR/PLS OXIMETRY MLT: CPT

## 2019-05-12 PROCEDURE — 25000242 PHARM REV CODE 250 ALT 637 W/ HCPCS: Performed by: HOSPITALIST

## 2019-05-12 PROCEDURE — 25000003 PHARM REV CODE 250: Performed by: HOSPITALIST

## 2019-05-12 PROCEDURE — 11000004 HC SNF PRIVATE

## 2019-05-12 RX ADMIN — VERAPAMIL HYDROCHLORIDE 80 MG: 80 TABLET, FILM COATED ORAL at 09:05

## 2019-05-12 RX ADMIN — FERROUS SULFATE TAB EC 325 MG (65 MG FE EQUIVALENT) 325 MG: 325 (65 FE) TABLET DELAYED RESPONSE at 09:05

## 2019-05-12 RX ADMIN — LOSARTAN POTASSIUM 50 MG: 50 TABLET, FILM COATED ORAL at 09:05

## 2019-05-12 RX ADMIN — POLYETHYLENE GLYCOL 3350 17 G: 17 POWDER, FOR SOLUTION ORAL at 09:05

## 2019-05-12 RX ADMIN — IPRATROPIUM BROMIDE AND ALBUTEROL SULFATE 3 ML: .5; 3 SOLUTION RESPIRATORY (INHALATION) at 03:05

## 2019-05-12 RX ADMIN — ATENOLOL 25 MG: 25 TABLET ORAL at 09:05

## 2019-05-12 RX ADMIN — FERROUS SULFATE TAB EC 325 MG (65 MG FE EQUIVALENT) 325 MG: 325 (65 FE) TABLET DELAYED RESPONSE at 08:05

## 2019-05-12 RX ADMIN — IPRATROPIUM BROMIDE AND ALBUTEROL SULFATE 3 ML: .5; 3 SOLUTION RESPIRATORY (INHALATION) at 11:05

## 2019-05-12 RX ADMIN — ENOXAPARIN SODIUM 30 MG: 100 INJECTION SUBCUTANEOUS at 06:05

## 2019-05-12 RX ADMIN — ACETAMINOPHEN 650 MG: 325 TABLET ORAL at 09:05

## 2019-05-12 RX ADMIN — SENNOSIDES AND DOCUSATE SODIUM 1 TABLET: 8.6; 5 TABLET ORAL at 09:05

## 2019-05-12 RX ADMIN — IPRATROPIUM BROMIDE AND ALBUTEROL SULFATE 3 ML: .5; 3 SOLUTION RESPIRATORY (INHALATION) at 08:05

## 2019-05-12 RX ADMIN — FERROUS SULFATE TAB EC 325 MG (65 MG FE EQUIVALENT) 325 MG: 325 (65 FE) TABLET DELAYED RESPONSE at 04:05

## 2019-05-12 RX ADMIN — TIOTROPIUM BROMIDE 18 MCG: 18 CAPSULE ORAL; RESPIRATORY (INHALATION) at 09:05

## 2019-05-12 RX ADMIN — IPRATROPIUM BROMIDE AND ALBUTEROL SULFATE 3 ML: .5; 3 SOLUTION RESPIRATORY (INHALATION) at 07:05

## 2019-05-13 LAB
ANION GAP SERPL CALC-SCNC: 8 MMOL/L (ref 8–16)
BASOPHILS # BLD AUTO: 0.04 K/UL (ref 0–0.2)
BASOPHILS NFR BLD: 0.7 % (ref 0–1.9)
BUN SERPL-MCNC: 12 MG/DL (ref 8–23)
CALCIUM SERPL-MCNC: 8.7 MG/DL (ref 8.7–10.5)
CHLORIDE SERPL-SCNC: 98 MMOL/L (ref 95–110)
CO2 SERPL-SCNC: 30 MMOL/L (ref 23–29)
CREAT SERPL-MCNC: 0.6 MG/DL (ref 0.5–1.4)
DIFFERENTIAL METHOD: ABNORMAL
EOSINOPHIL # BLD AUTO: 0.3 K/UL (ref 0–0.5)
EOSINOPHIL NFR BLD: 5.2 % (ref 0–8)
ERYTHROCYTE [DISTWIDTH] IN BLOOD BY AUTOMATED COUNT: 15.3 % (ref 11.5–14.5)
EST. GFR  (AFRICAN AMERICAN): >60 ML/MIN/1.73 M^2
EST. GFR  (NON AFRICAN AMERICAN): >60 ML/MIN/1.73 M^2
GLUCOSE SERPL-MCNC: 89 MG/DL (ref 70–110)
HCT VFR BLD AUTO: 20.6 % (ref 37–48.5)
HGB BLD-MCNC: 6.3 G/DL (ref 12–16)
IMM GRANULOCYTES # BLD AUTO: 0.02 K/UL (ref 0–0.04)
IMM GRANULOCYTES NFR BLD AUTO: 0.3 % (ref 0–0.5)
LYMPHOCYTES # BLD AUTO: 1.3 K/UL (ref 1–4.8)
LYMPHOCYTES NFR BLD: 21.9 % (ref 18–48)
MAGNESIUM SERPL-MCNC: 2 MG/DL (ref 1.6–2.6)
MCH RBC QN AUTO: 26.4 PG (ref 27–31)
MCHC RBC AUTO-ENTMCNC: 30.6 G/DL (ref 32–36)
MCV RBC AUTO: 86 FL (ref 82–98)
MONOCYTES # BLD AUTO: 0.8 K/UL (ref 0.3–1)
MONOCYTES NFR BLD: 12.8 % (ref 4–15)
NEUTROPHILS # BLD AUTO: 3.5 K/UL (ref 1.8–7.7)
NEUTROPHILS NFR BLD: 59.1 % (ref 38–73)
NRBC BLD-RTO: 0 /100 WBC
PHOSPHATE SERPL-MCNC: 3.5 MG/DL (ref 2.7–4.5)
PLATELET # BLD AUTO: 348 K/UL (ref 150–350)
PMV BLD AUTO: 10.7 FL (ref 9.2–12.9)
POTASSIUM SERPL-SCNC: 4.2 MMOL/L (ref 3.5–5.1)
RBC # BLD AUTO: 2.39 M/UL (ref 4–5.4)
SODIUM SERPL-SCNC: 136 MMOL/L (ref 136–145)
WBC # BLD AUTO: 5.93 K/UL (ref 3.9–12.7)

## 2019-05-13 PROCEDURE — 63600175 PHARM REV CODE 636 W HCPCS: Performed by: HOSPITALIST

## 2019-05-13 PROCEDURE — 94761 N-INVAS EAR/PLS OXIMETRY MLT: CPT

## 2019-05-13 PROCEDURE — 97535 SELF CARE MNGMENT TRAINING: CPT

## 2019-05-13 PROCEDURE — 25000003 PHARM REV CODE 250: Performed by: NURSE PRACTITIONER

## 2019-05-13 PROCEDURE — 97530 THERAPEUTIC ACTIVITIES: CPT

## 2019-05-13 PROCEDURE — 25000242 PHARM REV CODE 250 ALT 637 W/ HCPCS: Performed by: HOSPITALIST

## 2019-05-13 PROCEDURE — 94640 AIRWAY INHALATION TREATMENT: CPT

## 2019-05-13 PROCEDURE — 36415 COLL VENOUS BLD VENIPUNCTURE: CPT

## 2019-05-13 PROCEDURE — 97116 GAIT TRAINING THERAPY: CPT

## 2019-05-13 PROCEDURE — 83735 ASSAY OF MAGNESIUM: CPT

## 2019-05-13 PROCEDURE — 80048 BASIC METABOLIC PNL TOTAL CA: CPT

## 2019-05-13 PROCEDURE — 84100 ASSAY OF PHOSPHORUS: CPT

## 2019-05-13 PROCEDURE — 11000004 HC SNF PRIVATE

## 2019-05-13 PROCEDURE — 25000003 PHARM REV CODE 250: Performed by: HOSPITALIST

## 2019-05-13 PROCEDURE — 97110 THERAPEUTIC EXERCISES: CPT

## 2019-05-13 PROCEDURE — 25000242 PHARM REV CODE 250 ALT 637 W/ HCPCS: Performed by: NURSE PRACTITIONER

## 2019-05-13 PROCEDURE — 97803 MED NUTRITION INDIV SUBSEQ: CPT

## 2019-05-13 PROCEDURE — 94799 UNLISTED PULMONARY SVC/PX: CPT

## 2019-05-13 PROCEDURE — 85025 COMPLETE CBC W/AUTO DIFF WBC: CPT

## 2019-05-13 RX ORDER — LOSARTAN POTASSIUM 25 MG/1
25 TABLET ORAL DAILY
Status: DISCONTINUED | OUTPATIENT
Start: 2019-05-14 | End: 2019-05-14

## 2019-05-13 RX ORDER — IPRATROPIUM BROMIDE AND ALBUTEROL SULFATE 2.5; .5 MG/3ML; MG/3ML
3 SOLUTION RESPIRATORY (INHALATION)
Status: COMPLETED | OUTPATIENT
Start: 2019-05-13 | End: 2019-05-15

## 2019-05-13 RX ADMIN — TIOTROPIUM BROMIDE 18 MCG: 18 CAPSULE ORAL; RESPIRATORY (INHALATION) at 09:05

## 2019-05-13 RX ADMIN — ACETAMINOPHEN 650 MG: 325 TABLET ORAL at 11:05

## 2019-05-13 RX ADMIN — IPRATROPIUM BROMIDE AND ALBUTEROL SULFATE 3 ML: .5; 3 SOLUTION RESPIRATORY (INHALATION) at 08:05

## 2019-05-13 RX ADMIN — VERAPAMIL HYDROCHLORIDE 80 MG: 80 TABLET, FILM COATED ORAL at 03:05

## 2019-05-13 RX ADMIN — ACETAMINOPHEN 650 MG: 325 TABLET ORAL at 09:05

## 2019-05-13 RX ADMIN — LOSARTAN POTASSIUM 50 MG: 50 TABLET, FILM COATED ORAL at 09:05

## 2019-05-13 RX ADMIN — ATENOLOL 25 MG: 25 TABLET ORAL at 09:05

## 2019-05-13 RX ADMIN — ENOXAPARIN SODIUM 30 MG: 100 INJECTION SUBCUTANEOUS at 05:05

## 2019-05-13 RX ADMIN — IPRATROPIUM BROMIDE AND ALBUTEROL SULFATE 3 ML: .5; 3 SOLUTION RESPIRATORY (INHALATION) at 04:05

## 2019-05-13 RX ADMIN — FERROUS SULFATE TAB EC 325 MG (65 MG FE EQUIVALENT) 325 MG: 325 (65 FE) TABLET DELAYED RESPONSE at 08:05

## 2019-05-13 RX ADMIN — FERROUS SULFATE TAB EC 325 MG (65 MG FE EQUIVALENT) 325 MG: 325 (65 FE) TABLET DELAYED RESPONSE at 09:05

## 2019-05-13 RX ADMIN — VERAPAMIL HYDROCHLORIDE 80 MG: 80 TABLET, FILM COATED ORAL at 09:05

## 2019-05-13 RX ADMIN — FERROUS SULFATE TAB EC 325 MG (65 MG FE EQUIVALENT) 325 MG: 325 (65 FE) TABLET DELAYED RESPONSE at 03:05

## 2019-05-13 NOTE — PLAN OF CARE
Problem: Occupational Therapy Goal  Goal: Occupational Therapy Goal  Goals to be met by: 5/23/2019     Patient will increase functional independence with ADLs by performing:    UE Dressing with Minimal Assistance.  LE Dressing with Minimal Assistance.  Grooming while standing at sink with Stand-by Assistance.  Toileting from bedside commode with Minimal Assistance for hygiene and clothing management.   Bathing with Minimal Assistance.  Supine to sit with Stand-by Assistance /c HOB flat and no handrails.  Stand pivot transfers with Contact Guard Assistance.  Toilet transfer to bedside commode with Contact Guard Assistance.  Right Upper extremity exercise program 3 x 10 reps per handout, with independence.  Patient will complete a standing activity for 8 min with S in order to perform self care tasks.    Outcome: Ongoing (interventions implemented as appropriate)  Patient's goals are appropriate.   DAGMAR Tate  5/13/2019

## 2019-05-13 NOTE — PLAN OF CARE
Problem: Skin Injury Risk Increased  Goal: Skin Health and Integrity    Intervention: Optimize Skin Protection     05/13/19 0650   Prevent Additional Skin Injury   Head of Bed (HOB) HOB elevated   Pressure Reduction Techniques frequent weight shift encouraged

## 2019-05-13 NOTE — PLAN OF CARE
Problem: Physical Therapy Goal  Goal: Physical Therapy Goal  Goals to be met by: 14 days 2019     Patient will increase functional independence with mobility by performin. Supine to sit with supervision  2. Sit to supine with supervision  3. Sit to stand transfer with Stand-by Assistance  4. Bed to chair transfer with Stand-by Assistance using hemiwalker or least restrictive assistive device  5. Gait  x 100 feet with Stand-by Assistance using  hemiwalker or least restrictive assistive device.   6. Ascend/Descend 4 inch curb step with Contact Guard Assistance using  hemiwalker or least restrictive assistive device.  7. Stand for 3 minutes with Contact Guard Assistance using  hemiwalker or least restrictive assistive device, intermittently,  and perform an activity  8. Lower extremity exercise program x20 reps per handout, with assistance as needed and gym therex     Goals remain appropriate. Continue with PT POC as indicated.

## 2019-05-13 NOTE — PT/OT/SLP PROGRESS
Occupational Therapy  Treatment    Siomara Flores   MRN: [id number]   Admitting Diagnosis: Closed displaced intertrochanteric fracture of left femur    OT Date of Treatment: 05/13/19       Billable Minutes:  Self Care/Home Management 30 and Therapeutic Activity 15    General Precautions: Standard, fall  Orthopedic Precautions: LLE weight bearing as tolerated, LUE non weight bearing  Braces: Sling and swathe(L UE; Patient also has a L wrist brace. )         Subjective:  Communicated with patient prior to session.    Pain/Comfort  Pain Rating 1: 0/10  Pain Rating Post-Intervention 1: 0/10    Objective:       Occupational Performance:    Functional Mobility/Transfers:  · Patient completed Sit <> Stand Transfer with moderate assistance  with  hand-held assist   · Patient completed Toilet Transfer Stand Pivot technique with moderate assistance with  hand-held assist    Activities of Daily Living:  · Grooming: minimum assistance oral care and washing face sitting in w/c at sink  · Upper Body Dressing: total assistance Donning back gown  · Lower Body Dressing: total assistance Francis and donning pants  · Toileting: maximal assistance      [facility] 6 Click:  [facility] Total Score: 11    Additional Treatment:  Patient performed a functional activity removing and replacing items using pegs and pegboard while standing with CGA (sit<>stand with mod A) for activity tolerance and balance in order to perform self care tasks.     Patient left up in chair with call button in reach and all needs met.     ASSESSMENT:  Siomara Flores is a 86 y.o. female with a medical diagnosis of Closed displaced intertrochanteric fracture of left femur and presents with the deficits listed below. Patient tolerated treatment session and was motivated to complete tasks. Patient continues to benefit from skilled OT services to achieve maximal independence.    Rehab identified problem list/impairments: weakness, impaired endurance, impaired self care skills,  impaired functional mobilty, gait instability, impaired balance, decreased upper extremity function, decreased lower extremity function, pain, orthopedic precautions    Rehab potential is good    Activity tolerance: Good    Discharge recommendations: home with home health     Barriers to discharge: Decreased caregiver support     Equipment recommendations: walker, papi, commode, tub bench(Need to confirm with family (Patient reports she has equipment from her ))     GOALS:   Multidisciplinary Problems     Occupational Therapy Goals        Problem: Occupational Therapy Goal    Goal Priority Disciplines Outcome Interventions   Occupational Therapy Goal     OT, PT/OT Ongoing (interventions implemented as appropriate)    Description:  Goals to be met by: 5/23/2019     Patient will increase functional independence with ADLs by performing:    UE Dressing with Minimal Assistance.  LE Dressing with Minimal Assistance.  Grooming while standing at sink with Stand-by Assistance.  Toileting from bedside commode with Minimal Assistance for hygiene and clothing management.   Bathing with Minimal Assistance.  Supine to sit with Stand-by Assistance /c HOB flat and no handrails.  Stand pivot transfers with Contact Guard Assistance.  Toilet transfer to bedside commode with Contact Guard Assistance.  Right Upper extremity exercise program 3 x 10 reps per handout, with independence.  Patient will complete a standing activity for 8 min with S in order to perform self care tasks.                     Plan:  Patient to be seen 5 x/week to address the above listed problems via therapeutic exercises, therapeutic activities, self-care/home management  Plan of Care expires: 06/10/19  Plan of Care reviewed with: patient    DAGMAR Tate  05/13/2019

## 2019-05-13 NOTE — PROGRESS NOTES
[facility]                                  Skilled Nursing Facility                   Progress Note     Admit Date: 5/9/2019  RAVI TBD  Principal Problem:  Closed displaced intertrochanteric fracture of left femurHPI obtained from patient interview and chart review     Chief Complaint: Establish Care/ Initial Visit     HPI:   Mrs. Flores is a 86 year old. female Sikh with PMHx of  Bronchiectasis, SVT who presents to SNF following a hospitalization for left intertrochanteric, left proximal humerus, and left distal radius fracture s/p left femur intramedullary marc after a mechanical fall. She was admitted to [facility] from 5/2 until 5/9. Dr. Owusu performed a Cephalomedullary nail fixation of left intertrochanteric femur fracture on 5/3/19. EBL is 75ml.  Perioperative course was uncomplicated.  Patient's postop course complicated with critical anemia and hypovolemic shock requiring pressor support.  Patient was educated on her status and refused blood transfusions due to Episcopalian belief.  Her H&H preop H&H was 8.4/26. Her lowest H&H was on 5/8 at 6.0/19.  She will remain on ferrous sulfate 325 mg t.i.d..  Patient received 2 doses of Epogen while at [facility].      She originally presented to the ED for evaluation of left hip pain sustained after a fall. She was walking in her driveway which is full of cement holes, when she lost her footing and fell, landing on her left hip and left shoulder. After, she was unable to bear weight or stand.     She currently  denies any numbness or tingling in her extremities. She endorses pain in her shoulder and her hip. She is a Sikh, and refuses any blood products. Patient will be treated at [facility] with PT and OT to improve functional status and ability to perform ADLs.     Past Medical History: Patient has a past medical history of Bronchiectasis without complication, Hypertension, and  Supraventricular tachycardia.    Past Surgical History: Patient has a past surgical history that includes Cholecystectomy; Tonsillectomy; and Intramedullary rodding of femur (Left, 5/3/2019).    Social History: Patient reports that she has never smoked. She has never used smokeless tobacco. She reports that she does not drink alcohol or use drugs.  Patient's ill   about a month ago    Family History:  No family significant history to report    Allergies: Patient is allergic to aspirin (bulk).    ROS  Constitutional: Negative for fever or fatigue.   Eyes: Negative for blurred vision, double vision and discharge.   Respiratory: Negative for cough, shortness of breath and wheezing.    Cardiovascular: Negative for chest pain, palpitations, claudication, and leg swelling.   Gastrointestinal: Negative for abdominal pain, constipation, diarrhea, nausea and vomiting.   Genitourinary: Negative for dysuria, frequency and urgency.   Musculoskeletal:  + generalized weakness, pain to left arm and hip. Negative for back pain and myalgias.   Skin: Negative for itching and rash.   Neurological: Negative for dizziness, speech change, and headaches.   Psychiatric/Behavioral: + for depression. The patient is not nervous/anxious.      PEx  Temp:  [98.2 °F (36.8 °C)-98.4 °F (36.9 °C)]   Pulse:  [73-96]   Resp:  [18-24]   BP: ()/(47-58)   SpO2:  [96 %-98 %]      Constitutional: Patient appears frail  and in no distress   HENT:   Head: Normocephalic and atraumatic.   Eyes: Pupils are equal, round, and reactive to light.   Neck: Normal range of motion. Neck supple.   Cardiovascular: Normal rate, regular rhythm and normal heart sounds.    Pulmonary/Chest: Effort normal and breath sounds are clear  Abdominal: Soft. Bowel sounds are normal.   Musculoskeletal: decreased range of motion to left arm.   Neurological: Alert and oriented to person, place, and time.   Skin: Skin is warm and dry. Pt has extremely long toe nails that  are painful to the touch.   Psychiatric: mood is sad      Assessment and Plan:    Closed displaced intratrochanteric fracture of left femur  s/p left Cephalomedullary nail fixation of left  femur fracture on 5/3/19  Postoperative pain  - WBAT to LLE, NWB to LUE  - pt only wishes to take acetaminophen for pain- 650mg q6h PRN      Acute blood loss anemia  - continue ferrous sulfate 325 mg t.i.d.  - patient is a [employer] who refuses blood transfusions  - Post-op H/H 6.9/21.8 with baseline 8.4/26.9  - pediatric blood draws only, avoid IVF  - 5/13 initiated procrit 20,000 units     Debility   - Continue with PT/OT for gait training and strengthening and restoration of ADL's   - Encourage mobility, OOB in chair, and early ambulation as appropriate  - Fall precautions   - Monitor for bowel and bladder dysfunction  - Monitor for and prevent skin breakdown and pressure ulcers  - Continue DVT prophylaxis with  enoxaparin 30 mg daily    HTN  - 5/13 decrease losartan from 50 mg to 25 mg daily, continue atenolol 25 mg daily.  Holding parameters in place to hold for systolic blood pressure less than 100.     Hx of SVT  - continue verapamil 80 mg TID    Hx of Bronchiectasis  - continue duo nebs q.4 hours while awake and tiotropium 18mcg daily     Severe malnutrition  - Energy Intake: <75% of estimated energy requirement for > 3 months  - Body Fat Depletion: moderate depletion of orbitals and triceps and buccal area  - Muscle Mass Depletion: severe depletion of clavicle region and scapular region moderate at temples, hands and calf  - Weight Loss: > 7.5% % x < 3 months   - Continue regular diet, honor food intolerances, recommend change boost breeze to boost plus TID no chocolate  per pt request.    DC Update: pt lives alone and is asking about nursing home type placement after SNF.     Future Appointments   Date Time Provider Department Center   5/17/2019  1:30 PM Mirna Crews PA-C [facility] I certify  that SNF services are required to be given on an inpatient basis because Siomara Flores needs for skilled nursing care and/or skilled rehabilitation are required on a daily basis and such services can only practically be provided in a skilled nursing facility setting and are for an ongoing condition for which she received inpatient care in the hospital.     68 minutes spent in the care of the patient (Greater than 1/2 spent in non direct face-to-face contact)    36 of 68 minutes spent on documentation and counseling patient on clinical conditions and therapies provided regarding low blood count and procrit order, pt control and beginning of DC planning.  The remainder of the time was spent in direct patient care.     Edel Ibrahim, NP

## 2019-05-13 NOTE — PROGRESS NOTES
"Admission Medication Reconciliation - Pharmacy Consult Note    The home medication history was taken by Anna Martinez.  Based on information gathered and subsequent review by the clinical pharmacist, the items below may need attention.     You may go to "Admission" then "Reconcile Home Medications" tabs to review and/or act upon these items.     Potentially problematic discrepancies with current MAR  o Patient IS taking the following which was not ordered upon admit  o Albuterol inhaler    o Patient IS NOT taking the following which was ordered upon admit  o Ferrous sulfate 325 mg three times daily  o Spiriva inhaler daily    o Patient is taking a drug DIFFERENTLY than how ordered upon admit  o Taking losartan 100 mg daily at home, currently taking 25 mg daily  o Taking verapamil  mg daily at home, currently taking 80 mg three times daily          Please address this information as you see fit.  Feel free to contact us if you have any questions or require assistance.        Vita Del Valle, Pharm. D.  Clinical Pharmacist  [facility]        "

## 2019-05-13 NOTE — CLINICAL REVIEW
Clinical Pharmacy Chart Review Note      Admit Date: 5/9/2019   LOS: 4 days       Siomara Flores is a 86 y.o. female admitted to SNF for PT/OT after hospitalization for fall, severe malnutrition.    Active Hospital Problems    Diagnosis  POA    Severe malnutrition [E43]  Yes    Fall [W19.XXXA]  Yes      Resolved Hospital Problems   No resolved problems to display.     Review of patient's allergies indicates:   Allergen Reactions    Aspirin (bulk) Other (See Comments)     Developed gastric ulcer in past when taking aspirin per patient     Patient Active Problem List    Diagnosis Date Noted    Severe malnutrition 05/10/2019    Urinary retention 05/08/2019    Hypophosphatemia 05/08/2019    Postprocedural hypotension     Microcytic anemia 05/03/2019    Acute blood loss anemia 05/03/2019    Closed displaced intertrochanteric fracture of left femur 05/02/2019    Fall 05/02/2019    Closed fracture of left distal radius 05/02/2019    Closed fracture of left proximal humerus 05/02/2019    Refusal of blood transfusions as patient is Pentecostalism 05/02/2019    Drug-induced bradycardia 11/04/2015    Hypertension 06/18/2014    SVT (supraventricular tachycardia) 08/01/2012    Bronchiectasis without complication 08/01/2012    Tinnitus of both ears 08/01/2012    Cholecystitis 08/01/2012       Scheduled Meds:    albuterol-ipratropium  3 mL Nebulization Q4H WAKE    atenolol  25 mg Oral Daily    enoxaparin  30 mg Subcutaneous Daily    ferrous sulfate  325 mg Oral TID    [START ON 5/14/2019] losartan  25 mg Oral Daily    polyethylene glycol  17 g Oral Daily    senna-docusate 8.6-50 mg  1 tablet Oral BID    tiotropium  1 capsule Inhalation Daily    verapamil  80 mg Oral TID     Continuous Infusions:   PRN Meds: acetaminophen, bisacodyl, calcium carbonate, dextrose 50%, dextrose 50%, glucagon (human recombinant), glucose, glucose, ramelteon, senna-docusate 8.6-50 mg    OBJECTIVE:     Vital Signs (Last  24H)  Temp:  [98.2 °F (36.8 °C)-98.4 °F (36.9 °C)]   Pulse:  [73-96]   Resp:  [18-24]   BP: ()/(47-58)   SpO2:  [96 %-98 %]     Laboratory:  CBC:   Recent Labs   Lab 05/08/19 0342 05/09/19 0422 05/13/19  0510   WBC 5.52 8.40 5.93   RBC 2.35* 2.52* 2.39*   HGB 6.0* 6.6* 6.3*   HCT 19.3* 20.7* 20.6*    238 348   MCV 82 82 86   MCH 25.5* 26.2* 26.4*   MCHC 31.1* 31.9* 30.6*     BMP:   Recent Labs   Lab 05/08/19 0342 05/09/19 0422 05/13/19  0510   GLU 93 87 89    138 136   K 3.5 3.7 4.2   CL 99 98 98   CO2 32* 31* 30*   BUN 11 9 12   CREATININE 0.5 0.5 0.6   CALCIUM 8.2* 8.3* 8.7   MG 1.6 1.6 2.0     CMP:   Recent Labs   Lab 05/07/19 0909 05/08/19 0342 05/09/19 0422 05/13/19  0510   * 93 87 89   CALCIUM 8.5* 8.2* 8.3* 8.7   ALBUMIN 2.0* 2.0* 2.1*  --    PROT 5.6* 5.4* 5.8*  --     138 138 136   K 3.6 3.5 3.7 4.2   CO2 27 32* 31* 30*    99 98 98   BUN 10 11 9 12   CREATININE 0.5 0.5 0.5 0.6   ALKPHOS 120 106 112  --    ALT 20 17 15  --    AST 30 22 24  --    BILITOT 0.4 0.4 0.5  --      LFTs:   Recent Labs   Lab 05/07/19 0909 05/08/19 0342 05/09/19 0422   ALT 20 17 15   AST 30 22 24   ALKPHOS 120 106 112   BILITOT 0.4 0.4 0.5   PROT 5.6* 5.4* 5.8*   ALBUMIN 2.0* 2.0* 2.1*         ASSESSMENT/PLAN:     I have reviewed the medications in compliance with CMS Regulation F329 of the [place] Appendix PP. No irregularities were noted at this time.    Medications will be reviewed and monitored by Pharm. D.        Norma Martinez D.  Clinical Pharmacist  [facility]

## 2019-05-13 NOTE — PT/OT/SLP PROGRESS
Physical Therapy  Treatment    Siomara Flores   MRN: [id number]   Admitting Diagnosis: Closed displaced intertrochanteric fracture of left femur    PT Received On: 05/13/19  Total Time (min): (--)       Billable Minutes:  Gait Training 12, Therapeutic Activity 12 and Therapeutic Exercise 14    Missed time 2/2 pt fatigue.   Treatment Type: Treatment  PT/PTA: PTA     PTA Visit Number: 1       General Precautions: Standard, fall  Orthopedic Precautions: LLE weight bearing as tolerated, LUE non weight bearing   Braces: Sling and swathe     Subjective:  Communicated with nursing prior to session.  Pt agreed to work with therapy.     Pain/Comfort  Pain Rating 1: 0/10  Pain Rating Post-Intervention 1: 0/10    Objective:  Patient found seated w/c with OT.       AM-PAC 6 CLICK MOBILITY  Total Score:11    Bed Mobility:  Sit>Supine: not performed  Supine>Sit: not performed                                         Transfers:  Sit<>Stand: to/from w/c x5 trials w/ HW and Mod A to rise  Stand Pivot Transfer: w/c<>recumbent stepper w/ HW and Mod A   Increased time to perform, and cueing for hand placement.     Gait:  Amb x2 trials 6ft and 4ft w/ HW and Mod A for stability.      Therex:  BLE therex 2x10 reps:   -AP   -LAQ   -Hip Flexion   -GS    Additional Treatment:  Recumbent stepper x5 min w/ RUE and BLE    Patient left up in chair with call button in reach.    Assessment:  Siomara Flores is a 86 y.o. female with a medical diagnosis of Closed displaced intertrochanteric fracture of left femur.  Pt tolerated treatment fairly well, and will continue to benefit from PT services at this time. Continue with PT POC as indicated.    Rehab identified problem list/impairments: weakness, impaired endurance, impaired functional mobilty, impaired self care skills, gait instability, impaired balance, decreased upper extremity function, decreased lower extremity function, pain, orthopedic precautions    Rehab potential is good.    Activity  tolerance: Good    Discharge recommendations: home with home health     Barriers to discharge: Inaccessible home environment, Decreased caregiver support    Equipment recommendations: walker, papi, commode, tub bench(HW or QC pending progress)     GOALS:   Multidisciplinary Problems     Physical Therapy Goals        Problem: Physical Therapy Goal    Goal Priority Disciplines Outcome Goal Variances Interventions   Physical Therapy Goal     PT, PT/OT Ongoing (interventions implemented as appropriate)     Description:  Goals to be met by: 14 days 2019     Patient will increase functional independence with mobility by performin. Supine to sit with supervision  2. Sit to supine with supervision  3. Sit to stand transfer with Stand-by Assistance  4. Bed to chair transfer with Stand-by Assistance using hemiwalker or least restrictive assistive device  5. Gait  x 100 feet with Stand-by Assistance using  hemiwalker or least restrictive assistive device.   6. Ascend/Descend 4 inch curb step with Contact Guard Assistance using  hemiwalker or least restrictive assistive device.  7. Stand for 3 minutes with Contact Guard Assistance using  hemiwalker or least restrictive assistive device, intermittently,  and perform an activity  8. Lower extremity exercise program x20 reps per handout, with assistance as needed and gym therex                      PLAN:    Patient to be seen 5 x/week  to address the above listed problems via gait training, therapeutic activities, therapeutic exercises, wheelchair management/training  Plan of Care expires: 19  Plan of Care reviewed with: patient    Vita Peterson, PTA  2019

## 2019-05-14 ENCOUNTER — TELEPHONE (OUTPATIENT)
Dept: PODIATRY | Facility: CLINIC | Age: 84
End: 2019-05-14

## 2019-05-14 PROCEDURE — 11000004 HC SNF PRIVATE

## 2019-05-14 PROCEDURE — 97535 SELF CARE MNGMENT TRAINING: CPT

## 2019-05-14 PROCEDURE — 94640 AIRWAY INHALATION TREATMENT: CPT

## 2019-05-14 PROCEDURE — 25000242 PHARM REV CODE 250 ALT 637 W/ HCPCS: Performed by: NURSE PRACTITIONER

## 2019-05-14 PROCEDURE — 94761 N-INVAS EAR/PLS OXIMETRY MLT: CPT

## 2019-05-14 PROCEDURE — 97530 THERAPEUTIC ACTIVITIES: CPT

## 2019-05-14 PROCEDURE — 25000003 PHARM REV CODE 250: Performed by: NURSE PRACTITIONER

## 2019-05-14 PROCEDURE — 63600175 PHARM REV CODE 636 W HCPCS: Performed by: HOSPITALIST

## 2019-05-14 PROCEDURE — 94799 UNLISTED PULMONARY SVC/PX: CPT

## 2019-05-14 PROCEDURE — 97110 THERAPEUTIC EXERCISES: CPT

## 2019-05-14 PROCEDURE — 25000242 PHARM REV CODE 250 ALT 637 W/ HCPCS: Performed by: HOSPITALIST

## 2019-05-14 PROCEDURE — 97116 GAIT TRAINING THERAPY: CPT

## 2019-05-14 PROCEDURE — 63600175 PHARM REV CODE 636 W HCPCS: Performed by: NURSE PRACTITIONER

## 2019-05-14 PROCEDURE — 25000003 PHARM REV CODE 250: Performed by: HOSPITALIST

## 2019-05-14 RX ORDER — ACETAMINOPHEN 500 MG
1000 TABLET ORAL EVERY 8 HOURS
Status: DISCONTINUED | OUTPATIENT
Start: 2019-05-14 | End: 2019-06-11

## 2019-05-14 RX ADMIN — FERROUS SULFATE TAB EC 325 MG (65 MG FE EQUIVALENT) 325 MG: 325 (65 FE) TABLET DELAYED RESPONSE at 08:05

## 2019-05-14 RX ADMIN — POLYETHYLENE GLYCOL 3350 17 G: 17 POWDER, FOR SOLUTION ORAL at 09:05

## 2019-05-14 RX ADMIN — ACETAMINOPHEN 1000 MG: 325 TABLET ORAL at 10:05

## 2019-05-14 RX ADMIN — LOSARTAN POTASSIUM 25 MG: 25 TABLET, FILM COATED ORAL at 09:05

## 2019-05-14 RX ADMIN — VERAPAMIL HYDROCHLORIDE 80 MG: 80 TABLET, FILM COATED ORAL at 03:05

## 2019-05-14 RX ADMIN — EPOETIN ALFA-EPBX 20000 UNITS: 10000 INJECTION, SOLUTION INTRAVENOUS; SUBCUTANEOUS at 12:05

## 2019-05-14 RX ADMIN — ATENOLOL 25 MG: 25 TABLET ORAL at 09:05

## 2019-05-14 RX ADMIN — VERAPAMIL HYDROCHLORIDE 80 MG: 80 TABLET, FILM COATED ORAL at 08:05

## 2019-05-14 RX ADMIN — TIOTROPIUM BROMIDE 18 MCG: 18 CAPSULE ORAL; RESPIRATORY (INHALATION) at 09:05

## 2019-05-14 RX ADMIN — IPRATROPIUM BROMIDE AND ALBUTEROL SULFATE 3 ML: .5; 3 SOLUTION RESPIRATORY (INHALATION) at 07:05

## 2019-05-14 RX ADMIN — FERROUS SULFATE TAB EC 325 MG (65 MG FE EQUIVALENT) 325 MG: 325 (65 FE) TABLET DELAYED RESPONSE at 03:05

## 2019-05-14 RX ADMIN — SENNOSIDES AND DOCUSATE SODIUM 1 TABLET: 8.6; 5 TABLET ORAL at 09:05

## 2019-05-14 RX ADMIN — IPRATROPIUM BROMIDE AND ALBUTEROL SULFATE 3 ML: .5; 3 SOLUTION RESPIRATORY (INHALATION) at 04:05

## 2019-05-14 RX ADMIN — FERROUS SULFATE TAB EC 325 MG (65 MG FE EQUIVALENT) 325 MG: 325 (65 FE) TABLET DELAYED RESPONSE at 09:05

## 2019-05-14 RX ADMIN — VERAPAMIL HYDROCHLORIDE 80 MG: 80 TABLET, FILM COATED ORAL at 09:05

## 2019-05-14 RX ADMIN — SENNOSIDES AND DOCUSATE SODIUM 1 TABLET: 8.6; 5 TABLET ORAL at 08:05

## 2019-05-14 RX ADMIN — ENOXAPARIN SODIUM 30 MG: 100 INJECTION SUBCUTANEOUS at 04:05

## 2019-05-14 NOTE — PROGRESS NOTES
[facility] - Skilled Nursing Care  Adult Nutrition  Progress Note    SUMMARY   Recommendations    Recommendation/Intervention: Continue regular diet, boost plus TID, RD followng  Goals: PO to meet 50% of needs with ONS by next RD visit  Nutrition Goal Status: progressing towards goal  Communication of RD Recs: other (comment)(POC)    Reason for Assessment    Reason For Assessment: RD follow-up  Diagnosis: (debilty sp fall , sp rodding L femur)  Relevant Medical History: HTN, anemia,   Interdisciplinary Rounds: attended  General Information Comments: PO 75%  Nutrition Discharge Planning: Dc on regular diet with ONS of choice for weight gain and wound healing    Nutrition Risk Screen    Nutrition Risk Screen: no indicators present    Nutrition/Diet History    Patient Reported Diet/Restrictions/Preferences: general  Typical Food/Fluid Intake: lactose free milk, is willing to try boost plus rather than boost breeze  Food Preferences: sensitive stomach, will not eat beans, or cruciferous vegetable, corse foods,   Spiritual, Cultural Beliefs, Quaker Practices, Values that Affect Care: yes  Food Allergies: NKFA  Factors Affecting Nutritional Intake: altered gastrointestinal function, cultural/Voodoo beliefs, decreased appetite(may have been too distracted or too exhausted to eat and take care of herself)    Anthropometrics    Temp: 98.4 °F (36.9 °C)  Height Method: Stated  Height: 5' (152.4 cm)  Height (inches): 60 in  Weight Method: Standard Scale  Weight: 35.3 kg (77 lb 13.2 oz)  Weight (lb): 77.82 lb  Ideal Body Weight (IBW), Female: 100 lb  % Ideal Body Weight, Female (lb): 80.03 lb  BMI (Calculated): 15.7  BMI Grade: less than 16 protein-energy malnutrition grade III  Weight Loss: unintentional  Usual Body Weight (UBW), k.8 kg  % Usual Body Weight: 87.02  % Weight Change From Usual Weight: -13.16 %       Lab/Procedures/Meds    Pertinent Labs Reviewed: reviewed  Pertinent Labs Comments: Hg 6.6, Hct 20.7, Mg  1.6,   Pertinent Medications Reviewed: reviewed  Pertinent Medications Comments: Epo         Estimated/Assessed Needs    Weight Used For Calorie Calculations: 42 kg (92 lb 9.5 oz)(UBW in elderly )  Energy Calorie Requirements (kcal): 6723-7177  Energy Need Method: Kcal/kg(30-35 kcal/kg)  Protein Requirements: 55-63gm  Weight Used For Protein Calculations: 42 kg (92 lb 9.5 oz)(x 1.3-1.5 2/2 malnutrition)  Fluid Requirements (mL): per MD  Estimated Fluid Requirement Method: RDA Method  RDA Method (mL): 1260  CHO Requirement: -      Nutrition Prescription Ordered    Current Diet Order: Regular  Nutrition Order Comments: taking ONS 50%  Oral Nutrition Supplement: Boost plus TID    Evaluation of Received Nutrient/Fluid Intake    Energy Calories Required: meeting needs  Protein Required: meeting needs  Fluid Required: meeting needs  Tolerance: tolerating  % Intake of Estimated Energy Needs: 75 - 100 %  % Meal Intake: 75 - 100 %    Nutrition Risk    Level of Risk/Frequency of Follow-up: high     Assessment and Plan     Severe malnutrition  Malnutrition in the context of Social/Environmental Circumstances     Related to (etiology):  Starvation and lack of self care while caregiver, grief response, reduced appetite and po intake     Signs and Symptoms (as evidenced by):  Energy Intake: <75% of estimated energy requirement for > 3 months  Body Fat Depletion: moderate depletion of orbitals and triceps and buccal area  Muscle Mass Depletion: severe depletion of clavicle region and scapular region moderate at temples, hands and calf  Weight Loss: > 7.5% % x < 3 months  Fluid Accumulation: mild    Ongoing  Monitor and Evaluation    Food and Nutrient Intake: food and beverage intake  Physical Activity and Function: nutrition-related ADLs and IADLs  Anthropometric Measurements: weight change  Biochemical Data, Medical Tests and Procedures: electrolyte and renal panel  Nutrition-Focused Physical Findings: overall appearance      Malnutrition Assessment 5/10/19  Malnutrition Type: social/environmental circumstances  Energy Intake: severe energy intake  Skin (Micronutrient): dry  Hair/Scalp (Micronutrient): dull, dry  Eyes (Micronutrient): conjunctiva dull  Teeth (Micronutrient): (plates)  Neck/Chest (Micronutrient): bony prominence, muscle wasting, subcutaneous fat loss  Musculoskeletal/Lower Extremities: edema, joints painful, muscle wasting, subcutaneous fat loss       Weight Loss (Malnutrition): greater than 7.5% in 3 months  Subcutaneous Fat (Malnutrition): severe depletion  Muscle Mass (Malnutrition): severe depletion  Fluid Accumulation (Malnutrition): mild   Orbital Region (Subcutaneous Fat Loss): moderate depletion  Upper Arm Region (Subcutaneous Fat Loss): moderate depletion  Thoracic and Lumbar Region: moderate depletion   [place] Region (Muscle Loss): moderate depletion  Clavicle Bone Region (Muscle Loss): severe depletion  Clavicle and Acromion Bone Region (Muscle Loss): severe depletion  Scapular Bone Region (Muscle Loss): severe depletion  Dorsal Hand (Muscle Loss): moderate depletion  Posterior Calf Region (Muscle Loss): mild depletion            Severe Weight Loss (Malnutrition): greater than 7.5% in 3 months    Nutrition Follow-Up    RD Follow-up?: Yes

## 2019-05-14 NOTE — PT/OT/SLP PROGRESS
Occupational Therapy  Treatment    Siomara Flores   MRN: [id number]   Admitting Diagnosis: Closed displaced intertrochanteric fracture of left femur    OT Date of Treatment: 05/14/19       Billable Minutes: 60  Self Care/Home Management 40, Therapeutic Activity 10 and Therapeutic Exercise 10    General Precautions: Standard, fall  Orthopedic Precautions: LLE weight bearing as tolerated, LUE non weight bearing  Braces: Sling and swathe(L UE; Patient also has a L wrist brace. )         Subjective:  Communicated with patient prior to session.      Pain/Comfort  Pain Rating 1: 7/10  Location - Side 1: Left  Location - Orientation 1: generalized  Location 1: hip  Pain Addressed 1: Reposition, Distraction    Objective:   Patient found up in bedside chair.    Occupational Performance:    Functional Mobility/Transfers:  · Patient completed Sit <> Stand Transfer with minimum assistance  with  no assistive device   · Patient completed bedside chair <> wheelchair Stand Pivot technique with minimum assistance with no assistive device    Activities of Daily Living:  · Grooming: set up assistance seated in w/c at sink level performing oral hygiene  · Bathing: maximal assistance seated in w/c at sink level. (Patient able to wash chest, thighs, and genitals seated in w/c.)  · Upper Body Dressing: total assistance donning/doffing gown seated in w/c  · Lower Body Dressing: total assistance donning/doffing pull over pants seated in w/c and standing with minnimum assistance to pull pants over hips    AMPA 6 Click:  [facility] Total Score: 11    OT Exercises: RUE exercise using 2 lb dumbell in R hand x 2 sets 10 reps each of overhead press and chest press to inocrease strength for self care.    Additional Treatment:  Patient stood at table top ~2 minutes with minimum assistance while completing a table activity requiring crossing midline, fine motor skills, and visual scanning in preparation of home management.     Patient left up in chair  with call button in reach.    ASSESSMENT:  Siomara Flores is a 86 y.o. female with a medical diagnosis of Closed displaced intertrochanteric fracture of left femur and presents with the deficits listed below. Continues to benefit from OT.    Rehab identified problem list/impairments: weakness, impaired endurance, impaired self care skills, impaired functional mobilty, gait instability, impaired balance, decreased upper extremity function, decreased lower extremity function, pain, orthopedic precautions    Rehab potential is good    Activity tolerance: Fair    Discharge recommendations: home with home health     Barriers to discharge: Decreased caregiver support     Equipment recommendations: walker, papi, commode, tub bench(Need to confirm with family (Patient reports she has equipment from her ))     GOALS:   Multidisciplinary Problems     Occupational Therapy Goals        Problem: Occupational Therapy Goal    Goal Priority Disciplines Outcome Interventions   Occupational Therapy Goal     OT, PT/OT Ongoing (interventions implemented as appropriate)    Description:  Goals to be met by: 5/23/2019     Patient will increase functional independence with ADLs by performing:    UE Dressing with Minimal Assistance.  LE Dressing with Minimal Assistance.  Grooming while standing at sink with Stand-by Assistance.  Toileting from bedside commode with Minimal Assistance for hygiene and clothing management.   Bathing with Minimal Assistance.  Supine to sit with Stand-by Assistance /c HOB flat and no handrails.  Stand pivot transfers with Contact Guard Assistance.  Toilet transfer to bedside commode with Contact Guard Assistance.  Right Upper extremity exercise program 3 x 10 reps per handout, with independence.  Patient will complete a standing activity for 8 min with S in order to perform self care tasks.                     Plan:  Patient to be seen 5 x/week to address the above listed problems via therapeutic  exercises, therapeutic activities, self-care/home management  Plan of Care expires: 06/10/19  Plan of Care reviewed with: patient   The DAGMAR and KAMILLA have collaborated and discussed the patient's status, treatment plan and progress toward established goals.   ASIM Lobo 5/14/2019     ASIM Lobo  05/14/2019

## 2019-05-14 NOTE — TELEPHONE ENCOUNTER
----- Message from Omid Medina sent at 5/14/2019  2:59 PM CDT -----  Contact: [place]  Please call [place] at x [phone]    New patient requesting an immediate appt for toenail care (nails about 14 inches long)    Thank you

## 2019-05-14 NOTE — TELEPHONE ENCOUNTER
Called [facility] /[facility] back and she said patient need toenails trimmed, they are about 14 inches long, the nurse told me she isn't able to do her PT of that, because she isn't fitting in any shoes

## 2019-05-14 NOTE — PLAN OF CARE
Problem: Skin Injury Risk Increased  Goal: Skin Health and Integrity    Intervention: Optimize Skin Protection     05/14/19 0752   Prevent Additional Skin Injury   Head of Bed (HOB) HOB elevated   Pressure Reduction Techniques frequent weight shift encouraged;pressure points protected

## 2019-05-14 NOTE — PLAN OF CARE
Problem: Occupational Therapy Goal  Goal: Occupational Therapy Goal  Goals to be met by: 5/23/2019     Patient will increase functional independence with ADLs by performing:    UE Dressing with Minimal Assistance.  LE Dressing with Minimal Assistance.  Grooming while standing at sink with Stand-by Assistance.  Toileting from bedside commode with Minimal Assistance for hygiene and clothing management.   Bathing with Minimal Assistance.  Supine to sit with Stand-by Assistance /c HOB flat and no handrails.  Stand pivot transfers with Contact Guard Assistance.  Toilet transfer to bedside commode with Contact Guard Assistance.  Right Upper extremity exercise program 3 x 10 reps per handout, with independence.  Patient will complete a standing activity for 8 min with S in order to perform self care tasks.    Outcome: Ongoing (interventions implemented as appropriate)  Patient goals are appropriate.

## 2019-05-14 NOTE — PLAN OF CARE
Problem: Adult Inpatient Plan of Care  Goal: Plan of Care Review  Outcome: Ongoing (interventions implemented as appropriate)  Ms Mark is sitting up in recliner at this time. Sling remains in progress to LUE. Pt denied pain or discomfort. Scheduled Lovenox explaine and administered. Safety measures maintained. Call light is within reach. Will continue with plan of care.

## 2019-05-14 NOTE — PROGRESS NOTES
[facility]                                  Skilled Nursing Facility                   Progress Note     Admit Date: 2019  RAVI 2019  Principal Problem:  Closed displaced intertrochanteric fracture of left femurHPI obtained from patient interview and chart review     Chief Complaint:  Follow-up on hypotension    HPI:   Mrs. Flores is a 86 year old. female Mormon with PMHx of  Bronchiectasis, SVT who presents to SNF following a hospitalization for left intertrochanteric, left proximal humerus, and left distal radius fracture s/p left femur intramedullary marc after a mechanical fall. She was admitted to [facility] from  until . Dr. Owusu performed a Cephalomedullary nail fixation of left intertrochanteric femur fracture on 5/3/19.     Interval history:  Patient having low blood pressures yesterday; decreased losartan to 25 mg. Patient's 24 hr blood pressure today is 102/49 to 119/50.  Message sent to  and appointment scheduled are to make patient a Podiatry appointment to cut down toenails, patient's toenails so long and painful it hurts to walk.     Past Medical History: Patient has a past medical history of Bronchiectasis without complication, Hypertension, and Supraventricular tachycardia.    Past Surgical History: Patient has a past surgical history that includes Cholecystectomy; Tonsillectomy; and Intramedullary rodding of femur (Left, 5/3/2019).    Social History: Patient reports that she has never smoked. She has never used smokeless tobacco. She reports that she does not drink alcohol or use drugs.  Patient's ill   about a month ago    Family History:  No family significant history to report    Allergies: Patient is allergic to aspirin (bulk).    ROS  Constitutional: Negative for fever or fatigue.   Eyes: Negative for blurred vision, double vision and discharge.   Respiratory: Negative for  cough, shortness of breath and wheezing.    Cardiovascular: Negative for chest pain, palpitations, claudication, and leg swelling.   Gastrointestinal: Negative for abdominal pain, constipation, diarrhea, nausea and vomiting.   Genitourinary: Negative for dysuria, frequency and urgency.   Musculoskeletal:  + generalized weakness, pain to left arm and hip. Negative for back pain and myalgias.   Skin: Negative for itching and rash.   Neurological: Negative for dizziness, speech change, and headaches.   Psychiatric/Behavioral: + for depression. The patient is not nervous/anxious.      PEx  Temp:  [98.2 °F (36.8 °C)-98.8 °F (37.1 °C)]   Pulse:  [81-95]   Resp:  [16-20]   BP: (102-119)/(49-55)   SpO2:  [94 %-95 %]      Constitutional: Patient appears frail  and in no distress   HENT:   Head: Normocephalic and atraumatic.   Eyes: Pupils are equal, round, and reactive to light.   Neck: Normal range of motion. Neck supple.   Cardiovascular: Normal rate, regular rhythm and normal heart sounds.    Pulmonary/Chest: Effort normal and breath sounds are clear  Abdominal: Soft. Bowel sounds are normal.   Musculoskeletal: decreased range of motion to left arm.   Neurological: Alert and oriented to person, place, and time.   Skin: Skin is warm and dry. Pt has extremely long toe nails that are painful to the touch.   Psychiatric: mood is sad      Assessment and Plan:    HTN  - 5/13 decrease losartan from 50 mg to 25 mg daily, continue atenolol 25 mg daily.  Holding parameters in place to hold for systolic blood pressure less than 100.   - 5/14 holding home losartan at this time. patient's 24 hr blood pressure 102/49 to 119/50.     Onychogryphosis  - likely from  Insufficient consumtion of essential nutrients  - 5/14- Message sent to  and appointment scheduled are to make patient a Podiatry appointment to cut down toenails, patient's toenails are so long and painful it hurts her to walk. - patient to see Podiatry tomorrow  morning    Closed displaced intratrochanteric fracture of left femur  s/p left Cephalomedullary nail fixation of left  femur fracture on 5/3/19  Postoperative pain  - WBAT to LLE, NWB to LUE  - 5/14 initiated acetaminophen 1 g q.8 hours    CONTINUE     Acute blood loss anemia  - continue ferrous sulfate 325 mg t.i.d.  - patient is a [employer] who refuses blood transfusions  - Post-op H/H 6.9/21.8 with baseline 8.4/26.9  - pediatric blood draws only, avoid IVF  - 5/13 initiated procrit 20,000 units     Debility   - Continue with PT/OT for gait training and strengthening and restoration of ADL's   - Encourage mobility, OOB in chair, and early ambulation as appropriate  - Fall precautions   - Monitor for bowel and bladder dysfunction  - Monitor for and prevent skin breakdown and pressure ulcers  - Continue DVT prophylaxis with  enoxaparin 30 mg daily    Hx of SVT  - continue verapamil 80 mg TID    Hx of Bronchiectasis  - continue duo nebs q.4 hours while awake and tiotropium 18mcg daily     Severe malnutrition  - Energy Intake: <75% of estimated energy requirement for > 3 months  - Body Fat Depletion: moderate depletion of orbitals and triceps and buccal area  - Muscle Mass Depletion: severe depletion of clavicle region and scapular region moderate at temples, hands and calf  - Weight Loss: > 7.5% % x < 3 months   - Continue regular diet, honor food intolerances, recommend change boost breeze to boost plus TID no chocolate  per pt request.    DC Update: pt lives alone and is asking about nursing home type placement after SNF.     Future Appointments   Date Time Provider Department Center   5/17/2019  1:30 PM [facility] Jayden Ibrahim NP

## 2019-05-14 NOTE — PT/OT/SLP PROGRESS
Physical Therapy  Treatment    Siomara Flores   MRN: [id number]   Admitting Diagnosis: Closed displaced intertrochanteric fracture of left femur    PT Received On: 05/14/19  Total Time (min): (--)       Billable Minutes:  Gait Training 10, Therapeutic Activity 15 and Therapeutic Exercise 20    Treatment Type: Treatment  PT/PTA: PTA     PTA Visit Number: 2       General Precautions: Standard, fall  Orthopedic Precautions: LLE weight bearing as tolerated, LUE non weight bearing   Braces: Sling and swathe         Subjective:  Communicated with nursing prior to session.  Pt agreed to work with therapy.     Pain/Comfort  Pain Rating 1: 0/10  Pain Rating Post-Intervention 1: 0/10    Objective:  Patient found seated w/c.       AM-PAC 6 CLICK MOBILITY  Total Score:11    Bed Mobility:  Sit>Supine:not performed  Supine>Sit: not performed    Transfers:  Sit<>Stand: to/from w/c x3 trials; to/from mat x2 trials w/ HW and Min-Mod A  Stand Pivot Transfer: w/c<>recumbent stepper w/ HW and Min-Mod A; w/c<>mat w/ HW and Min-Mod A; w/c to bedside chair w/o AD and Mod A  Pt requires increased time and cueing for proper hand placement.     Gait:  Amb 4ft w/ HW and Mod A for stability. Pt declined further distance 2* to pt fatigue.      Therex:  BLE therex 2x10 reps:    -AP   -LAQ   -Hip Flexion    -GS    Additional Treatment:  -Recumbent stepper x15 min L1-2 using RUE and BLE to improve overall strength/endurace.     Patient left up in chair with call button in reach.    Assessment:  Siomara Flores is a 86 y.o. female with a medical diagnosis of Closed displaced intertrochanteric fracture of left femur. Pt tolerated treatment fairly well, and will continue to benefit from PT services at this time. Continue with PT POC as indicated.    Rehab identified problem list/impairments: weakness, impaired endurance, impaired functional mobilty, impaired self care skills, gait instability, impaired balance, decreased upper extremity function,  decreased lower extremity function, pain, orthopedic precautions    Rehab potential is good.    Activity tolerance: good    Discharge recommendations: home with home health     Barriers to discharge: Inaccessible home environment, Decreased caregiver support    Equipment recommendations: walker, papi, commode, tub bench(HW or QC pending progress)     GOALS:   Multidisciplinary Problems     Physical Therapy Goals        Problem: Physical Therapy Goal    Goal Priority Disciplines Outcome Goal Variances Interventions   Physical Therapy Goal     PT, PT/OT Ongoing (interventions implemented as appropriate)     Description:  Goals to be met by: 14 days 2019     Patient will increase functional independence with mobility by performin. Supine to sit with supervision  2. Sit to supine with supervision  3. Sit to stand transfer with Stand-by Assistance  4. Bed to chair transfer with Stand-by Assistance using hemiwalker or least restrictive assistive device  5. Gait  x 100 feet with Stand-by Assistance using  hemiwalker or least restrictive assistive device.   6. Ascend/Descend 4 inch curb step with Contact Guard Assistance using  hemiwalker or least restrictive assistive device.  7. Stand for 3 minutes with Contact Guard Assistance using  hemiwalker or least restrictive assistive device, intermittently,  and perform an activity  8. Lower extremity exercise program x20 reps per handout, with assistance as needed and gym therex                      PLAN:    Patient to be seen 5 x/week  to address the above listed problems via gait training, therapeutic activities, therapeutic exercises, wheelchair management/training  Plan of Care expires: 19  Plan of Care reviewed with: patient    Vita Peterson, PTA  2019

## 2019-05-15 ENCOUNTER — TELEPHONE (OUTPATIENT)
Dept: PODIATRY | Facility: CLINIC | Age: 84
End: 2019-05-15

## 2019-05-15 PROCEDURE — 97110 THERAPEUTIC EXERCISES: CPT

## 2019-05-15 PROCEDURE — 97535 SELF CARE MNGMENT TRAINING: CPT

## 2019-05-15 PROCEDURE — 25000003 PHARM REV CODE 250: Performed by: HOSPITALIST

## 2019-05-15 PROCEDURE — 25000003 PHARM REV CODE 250: Performed by: NURSE PRACTITIONER

## 2019-05-15 PROCEDURE — 97530 THERAPEUTIC ACTIVITIES: CPT

## 2019-05-15 PROCEDURE — 94640 AIRWAY INHALATION TREATMENT: CPT

## 2019-05-15 PROCEDURE — 25000242 PHARM REV CODE 250 ALT 637 W/ HCPCS: Performed by: HOSPITALIST

## 2019-05-15 PROCEDURE — 63600175 PHARM REV CODE 636 W HCPCS: Performed by: HOSPITALIST

## 2019-05-15 PROCEDURE — 11000004 HC SNF PRIVATE

## 2019-05-15 PROCEDURE — 94761 N-INVAS EAR/PLS OXIMETRY MLT: CPT

## 2019-05-15 PROCEDURE — 94799 UNLISTED PULMONARY SVC/PX: CPT

## 2019-05-15 PROCEDURE — 25000242 PHARM REV CODE 250 ALT 637 W/ HCPCS: Performed by: NURSE PRACTITIONER

## 2019-05-15 PROCEDURE — 97116 GAIT TRAINING THERAPY: CPT

## 2019-05-15 RX ORDER — IPRATROPIUM BROMIDE AND ALBUTEROL SULFATE 2.5; .5 MG/3ML; MG/3ML
3 SOLUTION RESPIRATORY (INHALATION) EVERY 4 HOURS PRN
Status: DISPENSED | OUTPATIENT
Start: 2019-05-15 | End: 2019-05-17

## 2019-05-15 RX ADMIN — ACETAMINOPHEN 500 MG: 325 TABLET ORAL at 09:05

## 2019-05-15 RX ADMIN — VERAPAMIL HYDROCHLORIDE 80 MG: 80 TABLET, FILM COATED ORAL at 08:05

## 2019-05-15 RX ADMIN — ATENOLOL 25 MG: 25 TABLET ORAL at 08:05

## 2019-05-15 RX ADMIN — SENNOSIDES AND DOCUSATE SODIUM 1 TABLET: 8.6; 5 TABLET ORAL at 09:05

## 2019-05-15 RX ADMIN — ACETAMINOPHEN 1000 MG: 325 TABLET ORAL at 05:05

## 2019-05-15 RX ADMIN — VERAPAMIL HYDROCHLORIDE 80 MG: 80 TABLET, FILM COATED ORAL at 09:05

## 2019-05-15 RX ADMIN — FERROUS SULFATE TAB EC 325 MG (65 MG FE EQUIVALENT) 325 MG: 325 (65 FE) TABLET DELAYED RESPONSE at 09:05

## 2019-05-15 RX ADMIN — POLYETHYLENE GLYCOL 3350 17 G: 17 POWDER, FOR SOLUTION ORAL at 08:05

## 2019-05-15 RX ADMIN — FERROUS SULFATE TAB EC 325 MG (65 MG FE EQUIVALENT) 325 MG: 325 (65 FE) TABLET DELAYED RESPONSE at 02:05

## 2019-05-15 RX ADMIN — SENNOSIDES AND DOCUSATE SODIUM 1 TABLET: 8.6; 5 TABLET ORAL at 08:05

## 2019-05-15 RX ADMIN — ACETAMINOPHEN 1000 MG: 325 TABLET ORAL at 01:05

## 2019-05-15 RX ADMIN — IPRATROPIUM BROMIDE AND ALBUTEROL SULFATE 3 ML: .5; 3 SOLUTION RESPIRATORY (INHALATION) at 07:05

## 2019-05-15 RX ADMIN — ENOXAPARIN SODIUM 30 MG: 100 INJECTION SUBCUTANEOUS at 05:05

## 2019-05-15 RX ADMIN — TIOTROPIUM BROMIDE 18 MCG: 18 CAPSULE ORAL; RESPIRATORY (INHALATION) at 02:05

## 2019-05-15 RX ADMIN — FERROUS SULFATE TAB EC 325 MG (65 MG FE EQUIVALENT) 325 MG: 325 (65 FE) TABLET DELAYED RESPONSE at 08:05

## 2019-05-15 NOTE — PLAN OF CARE
Problem: Adult Inpatient Plan of Care  Goal: Plan of Care Review  Outcome: Ongoing (interventions implemented as appropriate)     05/15/19 0139   Plan of Care Review   Plan of Care Reviewed With patient       Problem: Fall Injury Risk  Goal: Absence of Fall and Fall-Related Injury  Outcome: Ongoing (interventions implemented as appropriate)  Intervention: Identify and Manage Contributors to Fall Injury Risk     05/15/19 0139   Manage Acute Allergic Reaction   Medication Review/Management medications reviewed   Identify and Manage Contributors to Fall Injury Risk   Self-Care Promotion BADL personal routines maintained;BADL personal objects within reach     Intervention: Promote Injury-Free Environment     05/15/19 0139   Optimize Balance and Safe Activity   Safety Promotion/Fall Prevention assistive device/personal item within reach;bedside commode chair;Fall Risk reviewed with patient/family;Fall Risk signage in place;instructed to call staff for mobility;upper side rails raised x 2, lower siderails raised x 1 (Peds only)

## 2019-05-15 NOTE — PT/OT/SLP PROGRESS
Physical Therapy  Treatment    Siomara Flores   MRN: [id number]   Admitting Diagnosis: Closed displaced intertrochanteric fracture of left femur    PT Received On: 05/15/19  Total Time (min): (--)       Billable Minutes:  Gait Training 10, Therapeutic Activity 13 and Therapeutic Exercise 15    Treatment Type: Treatment  PT/PTA: PTA     PTA Visit Number: 3       General Precautions: Standard, fall  Orthopedic Precautions: LUE non weight bearing, LUE weight bearing as tolerated   Braces: Sling and swathe         Subjective:  Communicated with nursing prior to session.  Pt agreed to work with therapy.     Pain/Comfort  Pain Rating 1: 5/10  Location - Side 1: Left  Location - Orientation 1: generalized  Location 1: leg  Pain Addressed 1: Reposition, Distraction  Pain Rating Post-Intervention 1: 5/10    Objective:  Patient found seated w/c.       AM-PAC 6 CLICK MOBILITY  Total Score:12    Bed Mobility:  Sit>Supine:not performed  Supine>Sit: not performed    Transfers:  Sit<>Stand: to/from w/c x3 trials w/ HW and Min A  Stand Pivot Transfer: w/c<>recumbent stepper w/o AD w/ Min A     Gait:  Amb x2 trials 8ft., and 4ft with HW and Min A. Pt w/ difficulty advancing R LE. Increased time to perform. Cueing for proper sequencing.      Therex:  BLE therex 2x10 reps:    -AP   -LAQ    -Hip Flexion    -GS    Additional Treatment:  Recumbent stepper x8 min using BLE and RUE only     Patient left up in chair with call button in reach, nursing notified and friends present.    Assessment:  Siomara Flores is a 86 y.o. female with a medical diagnosis of Closed displaced intertrochanteric fracture of left femur.  Pt fair to treatment session. Pt will continue to benefit from PT services at this time. Continue with PT POC as indicated.    Rehab identified problem list/impairments: weakness, impaired endurance, impaired functional mobilty, impaired self care skills, gait instability, impaired balance, decreased upper extremity function,  decreased lower extremity function, pain, orthopedic precautions    Rehab potential is good.    Activity tolerance: Fair    Discharge recommendations: home with home health     Barriers to discharge: Inaccessible home environment, Decreased caregiver support    Equipment recommendations: walker, papi, commode, tub bench(HW or QC pending progress)     GOALS:   Multidisciplinary Problems     Physical Therapy Goals        Problem: Physical Therapy Goal    Goal Priority Disciplines Outcome Goal Variances Interventions   Physical Therapy Goal     PT, PT/OT Ongoing (interventions implemented as appropriate)     Description:  Goals to be met by: 14 days 2019     Patient will increase functional independence with mobility by performin. Supine to sit with supervision  2. Sit to supine with supervision  3. Sit to stand transfer with Stand-by Assistance  4. Bed to chair transfer with Stand-by Assistance using hemiwalker or least restrictive assistive device  5. Gait  x 100 feet with Stand-by Assistance using  hemiwalker or least restrictive assistive device.   6. Ascend/Descend 4 inch curb step with Contact Guard Assistance using  hemiwalker or least restrictive assistive device.  7. Stand for 3 minutes with Contact Guard Assistance using  hemiwalker or least restrictive assistive device, intermittently,  and perform an activity  8. Lower extremity exercise program x20 reps per handout, with assistance as needed and gym therex                      PLAN:    Patient to be seen 5 x/week  to address the above listed problems via gait training, therapeutic activities, therapeutic exercises, wheelchair management/training  Plan of Care expires: 19  Plan of Care reviewed with: patient    Vita Peterson, PTA  05/15/2019

## 2019-05-15 NOTE — PLAN OF CARE
Problem: Occupational Therapy Goal  Goal: Occupational Therapy Goal  Goals to be met by: 5/20/19     Patient will increase functional independence with ADLs by performing:    UE Dressing with Set-up Assistance.  LE Dressing with Moderate Assistance and Assistive Devices as needed.  Grooming while standing at sink with Minimal Assistance.  Toileting from bedside commode with Minimal Assistance for hygiene and clothing management. - progressing  Rolling to Bilateral with Moderate Assistance.   Supine to sit with Moderate Assistance. MET 5/9  UPDATED: supine to sit with CGA  Toilet transfer to bedside commode with Minimal Assistance. MET 5/9  UPDATED: BSC t/f with CGA      Outcome: Outcome(s) achieved Date Met: 05/15/19  2 goals met; pt d/c to SNF

## 2019-05-15 NOTE — TELEPHONE ENCOUNTER
"Spoke with nurse at SNF, pt unable to participate in rehab due to nails approx 12" long--has to be transferred here from facility and has appt here on Friday.  Appt made  "

## 2019-05-15 NOTE — PROGRESS NOTES
[facility]                                  Skilled Nursing Facility                   Progress Note     Admit Date: 2019  RAVI 2019  Principal Problem:  Closed displaced intertrochanteric fracture of left femurHPI obtained from patient interview and chart review     Chief Complaint:  Follow-up on hypotension    HPI:   Mrs. Flores is a 86 year old. female [employer] with PMHx of  Bronchiectasis, SVT who presents to SNF following a hospitalization for left intertrochanteric, left proximal humerus, and left distal radius fracture s/p left femur intramedullary marc after a mechanical fall. She was admitted to [facility] from  until . Dr. Owusu performed a Cephalomedullary nail fixation of left intertrochanteric femur fracture on 5/3/19.     Interval history:  Patient was supposed to see Podiatry today for a toenail cut down;  however, the podiatrist called in sick today the patient returned to SNF unit.  Multiple phone calls made to Podiatry Clinic.  I was able to have patient scheduled for Friday before ortho appointment to have toenails cut.  Patient having low blood pressures, losartan was decreased to 25 mg on  and discontinued on . Patient's 24 hr blood pressure today is 95/46 to 108/54.      Past Medical History: Patient has a past medical history of Bronchiectasis without complication, Hypertension, and Supraventricular tachycardia.    Past Surgical History: Patient has a past surgical history that includes Cholecystectomy; Tonsillectomy; and Intramedullary rodding of femur (Left, 5/3/2019).    Social History: Patient reports that she has never smoked. She has never used smokeless tobacco. She reports that she does not drink alcohol or use drugs.  Patient's ill   about a month ago    Family History:  No family significant history to report    Allergies: Patient is allergic to aspirin  (bulk).    ROS  Constitutional: Negative for fever or fatigue.   Eyes: Negative for blurred vision, double vision and discharge.   Respiratory: Negative for cough, shortness of breath and wheezing.    Cardiovascular: Negative for chest pain, palpitations, claudication, and leg swelling.   Gastrointestinal: Negative for abdominal pain, constipation, diarrhea, nausea and vomiting.   Genitourinary: Negative for dysuria, frequency and urgency.   Musculoskeletal:  + generalized weakness, pain to left arm and hip. Negative for back pain and myalgias.   Skin: Negative for itching and rash.   Neurological: Negative for dizziness, speech change, and headaches.   Psychiatric/Behavioral: + for depression. The patient is not nervous/anxious.      PEx  Temp:  [97.8 °F (36.6 °C)]   Pulse:  [77-90]   Resp:  [17-18]   BP: ()/(46-54)   SpO2:  [96 %-98 %]      Constitutional: Patient appears frail  and in no distress   HENT:   Head: Normocephalic and atraumatic.   Eyes: Pupils are equal, round, and reactive to light.   Neck: Normal range of motion. Neck supple.   Cardiovascular: Normal rate, regular rhythm and normal heart sounds.    Pulmonary/Chest: Effort normal and breath sounds are clear  Abdominal: Soft. Bowel sounds are normal.   Musculoskeletal: decreased range of motion to left arm, sling in place.   Neurological: Alert and oriented to person, place, and time.   Skin: Skin is warm and dry. Pt has extremely long toe nails that are painful to the touch.  Patient has skin overgrowth to scalp.  Aquacel dressing in place to left leg without drainage, no swelling noted.    Psychiatric: mood is sad      Assessment and Plan:    HTN  - 5/13 decrease losartan from 50 mg to 25 mg daily, continue atenolol 25 mg daily.  Holding parameters in place to hold for systolic blood pressure less than 100.   - 5/14 holding home losartan at this time. patient's 24 hr blood pressure 102/49 to 119/50.   - 5/15 decrease atenolol to 12.5 mg  daily; 24 hr blood pressure today is 95/46 to 108/54.      Seborrheic Keratosis   - 5/15 differential diagnosis includes melanoma- ambulatory referral to Dermatology placed.  Lesion appears as a skin overgrowth to scalp that is waxy brown and tan in color, slightly raise with a bumpy appearance.  Lesion is about  5 cm in diameter and circular.     Onychogryphosis  - likely from  Insufficient consumtion of essential nutrients  - 5/14- Message sent to  and appointment scheduled are to make patient a Podiatry appointment to cut down toenails, patient's toenails are so long and painful it hurts her to walk. - patient to see Podiatry tomorrow morning  - 5/15 patient was able to see provider today and Podiatry.  I got her an appointment Friday before her ortho follow-up to have her toes cut.     Closed displaced intratrochanteric fracture of left femur  s/p left Cephalomedullary nail fixation of left  femur fracture on 5/3/19  Postoperative pain  - WBAT to LLE, NWB to LUE  - 5/14 initiated acetaminophen 1 g q.8 hours  - 5/15 pain is better controlled today with scheduled Tylenol    CONTINUE     Acute blood loss anemia  - continue ferrous sulfate 325 mg t.i.d.  - patient is a [employer] who refuses blood transfusions  - Post-op H/H 6.9/21.8 with baseline 8.4/26.9  - pediatric blood draws only, avoid IVF  - 5/13 initiated procrit 20,000 units     Debility   - Continue with PT/OT for gait training and strengthening and restoration of ADL's   - Encourage mobility, OOB in chair, and early ambulation as appropriate  - Fall precautions   - Monitor for bowel and bladder dysfunction  - Monitor for and prevent skin breakdown and pressure ulcers  - Continue DVT prophylaxis with  enoxaparin 30 mg daily    Hx of SVT  - continue verapamil 80 mg TID    Hx of Bronchiectasis  - continue duo nebs q.4 hours while awake and tiotropium 18mcg daily     Severe malnutrition  - Energy Intake: <75% of estimated energy  requirement for > 3 months  - Body Fat Depletion: moderate depletion of orbitals and triceps and buccal area  - Muscle Mass Depletion: severe depletion of clavicle region and scapular region moderate at temples, hands and calf  - Weight Loss: > 7.5% % x < 3 months   - Continue regular diet, honor food intolerances, recommend change boost breeze to boost plus TID no chocolate  per pt request.    DC Update: pt lives alone and is asking about nursing home type placement after SNF.     Future Appointments   Date Time Provider Department Center   5/17/2019 12:15 PM Samir Arellano DPM [facility] POD [address]   5/17/2019  1:30 PM Mirna Crews PA-C [facility] ORTHO [address]       65 minutes spent in the care of the patient (Greater than 1/2 spent in non direct face-to-face contact)    34 of 65 minutes spent on documentation and counseling patient on clinical conditions and therapies provided regarding podiatry appointment, dermatology ambulatory referral.  Coordination of care with Podiatry Clinic that involved multiple calls to arrange for emergency appointment. The remainder of the time was spent in direct patient care.       Edel Ibrahim, DESIRAE

## 2019-05-15 NOTE — PROGRESS NOTES
Ms. Flores was seen at [facility] where she is undergoing rehab following her left femur fracture.  She underwent an IM nailing by Dr. Bruce on 5/3/19.    Interval History:  She reports that she is doing well.  Pain is controlled with prescribed medication.  She is taking pain medication.  She is participating in her therapy sessions, per their note walking 4 feet with a papi walker.  She is progressing towards her stated goals per therapy note.    She denies fever, chills, and sweats since the time of the surgery.     Physical exam:  Aquacel dressing in place.  There is no redness or drainage present.  It is too early to remove her dressing at this time.  She has good sensation to her lowe leg with PPP x 2 to her foot on the left.        RADS: none done today    Assessment:  Post-op visit (11 days)    Plan:  Current care, treatment plan, precautions, activity level/ modifications, limitations, rehabilitation exercises and proposed future treatment were discussed with the patient. We discussed the need to monitor for changes in symptoms and condition and report them to the physician.  Discussed importance of compliance with all appointments and follow up examinations.         - Pain medication: refill was not needed,   - Pain medication refill policy provided to patient for review, no  - Patient is to return to clinic as scheduled on 5/17/19 for suture removal, appointment with SAGE Rodarte    Future Appointments   Date Time Provider Department Center   5/15/2019  9:30 AM Essence Barton DPM [facility] POD[facility]ATR [facility]   5/17/2019  1:30 PM Mirna Crews PA-C [facility]        If there are any questions prior to scheduled follow up, the patient was instructed to contact the office

## 2019-05-15 NOTE — PT/OT/SLP PROGRESS
Occupational Therapy  Treatment    Siomara Flores   MRN: [id number]   Admitting Diagnosis: Closed displaced intertrochanteric fracture of left femur    OT Date of Treatment: 05/15/19       Billable Minutes: 45  Self Care/Home Management 20, Therapeutic Activity 10 and Therapeutic Exercise 15    General Precautions: Standard, fall  Orthopedic Precautions: LLE weight bearing as tolerated, LUE non weight bearing  Braces: Sling and swathe(L UE; Patient also has a L wrist brace. )         Subjective:  Communicated with patient prior to session.      Pain/Comfort  Pain Rating 1: 6/10  Location - Side 1: Left  Location - Orientation 1: generalized  Location 1: leg  Pain Addressed 1: Reposition, Distraction    Objective:   Patient found up in bedside chair with LUE sling and brace applied incorrectly (corrected by OT). Nursing administered medication during session.    Occupational Performance:    Functional Mobility/Transfers:  · Patient completed Sit <> Stand Transfer with minimum assistance with no AD  · Patient completed bedside chair <> w/c Stand Pivot technique with contact guard assistance with no assistive device    Activities of Daily Living:  · Grooming: set up assistance seated in w/c at sink level performing oral hygiene and washing face    AMPA 6 Click:  [facility] Total Score: 11     OT Exercises: RUE exercises using 1 lb dumbell in all available pain free ranges to increase strength for adls and t/fs.     Additional Treatment:  Patient completed standing tolerance activity with CGA and rolling walker x ~6 minutes with no rest break while completing table top requiring crossing midline, fine motor skills, and visual motor skills in preparation of self care and home management tasks.    Patient left up in chair with call button in reach    ASSESSMENT:  Siomara Flores is a 86 y.o. female with a medical diagnosis of Closed displaced intertrochanteric fracture of left femur and presents with the deficits listed  below. Patient L fingers were swollen. She tolerated session well, despite pain. Will continue to benefit from OT.    Rehab identified problem list/impairments: weakness, impaired endurance, impaired self care skills, impaired functional mobilty, gait instability, impaired balance, decreased upper extremity function, decreased lower extremity function, pain, orthopedic precautions    Rehab potential is good    Activity tolerance: Good    Discharge recommendations: home with home health     Barriers to discharge: Decreased caregiver support     Equipment recommendations: walker, papi, commode, tub bench(Need to confirm with family (Patient reports she has equipment from her ))     GOALS:   Multidisciplinary Problems     Occupational Therapy Goals        Problem: Occupational Therapy Goal    Goal Priority Disciplines Outcome Interventions   Occupational Therapy Goal     OT, PT/OT Ongoing (interventions implemented as appropriate)    Description:  Goals to be met by: 5/23/2019     Patient will increase functional independence with ADLs by performing:    UE Dressing with Minimal Assistance.  LE Dressing with Minimal Assistance.  Grooming while standing at sink with Stand-by Assistance.  Toileting from bedside commode with Minimal Assistance for hygiene and clothing management.   Bathing with Minimal Assistance.  Supine to sit with Stand-by Assistance /c HOB flat and no handrails.  Stand pivot transfers with Contact Guard Assistance.  Toilet transfer to bedside commode with Contact Guard Assistance.  Right Upper extremity exercise program 3 x 10 reps per handout, with independence.  Patient will complete a standing activity for 8 min with S in order to perform self care tasks.                     Plan:  Patient to be seen 5 x/week to address the above listed problems via therapeutic exercises, therapeutic activities, self-care/home management  Plan of Care expires: 06/10/19  Plan of Care reviewed with:  patient    Marisa Keita, [facility]  05/15/2019

## 2019-05-15 NOTE — PT/OT/SLP DISCHARGE
Occupational Therapy Discharge Summary    Siomara Flores  MRN: [id number]   Principal Problem: Closed displaced intertrochanteric fracture of left femur      Patient Discharged from acute Occupational Therapy on 5/9/19.  Please refer to prior OT note dated 5/9/19 for functional status.    Assessment:      Goals partially met.    Objective:     GOALS:   Multidisciplinary Problems     Occupational Therapy Goals     Not on file          Multidisciplinary Problems (Resolved)        Problem: Occupational Therapy Goal    Goal Priority Disciplines Outcome Interventions   Occupational Therapy Goal   (Resolved)     OT, PT/OT Outcome(s) achieved    Description:  Goals to be met by: 5/20/19     Patient will increase functional independence with ADLs by performing:    UE Dressing with Set-up Assistance.  LE Dressing with Moderate Assistance and Assistive Devices as needed.  Grooming while standing at sink with Minimal Assistance.  Toileting from bedside commode with Minimal Assistance for hygiene and clothing management. - progressing  Rolling to Bilateral with Moderate Assistance.   Supine to sit with Moderate Assistance. MET 5/9  UPDATED: supine to sit with CGA  Toilet transfer to bedside commode with Minimal Assistance. MET 5/9  UPDATED: BSC t/f with CGA                       Reasons for Discontinuation of Therapy Services  Transfer to alternate level of care.      Plan:     Patient Discharged to: Skilled Nursing Facility    Nolvia Thomas, OT  5/15/2019

## 2019-05-16 LAB
ANION GAP SERPL CALC-SCNC: 8 MMOL/L (ref 8–16)
BASOPHILS # BLD AUTO: 0.07 K/UL (ref 0–0.2)
BASOPHILS NFR BLD: 1.1 % (ref 0–1.9)
BUN SERPL-MCNC: 14 MG/DL (ref 8–23)
CALCIUM SERPL-MCNC: 9.1 MG/DL (ref 8.7–10.5)
CHLORIDE SERPL-SCNC: 98 MMOL/L (ref 95–110)
CO2 SERPL-SCNC: 29 MMOL/L (ref 23–29)
CREAT SERPL-MCNC: 0.6 MG/DL (ref 0.5–1.4)
DIFFERENTIAL METHOD: ABNORMAL
EOSINOPHIL # BLD AUTO: 0.3 K/UL (ref 0–0.5)
EOSINOPHIL NFR BLD: 4.2 % (ref 0–8)
ERYTHROCYTE [DISTWIDTH] IN BLOOD BY AUTOMATED COUNT: 16.9 % (ref 11.5–14.5)
EST. GFR  (AFRICAN AMERICAN): >60 ML/MIN/1.73 M^2
EST. GFR  (NON AFRICAN AMERICAN): >60 ML/MIN/1.73 M^2
GLUCOSE SERPL-MCNC: 90 MG/DL (ref 70–110)
HCT VFR BLD AUTO: 23.3 % (ref 37–48.5)
HGB BLD-MCNC: 7 G/DL (ref 12–16)
IMM GRANULOCYTES # BLD AUTO: 0.04 K/UL (ref 0–0.04)
IMM GRANULOCYTES NFR BLD AUTO: 0.7 % (ref 0–0.5)
LYMPHOCYTES # BLD AUTO: 1.1 K/UL (ref 1–4.8)
LYMPHOCYTES NFR BLD: 18.6 % (ref 18–48)
MAGNESIUM SERPL-MCNC: 2 MG/DL (ref 1.6–2.6)
MCH RBC QN AUTO: 25.9 PG (ref 27–31)
MCHC RBC AUTO-ENTMCNC: 30 G/DL (ref 32–36)
MCV RBC AUTO: 86 FL (ref 82–98)
MONOCYTES # BLD AUTO: 0.7 K/UL (ref 0.3–1)
MONOCYTES NFR BLD: 11.4 % (ref 4–15)
NEUTROPHILS # BLD AUTO: 3.9 K/UL (ref 1.8–7.7)
NEUTROPHILS NFR BLD: 64 % (ref 38–73)
NRBC BLD-RTO: 0 /100 WBC
PHOSPHATE SERPL-MCNC: 3.6 MG/DL (ref 2.7–4.5)
PLATELET # BLD AUTO: 470 K/UL (ref 150–350)
PMV BLD AUTO: 10.2 FL (ref 9.2–12.9)
POTASSIUM SERPL-SCNC: 3.9 MMOL/L (ref 3.5–5.1)
RBC # BLD AUTO: 2.7 M/UL (ref 4–5.4)
SODIUM SERPL-SCNC: 135 MMOL/L (ref 136–145)
WBC # BLD AUTO: 6.13 K/UL (ref 3.9–12.7)

## 2019-05-16 PROCEDURE — 97530 THERAPEUTIC ACTIVITIES: CPT

## 2019-05-16 PROCEDURE — 97110 THERAPEUTIC EXERCISES: CPT

## 2019-05-16 PROCEDURE — 83735 ASSAY OF MAGNESIUM: CPT

## 2019-05-16 PROCEDURE — 11000004 HC SNF PRIVATE

## 2019-05-16 PROCEDURE — 94640 AIRWAY INHALATION TREATMENT: CPT

## 2019-05-16 PROCEDURE — 63600175 PHARM REV CODE 636 W HCPCS: Performed by: HOSPITALIST

## 2019-05-16 PROCEDURE — 25000003 PHARM REV CODE 250: Performed by: HOSPITALIST

## 2019-05-16 PROCEDURE — 25000242 PHARM REV CODE 250 ALT 637 W/ HCPCS: Performed by: NURSE PRACTITIONER

## 2019-05-16 PROCEDURE — 97535 SELF CARE MNGMENT TRAINING: CPT

## 2019-05-16 PROCEDURE — 97116 GAIT TRAINING THERAPY: CPT

## 2019-05-16 PROCEDURE — 25000242 PHARM REV CODE 250 ALT 637 W/ HCPCS: Performed by: HOSPITALIST

## 2019-05-16 PROCEDURE — 84100 ASSAY OF PHOSPHORUS: CPT

## 2019-05-16 PROCEDURE — 25000003 PHARM REV CODE 250: Performed by: NURSE PRACTITIONER

## 2019-05-16 PROCEDURE — 80048 BASIC METABOLIC PNL TOTAL CA: CPT

## 2019-05-16 PROCEDURE — 36415 COLL VENOUS BLD VENIPUNCTURE: CPT

## 2019-05-16 PROCEDURE — 85025 COMPLETE CBC W/AUTO DIFF WBC: CPT

## 2019-05-16 PROCEDURE — 99900058 HC 022 PAID UNDER SNF PPS

## 2019-05-16 RX ADMIN — ACETAMINOPHEN 1000 MG: 325 TABLET ORAL at 01:05

## 2019-05-16 RX ADMIN — SENNOSIDES AND DOCUSATE SODIUM 1 TABLET: 8.6; 5 TABLET ORAL at 09:05

## 2019-05-16 RX ADMIN — FERROUS SULFATE TAB EC 325 MG (65 MG FE EQUIVALENT) 325 MG: 325 (65 FE) TABLET DELAYED RESPONSE at 09:05

## 2019-05-16 RX ADMIN — VERAPAMIL HYDROCHLORIDE 80 MG: 80 TABLET, FILM COATED ORAL at 09:05

## 2019-05-16 RX ADMIN — ACETAMINOPHEN 1000 MG: 325 TABLET ORAL at 06:05

## 2019-05-16 RX ADMIN — ACETAMINOPHEN 1000 MG: 325 TABLET ORAL at 09:05

## 2019-05-16 RX ADMIN — ENOXAPARIN SODIUM 30 MG: 100 INJECTION SUBCUTANEOUS at 04:05

## 2019-05-16 RX ADMIN — FERROUS SULFATE TAB EC 325 MG (65 MG FE EQUIVALENT) 325 MG: 325 (65 FE) TABLET DELAYED RESPONSE at 02:05

## 2019-05-16 RX ADMIN — VERAPAMIL HYDROCHLORIDE 80 MG: 80 TABLET, FILM COATED ORAL at 03:05

## 2019-05-16 RX ADMIN — ATENOLOL 12.5 MG: 25 TABLET ORAL at 09:05

## 2019-05-16 RX ADMIN — IPRATROPIUM BROMIDE AND ALBUTEROL SULFATE 3 ML: .5; 3 SOLUTION RESPIRATORY (INHALATION) at 07:05

## 2019-05-16 RX ADMIN — TIOTROPIUM BROMIDE 18 MCG: 18 CAPSULE ORAL; RESPIRATORY (INHALATION) at 09:05

## 2019-05-16 NOTE — PLAN OF CARE
Problem: Adult Inpatient Plan of Care  Goal: Plan of Care Review  Outcome: Revised  Repositions with assist, no new skin breakdowns noted. MERARI sling intact, bruising noted. Left hip surgical aquacel drsg dry and intact x 3 sites. Afebrile. Monitored for pain and safety. Safety maintained. Pain meds effective

## 2019-05-16 NOTE — PT/OT/SLP PROGRESS
Physical Therapy  Treatment    Siomara Flores   MRN: [id number]   Admitting Diagnosis: Closed displaced intertrochanteric fracture of left femur    PT Received On: 05/16/19  Total Time (min): (--)       Billable Minutes:  Gait Training 10, Therapeutic Activity 15 and Therapeutic Exercise 21    Treatment Type: Treatment  PT/PTA: PTA     PTA Visit Number: 4       General Precautions: Standard, fall  Orthopedic Precautions: LUE non weight bearing, LLE weight bearing as tolerated   Braces: Sling and swathe         Subjective:  Communicated with nursing prior to session.  Pt agreed to work with therapy.     Pain/Comfort  Pain Rating 1: 4/10  Location - Side 1: Left  Location - Orientation 1: generalized  Location 1: leg  Pain Addressed 1: Reposition, Distraction  Pain Rating Post-Intervention 1: 4/10    Objective:  Patient found seated w/c.       AM-PAC 6 CLICK MOBILITY  Total Score:12    Bed Mobility:  Sit>Supine:not performed  Supine>Sit: not performed    Transfers:  Sit<>Stand: to/from w/c x2 trials w/ Min A using HW  Stand Pivot Transfer: bedside chair to w/c w/o AD and Min A; w/c<>recumbent stepper w/o AD and Min A; w/c to bedside chair w/o AD and Min A     Gait:  Amb 12ft w/ HW and Min A. No LOB noted. Cueing for proper sequencing. Increased time to perform.      Therex:  BLE therex 2x10 reps:     -AP   -LAQ   -Hip Flexion   -GS      Additional Treatment:  Recumbent stepper x15 min L2 using BLE and RUE only    Patient left up in chair with call button in reach and nursing notified.    Assessment:  Siomara Flores is a 86 y.o. female with a medical diagnosis of Closed displaced intertrochanteric fracture of left femur.  Pt tolerated treatment well, and will continue to benefit from PT services at this time. Continue with PT POC as indicated.    Rehab identified problem list/impairments: weakness, impaired endurance, impaired functional mobilty, impaired self care skills, gait instability, impaired balance, decreased  upper extremity function, decreased lower extremity function, pain, orthopedic precautions    Rehab potential is good.    Activity tolerance: Good    Discharge recommendations: home with home health     Barriers to discharge: Inaccessible home environment, Decreased caregiver support    Equipment recommendations: walker, papi, commode, tub bench(HW or QC pending progress)     GOALS:   Multidisciplinary Problems     Physical Therapy Goals        Problem: Physical Therapy Goal    Goal Priority Disciplines Outcome Goal Variances Interventions   Physical Therapy Goal     PT, PT/OT Ongoing (interventions implemented as appropriate)     Description:  Goals to be met by: 14 days 2019     Patient will increase functional independence with mobility by performin. Supine to sit with supervision  2. Sit to supine with supervision  3. Sit to stand transfer with Stand-by Assistance  4. Bed to chair transfer with Stand-by Assistance using hemiwalker or least restrictive assistive device  5. Gait  x 100 feet with Stand-by Assistance using  hemiwalker or least restrictive assistive device.   6. Ascend/Descend 4 inch curb step with Contact Guard Assistance using  hemiwalker or least restrictive assistive device.  7. Stand for 3 minutes with Contact Guard Assistance using  hemiwalker or least restrictive assistive device, intermittently,  and perform an activity  8. Lower extremity exercise program x20 reps per handout, with assistance as needed and gym therex                      PLAN:    Patient to be seen 5 x/week  to address the above listed problems via gait training, therapeutic activities, therapeutic exercises, wheelchair management/training  Plan of Care expires: 19  Plan of Care reviewed with: patient    Vita Peterson, PTA  2019

## 2019-05-16 NOTE — PLAN OF CARE
Problem: Fall Injury Risk  Goal: Absence of Fall and Fall-Related Injury    Intervention: Promote Injury-Free Environment     05/16/19 0235   Optimize [place] and Functional Mobility   Environmental Safety Modification assistive device/personal items within reach;clutter free environment maintained;lighting adjusted   Optimize Balance and Safe Activity   Safety Promotion/Fall Prevention side rails raised x 2

## 2019-05-16 NOTE — PROGRESS NOTES
[facility]                                  Skilled Nursing Facility                   Progress Note     Admit Date: 2019  RAVI 2019  Principal Problem:  Closed displaced intertrochanteric fracture of left femurHPI obtained from patient interview and chart review     Chief Complaint:  Revaluation of medical treatment and therapy status: Lab review    HPI:   Mrs. Flores is a 86 year old. female Hoahaoism with PMHx of  Bronchiectasis, SVT who presents to SNF following a hospitalization for left intertrochanteric, left proximal humerus, and left distal radius fracture s/p left femur intramedullary marc after a mechanical fall. [place] [facility] [place] . Dr. Owusu performed a Cephalomedullary nail fixation of left intertrochanteric femur fracture on 5/3/19.     Interval history: All labs reviewed.  No leukocytosis.  H&H improved to 7.0/23 from 6.3/20 after Procrit injection and continue iron supplementation.  Patient's 24 hr blood pressure range is 98/48 to 129/62. Patient progessing slowly with PT/OT due to extremely long toenails.  Patient has podiatry appointment tomorrow on . Pt remains with extremely low H&H despite slight improvement.  Patient is Hoahaoism and does not accept blood transfusions. Continuing to follow and treat all acute and chronic conditions.    Past Medical History: Patient has a past medical history of Bronchiectasis without complication, Hypertension, and Supraventricular tachycardia.    Past Surgical History: Patient has a past surgical history that includes Cholecystectomy; Tonsillectomy; and Intramedullary rodding of femur (Left, 5/3/2019).    Social History: Patient reports that she has never smoked. She has never used smokeless tobacco. She reports that she does not drink alcohol or use drugs.  Patient's ill   about a month ago    Family History:  No family  significant history to report    Allergies: Patient is allergic to aspirin (bulk).    ROS  Constitutional: Negative for fever or fatigue.   Eyes: Negative for blurred vision, double vision and discharge.   Respiratory: Negative for cough, shortness of breath and wheezing.    Cardiovascular: Negative for chest pain, palpitations, claudication, and leg swelling.   Gastrointestinal: Negative for abdominal pain, constipation, diarrhea, nausea and vomiting.   Genitourinary: Negative for dysuria, frequency and urgency.   Musculoskeletal:  + generalized weakness, pain to left arm and hip. Negative for back pain and myalgias.   Skin: Negative for itching and rash.   Neurological: Negative for dizziness, speech change, and headaches.   Psychiatric/Behavioral: + for depression. The patient is not nervous/anxious.      PEx  Temp:  [98.1 °F (36.7 °C)]   Pulse:  [72-92]   Resp:  [18]   BP: ()/(46-62)   SpO2:  [96 %-97 %]      Constitutional: Patient appears frail  and in no distress   HENT:   Head: Normocephalic and atraumatic.   Eyes: Pupils are equal, round, and reactive to light.   Neck: Normal range of motion. Neck supple.   Cardiovascular: Normal rate, regular rhythm and normal heart sounds.    Pulmonary/Chest: Effort normal and breath sounds are clear  Abdominal: Soft. Bowel sounds are normal.   Musculoskeletal: decreased range of motion to left arm, sling in place.   Neurological: Alert and oriented to person, place, and time.   Skin: Skin is warm and dry. Pt has extremely long toe nails that are painful to the touch.  Patient has skin overgrowth to scalp.  Aquacel dressing in place to left leg without drainage, no swelling noted.    Psychiatric: mood is sad      Assessment and Plan:    Hx of HTN  - currently patient is experiencing hypotension   - 5/13 decrease losartan from 50 mg to 25 mg daily, continue atenolol 25 mg daily.  Holding parameters in place to hold for systolic blood pressure less than 100.   - 5/14  holding home losartan at this time. patient's 24 hr blood pressure 102/49 to 119/50.   - 5/15 decrease atenolol to 12.5 mg daily; 24 hr blood pressure today is 95/46 to 108/54.    - 5/16 BP slightly improved with the decrease in atenolol dose. Patient's 24 hr blood pressure range is 98/48 to 129/62    Hx of SVT  - continue verapamil 80 mg TID  - 5/16 with reduction of atenolol.  Heart rate is increased to the 90s.  Will continue to assess blood pressure and heart rate daily.     Acute blood loss anemia  - continue ferrous sulfate 325 mg t.i.d.  - patient is a Alevism who refuses blood transfusions  - Post-op H/H 6.9/21.8 with baseline 8.4/26.9  - pediatric blood draws only, avoid IVF  - 5/13 initiated procrit 20,000 units   - 5/16 H&H improved to 7.0/23 from 6.3/20 after Procrit injection and continue iron supplementation    CONTINUE    Seborrheic Keratosis   - 5/15 differential diagnosis includes melanoma- ambulatory referral to Dermatology placed.  Lesion appears as a skin overgrowth to scalp that is waxy brown and tan in color, slightly raise with a bumpy appearance.  Lesion is about  5 cm in diameter and circular.     Onychogryphosis  - likely from  Insufficient consumtion of essential nutrients  - 5/14- Message sent to  and appointment scheduled are to make patient a Podiatry appointment to cut down toenails, patient's toenails are so long and painful it hurts her to walk. - patient to see Podiatry tomorrow morning  - 5/15 patient was able to see provider today and Podiatry.  I got her an appointment Friday before her ortho follow-up to have her toes cut.     Closed displaced intratrochanteric fracture of left femur  s/p left Cephalomedullary nail fixation of left  femur fracture on 5/3/19  Postoperative pain  - WBAT to LLE, NWB to LUE  - 5/14 initiated acetaminophen 1 g q.8 hours  - 5/15 pain is better controlled today with scheduled Tylenol    Debility   - Continue with PT/OT for gait  training and strengthening and restoration of ADL's   - Encourage mobility, OOB in chair, and early ambulation as appropriate  - Fall precautions   - Monitor for bowel and bladder dysfunction  - Monitor for and prevent skin breakdown and pressure ulcers  - Continue DVT prophylaxis with  enoxaparin 30 mg daily    Hx of Bronchiectasis  - continue duo nebs q.4 hours while awake and tiotropium 18mcg daily     Severe malnutrition  - Energy Intake: <75% of estimated energy requirement for > 3 months  - Body Fat Depletion: moderate depletion of orbitals and triceps and buccal area  - Muscle Mass Depletion: severe depletion of clavicle region and scapular region moderate at temples, hands and calf  - Weight Loss: > 7.5% % x < 3 months   - Continue regular diet, honor food intolerances, recommend change boost breeze to boost plus TID no chocolate  per pt request.    DC Update: pt lives alone and is asking about nursing home type placement after SNF.     Future Appointments   Date Time Provider Department Center   5/17/2019 12:15 PM Samir Arellano DPM [facility]   5/17/2019  1:30 PM Mirna Crews PA-C [facility] ORTHO Jayden Ibrahim NP

## 2019-05-16 NOTE — NURSING
Recheck /53 P 79,Notified DESIRAE Guajardo and [facility] pharmacist, ok to give verapamil at this time.

## 2019-05-16 NOTE — PT/OT/SLP PROGRESS
Occupational Therapy  Treatment    Siomara Flores   MRN: [id number]   Admitting Diagnosis: Closed displaced intertrochanteric fracture of left femur    OT Date of Treatment: 05/16/19       Billable Minutes: 65  Self Care/Home Management 55 and Therapeutic Exercise 10    General Precautions: Standard, fall  Orthopedic Precautions: LLE weight bearing as tolerated, LUE non weight bearing  Braces: Sling and swathe(L UE; Patient also has a L wrist brace. )         Subjective:  Communicated with patient prior to session.      Pain/Comfort  Pain Rating 1: 7/10  Location - Side 1: Left  Location - Orientation 1: generalized  Location 1: leg  Pain Addressed 1: Reposition, Distraction    Objective:   Patient found up in bedside chair.    Occupational Performance:    Functional Mobility/Transfers:  · Patient completed Sit <> Stand Transfer with minimum assistance  with  no assistive device   · Patient completed Bedside chair <> wheelchair Transfer using Stand Pivot technique with minimum assistance with no assistive device    Activities of Daily Living:  · Grooming: set up assistance seated in w/c at sink level for oral hygiene and washing face  · Bathing: maximal assistance seated in w/c at sink level (Pt. able to wash chest and genitals).  · Upper Body Dressing: total assistance seated in w/c  · Lower Body Dressing: total assistance seated in w/c and standing with min(A) to pull pants over hip    AMPAC 6 Click:  AMPAC Total Score: 11    OT Exercises: RUE exercise using 1 lb dowel in all alvailable pain free ranges to increase strength needed fot t/fs and self care.      Patient left up in chair with call button in reach    ASSESSMENT:  Siomara Flores is a 86 y.o. female with a medical diagnosis of Closed displaced intertrochanteric fracture of left femur and presents with the deficits listed below. Patient with LLE WBAT and LUE NWB precautions. Toes are noted to be painful to touch d/t long toenails. She tolerated session  well and will continue to benefit from OT.     Rehab identified problem list/impairments: weakness, impaired endurance, impaired self care skills, impaired functional mobilty, gait instability, impaired balance, decreased upper extremity function, decreased lower extremity function, pain, orthopedic precautions    Rehab potential is good    Activity tolerance: Good    Discharge recommendations: home with home health     Barriers to discharge: Decreased caregiver support     Equipment recommendations: walker, papi, commode, tub bench(Need to confirm with family (Patient reports she has equipment from her ))     GOALS:   Multidisciplinary Problems     Occupational Therapy Goals        Problem: Occupational Therapy Goal    Goal Priority Disciplines Outcome Interventions   Occupational Therapy Goal     OT, PT/OT Ongoing (interventions implemented as appropriate)    Description:  Goals to be met by: 5/23/2019     Patient will increase functional independence with ADLs by performing:    UE Dressing with Minimal Assistance.  LE Dressing with Minimal Assistance.  Grooming while standing at sink with Stand-by Assistance.  Toileting from bedside commode with Minimal Assistance for hygiene and clothing management.   Bathing with Minimal Assistance.  Supine to sit with Stand-by Assistance /c HOB flat and no handrails.  Stand pivot transfers with Contact Guard Assistance.  Toilet transfer to bedside commode with Contact Guard Assistance.  Right Upper extremity exercise program 3 x 10 reps per handout, with independence.  Patient will complete a standing activity for 8 min with S in order to perform self care tasks.                     Plan:  Patient to be seen 5 x/week to address the above listed problems via therapeutic exercises, therapeutic activities, self-care/home management  Plan of Care expires: 06/10/19  Plan of Care reviewed with: patient    Marisa ASIM Keita  05/16/2019

## 2019-05-17 ENCOUNTER — OFFICE VISIT (OUTPATIENT)
Dept: ORTHOPEDICS | Facility: CLINIC | Age: 84
End: 2019-05-17
Payer: MEDICARE

## 2019-05-17 ENCOUNTER — OFFICE VISIT (OUTPATIENT)
Dept: PODIATRY | Facility: CLINIC | Age: 84
End: 2019-05-17
Payer: MEDICARE

## 2019-05-17 VITALS
HEART RATE: 73 BPM | SYSTOLIC BLOOD PRESSURE: 97 MMHG | TEMPERATURE: 97 F | DIASTOLIC BLOOD PRESSURE: 45 MMHG | RESPIRATION RATE: 18 BRPM

## 2019-05-17 VITALS
DIASTOLIC BLOOD PRESSURE: 79 MMHG | HEART RATE: 92 BPM | SYSTOLIC BLOOD PRESSURE: 114 MMHG | HEIGHT: 60 IN | BODY MASS INDEX: 15.2 KG/M2

## 2019-05-17 DIAGNOSIS — I73.9 PERIPHERAL VASCULAR DISEASE: Primary | ICD-10-CM

## 2019-05-17 DIAGNOSIS — L84 CORN OR CALLUS: ICD-10-CM

## 2019-05-17 DIAGNOSIS — B35.1 ONYCHOMYCOSIS DUE TO DERMATOPHYTE: ICD-10-CM

## 2019-05-17 DIAGNOSIS — S42.295A OTHER CLOSED NONDISPLACED FRACTURE OF PROXIMAL END OF LEFT HUMERUS, INITIAL ENCOUNTER: ICD-10-CM

## 2019-05-17 DIAGNOSIS — S52.502A CLOSED FRACTURE OF DISTAL END OF LEFT RADIUS, UNSPECIFIED FRACTURE MORPHOLOGY, INITIAL ENCOUNTER: Primary | ICD-10-CM

## 2019-05-17 PROCEDURE — 97803 MED NUTRITION INDIV SUBSEQ: CPT

## 2019-05-17 PROCEDURE — 25000003 PHARM REV CODE 250: Performed by: HOSPITALIST

## 2019-05-17 PROCEDURE — 99024 PR POST-OP FOLLOW-UP VISIT: ICD-10-PCS | Mod: S$GLB,,, | Performed by: PHYSICIAN ASSISTANT

## 2019-05-17 PROCEDURE — 11721 DEBRIDE NAIL 6 OR MORE: CPT | Mod: 59,Q8,S$GLB, | Performed by: PODIATRIST

## 2019-05-17 PROCEDURE — 63600175 PHARM REV CODE 636 W HCPCS: Performed by: HOSPITALIST

## 2019-05-17 PROCEDURE — 99999 PR PBB SHADOW E&M-EST. PATIENT-LVL V: CPT | Mod: PBBFAC,,, | Performed by: PHYSICIAN ASSISTANT

## 2019-05-17 PROCEDURE — 99203 PR OFFICE/OUTPT VISIT, NEW, LEVL III, 30-44 MIN: ICD-10-PCS | Mod: 25,S$GLB,, | Performed by: PODIATRIST

## 2019-05-17 PROCEDURE — 99999 PR PBB SHADOW E&M-EST. PATIENT-LVL V: ICD-10-PCS | Mod: PBBFAC,,, | Performed by: PHYSICIAN ASSISTANT

## 2019-05-17 PROCEDURE — 97110 THERAPEUTIC EXERCISES: CPT

## 2019-05-17 PROCEDURE — 99999 PR PBB SHADOW E&M-EST. PATIENT-LVL III: CPT | Mod: PBBFAC,,, | Performed by: PODIATRIST

## 2019-05-17 PROCEDURE — 25000242 PHARM REV CODE 250 ALT 637 W/ HCPCS: Performed by: HOSPITALIST

## 2019-05-17 PROCEDURE — 99203 OFFICE O/P NEW LOW 30 MIN: CPT | Mod: 25,S$GLB,, | Performed by: PODIATRIST

## 2019-05-17 PROCEDURE — 99999 PR PBB SHADOW E&M-EST. PATIENT-LVL III: ICD-10-PCS | Mod: PBBFAC,,, | Performed by: PODIATRIST

## 2019-05-17 PROCEDURE — 1101F PR PT FALLS ASSESS DOC 0-1 FALLS W/OUT INJ PAST YR: ICD-10-PCS | Mod: CPTII,S$GLB,, | Performed by: PODIATRIST

## 2019-05-17 PROCEDURE — 97535 SELF CARE MNGMENT TRAINING: CPT

## 2019-05-17 PROCEDURE — 11056 PR TRIM BENIGN HYPERKERATOTIC SKIN LESION,2-4: ICD-10-PCS | Mod: Q8,S$GLB,, | Performed by: PODIATRIST

## 2019-05-17 PROCEDURE — 97530 THERAPEUTIC ACTIVITIES: CPT

## 2019-05-17 PROCEDURE — 11000004 HC SNF PRIVATE

## 2019-05-17 PROCEDURE — 11056 PARNG/CUTG B9 HYPRKR LES 2-4: CPT | Mod: Q8,S$GLB,, | Performed by: PODIATRIST

## 2019-05-17 PROCEDURE — 97116 GAIT TRAINING THERAPY: CPT

## 2019-05-17 PROCEDURE — 1101F PT FALLS ASSESS-DOCD LE1/YR: CPT | Mod: CPTII,S$GLB,, | Performed by: PODIATRIST

## 2019-05-17 PROCEDURE — 11721 PR DEBRIDEMENT OF NAILS, 6 OR MORE: ICD-10-PCS | Mod: 59,Q8,S$GLB, | Performed by: PODIATRIST

## 2019-05-17 PROCEDURE — 25000003 PHARM REV CODE 250: Performed by: NURSE PRACTITIONER

## 2019-05-17 PROCEDURE — 99024 POSTOP FOLLOW-UP VISIT: CPT | Mod: S$GLB,,, | Performed by: PHYSICIAN ASSISTANT

## 2019-05-17 RX ADMIN — SENNOSIDES AND DOCUSATE SODIUM 1 TABLET: 8.6; 5 TABLET ORAL at 08:05

## 2019-05-17 RX ADMIN — VERAPAMIL HYDROCHLORIDE 80 MG: 80 TABLET, FILM COATED ORAL at 03:05

## 2019-05-17 RX ADMIN — ATENOLOL 12.5 MG: 25 TABLET ORAL at 09:05

## 2019-05-17 RX ADMIN — TIOTROPIUM BROMIDE 18 MCG: 18 CAPSULE ORAL; RESPIRATORY (INHALATION) at 09:05

## 2019-05-17 RX ADMIN — ACETAMINOPHEN 1000 MG: 325 TABLET ORAL at 03:05

## 2019-05-17 RX ADMIN — VERAPAMIL HYDROCHLORIDE 80 MG: 80 TABLET, FILM COATED ORAL at 08:05

## 2019-05-17 RX ADMIN — ENOXAPARIN SODIUM 30 MG: 100 INJECTION SUBCUTANEOUS at 05:05

## 2019-05-17 RX ADMIN — FERROUS SULFATE TAB EC 325 MG (65 MG FE EQUIVALENT) 325 MG: 325 (65 FE) TABLET DELAYED RESPONSE at 03:05

## 2019-05-17 RX ADMIN — ACETAMINOPHEN 1000 MG: 325 TABLET ORAL at 05:05

## 2019-05-17 RX ADMIN — ACETAMINOPHEN 1000 MG: 325 TABLET ORAL at 09:05

## 2019-05-17 RX ADMIN — VERAPAMIL HYDROCHLORIDE 80 MG: 80 TABLET, FILM COATED ORAL at 09:05

## 2019-05-17 RX ADMIN — FERROUS SULFATE TAB EC 325 MG (65 MG FE EQUIVALENT) 325 MG: 325 (65 FE) TABLET DELAYED RESPONSE at 08:05

## 2019-05-17 RX ADMIN — FERROUS SULFATE TAB EC 325 MG (65 MG FE EQUIVALENT) 325 MG: 325 (65 FE) TABLET DELAYED RESPONSE at 09:05

## 2019-05-17 NOTE — PLAN OF CARE
Problem: Physical Therapy Goal  Goal: Physical Therapy Goal  Goals to be met by: 14 days 2019     Patient will increase functional independence with mobility by performin. Supine to sit with supervision  2. Sit to supine with supervision  3. Sit to stand transfer with Stand-by Assistance  4. Bed to chair transfer with Stand-by Assistance using hemiwalker or least restrictive assistive device  5. Gait  x 100 feet with Stand-by Assistance using  hemiwalker or least restrictive assistive device. REVISE 2019   REVISED GOAL: gait x 75 feet w/ HW or least restrictive device and SBA = not met  6. Ascend/Descend 4 inch curb step with Contact Guard Assistance using  hemiwalker or least restrictive assistive device.  7. Stand for 3 minutes with Contact Guard Assistance using  hemiwalker or least restrictive assistive device, intermittently,  and perform an activity  8. Lower extremity exercise program x20 reps per handout, with assistance as needed and gym therex     Gait goal revised. Darlene Jaimes, PT 2019

## 2019-05-17 NOTE — PROGRESS NOTES
Ms. Flores is 86-year-old female who fell from a standing height resulting in an osteoporotic pathologic left intertrochanteric femur fracture treated with CM nail on 05/03/2019 as well as non-displaced left proximal humerus fracture and left distal radius fracture.     Ms. Flores is here today for a post-operative visit after a   Cephalomedullary nail fixation of left intertrochanteric femur fracture  by Dr. Bruce on 05/03/2019.  As well as follow up for non-operative treatment of left proximal humerus and distal radius fractures.     IMPLANTS:  Synthes trochanteric fixation nail advanced 11 x 360 mm with 80 mm hip screw and single distal interlocking screw.    Interval History:    she reports that she is doing ok.  Pain is controleld.  she is  taking pain medication.    She is at [facility]. She is receiving PT.   she denies fever, chills, and sweats since the time of the surgery.     Physical exam:  Dressing taken down.  Incision is clean, dry and intact.  Sutures removed without difficulty.      RADS:   WRIST: Bones are demineralized.  There is a subtle nondisplaced cortical defect involving the distal radius without significant angulation.  No additional fracture identified.  Normal alignment.  Soft tissues are symmetric.  No radiopaque foreign body.  SHOULDER:minimally displaced fracture of the left humeral neck.     Assessment:  Post-op visit ( 2 weeks)    Plan:  Current care, treatment plan, precautions, activity level/ modifications, limitations, rehabilitation exercises and proposed future treatment were discussed with the patient. We discussed the need to monitor for changes in symptoms and condition and report them to the physician.  Discussed importance of compliance with all appointments and follow up examinations.   - The patient was advised to keep the incision clean and dry for the next 24 hours after which she may wash the area with antibacterial soap in the shower. Will not submerge until the  incision is completely healed  -Patient was advised to monitor wound closely and multiple times daily for any problems. Call clinic immediately or report to ED for immediate medical attention for any complications including reopening of wound, drainage, purulence, redness, streaking, odor, pain out of proportion, fever, chills, etc.   -PT/OT, SNF, Patient is responsible to establish and continue care   HIP:      -range of motion as tolerated     - weight bearing as tolerated   SHOULDER:     - pendulum, continue sling, though  May remove for range of motion od elbow and wrist    - NWB   WRIST:     - NWB    -continue brace, range of motion of fingers,.    - pain medication: no refill needed,    Pain medication refill policy provided to patient for review.   - Patient is to return to clinic in 2 weeks  At time x-ray of her femur, wrist and 2 view shoulder AP scap Y is needed     If there are any questions prior to scheduled follow up, the patient was instructed to contact the office

## 2019-05-17 NOTE — PT/OT/SLP PROGRESS
Physical Therapy  Treatment    Siomara Flores   MRN: [id number]   Admitting Diagnosis: Closed displaced intertrochanteric fracture of left femur    PT Received On: 05/17/19          Billable Minutes:  Gait Training 23, Therapeutic Activity 19 and Therapeutic Exercise 8=50    Treatment Type: Treatment  PT/PTA: PT     PTA Visit Number: 0       General Precautions: Standard, fall  Orthopedic Precautions: LUE non weight bearing, LLE weight bearing as tolerated   Braces: Sling and swathe         Subjective:  Communicated with patient prior to session.  Patient agreeable to session. Reports she has appointment this afternoon.  Communication w/ PA, Mirna Crews, and she reports likely patient will remain NWB LUE and need sling and swath d/t proximal humerus fracture.    Pain/Comfort  Pain Rating 1: 4/10  Location - Side 1: Left  Location - Orientation 1: generalized  Location 1: hip  Pain Addressed 1: Reposition, Distraction  Pain Rating Post-Intervention 1: 5/10    Objective:  Patient found seated in w/c w/ sling and swath and left wrist immobilzer in place prior to session. with       AM-PAC 6 CLICK MOBILITY  Total Score:15    Bed Mobility:  Sit>Supine:not performed  Supine>Sit: not performed    Transfers:  Sit<>Stand: to/from w/c x2 trials w/ HW and min assistance; in parallel bars w/ RUE on bar w/ min assist.  Stand Pivot Transfer: in parallel bars w/ min assist and RUE on // bars  NWB LUE and WBAT LLE    Gait:  Amb w/ HW and min assistance 16 feet; 20 feet. Cues and standing rest breaks to lengthen gait trial. No LOB, Slow pace. Small steps.  amb in parallel bars w/ RUE support and CGA/min assist prior to HW gait.     Advanced Gait:  Stairs: unable  Curb Step: unable     Wheelchair Mobility:  notperfomred     Therex:  Glute sets,   Ankle  Pumps,   LAQ   x20 reps w/ assist as needed      Balance:  Stand w/ HW and CGA, WBAT LLE and NWB LUE    Additional Treatment:  Patient educated on LUE/LLE injuries and tentative plan  for NWB LUE; need for assistance at d/c.     Patient left up in chair with nurse  present. And patient in gym awaiting OT    Assessment:  Siomara Flores is a 86 y.o. female with a medical diagnosis of Closed displaced intertrochanteric fracture of left femur.  Patient is NWB LUE and has sling and swath and left wrist immoblizer and is WBAT LLE. She was able to increase amb distance slightly today. Patient will benefit from continued physical therapy to address deficits and improve safety and functional mobility. Continue with physical therapy plan of care. .    Rehab identified problem list/impairments: weakness, impaired endurance, impaired functional mobilty, impaired self care skills, gait instability, impaired balance, decreased upper extremity function, decreased lower extremity function, pain, orthopedic precautions    Rehab potential is good.    Activity tolerance: Good    Discharge recommendations: home with home health     Barriers to discharge: Inaccessible home environment, Decreased caregiver support    Equipment recommendations: walker, papi, commode, tub bench(HW or QC pending progress)     GOALS:   Multidisciplinary Problems     Physical Therapy Goals        Problem: Physical Therapy Goal    Goal Priority Disciplines Outcome Goal Variances Interventions   Physical Therapy Goal     PT, PT/OT Ongoing (interventions implemented as appropriate)     Description:  Goals to be met by: 14 days 2019     Patient will increase functional independence with mobility by performin. Supine to sit with supervision  2. Sit to supine with supervision  3. Sit to stand transfer with Stand-by Assistance  4. Bed to chair transfer with Stand-by Assistance using hemiwalker or least restrictive assistive device  5. Gait  x 100 feet with Stand-by Assistance using  hemiwalker or least restrictive assistive device. REVISE 2019   REVISED GOAL: gait x 75 feet w/ HW or least restrictive device and SBA = not  met  6. Ascend/Descend 4 inch curb step with Contact Guard Assistance using  hemiwalker or least restrictive assistive device.  7. Stand for 3 minutes with Contact Guard Assistance using  hemiwalker or least restrictive assistive device, intermittently,  and perform an activity  8. Lower extremity exercise program x20 reps per handout, with assistance as needed and gym therex                       PLAN:    Patient to be seen 5 x/week  to address the above listed problems via gait training, therapeutic activities, therapeutic exercises, wheelchair management/training  Plan of Care expires: 06/09/19  Plan of Care reviewed with: patient    Darlene GRANDE Theodore, PT  05/17/2019

## 2019-05-17 NOTE — LETTER
May 20, 2019      Sobia Lebron MD  [address]  [place] [address]           [facility] - Podiatry  [address]  [place] [address]  Phone: [phone]          Patient: Siomara Flores   MR Number: [id number]   YOB: 1933   Date of Visit: 5/17/2019       Dear Dr. Sobia Lebron:    Thank you for referring Siomara Flores to me for evaluation. Attached you will find relevant portions of my assessment and plan of care.    If you have questions, please do not hesitate to call me. I look forward to following Sioamra Flores along with you.    Sincerely,    Nga Mazariegos  CC:  No Recipients    If you would like to receive this communication electronically, please contact [email or web address] or [phone] to request more information on [employer] access.    For providers and/or their staff who would like to refer a patient to [facility], please contact us through our one-stop-shop provider referral line, [facility], at [phone].    If you feel you have received this communication in error or would no longer like to receive these types of communications, please e-mail [email or web address]

## 2019-05-17 NOTE — PLAN OF CARE
Problem: Occupational Therapy Goal  Goal: Occupational Therapy Goal  Goals to be met by: 5/23/2019     Patient will increase functional independence with ADLs by performing:    UE Dressing with Minimal Assistance.  LE Dressing with Minimal Assistance.  Grooming while standing at sink with Stand-by Assistance.  Toileting from bedside commode with Minimal Assistance for hygiene and clothing management.   Bathing with Minimal Assistance.  Supine to sit with Stand-by Assistance /c HOB flat and no handrails.  Stand pivot transfers with Contact Guard Assistance.  Toilet transfer to bedside commode with Contact Guard Assistance.  Right Upper extremity exercise program 3 x 10 reps per handout, with independence.  Patient will complete a standing activity for 8 min with S in order to perform self care tasks.    Outcome: Ongoing (interventions implemented as appropriate)  JETT Llamas/SOLANGE      5/17/2019

## 2019-05-17 NOTE — PT/OT/SLP PROGRESS
Occupational Therapy  Treatment    Siomara Flores   MRN: [id number]   Admitting Diagnosis: Closed displaced intertrochanteric fracture of left femur    OT Date of Treatment: 05/17/19       Billable Minutes:  Self Care/Home Management 37 and Therapeutic Exercise 10    General Precautions: Standard, fall  Orthopedic Precautions: LUE non weight bearing  Braces: Sling and swathe         Subjective:  Communicated with nurse prior to session.      Pain/Comfort  Pain Rating 1: 3/10  Location - Side 1: Left  Location - Orientation 1: generalized  Location 1: hip  Pain Addressed 1: Reposition, Distraction  Pain Rating Post-Intervention 1: 3/10    Objective:  Patient found with: (Sling and swathe)    Occupational Performance:    Bed Mobility:    · Pt seated in W/C at onset of therapy session.    Functional Mobility/Transfers:  · Patient completed Sit <> Stand Transfer with minimum assistance  with  hemiwalker   · Patient completed Bed <> Chair Transfer using Stand Pivot technique with minimum assistance with hemiwalker      Activities of Daily Living:  · Upper Body Dressing: maximal assistance with donning of gown and sling/swathe  · Lower Body Dressing: moderate assistance donning pants and socks using reacher and sock aide with HW to steady when standing.    [facility] 6 Click:  [facility] Total Score: 16    OT Exercises: UE Ergometer performed 10 minutes on UBE using (R) UE only.     Additional Treatment:  Pt educated on proper way to don/docc sling and swathe.    Patient left up in chair with PT present and initiating Tx session.    ASSESSMENT:  Siomara Flores is a 86 y.o. female with a medical diagnosis of Closed displaced intertrochanteric fracture of left femur .   Pt was agreeable to OT and tolerated Tx without incidence.  She is making progress but continues to present with deficits affecting (I)ce with functional transfers, functional standing balance and self care tasks with standing component. Pt introduced to using hip  kit to perform LBD with V/Cs needed to use proper technique.   OT continues to be recommended to further her functional (I)ce and safety. Goals remain appropriate and continued OT is recommended.        Rehab identified problem list/impairments: weakness, impaired endurance, impaired self care skills, impaired functional mobilty, gait instability, impaired balance, decreased upper extremity function, decreased lower extremity function, pain, orthopedic precautions    Rehab potential is good    Activity tolerance: Good    Discharge recommendations: home with home health     Barriers to discharge: Decreased caregiver support     Equipment recommendations: walker, papi, commode, tub bench(Need to confirm with family (Patient reports she has equipment from her ))     GOALS:   Multidisciplinary Problems     Occupational Therapy Goals        Problem: Occupational Therapy Goal    Goal Priority Disciplines Outcome Interventions   Occupational Therapy Goal     OT, PT/OT Ongoing (interventions implemented as appropriate)    Description:  Goals to be met by: 5/23/2019     Patient will increase functional independence with ADLs by performing:    UE Dressing with Minimal Assistance.  LE Dressing with Minimal Assistance.  Grooming while standing at sink with Stand-by Assistance.  Toileting from bedside commode with Minimal Assistance for hygiene and clothing management.   Bathing with Minimal Assistance.  Supine to sit with Stand-by Assistance /c HOB flat and no handrails.  Stand pivot transfers with Contact Guard Assistance.  Toilet transfer to bedside commode with Contact Guard Assistance.  Right Upper extremity exercise program 3 x 10 reps per handout, with independence.  Patient will complete a standing activity for 8 min with S in order to perform self care tasks.                     Plan:  Patient to be seen 5 x/week to address the above listed problems via therapeutic exercises, therapeutic activities,  self-care/home management  Plan of Care expires: 06/10/19  Plan of Care reviewed with: patient    Andrade Burris OTR/SOLANGE  05/17/2019

## 2019-05-17 NOTE — PLAN OF CARE
Problem: Physical Therapy Goal  Goal: Physical Therapy Goal  Goals to be met by: 14 days 2019     Patient will increase functional independence with mobility by performin. Supine to sit with supervision  2. Sit to supine with supervision  3. Sit to stand transfer with Stand-by Assistance  4. Bed to chair transfer with Stand-by Assistance using hemiwalker or least restrictive assistive device  5. Gait  x 100 feet with Stand-by Assistance using  hemiwalker or least restrictive assistive device. REVISE 2019   REVISED GOAL: gait x 75 feet w/ HW or least restrictive device and SBA = not met  6. Ascend/Descend 4 inch curb step with Contact Guard Assistance using  hemiwalker or least restrictive assistive device.  7. Stand for 3 minutes with Contact Guard Assistance using  hemiwalker or least restrictive assistive device, intermittently,  and perform an activity  8. Lower extremity exercise program x20 reps per handout, with assistance as needed and gym therex  NEW GOAL 2019 9. Patient will propel w/c w/ RUE and RLE or w/ BLE 75 feet including two turns w/ SBA= not met      Added a w/c goal. Darlene Jaimes, PT 2019

## 2019-05-17 NOTE — PROGRESS NOTES
[facility]illed [facility]  Adult Nutrition  Progress Note    SUMMARY   Recommendations    Recommendation/Intervention: Continue regular diet, boost plus TID, RD following  Goals: PO to meet 50% of needs with ONS by next RD visit  Nutrition Goal Status: progressing towards goal    Reason for Assessment    Reason For Assessment: RD follow-up  Diagnosis: (debilty sp fall , sp rodding L femur)  Relevant Medical History: HTN, anemia,   Interdisciplinary Rounds: attended  General Information Comments: taking > 50% PO , taking 75% of boost plus  Nutrition Discharge Planning: Dc on regular diet with ONS of choice for weight gain and wound healing    Nutrition Risk Screen    Nutrition Risk Screen: no indicators present    Nutrition/Diet History    Patient Reported Diet/Restrictions/Preferences: general  Typical Food/Fluid Intake: lactose free milk, is willing to try boost plus rather than boost breeze  Food Preferences: sensitive stomach, will not eat beans, or cruciferous vegetable, corse foods,   Spiritual, Cultural Beliefs, Buddhism Practices, Values that Affect Care: yes  Food Allergies: NKFA  Factors Affecting Nutritional Intake: altered gastrointestinal function, cultural/Zoroastrianism beliefs, decreased appetite(may have been too distracted or too exhausted to eat and take care of herself)    Anthropometrics    Temp: 97.9 °F (36.6 °C)  Height Method: Stated  Height: 5' (152.4 cm)  Height (inches): 60 in  Weight Method: Standard Scale  Weight: 35.3 kg (77 lb 13.2 oz)  Weight (lb): 77.82 lb  Ideal Body Weight (IBW), Female: 100 lb  % Ideal Body Weight, Female (lb): 80.03 lb  BMI (Calculated): 15.7  BMI Grade: less than 16 protein-energy malnutrition grade III  Weight Loss: unintentional  Usual Body Weight (UBW), k.8 kg  % Usual Body Weight: 87.02  % Weight Change From Usual Weight: -13.16 %     Lab/Procedures/Meds    Pertinent Labs Reviewed: reviewed  Pertinent Labs Comments: Na 135  Pertinent Medications  Reviewed: reviewed     Estimated/Assessed Needs    Weight Used For Calorie Calculations: 42 kg (92 lb 9.5 oz)(UBW in elderly )  Energy Calorie Requirements (kcal): 4128-1341  Energy Need Method: Kcal/kg(30-35 kcal/kg)  Protein Requirements: 55-63gm  Weight Used For Protein Calculations: 42 kg (92 lb 9.5 oz)(x 1.3-1.5 2/2 malnutrition)  Fluid Requirements (mL): per MD  Estimated Fluid Requirement Method: RDA Method  RDA Method (mL): 1260  CHO Requirement: -  Nutrition Prescription Ordered    Current Diet Order: Regular  Nutrition Order Comments: PO 75%  Oral Nutrition Supplement: Boost plus TID    Evaluation of Received Nutrient/Fluid Intake    Energy Calories Required: meeting needs  Protein Required: meeting needs  Fluid Required: meeting needs  Tolerance: tolerating  % Intake of Estimated Energy Needs: 75 - 100 %  % Meal Intake: 50 - 75 %    Nutrition Risk    Level of Risk/Frequency of Follow-up: low     Assessment and Plan     Severe malnutrition  Malnutrition in the context of Social/Environmental Circumstances     Related to (etiology):  Starvation and lack of self care while caregiver, grief response, reduced appetite and po intake     Signs and Symptoms (as evidenced by):  Energy Intake: <75% of estimated energy requirement for > 3 months  Body Fat Depletion: moderate depletion of orbitals and triceps and buccal area  Muscle Mass Depletion: severe depletion of clavicle region and scapular region moderate at temples, hands and calf  Weight Loss: > 7.5% % x < 3 months  Fluid Accumulation: mild    Ongoing  Monitor and Evaluation    Food and Nutrient Intake: food and beverage intake  Physical Activity and Function: nutrition-related ADLs and IADLs  Anthropometric Measurements: weight change  Biochemical Data, Medical Tests and Procedures: electrolyte and renal panel  Nutrition-Focused Physical Findings: overall appearance     Malnutrition Assessment   5/10/ 19  Malnutrition Type: social/environmental  circumstances  Energy Intake: severe energy intake  Skin (Micronutrient): dry  Hair/Scalp (Micronutrient): dull, dry  Eyes (Micronutrient): conjunctiva dull  Teeth (Micronutrient): (plates)  Neck/Chest (Micronutrient): bony prominence, muscle wasting, subcutaneous fat loss  Musculoskeletal/Lower Extremities: edema, joints painful, muscle wasting, subcutaneous fat loss       Weight Loss (Malnutrition): greater than 7.5% in 3 months  Subcutaneous Fat (Malnutrition): severe depletion  Muscle Mass (Malnutrition): severe depletion  Fluid Accumulation (Malnutrition): mild   Orbital Region (Subcutaneous Fat Loss): moderate depletion  Upper Arm Region (Subcutaneous Fat Loss): moderate depletion  Thoracic and Lumbar Region: moderate depletion   Upperglade Region (Muscle Loss): moderate depletion  Clavicle Bone Region (Muscle Loss): severe depletion  Clavicle and Acromion Bone Region (Muscle Loss): severe depletion  Scapular Bone Region (Muscle Loss): severe depletion  Dorsal Hand (Muscle Loss): moderate depletion  Posterior Calf Region (Muscle Loss): mild depletion            Severe Weight Loss (Malnutrition): greater than 7.5% in 3 months    Nutrition Follow-Up    RD Follow-up?: Yes

## 2019-05-18 PROCEDURE — 11000004 HC SNF PRIVATE

## 2019-05-18 PROCEDURE — 25000003 PHARM REV CODE 250: Performed by: HOSPITALIST

## 2019-05-18 PROCEDURE — 25000242 PHARM REV CODE 250 ALT 637 W/ HCPCS: Performed by: HOSPITALIST

## 2019-05-18 PROCEDURE — 97116 GAIT TRAINING THERAPY: CPT

## 2019-05-18 PROCEDURE — 25000003 PHARM REV CODE 250: Performed by: NURSE PRACTITIONER

## 2019-05-18 PROCEDURE — 97110 THERAPEUTIC EXERCISES: CPT

## 2019-05-18 PROCEDURE — 63600175 PHARM REV CODE 636 W HCPCS: Performed by: HOSPITALIST

## 2019-05-18 PROCEDURE — 97530 THERAPEUTIC ACTIVITIES: CPT

## 2019-05-18 RX ADMIN — ACETAMINOPHEN 1000 MG: 325 TABLET ORAL at 02:05

## 2019-05-18 RX ADMIN — FERROUS SULFATE TAB EC 325 MG (65 MG FE EQUIVALENT) 325 MG: 325 (65 FE) TABLET DELAYED RESPONSE at 10:05

## 2019-05-18 RX ADMIN — FERROUS SULFATE TAB EC 325 MG (65 MG FE EQUIVALENT) 325 MG: 325 (65 FE) TABLET DELAYED RESPONSE at 02:05

## 2019-05-18 RX ADMIN — ATENOLOL 12.5 MG: 25 TABLET ORAL at 10:05

## 2019-05-18 RX ADMIN — ACETAMINOPHEN 1000 MG: 325 TABLET ORAL at 05:05

## 2019-05-18 RX ADMIN — FERROUS SULFATE TAB EC 325 MG (65 MG FE EQUIVALENT) 325 MG: 325 (65 FE) TABLET DELAYED RESPONSE at 08:05

## 2019-05-18 RX ADMIN — ENOXAPARIN SODIUM 30 MG: 100 INJECTION SUBCUTANEOUS at 05:05

## 2019-05-18 RX ADMIN — VERAPAMIL HYDROCHLORIDE 80 MG: 80 TABLET, FILM COATED ORAL at 10:05

## 2019-05-18 RX ADMIN — VERAPAMIL HYDROCHLORIDE 80 MG: 80 TABLET, FILM COATED ORAL at 08:05

## 2019-05-18 RX ADMIN — VERAPAMIL HYDROCHLORIDE 80 MG: 80 TABLET, FILM COATED ORAL at 02:05

## 2019-05-18 RX ADMIN — ACETAMINOPHEN 1000 MG: 325 TABLET ORAL at 09:05

## 2019-05-18 RX ADMIN — SENNOSIDES AND DOCUSATE SODIUM 1 TABLET: 8.6; 5 TABLET ORAL at 08:05

## 2019-05-18 RX ADMIN — TIOTROPIUM BROMIDE 18 MCG: 18 CAPSULE ORAL; RESPIRATORY (INHALATION) at 10:05

## 2019-05-18 NOTE — PT/OT/SLP PROGRESS
Physical Therapy  Treatment    Siomara Flores   MRN: [id number]   Admitting Diagnosis: Closed displaced intertrochanteric fracture of left femur    PT Received On: 05/18/19          Billable Minutes:  Gait Training 10, Therapeutic Activity 10 and Therapeutic Exercise 22=42    Treatment Type: Treatment  PT/PTA: PT     PTA Visit Number: 0       General Precautions: Standard, fall  Orthopedic Precautions: LUE non weight bearing, LLE weight bearing as tolerated   Braces: Sling and swathe         Subjective:  Communicated with patient prior to session.  Agreeable to session    Pain/Comfort  Pain Rating 1: 6/10  Location - Side 1: Left  Location - Orientation 1: generalized  Location 1: arm(hip)  Pain Addressed 1: Distraction  Pain Rating Post-Intervention 1: 6/10    Objective:  Patient found sitting inbedside chair w/ sling and swath and wrist immobilizer LUE with       [facility] 6 CLICK MOBILITY  Total Score:14    Bed Mobility:  Sit>Supine:not performed  Supine>Sit: not performed    Transfers:  Sit<>Stand: from bedside chair w/o AD w/ min assistance; to/from w/c in parallel bars w/ use of RUE on bar w/ CGA; to/from w/c w/ HW w/ max assistance  Stand Pivot Transfer: w/c<>chair w/ min assist, SPT  NWB LUE    Gait:  Amb in parallel bars w/ CGA w RUE on bars and w/c follow.   Attempted gait w/ HW and patient unable, reports too fatigued, anxious       Therex:  Quad sets,   Glute sets,   Ankle  Pumps,   hip abduction/adduction,  heelslides,   SAQ,   LAQ   Hip flexion  x20 reps w/ assist as needed      Additional Treatment:  .patient pedals UBE w/ RUE only x10 minutes  Patient assisted w/ brushing teeth. Performs w/ SBA after set up, using RUE, seated in w/c      Patient left up in chair with call button in reach.    Assessment:  Siomara Flores is a 86 y.o. female with a medical diagnosis of Closed displaced intertrochanteric fracture of left femur.  Patient is NWB LUE and WBAT LLE. Patient now allowed to perform some UE  exercises but to remain NWB and use sling and swath. Patient w/ increased pain today and unable to ambulate w/ HW. Patient will benefit from continued physical therapy to address deficits and improve safety and functional mobility. Continue with physical therapy plan of care. .    Rehab identified problem list/impairments: weakness, impaired endurance, impaired functional mobilty, impaired self care skills, gait instability, impaired balance, decreased upper extremity function, decreased lower extremity function, pain, orthopedic precautions    Rehab potential is good.    Activity tolerance: Good    Discharge recommendations: home with home health     Barriers to discharge: Inaccessible home environment, Decreased caregiver support    Equipment recommendations: walker, papi, commode, tub bench(HW or QC pending progress)     GOALS:   Multidisciplinary Problems     Physical Therapy Goals        Problem: Physical Therapy Goal    Goal Priority Disciplines Outcome Goal Variances Interventions   Physical Therapy Goal     PT, PT/OT Ongoing (interventions implemented as appropriate)     Description:  Goals to be met by: 14 days 2019     Patient will increase functional independence with mobility by performin. Supine to sit with supervision  2. Sit to supine with supervision  3. Sit to stand transfer with Stand-by Assistance  4. Bed to chair transfer with Stand-by Assistance using hemiwalker or least restrictive assistive device  5. Gait  x 100 feet with Stand-by Assistance using  hemiwalker or least restrictive assistive device. REVISE 2019   REVISED GOAL: gait x 75 feet w/ HW or least restrictive device and SBA = not met  6. Ascend/Descend 4 inch curb step with Contact Guard Assistance using  hemiwalker or least restrictive assistive device.  7. Stand for 3 minutes with Contact Guard Assistance using  hemiwalker or least restrictive assistive device, intermittently,  and perform an activity  8. Lower  extremity exercise program x20 reps per handout, with assistance as needed and gym therex  NEW GOAL 5/17/2019 9. Patient will propel w/c w/ RUE and RLE or w/ BLE 75 feet including two turns w/ SBA= not met                        PLAN:    Patient to be seen 5 x/week  to address the above listed problems via gait training, therapeutic activities, therapeutic exercises, wheelchair management/training  Plan of Care expires: 06/09/19  Plan of Care reviewed with: patient    Darlene Jaimes, PT  05/18/2019

## 2019-05-18 NOTE — PLAN OF CARE
Problem: Physical Therapy Goal  Goal: Physical Therapy Goal  Goals to be met by: 14 days 2019     Patient will increase functional independence with mobility by performin. Supine to sit with supervision  2. Sit to supine with supervision  3. Sit to stand transfer with Stand-by Assistance  4. Bed to chair transfer with Stand-by Assistance using hemiwalker or least restrictive assistive device  5. Gait  x 100 feet with Stand-by Assistance using  hemiwalker or least restrictive assistive device. REVISE 2019   REVISED GOAL: gait x 75 feet w/ HW or least restrictive device and SBA = not met  6. Ascend/Descend 4 inch curb step with Contact Guard Assistance using  hemiwalker or least restrictive assistive device.  7. Stand for 3 minutes with Contact Guard Assistance using  hemiwalker or least restrictive assistive device, intermittently,  and perform an activity  8. Lower extremity exercise program x20 reps per handout, with assistance as needed and gym therex  NEW GOAL 2019 9. Patient will propel w/c w/ RUE and RLE or w/ BLE 75 feet including two turns w/ SBA= not met       Goals remain appropriate. Continue with Physical therapy Plan of Care. Darlene Jaimes, PT 2019

## 2019-05-19 PROCEDURE — 25000242 PHARM REV CODE 250 ALT 637 W/ HCPCS: Performed by: HOSPITALIST

## 2019-05-19 PROCEDURE — 25000003 PHARM REV CODE 250: Performed by: NURSE PRACTITIONER

## 2019-05-19 PROCEDURE — 11000004 HC SNF PRIVATE

## 2019-05-19 PROCEDURE — 25000003 PHARM REV CODE 250: Performed by: HOSPITALIST

## 2019-05-19 PROCEDURE — 63600175 PHARM REV CODE 636 W HCPCS: Performed by: HOSPITALIST

## 2019-05-19 RX ADMIN — FERROUS SULFATE TAB EC 325 MG (65 MG FE EQUIVALENT) 325 MG: 325 (65 FE) TABLET DELAYED RESPONSE at 02:05

## 2019-05-19 RX ADMIN — ACETAMINOPHEN 1000 MG: 325 TABLET ORAL at 05:05

## 2019-05-19 RX ADMIN — ENOXAPARIN SODIUM 30 MG: 100 INJECTION SUBCUTANEOUS at 05:05

## 2019-05-19 RX ADMIN — VERAPAMIL HYDROCHLORIDE 80 MG: 80 TABLET, FILM COATED ORAL at 08:05

## 2019-05-19 RX ADMIN — TIOTROPIUM BROMIDE 18 MCG: 18 CAPSULE ORAL; RESPIRATORY (INHALATION) at 10:05

## 2019-05-19 RX ADMIN — SENNOSIDES AND DOCUSATE SODIUM 1 TABLET: 8.6; 5 TABLET ORAL at 08:05

## 2019-05-19 RX ADMIN — FERROUS SULFATE TAB EC 325 MG (65 MG FE EQUIVALENT) 325 MG: 325 (65 FE) TABLET DELAYED RESPONSE at 10:05

## 2019-05-19 RX ADMIN — ATENOLOL 12.5 MG: 25 TABLET ORAL at 10:05

## 2019-05-19 RX ADMIN — VERAPAMIL HYDROCHLORIDE 80 MG: 80 TABLET, FILM COATED ORAL at 10:05

## 2019-05-19 RX ADMIN — FERROUS SULFATE TAB EC 325 MG (65 MG FE EQUIVALENT) 325 MG: 325 (65 FE) TABLET DELAYED RESPONSE at 08:05

## 2019-05-19 RX ADMIN — ACETAMINOPHEN 1000 MG: 325 TABLET ORAL at 02:05

## 2019-05-19 RX ADMIN — ACETAMINOPHEN 1000 MG: 325 TABLET ORAL at 08:05

## 2019-05-19 RX ADMIN — SENNOSIDES AND DOCUSATE SODIUM 1 TABLET: 8.6; 5 TABLET ORAL at 10:05

## 2019-05-20 LAB
ANION GAP SERPL CALC-SCNC: 8 MMOL/L (ref 8–16)
BASOPHILS # BLD AUTO: 0.07 K/UL (ref 0–0.2)
BASOPHILS NFR BLD: 1.3 % (ref 0–1.9)
BUN SERPL-MCNC: 15 MG/DL (ref 8–23)
CALCIUM SERPL-MCNC: 8.9 MG/DL (ref 8.7–10.5)
CHLORIDE SERPL-SCNC: 101 MMOL/L (ref 95–110)
CO2 SERPL-SCNC: 29 MMOL/L (ref 23–29)
CREAT SERPL-MCNC: 0.5 MG/DL (ref 0.5–1.4)
DIFFERENTIAL METHOD: ABNORMAL
EOSINOPHIL # BLD AUTO: 0.2 K/UL (ref 0–0.5)
EOSINOPHIL NFR BLD: 3.8 % (ref 0–8)
ERYTHROCYTE [DISTWIDTH] IN BLOOD BY AUTOMATED COUNT: 17.6 % (ref 11.5–14.5)
EST. GFR  (AFRICAN AMERICAN): >60 ML/MIN/1.73 M^2
EST. GFR  (NON AFRICAN AMERICAN): >60 ML/MIN/1.73 M^2
GLUCOSE SERPL-MCNC: 83 MG/DL (ref 70–110)
HCT VFR BLD AUTO: 25.9 % (ref 37–48.5)
HGB BLD-MCNC: 7.7 G/DL (ref 12–16)
IMM GRANULOCYTES # BLD AUTO: 0.02 K/UL (ref 0–0.04)
IMM GRANULOCYTES NFR BLD AUTO: 0.4 % (ref 0–0.5)
LYMPHOCYTES # BLD AUTO: 1.1 K/UL (ref 1–4.8)
LYMPHOCYTES NFR BLD: 20.6 % (ref 18–48)
MAGNESIUM SERPL-MCNC: 2 MG/DL (ref 1.6–2.6)
MCH RBC QN AUTO: 25.8 PG (ref 27–31)
MCHC RBC AUTO-ENTMCNC: 29.7 G/DL (ref 32–36)
MCV RBC AUTO: 87 FL (ref 82–98)
MONOCYTES # BLD AUTO: 0.6 K/UL (ref 0.3–1)
MONOCYTES NFR BLD: 10.6 % (ref 4–15)
NEUTROPHILS # BLD AUTO: 3.4 K/UL (ref 1.8–7.7)
NEUTROPHILS NFR BLD: 63.3 % (ref 38–73)
NRBC BLD-RTO: 0 /100 WBC
PHOSPHATE SERPL-MCNC: 3.5 MG/DL (ref 2.7–4.5)
PLATELET # BLD AUTO: 490 K/UL (ref 150–350)
PMV BLD AUTO: 9.7 FL (ref 9.2–12.9)
POTASSIUM SERPL-SCNC: 3.9 MMOL/L (ref 3.5–5.1)
RBC # BLD AUTO: 2.99 M/UL (ref 4–5.4)
SODIUM SERPL-SCNC: 138 MMOL/L (ref 136–145)
WBC # BLD AUTO: 5.3 K/UL (ref 3.9–12.7)

## 2019-05-20 PROCEDURE — 25000242 PHARM REV CODE 250 ALT 637 W/ HCPCS: Performed by: HOSPITALIST

## 2019-05-20 PROCEDURE — 97110 THERAPEUTIC EXERCISES: CPT

## 2019-05-20 PROCEDURE — 97530 THERAPEUTIC ACTIVITIES: CPT

## 2019-05-20 PROCEDURE — 84100 ASSAY OF PHOSPHORUS: CPT

## 2019-05-20 PROCEDURE — 11000004 HC SNF PRIVATE

## 2019-05-20 PROCEDURE — 36415 COLL VENOUS BLD VENIPUNCTURE: CPT

## 2019-05-20 PROCEDURE — 25000003 PHARM REV CODE 250: Performed by: HOSPITALIST

## 2019-05-20 PROCEDURE — 97116 GAIT TRAINING THERAPY: CPT

## 2019-05-20 PROCEDURE — 25000003 PHARM REV CODE 250: Performed by: NURSE PRACTITIONER

## 2019-05-20 PROCEDURE — 97535 SELF CARE MNGMENT TRAINING: CPT

## 2019-05-20 PROCEDURE — 85025 COMPLETE CBC W/AUTO DIFF WBC: CPT

## 2019-05-20 PROCEDURE — 63600175 PHARM REV CODE 636 W HCPCS: Performed by: HOSPITALIST

## 2019-05-20 PROCEDURE — 83735 ASSAY OF MAGNESIUM: CPT

## 2019-05-20 PROCEDURE — 80048 BASIC METABOLIC PNL TOTAL CA: CPT

## 2019-05-20 RX ADMIN — ACETAMINOPHEN 1000 MG: 325 TABLET ORAL at 09:05

## 2019-05-20 RX ADMIN — VERAPAMIL HYDROCHLORIDE 80 MG: 80 TABLET, FILM COATED ORAL at 10:05

## 2019-05-20 RX ADMIN — VERAPAMIL HYDROCHLORIDE 80 MG: 80 TABLET, FILM COATED ORAL at 02:05

## 2019-05-20 RX ADMIN — ENOXAPARIN SODIUM 30 MG: 100 INJECTION SUBCUTANEOUS at 04:05

## 2019-05-20 RX ADMIN — FERROUS SULFATE TAB EC 325 MG (65 MG FE EQUIVALENT) 325 MG: 325 (65 FE) TABLET DELAYED RESPONSE at 08:05

## 2019-05-20 RX ADMIN — FERROUS SULFATE TAB EC 325 MG (65 MG FE EQUIVALENT) 325 MG: 325 (65 FE) TABLET DELAYED RESPONSE at 02:05

## 2019-05-20 RX ADMIN — ATENOLOL 12.5 MG: 25 TABLET ORAL at 10:05

## 2019-05-20 RX ADMIN — ACETAMINOPHEN 1000 MG: 325 TABLET ORAL at 02:05

## 2019-05-20 RX ADMIN — TIOTROPIUM BROMIDE 18 MCG: 18 CAPSULE ORAL; RESPIRATORY (INHALATION) at 10:05

## 2019-05-20 RX ADMIN — VERAPAMIL HYDROCHLORIDE 80 MG: 80 TABLET, FILM COATED ORAL at 08:05

## 2019-05-20 RX ADMIN — SENNOSIDES AND DOCUSATE SODIUM 1 TABLET: 8.6; 5 TABLET ORAL at 08:05

## 2019-05-20 RX ADMIN — FERROUS SULFATE TAB EC 325 MG (65 MG FE EQUIVALENT) 325 MG: 325 (65 FE) TABLET DELAYED RESPONSE at 10:05

## 2019-05-20 RX ADMIN — ACETAMINOPHEN 1000 MG: 325 TABLET ORAL at 05:05

## 2019-05-20 NOTE — PT/OT/SLP PROGRESS
Occupational Therapy  Treatment    Siomara Flores   MRN: [id number]   Admitting Diagnosis: Closed displaced intertrochanteric fracture of left femur    OT Date of Treatment: 05/20/19       Billable Minutes:  Self Care/Home Management 20 and Therapeutic Activity 25    General Precautions: Standard, fall  Orthopedic Precautions: LUE non weight bearing  Braces: Sling and swathe         Subjective:  Communicated with nurse prior to session.      Pain/Comfort  Pain Rating 1: 6/10  Location - Side 1: Left  Location - Orientation 1: generalized  Location 1: arm  Pain Addressed 1: Reposition, Distraction  Pain Rating Post-Intervention 1: 6/10    Objective:  Patient found with: (sliing and swathe)    Occupational Performance:    Bed Mobility:    · Pt seated in W/C at onset of therapy session.    Functional Mobility/Transfers:  · Patient completed Sit <> Stand Transfer with contact guard assistance  with  hemiwalker   · Patient completed Bed <> Chair Transfer using Stand Pivot technique with contact guard assistance with hemiwalker      Activities of Daily Living:  · Grooming: set up . Pt washing face, hands and performing oral care with set up only with all performed seated sinkside.  · Lower Body Dressing: minimum assistance Pt donning pants and socks using hip kit with V/Cs needed to properly use equipment to don/doff clothing.    Additional Treatment:  Pt edu on Plan of care,  safety when performing functional transfers, proper sequence when using A/E to perform self care tasks and functional standing activities.  - White board updated  - Self care tasks completed-- as noted above     Pt worked on functional standing activity consisting of standing with RW while reaching in all planes , crossing of midline and reaching to varying heights to facilitate (B) wt shifting and stability in standing to increase (I)ce when performing self care, and functional activities with standing component.  Pt tolerated up to 12 minutes and  10 min 34 sec respectively in standing with SBA and HW to steady.    Patient left up in chair with call button in reach and nurse notified     [facility] 6 Click:  [facility] Total Score: 17      ASSESSMENT:  Siomara Flores is a 86 y.o. female with a medical diagnosis of Closed displaced intertrochanteric fracture of left femur .   Pt was agreeable to OT and tolerated Tx without incidence.  She is making progress but continues to require V/Cs to properly and safely perform activities to completion.  She continues to present with deficits affecting (I)ce with functional transfers, functional standing balance and self care tasks with standing component.   OT continues to be recommended to further her functional (I)ce and safety. Goals remain appropriate and continued OT is recommended.        Rehab identified problem list/impairments: weakness, impaired endurance, impaired self care skills, impaired functional mobilty, gait instability, impaired balance, decreased upper extremity function, decreased lower extremity function, pain, orthopedic precautions    Rehab potential is good    Activity tolerance: Good    Discharge recommendations: home with home health     Barriers to discharge: Decreased caregiver support     Equipment recommendations: walker, papi, commode, tub bench(Need to confirm with family (Patient reports she has equipment from her ))     GOALS:   Multidisciplinary Problems     Occupational Therapy Goals        Problem: Occupational Therapy Goal    Goal Priority Disciplines Outcome Interventions   Occupational Therapy Goal     OT, PT/OT Ongoing (interventions implemented as appropriate)    Description:  Goals to be met by: 5/23/2019     Patient will increase functional independence with ADLs by performing:    UE Dressing with Minimal Assistance.  LE Dressing with Minimal Assistance.  Grooming while standing at sink with Stand-by Assistance.  Toileting from bedside commode with Minimal Assistance for  hygiene and clothing management.   Bathing with Minimal Assistance.  Supine to sit with Stand-by Assistance /c HOB flat and no handrails.  Stand pivot transfers with Contact Guard Assistance.  Toilet transfer to bedside commode with Contact Guard Assistance.  Right Upper extremity exercise program 3 x 10 reps per handout, with independence.  Patient will complete a standing activity for 8 min with S in order to perform self care tasks.                     Plan:  Patient to be seen 5 x/week to address the above listed problems via therapeutic exercises, therapeutic activities, self-care/home management  Plan of Care expires: 06/10/19  Plan of Care reviewed with: patient    Andrade Burris, OTR/L  05/20/2019

## 2019-05-20 NOTE — PT/OT/SLP PROGRESS
Physical Therapy  Treatment    Siomara Flores   MRN: [id number]   Admitting Diagnosis: Closed displaced intertrochanteric fracture of left femur    PT Received On: 05/20/19  Total Time (min): (--)       Billable Minutes:  Gait Training 10, Therapeutic Activity 15 and Therapeutic Exercise 20    Treatment Type: Treatment  PT/PTA: PTA     PTA Visit Number: 1       General Precautions: Standard, fall  Orthopedic Precautions: LUE non weight bearing   Braces: Sling and swathe         Subjective:  Communicated with nursing prior to session.  Pt agreed to work with therapy.     Pain/Comfort  Pain Rating 1: 6/10  Location - Side 1: Left  Location - Orientation 1: generalized  Location 1: arm(hip)    Objective:  Patient found seated w/c.        AM-PAC 6 CLICK MOBILITY  Total Score:14    Bed Mobility:  Sit>Supine:not performed  Supine>Sit: not performed    Transfers:  Sit<>Stand: to/from w/c x3 trials w/ HW and Min A  Stand Pivot Transfer: w/c<>recumbent stepper w/ HW and Min A; w/c to bedside chair w/ HW and Min A     Gait:  Amb x2 trials 26ft and 20ft w/ HW and CGA to occasional Min A. Pt required seated rest break b/w trials.      Therex:  BLE therex 2x10 reps:    -AP   -LAQ   -Hip Flexion    -GS    Additional Treatment:  -Recumbent stepper x15 min using RUE and BLE.     Patient left up in chair with all lines intact, call button in reach and nursing notified.    Assessment:  Siomara Flores is a 86 y.o. female with a medical diagnosis of Closed displaced intertrochanteric fracture of left femur.  Pt tolerated treatment well, and will continue to benefit from PT services at this time. Continue with PT POC as indicated.    Rehab identified problem list/impairments: weakness, impaired endurance, impaired functional mobilty, impaired self care skills, gait instability, impaired balance, decreased upper extremity function, decreased lower extremity function, pain, orthopedic precautions    Rehab potential is good.    Activity  tolerance: Good    Discharge recommendations: home with home health     Barriers to discharge: Inaccessible home environment, Decreased caregiver support    Equipment recommendations: walker, papi, commode, tub bench(HW or QC pending progress)     GOALS:   Multidisciplinary Problems     Physical Therapy Goals        Problem: Physical Therapy Goal    Goal Priority Disciplines Outcome Goal Variances Interventions   Physical Therapy Goal     PT, PT/OT Ongoing (interventions implemented as appropriate)     Description:  Goals to be met by: 14 days 2019     Patient will increase functional independence with mobility by performin. Supine to sit with supervision  2. Sit to supine with supervision  3. Sit to stand transfer with Stand-by Assistance  4. Bed to chair transfer with Stand-by Assistance using hemiwalker or least restrictive assistive device  5. Gait  x 100 feet with Stand-by Assistance using  hemiwalker or least restrictive assistive device. REVISE 2019   REVISED GOAL: gait x 75 feet w/ HW or least restrictive device and SBA = not met  6. Ascend/Descend 4 inch curb step with Contact Guard Assistance using  hemiwalker or least restrictive assistive device.  7. Stand for 3 minutes with Contact Guard Assistance using  hemiwalker or least restrictive assistive device, intermittently,  and perform an activity  8. Lower extremity exercise program x20 reps per handout, with assistance as needed and gym therex  NEW GOAL 2019 9. Patient will propel w/c w/ RUE and RLE or w/ BLE 75 feet including two turns w/ SBA= not met                        PLAN:    Patient to be seen 5 x/week  to address the above listed problems via gait training, therapeutic activities, therapeutic exercises, wheelchair management/training  Plan of Care expires: 19  Plan of Care reviewed with: patient    Vita Peterson, PTA  2019

## 2019-05-20 NOTE — PLAN OF CARE
Problem: Occupational Therapy Goal  Goal: Occupational Therapy Goal  Goals to be met by: 5/23/2019     Patient will increase functional independence with ADLs by performing:    UE Dressing with Minimal Assistance.  LE Dressing with Minimal Assistance.  Grooming while standing at sink with Stand-by Assistance.  Toileting from bedside commode with Minimal Assistance for hygiene and clothing management.   Bathing with Minimal Assistance.  Supine to sit with Stand-by Assistance /c HOB flat and no handrails.  Stand pivot transfers with Contact Guard Assistance.  Toilet transfer to bedside commode with Contact Guard Assistance.  Right Upper extremity exercise program 3 x 10 reps per handout, with independence.  Patient will complete a standing activity for 8 min with S in order to perform self care tasks.    Outcome: Ongoing (interventions implemented as appropriate)  Andrade Burris OTR/L      5/20/2019

## 2019-05-20 NOTE — PLAN OF CARE
Problem: Physical Therapy Goal  Goal: Physical Therapy Goal  Goals to be met by: 14 days 2019     Patient will increase functional independence with mobility by performin. Supine to sit with supervision  2. Sit to supine with supervision  3. Sit to stand transfer with Stand-by Assistance  4. Bed to chair transfer with Stand-by Assistance using hemiwalker or least restrictive assistive device  5. Gait  x 100 feet with Stand-by Assistance using  hemiwalker or least restrictive assistive device. REVISE 2019   REVISED GOAL: gait x 75 feet w/ HW or least restrictive device and SBA = not met  6. Ascend/Descend 4 inch curb step with Contact Guard Assistance using  hemiwalker or least restrictive assistive device.  7. Stand for 3 minutes with Contact Guard Assistance using  hemiwalker or least restrictive assistive device, intermittently,  and perform an activity  8. Lower extremity exercise program x20 reps per handout, with assistance as needed and gym therex  NEW GOAL 2019 9. Patient will propel w/c w/ RUE and RLE or w/ BLE 75 feet including two turns w/ SBA= not met       Goals remain appropriate. Continue with PT POC as indicated.

## 2019-05-20 NOTE — PLAN OF CARE
Problem: Adult Inpatient Plan of Care  Goal: Plan of Care Review  Outcome: Ongoing (interventions implemented as appropriate)  AAOx4. Free of falls throughout shift. Call light in reach and bed in lowest position. Left arm remains in sling and left leg incision ASIM, no redness or swelling noted. Afebrile. Some complaints of pain and relieved with scheduled Acetaminophen 650 mg PO. Will continue to monitor.

## 2019-05-20 NOTE — PROGRESS NOTES
Subjective:      Patient ID: Siomara Flores is a 86 y.o. female.    Chief Complaint: Foot Problem; Nail Problem (toenails are about 14inches); and Nail Care    Siomara is a 86 y.o. female who presents to the clinic for evaluation and treatment of high risk feet. Siomara has a past medical history of Bronchiectasis without complication, Hypertension, and Supraventricular tachycardia. The patient's chief complaint is long, thick toenails. This patient has documented high risk feet requiring routine maintenance secondary to peripheral vascular disease.    PCP: Vince Wray MD    Date Last Seen by PCP:   Chief Complaint   Patient presents with    Foot Problem    Nail Problem     toenails are about 14inches    Nail Care         Current shoe gear:  Affected Foot: Slip-on shoes     Unaffected Foot: Slip-on shoes    Last encounter in this department: Visit date not found    Hemoglobin A1C   Date Value Ref Range Status   05/02/2019 5.6 4.0 - 5.6 % Final     Comment:     ADA Screening Guidelines:  5.7-6.4%  Consistent with prediabetes  >or=6.5%  Consistent with diabetes  High levels of fetal hemoglobin interfere with the HbA1C  assay. Heterozygous hemoglobin variants (HbS, HgC, etc)do  not significantly interfere with this assay.   However, presence of multiple variants may affect accuracy.         Review of Systems   Constitution: Negative for chills and fever.   HENT: Negative for congestion and tinnitus.    Eyes: Negative for double vision and visual disturbance.   Cardiovascular: Positive for claudication. Negative for chest pain.   Respiratory: Negative for hemoptysis and shortness of breath.    Endocrine: Negative for cold intolerance and heat intolerance.   Hematologic/Lymphatic: Negative for adenopathy and bleeding problem.   Skin: Positive for color change, dry skin, nail changes and poor wound healing.   Musculoskeletal: Positive for stiffness. Negative for myalgias.   Gastrointestinal: Negative for  nausea and vomiting.   Genitourinary: Negative for dysuria and hematuria.   Neurological: Positive for sensory change.   Psychiatric/Behavioral: Negative for altered mental status and suicidal ideas.   Allergic/Immunologic: Negative for environmental allergies and persistent infections.         Objective:      Physical Exam   Constitutional: She is oriented to person, place, and time. She appears well-developed and well-nourished.   Cardiovascular:   Pulses:       Dorsalis pedis pulses are 0 on the right side, and 0 on the left side.        Posterior tibial pulses are 1+ on the right side, and 1+ on the left side.   Pulmonary/Chest: Effort normal.   Musculoskeletal: Normal range of motion.   Anterior, lateral, and posterior muscle groups bilateral lower extremities show strength 4 over 5 symmetrically. Inspection and palpation of the joints and bones reveal no crepitus or joint effusion. No tenderness upon palpation. Mild plantar flexor contractures noted to digits 2 through 5 bilaterally.  Angle and base of gait are normal.   Feet:   Right Foot:   Skin Integrity: Positive for callus and dry skin.   Left Foot:   Skin Integrity: Positive for callus and dry skin.   Neurological: She is alert and oriented to person, place, and time. She displays atrophy and abnormal reflex. A sensory deficit is present.   Reflex Scores:       Patellar reflexes are 1+ on the right side and 1+ on the left side.       Achilles reflexes are 1+ on the right side and 1+ on the left side.  Skin: Skin is warm and dry. Capillary refill takes 2 to 3 seconds. There is pallor.   Skin bilateral lower extremities noted to be thin, dry, and atrophic.  Toenails thickened, discolored, with subungual fungal debris and tenderness noted.  Hyperkeratotic lesions noted to both feet plantarly with tenderness.   Psychiatric: She has a normal mood and affect.   Vitals reviewed.            Assessment:       Encounter Diagnoses   Name Primary?    Peripheral  vascular disease Yes    Corn or callus     Onychomycosis due to dermatophyte          Plan:       Siomara was seen today for foot problem, nail problem and nail care.    Diagnoses and all orders for this visit:    Peripheral vascular disease    Corn or callus    Onychomycosis due to dermatophyte      I counseled the patient on her conditions, their implications and medical management.      Routine Foot Care    Performed by:  Samri Arellano. DPM  Authorized by:  Patient     Consent Done?:  Yes (Verbal)     Nail Care Type:  Debride  Location(s): All  (Left 1st Toe, Left 3rd Toe, Left 2nd Toe, Left 4th Toe, Left 5th Toe, Right 1st Toe, Right 2nd Toe, Right 3rd Toe, Right 4th Toe and Right 5th Toe)  Patient tolerance:  Patient tolerated the procedure well with no immediate complications     With patient's permission, the toenails mentioned above were aggressively reduced and debrided using a nail nipper, removing all offending nail and debris. The patient will continue to monitor the areas daily, inspect the feet, wear protective shoe gear when ambulatory, and moisturizer to maintain skin integrity.      Callus Care Type: Debride    With patient's permission, the calluses/hyperkeratotic lesions mentioned above were aggressively reduced and debrided using a number 15 blade. The patient will continue to monitor the areas daily, inspect the feet, wear protective shoe gear when ambulatory, and moisturizer to maintain skin integrity.     Follow-up as needed.  .

## 2019-05-21 PROCEDURE — 97110 THERAPEUTIC EXERCISES: CPT

## 2019-05-21 PROCEDURE — 25000003 PHARM REV CODE 250: Performed by: NURSE PRACTITIONER

## 2019-05-21 PROCEDURE — 97116 GAIT TRAINING THERAPY: CPT

## 2019-05-21 PROCEDURE — 11000004 HC SNF PRIVATE

## 2019-05-21 PROCEDURE — 97530 THERAPEUTIC ACTIVITIES: CPT

## 2019-05-21 PROCEDURE — 63600175 PHARM REV CODE 636 W HCPCS: Performed by: HOSPITALIST

## 2019-05-21 PROCEDURE — 97535 SELF CARE MNGMENT TRAINING: CPT

## 2019-05-21 PROCEDURE — 25000003 PHARM REV CODE 250: Performed by: HOSPITALIST

## 2019-05-21 RX ADMIN — ACETAMINOPHEN 1000 MG: 325 TABLET ORAL at 05:05

## 2019-05-21 RX ADMIN — VERAPAMIL HYDROCHLORIDE 80 MG: 80 TABLET, FILM COATED ORAL at 08:05

## 2019-05-21 RX ADMIN — FERROUS SULFATE TAB EC 325 MG (65 MG FE EQUIVALENT) 325 MG: 325 (65 FE) TABLET DELAYED RESPONSE at 09:05

## 2019-05-21 RX ADMIN — FERROUS SULFATE TAB EC 325 MG (65 MG FE EQUIVALENT) 325 MG: 325 (65 FE) TABLET DELAYED RESPONSE at 02:05

## 2019-05-21 RX ADMIN — VERAPAMIL HYDROCHLORIDE 80 MG: 80 TABLET, FILM COATED ORAL at 09:05

## 2019-05-21 RX ADMIN — FERROUS SULFATE TAB EC 325 MG (65 MG FE EQUIVALENT) 325 MG: 325 (65 FE) TABLET DELAYED RESPONSE at 08:05

## 2019-05-21 RX ADMIN — ENOXAPARIN SODIUM 30 MG: 100 INJECTION SUBCUTANEOUS at 04:05

## 2019-05-21 RX ADMIN — ATENOLOL 12.5 MG: 25 TABLET ORAL at 09:05

## 2019-05-21 RX ADMIN — ACETAMINOPHEN 1000 MG: 325 TABLET ORAL at 09:05

## 2019-05-21 RX ADMIN — ACETAMINOPHEN 1000 MG: 325 TABLET ORAL at 02:05

## 2019-05-21 NOTE — PROGRESS NOTES
Ms. Flores was seen at [facility] where she is undergoing rehab following her left femur fracture.  She underwent an IM nailing by Dr. Bruce on 5/3/19.    Interval History:  She reports that she is doing well.  Pain is controlled with prescribed medication.  She is taking pain medication.  She is participating in her therapy sessions, per their note walking up to 26 feet with a papi walker.  She is progressing towards her stated goals per therapy note.    She denies fever, chills, and sweats since the time of the surgery.     Physical exam:  She is in a sling and swath to her left arm.  Radial pulse is 2+.  There is no edema about her hand.  Her dressing has been removed to her left hip.  Incision is healing.  There is no redness or drainage present.  She has tactile stimulation to her left lower leg and palp pedal pulse x 2 to left foot.          RADS: none done today    Assessment:  Post-op visit (11 days)    Plan:  Current care, treatment plan, precautions, activity level/ modifications, limitations, rehabilitation exercises and proposed future treatment were discussed with the patient. We discussed the need to monitor for changes in symptoms and condition and report them to the physician.  Discussed importance of compliance with all appointments and follow up examinations.         - Pain medication: refill was not needed,   - Pain medication refill policy provided to patient for review, no  - Patient is to return to clinic as scheduled on 5/31/19, appointment with SAGE Rodarte  - DVT ppx Lovenox 30 mg daily.    Future Appointments   Date Time Provider Department Center   5/31/2019 11:00 AM Mirna Crews PA-C [facility]        If there are any questions prior to scheduled follow up, the patient was instructed to contact the office

## 2019-05-21 NOTE — PT/OT/SLP PROGRESS
Physical Therapy  Treatment    Siomara Flores   MRN: [id number]   Admitting Diagnosis: Closed displaced intertrochanteric fracture of left femur    PT Received On: 05/21/19          Billable Minutes:  Gait Training 15, Therapeutic Activity 12 and Therapeutic Exercise 15=42    Treatment Type: Treatment  PT/PTA: PT     PTA Visit Number: 0       General Precautions: Standard, fall  Orthopedic Precautions: LUE non weight bearing, LLE weight bearing as tolerated   Braces: Sling and swathe         Subjective:  Communicated with patient prior to session.  Agreeable to session    Pain/Comfort  Pain Rating 1: 1/10  Location - Side 1: Left  Location - Orientation 1: generalized  Location 1: arm  Pain Addressed 1: Pre-medicate for activity, Distraction  Pain Rating Post-Intervention 1: 1/10    Objective:  Patient found seated in w/c w/ sling and swath/wrist brace in place LUE with       AM-PAC 6 CLICK MOBILITY  Total Score:14    Bed Mobility:  Sit>Supine:not performed  Supine>Sit: not performed    Transfers:  Sit<>Stand: to/from w/c w/ HW and CGA x2 trials;   Stand Pivot Transfer: simulated car transfer w/ HW transferring form w/c to [facility]. Min assist for safety and HW management, cues for technique.  NWB LUE and WBAT LLE    Gait:  Amb w/ HW and CGA w/ w/c follow 43 feet including one turn. Slow pace. Step to gait. No LOB     Advanced Gait:  Stairs: not performed  Curb Step: not performed    Wheelchair Mobility:not performed    Therex:  Patient performed 15 minutes on recumbent stepper with RUE and legs on workload 3. occ brief rest breaks.      Balance:  Stands w/ HW and CGA    Additional Treatment:  Patient educated on gait and trf technique. Educated on d/c date and PT POC; need for assistance at d/c. Suggested patient discuss w/ brother and MORRO in [place].    Patient left up in chair with call button in reach.    Assessment:  Siomara Flores is a 86 y.o. female with a medical diagnosis of Closed displaced  intertrochanteric fracture of left femur.  Patient is NWB LUE and has sling and swath and is WBAT LLE.She is improving w/ gait and transfers, using HW w/ CGA. She will likely require assistance at d/c due to LUE deficits primarily and patient reports she does not have any family who can assist. Patient will benefit from continued physical therapy to address deficits and improve safety and functional mobility. Continue with physical therapy plan of care. .    Rehab identified problem list/impairments: weakness, impaired endurance, impaired functional mobilty, impaired self care skills, gait instability, impaired balance, decreased upper extremity function, decreased lower extremity function, pain, orthopedic precautions    Rehab potential is good.    Activity tolerance: Good    Discharge recommendations: home with home health     Barriers to discharge: Inaccessible home environment, Decreased caregiver support    Equipment recommendations: walker, papi, commode, tub bench(HW or QC pending progress)     GOALS:   Multidisciplinary Problems     Physical Therapy Goals        Problem: Physical Therapy Goal    Goal Priority Disciplines Outcome Goal Variances Interventions   Physical Therapy Goal     PT, PT/OT Ongoing (interventions implemented as appropriate)     Description:  Goals to be met by: 14 days 2019     Patient will increase functional independence with mobility by performin. Supine to sit with supervision  2. Sit to supine with supervision  3. Sit to stand transfer with Stand-by Assistance  4. Bed to chair transfer with Stand-by Assistance using hemiwalker or least restrictive assistive device  5. Gait  x 100 feet with Stand-by Assistance using  hemiwalker or least restrictive assistive device. REVISE 2019   REVISED GOAL: gait x 75 feet w/ HW or least restrictive device and SBA = not met  6. Ascend/Descend 4 inch curb step with Contact Guard Assistance using  hemiwalker or least restrictive  assistive device.  7. Stand for 3 minutes with Contact Guard Assistance using  hemiwalker or least restrictive assistive device, intermittently,  and perform an activity  8. Lower extremity exercise program x20 reps per handout, with assistance as needed and gym therex  NEW GOAL 5/17/2019 9. Patient will propel w/c w/ RUE and RLE or w/ BLE 75 feet including two turns w/ SBA= not met                        PLAN:    Patient to be seen 5 x/week  to address the above listed problems via gait training, therapeutic activities, therapeutic exercises, wheelchair management/training  Plan of Care expires: 06/09/19  Plan of Care reviewed with: patient    Darlene GRANDE Theodore, PT  05/21/2019

## 2019-05-21 NOTE — PT/OT/SLP PROGRESS
Occupational Therapy  Treatment    Siomara Flores   MRN: [id number]   Admitting Diagnosis: Closed displaced intertrochanteric fracture of left femur    OT Date of Treatment: 05/21/19       Billable Minutes:  Self Care/Home Management 45    General Precautions: Standard, fall  Orthopedic Precautions: LUE non weight bearing  Braces: Sling and swathe(and L wrist brace)         Subjective:  Communicated with nsg prior to session.  I am doing well today    Pain/Comfort  Pain Rating 1: 6/10  Location - Side 1: Left  Location - Orientation 1: generalized  Location 1: arm  Pain Addressed 1: Reposition, Distraction  Pain Rating 2: 6/10    Objective:    Pt. Seated in chair on arrival  Occupational Performance:    Bed Mobility:    ·  Not tested     Functional Mobility/Transfers:  · Patient completed Sit <> Stand Transfer with contact guard assistance  with  hand-held assist  from chair <> w/c with cues for safety towards R side for t/f's      Activities of Daily Living:  · Grooming: stand by assistance seated at sink level and also ((A)  With hair washing. Pt. Noted with scab at scalp area CN notified.  · Bathing: moderate assistance with bathing aspects at sink level (A) for BLE feet RUE and post robinson area  · Upper Body Dressing: maximal assistance to jose alejandro doff/jose alejandro gown and also with and sling and swath  · Lower Body Dressing: moderate assistance to jose alejandro pants with use of AE/ reacher and (A) to mange over hip instance     AMPA 6 Click:  [facility] Total Score: 17      Additional Treatment:  Pt. Also activly performing AROM of digits on L hand x 20 reps with digit flex/ext to decrease swelling and edu on elevation with L wrist brace intact    Patient left up in chair with all lines intact and call button in reach    ASSESSMENT:  Siomara Flores is a 86 y.o. female with a medical diagnosis of Closed displaced intertrochanteric fracture of left femur Pt. participated well with session on this day.Pt demos physical deficits with  balance  functional mobility, UB strength, endurance  level of functional indep with daily tasks and activities and selfcare skills .Pt. Will continue to benefit from continued OT to progress towards goals      Rehab identified problem list/impairments: weakness, impaired endurance, impaired self care skills, impaired functional mobilty, gait instability, impaired balance, decreased upper extremity function, decreased lower extremity function, pain, orthopedic precautions    Rehab potential is fair    Activity tolerance: Fair    Discharge recommendations: home with home health     Barriers to discharge: Decreased caregiver support     Equipment recommendations: walker, papi, commode, tub bench(Need to confirm with family (Patient reports she has equipment from her ))     GOALS:   Multidisciplinary Problems     Occupational Therapy Goals        Problem: Occupational Therapy Goal    Goal Priority Disciplines Outcome Interventions   Occupational Therapy Goal     OT, PT/OT Ongoing (interventions implemented as appropriate)    Description:  Goals to be met by: 5/23/2019     Patient will increase functional independence with ADLs by performing:    UE Dressing with Minimal Assistance.  LE Dressing with Minimal Assistance.  Grooming while standing at sink with Stand-by Assistance.  Toileting from bedside commode with Minimal Assistance for hygiene and clothing management.   Bathing with Minimal Assistance.  Supine to sit with Stand-by Assistance /c HOB flat and no handrails.  Stand pivot transfers with Contact Guard Assistance.  Toilet transfer to bedside commode with Contact Guard Assistance.  Right Upper extremity exercise program 3 x 10 reps per handout, with independence.  Patient will complete a standing activity for 8 min with S in order to perform self care tasks.                     Plan:  Patient to be seen 5 x/week to address the above listed problems via therapeutic exercises, therapeutic activities,  self-care/home management  Plan of Care expires: 06/10/19  Plan of Care reviewed with: patient  The DAGMAR and KAMILLA have collaborated and discussed the patient's status, treatment plan and progress toward established goals.   KAMILLA Deras/SOLANGE 5/21/2019

## 2019-05-21 NOTE — PLAN OF CARE
Problem: Occupational Therapy Goal  Goal: Occupational Therapy Goal  Goals to be met by: 5/23/2019     Patient will increase functional independence with ADLs by performing:    UE Dressing with Minimal Assistance.  LE Dressing with Minimal Assistance.  Grooming while standing at sink with Stand-by Assistance.  Toileting from bedside commode with Minimal Assistance for hygiene and clothing management.   Bathing with Minimal Assistance.  Supine to sit with Stand-by Assistance /c HOB flat and no handrails.  Stand pivot transfers with Contact Guard Assistance.  Toilet transfer to bedside commode with Contact Guard Assistance.  Right Upper extremity exercise program 3 x 10 reps per handout, with independence.  Patient will complete a standing activity for 8 min with S in order to perform self care tasks.    Outcome: Ongoing (interventions implemented as appropriate)  .

## 2019-05-22 PROCEDURE — 97116 GAIT TRAINING THERAPY: CPT

## 2019-05-22 PROCEDURE — 25000242 PHARM REV CODE 250 ALT 637 W/ HCPCS: Performed by: HOSPITALIST

## 2019-05-22 PROCEDURE — 63600175 PHARM REV CODE 636 W HCPCS: Performed by: HOSPITALIST

## 2019-05-22 PROCEDURE — 97530 THERAPEUTIC ACTIVITIES: CPT

## 2019-05-22 PROCEDURE — 97110 THERAPEUTIC EXERCISES: CPT

## 2019-05-22 PROCEDURE — 25000003 PHARM REV CODE 250: Performed by: HOSPITALIST

## 2019-05-22 PROCEDURE — 25000003 PHARM REV CODE 250: Performed by: NURSE PRACTITIONER

## 2019-05-22 PROCEDURE — 99900058 HC 022 PAID UNDER SNF PPS

## 2019-05-22 PROCEDURE — 11000004 HC SNF PRIVATE

## 2019-05-22 RX ADMIN — FERROUS SULFATE TAB EC 325 MG (65 MG FE EQUIVALENT) 325 MG: 325 (65 FE) TABLET DELAYED RESPONSE at 09:05

## 2019-05-22 RX ADMIN — VERAPAMIL HYDROCHLORIDE 80 MG: 80 TABLET, FILM COATED ORAL at 09:05

## 2019-05-22 RX ADMIN — ACETAMINOPHEN 1000 MG: 325 TABLET ORAL at 09:05

## 2019-05-22 RX ADMIN — POLYETHYLENE GLYCOL 3350 17 G: 17 POWDER, FOR SOLUTION ORAL at 09:05

## 2019-05-22 RX ADMIN — ENOXAPARIN SODIUM 30 MG: 100 INJECTION SUBCUTANEOUS at 05:05

## 2019-05-22 RX ADMIN — TIOTROPIUM BROMIDE 18 MCG: 18 CAPSULE ORAL; RESPIRATORY (INHALATION) at 09:05

## 2019-05-22 RX ADMIN — ATENOLOL 12.5 MG: 25 TABLET ORAL at 09:05

## 2019-05-22 RX ADMIN — ACETAMINOPHEN 1000 MG: 325 TABLET ORAL at 05:05

## 2019-05-22 RX ADMIN — ACETAMINOPHEN 1000 MG: 325 TABLET ORAL at 02:05

## 2019-05-22 RX ADMIN — FERROUS SULFATE TAB EC 325 MG (65 MG FE EQUIVALENT) 325 MG: 325 (65 FE) TABLET DELAYED RESPONSE at 03:05

## 2019-05-22 RX ADMIN — VERAPAMIL HYDROCHLORIDE 80 MG: 80 TABLET, FILM COATED ORAL at 03:05

## 2019-05-22 RX ADMIN — SENNOSIDES AND DOCUSATE SODIUM 1 TABLET: 8.6; 5 TABLET ORAL at 09:05

## 2019-05-22 NOTE — PROGRESS NOTES
[facility]                                  Skilled Nursing Facility                   Progress Note     Admit Date: 2019  RAVI 2019  Principal Problem:  Closed displaced intertrochanteric fracture of left femurHPI obtained from patient interview and chart review     Chief Complaint:  Revaluation of medical treatment and therapy status: Lab review    HPI:   Mrs. Flores is a 86 year old. female [employer] with PMHx of  Bronchiectasis, SVT who presents to SNF following a hospitalization for left intertrochanteric, left proximal humerus, and left distal radius fracture s/p left femur intramedullary marc after a mechanical fall. She was admitted to [facility] from  until . Dr. Owusu performed a Cephalomedullary nail fixation of left intertrochanteric femur fracture on 5/3/19.     Interval history: All labs reviewed.  No leukocytosis.  H&H improved to 7.7/25 from 7.0/23.  Patient's 24 hr blood pressure range is 96/50 to 121/57. Patient progessing slowly with PT/OT.   Patient is [employer] and does not accept blood transfusions.  Continuing to follow and treat all acute and chronic conditions.    Past Medical History: Patient has a past medical history of Bronchiectasis without complication, Hypertension, and Supraventricular tachycardia.    Past Surgical History: Patient has a past surgical history that includes Cholecystectomy; Tonsillectomy; and Intramedullary rodding of femur (Left, 5/3/2019).    Social History: Patient reports that she has never smoked. She has never used smokeless tobacco. She reports that she does not drink alcohol or use drugs.  Patient's ill   about a month ago    Family History:  No family significant history to report    Allergies: Patient is allergic to aspirin (bulk).    ROS  Constitutional: Negative for fever or fatigue.   Eyes: Negative for blurred vision, double vision and discharge.    Respiratory: Negative for cough, shortness of breath and wheezing.    Cardiovascular: Negative for chest pain, palpitations, claudication, and leg swelling.   Gastrointestinal: Negative for abdominal pain, constipation, diarrhea, nausea and vomiting.   Genitourinary: Negative for dysuria, frequency and urgency.   Musculoskeletal:  + generalized weakness, pain to left arm and hip. Negative for back pain and myalgias.   Skin: Negative for itching and rash.   Neurological: Negative for dizziness, speech change, and headaches.   Psychiatric/Behavioral: + for depression. The patient is not nervous/anxious.      PEx  Temp:  [98 °F (36.7 °C)]   Pulse:  [81-84]   Resp:  [16-18]   BP: (103-125)/(49-61)   SpO2:  [98 %]      Constitutional: Patient appears frail  and in no distress   HENT:   Head: Normocephalic and atraumatic.   Eyes: Pupils are equal, round, and reactive to light.   Neck: Normal range of motion. Neck supple.   Cardiovascular: Normal rate, regular rhythm and normal heart sounds.    Pulmonary/Chest: Effort normal and breath sounds are clear  Abdominal: Soft. Bowel sounds are normal.   Musculoskeletal: decreased range of motion to left arm, sling in place.   Neurological: Alert and oriented to person, place, and time.   Skin: Skin is warm and dry. Pt has extremely long toe nails that are painful to the touch.  Patient has skin overgrowth to scalp.  Aquacel dressing in place to left leg without drainage- to be removed 5/21, no swelling noted to periwound area.    Psychiatric: mood is sad      Assessment and Plan:    Hx of HTN  - currently patient is experiencing hypotension   - 5/13 decrease losartan from 50 mg to 25 mg daily, continue atenolol 25 mg daily.  Holding parameters in place to hold for systolic blood pressure less than 100.   - 5/14 holding home losartan at this time. patient's 24 hr blood pressure 102/49 to 119/50.   - 5/15 decrease atenolol to 12.5 mg daily; 24 hr blood pressure today is 95/46 to  108/54.    - 5/16 BP slightly improved with the decrease in atenolol dose. Patient's 24 hr blood pressure range is 98/48 to 129/62  - 5/20 patient's 24 hr blood pressure ranges 96/50 to 121/57    Acute blood loss anemia  - continue ferrous sulfate 325 mg t.i.d.  - patient is a [employer] who refuses blood transfusions  - Post-op H/H 6.9/21.8 with baseline 8.4/26.9  - pediatric blood draws only, avoid IVF  - 5/13 initiated procrit 20,000 units   - 5/16 H&H improved to 7.0/23 from 6.3/20 after Procrit injection and continue iron supplementation  - 5/20 H&H improved to 7.7/25      Onychogryphosis  - likely from  Insufficient consumtion of essential nutrients  - 5/14- Message sent to  and appointment scheduled are to make patient a Podiatry appointment to cut down toenails, patient's toenails are so long and painful it hurts her to walk. - patient to see Podiatry tomorrow morning  - 5/15 patient was able to see provider today and Podiatry.  I got her an appointment Friday before her ortho follow-up to have her toes cut.  - 5/20 patient went to podiatry appointment in all toenails were trimmed to a normal length.  Patient's toenail pain is significantly improved    CONTINUE    Hx of SVT  - continue verapamil 80 mg TID  - 5/16 with reduction of atenolol.  Heart rate is increased to the 90s.  Will continue to assess blood pressure and heart rate daily.     Seborrheic Keratosis   - 5/15 differential diagnosis includes melanoma- ambulatory referral to Dermatology placed.  Lesion appears as a skin overgrowth to scalp that is waxy brown and tan in color, slightly raise with a bumpy appearance.  Lesion is about  5 cm in diameter and circular.      Closed displaced intratrochanteric fracture of left femur  s/p left Cephalomedullary nail fixation of left  femur fracture on 5/3/19  Postoperative pain  - WBAT to LLE, NWB to LUE  - 5/14 initiated acetaminophen 1 g q.8 hours  - 5/15 pain is better controlled today  with scheduled Tylenol    Debility   - Continue with PT/OT for gait training and strengthening and restoration of ADL's   - Encourage mobility, OOB in chair, and early ambulation as appropriate  - Fall precautions   - Monitor for bowel and bladder dysfunction  - Monitor for and prevent skin breakdown and pressure ulcers  - Continue DVT prophylaxis with  enoxaparin 30 mg daily    Hx of Bronchiectasis  - continue duo nebs q.4 hours while awake and tiotropium 18mcg daily     Severe malnutrition  - Energy Intake: <75% of estimated energy requirement for > 3 months  - Body Fat Depletion: moderate depletion of orbitals and triceps and buccal area  - Muscle Mass Depletion: severe depletion of clavicle region and scapular region moderate at temples, hands and calf  - Weight Loss: > 7.5% % x < 3 months   - Continue regular diet, honor food intolerances, recommend change boost breeze to boost plus TID no chocolate  per pt request.    DC Update: pt lives alone and is asking about nursing home type placement after SNF.     Future Appointments   Date Time Provider Department Center   5/31/2019 11:00 AM [facility] Jayden Ibrahim NP    DOS: 5/20/19

## 2019-05-22 NOTE — PT/OT/SLP PROGRESS
Occupational Therapy  Treatment    Siomara Flores   MRN: [id number]   Admitting Diagnosis: Closed displaced intertrochanteric fracture of left femur    OT Date of Treatment: 05/22/19       Billable Minutes:  Therapeutic Activity 40    General Precautions: Standard, fall  Orthopedic Precautions: LUE non weight bearing  Braces: Sling and swathe(and L wrist brace)         Subjective:  Communicated with nsg prior to session.  I am doing well today    Pain/Comfort  Pain Rating 1: 6/10  Location - Side 1: Left  Location - Orientation 1: generalized  Location 1: arm  Pain Addressed 1: Pre-medicate for activity  Pain Rating Post-Intervention 1: 6/10    Objective:   Pt. Seated in chair on arrival     Occupational Performance:    Bed Mobility:    ·  Not tested     Functional Mobility/Transfers:  · Patient completed Sit <> Stand Transfer with contact guard assistance  with  no assistive device and hand-held assist from chair to w/c with cues for safety      Activities of Daily Living:  ·  Not tested     AMPA 6 Click:  [facility] Total Score: 17    OT Exercises: UE Ergometer 5 min with RUE    Additional Treatment:  Pt. With standing act on this day with task. Pt. With CGA for balance aspects with task with  AD at raised counter Pt with visual perception task with discrimination of various shapes and sizes x 5  min  And then 3 min with standing bal and min cues through out with weight shifting and use of RUE incorporated and crossing over mid line and facilitation with posture in prep for home management .     Pt. With 1# dumbell activity with RUE  x 10 reps reps with  shd flex, bicep curls horz adb/add and forward flex motion to increase BUE ROM and strength.  Pt. WithAA/ ROM with LUE on this day from elbow  to digits with flex/ext pattern 2x10 reps and ;wrist brace still in place with digits ROM sling and swathe removed and then replaced back for ROM  Pt. With standing and therex performed to increase ROM, endurance selfcare task  and fxl mobility for independence   Patient left up in chair with all lines intact and call button in reach    ASSESSMENT:  Siomara Flores is a 86 y.o. female with a medical diagnosis of Closed displaced intertrochanteric fracture of left femur Pt. participated well with session on this day.Pt demos physical deficits with balance  functional mobility, UB strength, endurance  level of functional indep with daily tasks and activities and selfcare skills .Pt. Will continue to benefit from continued OT to progress towards goals      Rehab identified problem list/impairments: weakness, impaired endurance, impaired self care skills, impaired functional mobilty, gait instability, impaired balance, decreased upper extremity function, decreased lower extremity function, pain, orthopedic precautions    Rehab potential is fair    Activity tolerance: Fair    Discharge recommendations: home with home health 274 hr ((A)    Barriers to discharge: Decreased caregiver support     Equipment recommendations: walker, papi, commode, tub bench(Need to confirm with family (Patient reports she has equipment from her ))     GOALS:   Multidisciplinary Problems     Occupational Therapy Goals        Problem: Occupational Therapy Goal    Goal Priority Disciplines Outcome Interventions   Occupational Therapy Goal     OT, PT/OT Ongoing (interventions implemented as appropriate)    Description:  Goals to be met by: 5/23/2019     Patient will increase functional independence with ADLs by performing:    UE Dressing with Minimal Assistance.  LE Dressing with Minimal Assistance.  Grooming while standing at sink with Stand-by Assistance.  Toileting from bedside commode with Minimal Assistance for hygiene and clothing management.   Bathing with Minimal Assistance.  Supine to sit with Stand-by Assistance /c HOB flat and no handrails.  Stand pivot transfers with Contact Guard Assistance.  Toilet transfer to bedside commode with Contact Guard  Assistance.  Right Upper extremity exercise program 3 x 10 reps per handout, with independence.  Patient will complete a standing activity for 8 min with S in order to perform self care tasks.                   Plan:  Patient to be seen 5 x/week to address the above listed problems via therapeutic exercises, therapeutic activities, self-care/home management  Plan of Care expires: 06/10/19  Plan of Care reviewed with: patient    KAMILLA Deras/SOLANGE  05/22/2019

## 2019-05-22 NOTE — PT/OT/SLP PROGRESS
Physical Therapy  Treatment    Siomara Flores   MRN: [id number]   Admitting Diagnosis: Closed displaced intertrochanteric fracture of left femur    PT Received On: 05/22/19          Billable Minutes:  Gait Training 15, Therapeutic Activity 15 and Therapeutic Exercise 10=40    Treatment Type: Treatment  PT/PTA: PT     PTA Visit Number: 0       General Precautions: Standard, fall  Orthopedic Precautions: LUE non weight bearing, LLE weight bearing as tolerated   Braces: Sling and swathe         Subjective:  Communicated with patient prior to session.  Agreeable to session    Pain/Comfort  Pain Rating 1: 6/10  Location - Side 1: Left  Location - Orientation 1: generalized  Location 1: arm(and leg\)  Pain Addressed 1: Pre-medicate for activity, Distraction  Pain Rating Post-Intervention 1: 6/10    Objective:  Patient found sitting in w/c in bathroom, completing hygiene w/ PT tech with       AM-PAC 6 CLICK MOBILITY  Total Score:16    Bed Mobility:  Sit>Supine:on mat w/ SBA w/ LUE NWB in sling and swath. Patient lying to her left on mat to simulate home.  Supine>Sit: on mat w/ min assist for trunk elevation, LUE NWB    Transfers:  Sit<>Stand: to/from w/c w/ HW and CGA x2 trials  Stand Pivot Transfer: w/c<>mat w/ HW and CGA  NWB LUE, WBAT LLE    Gait:  Amb w/ HW and CGA 47 feet, slow pace, two turns, w/c follow for safety. amb w/ HW and CGA 10 feet     Advanced Gait:  Stairs: unable   Curb Step: not perormed    Wheelchair Mobility:  Patient propels w/c w/ RU/BLE 69 feet including two turns. Occ rest breaks. occ cues for technique. SBA     Therex:  Quad sets,   Glute sets,   Ankle  Pumps,   hip abduction/adduction,  2x20 reps w/ assist as needed      Balance:  Stands w/ HW and CGA    Additional Treatment:  Educate in gait and trf technique, w/c mobility w/ RU/BLE    Patient left up in chair with call button in reach.    Assessment:  Siomara Flores is a 86 y.o. female with a medical diagnosis of Closed displaced  intertrochanteric fracture of left femur.  Patient progressing well w/ improvign functional mobility and observing precautions. Will likely need assistance at time of d/c primarily due to LUE NWB. Patient will benefit from continued physical therapy to address deficits and improve safety and functional mobility. Continue with physical therapy plan of care. .    Rehab identified problem list/impairments: weakness, impaired endurance, impaired functional mobilty, impaired self care skills, gait instability, impaired balance, decreased upper extremity function, decreased lower extremity function, pain, orthopedic precautions    Rehab potential is good.    Activity tolerance: Good    Discharge recommendations: home with home health     Barriers to discharge: Inaccessible home environment, Decreased caregiver support    Equipment recommendations: walker, papi, commode, tub bench(HW or QC pending progress)     GOALS:   Multidisciplinary Problems     Physical Therapy Goals        Problem: Physical Therapy Goal    Goal Priority Disciplines Outcome Goal Variances Interventions   Physical Therapy Goal     PT, PT/OT Ongoing (interventions implemented as appropriate)     Description:  Goals to be met by: 14 days 2019     Patient will increase functional independence with mobility by performin. Supine to sit with supervision  2. Sit to supine with supervision  3. Sit to stand transfer with Stand-by Assistance  4. Bed to chair transfer with Stand-by Assistance using hemiwalker or least restrictive assistive device  5. Gait  x 100 feet with Stand-by Assistance using  hemiwalker or least restrictive assistive device. REVISE 2019   REVISED GOAL: gait x 75 feet w/ HW or least restrictive device and SBA = not met  6. Ascend/Descend 4 inch curb step with Contact Guard Assistance using  hemiwalker or least restrictive assistive device.  7. Stand for 3 minutes with Contact Guard Assistance using  hemiwalker or least  restrictive assistive device, intermittently,  and perform an activity  8. Lower extremity exercise program x20 reps per handout, with assistance as needed and gym therex  NEW GOAL 5/17/2019 9. Patient will propel w/c w/ RUE and RLE or w/ BLE 75 feet including two turns w/ SBA= not met                        PLAN:    Patient to be seen 5 x/week  to address the above listed problems via gait training, therapeutic activities, therapeutic exercises, wheelchair management/training  Plan of Care expires: 06/09/19  Plan of Care reviewed with: patient    Darlene HARJINDER Jaimes, PT  05/22/2019

## 2019-05-23 LAB
ANION GAP SERPL CALC-SCNC: 8 MMOL/L (ref 8–16)
BASOPHILS # BLD AUTO: 0.05 K/UL (ref 0–0.2)
BASOPHILS NFR BLD: 1.1 % (ref 0–1.9)
BUN SERPL-MCNC: 14 MG/DL (ref 8–23)
CALCIUM SERPL-MCNC: 9.1 MG/DL (ref 8.7–10.5)
CHLORIDE SERPL-SCNC: 99 MMOL/L (ref 95–110)
CO2 SERPL-SCNC: 30 MMOL/L (ref 23–29)
CREAT SERPL-MCNC: 0.6 MG/DL (ref 0.5–1.4)
DIFFERENTIAL METHOD: ABNORMAL
EOSINOPHIL # BLD AUTO: 0.2 K/UL (ref 0–0.5)
EOSINOPHIL NFR BLD: 3.6 % (ref 0–8)
ERYTHROCYTE [DISTWIDTH] IN BLOOD BY AUTOMATED COUNT: 17.8 % (ref 11.5–14.5)
EST. GFR  (AFRICAN AMERICAN): >60 ML/MIN/1.73 M^2
EST. GFR  (NON AFRICAN AMERICAN): >60 ML/MIN/1.73 M^2
GLUCOSE SERPL-MCNC: 80 MG/DL (ref 70–110)
HCT VFR BLD AUTO: 26.9 % (ref 37–48.5)
HGB BLD-MCNC: 8.1 G/DL (ref 12–16)
IMM GRANULOCYTES # BLD AUTO: 0.02 K/UL (ref 0–0.04)
IMM GRANULOCYTES NFR BLD AUTO: 0.4 % (ref 0–0.5)
LYMPHOCYTES # BLD AUTO: 1.1 K/UL (ref 1–4.8)
LYMPHOCYTES NFR BLD: 22.6 % (ref 18–48)
MAGNESIUM SERPL-MCNC: 2.1 MG/DL (ref 1.6–2.6)
MCH RBC QN AUTO: 26.5 PG (ref 27–31)
MCHC RBC AUTO-ENTMCNC: 30.1 G/DL (ref 32–36)
MCV RBC AUTO: 88 FL (ref 82–98)
MONOCYTES # BLD AUTO: 0.4 K/UL (ref 0.3–1)
MONOCYTES NFR BLD: 9.1 % (ref 4–15)
NEUTROPHILS # BLD AUTO: 3 K/UL (ref 1.8–7.7)
NEUTROPHILS NFR BLD: 63.2 % (ref 38–73)
NRBC BLD-RTO: 0 /100 WBC
PHOSPHATE SERPL-MCNC: 3.6 MG/DL (ref 2.7–4.5)
PLATELET # BLD AUTO: 433 K/UL (ref 150–350)
PMV BLD AUTO: 9.9 FL (ref 9.2–12.9)
POTASSIUM SERPL-SCNC: 4.2 MMOL/L (ref 3.5–5.1)
RBC # BLD AUTO: 3.06 M/UL (ref 4–5.4)
SODIUM SERPL-SCNC: 137 MMOL/L (ref 136–145)
WBC # BLD AUTO: 4.73 K/UL (ref 3.9–12.7)

## 2019-05-23 PROCEDURE — 97530 THERAPEUTIC ACTIVITIES: CPT

## 2019-05-23 PROCEDURE — 63600175 PHARM REV CODE 636 W HCPCS: Performed by: HOSPITALIST

## 2019-05-23 PROCEDURE — 25000003 PHARM REV CODE 250: Performed by: NURSE PRACTITIONER

## 2019-05-23 PROCEDURE — 85025 COMPLETE CBC W/AUTO DIFF WBC: CPT

## 2019-05-23 PROCEDURE — 84100 ASSAY OF PHOSPHORUS: CPT

## 2019-05-23 PROCEDURE — 83735 ASSAY OF MAGNESIUM: CPT

## 2019-05-23 PROCEDURE — 11000004 HC SNF PRIVATE

## 2019-05-23 PROCEDURE — 25000003 PHARM REV CODE 250: Performed by: HOSPITALIST

## 2019-05-23 PROCEDURE — 97116 GAIT TRAINING THERAPY: CPT

## 2019-05-23 PROCEDURE — 25000242 PHARM REV CODE 250 ALT 637 W/ HCPCS: Performed by: HOSPITALIST

## 2019-05-23 PROCEDURE — 97110 THERAPEUTIC EXERCISES: CPT

## 2019-05-23 PROCEDURE — 80048 BASIC METABOLIC PNL TOTAL CA: CPT

## 2019-05-23 PROCEDURE — 36415 COLL VENOUS BLD VENIPUNCTURE: CPT

## 2019-05-23 RX ADMIN — SENNOSIDES AND DOCUSATE SODIUM 1 TABLET: 8.6; 5 TABLET ORAL at 10:05

## 2019-05-23 RX ADMIN — POLYETHYLENE GLYCOL 3350 17 G: 17 POWDER, FOR SOLUTION ORAL at 09:05

## 2019-05-23 RX ADMIN — VERAPAMIL HYDROCHLORIDE 80 MG: 80 TABLET, FILM COATED ORAL at 02:05

## 2019-05-23 RX ADMIN — ACETAMINOPHEN 1000 MG: 325 TABLET ORAL at 10:05

## 2019-05-23 RX ADMIN — ATENOLOL 12.5 MG: 25 TABLET ORAL at 09:05

## 2019-05-23 RX ADMIN — ENOXAPARIN SODIUM 30 MG: 100 INJECTION SUBCUTANEOUS at 05:05

## 2019-05-23 RX ADMIN — SENNOSIDES AND DOCUSATE SODIUM 1 TABLET: 8.6; 5 TABLET ORAL at 09:05

## 2019-05-23 RX ADMIN — FERROUS SULFATE TAB EC 325 MG (65 MG FE EQUIVALENT) 325 MG: 325 (65 FE) TABLET DELAYED RESPONSE at 10:05

## 2019-05-23 RX ADMIN — ACETAMINOPHEN 1000 MG: 325 TABLET ORAL at 02:05

## 2019-05-23 RX ADMIN — FERROUS SULFATE TAB EC 325 MG (65 MG FE EQUIVALENT) 325 MG: 325 (65 FE) TABLET DELAYED RESPONSE at 09:05

## 2019-05-23 RX ADMIN — ACETAMINOPHEN 1000 MG: 325 TABLET ORAL at 05:05

## 2019-05-23 RX ADMIN — VERAPAMIL HYDROCHLORIDE 80 MG: 80 TABLET, FILM COATED ORAL at 10:05

## 2019-05-23 RX ADMIN — TIOTROPIUM BROMIDE 18 MCG: 18 CAPSULE ORAL; RESPIRATORY (INHALATION) at 09:05

## 2019-05-23 RX ADMIN — VERAPAMIL HYDROCHLORIDE 80 MG: 80 TABLET, FILM COATED ORAL at 09:05

## 2019-05-23 RX ADMIN — FERROUS SULFATE TAB EC 325 MG (65 MG FE EQUIVALENT) 325 MG: 325 (65 FE) TABLET DELAYED RESPONSE at 02:05

## 2019-05-23 NOTE — PLAN OF CARE
Problem: Occupational Therapy Goal  Goal: Occupational Therapy Goal  Goals to be met by: 5/23/2019     Patient will increase functional independence with ADLs by performing:    UE Dressing with Minimal Assistance.  LE Dressing with Minimal Assistance.  Grooming while standing at sink with Stand-by Assistance.  Toileting from bedside commode with Minimal Assistance for hygiene and clothing management.   Bathing with Minimal Assistance.  Supine to sit with Stand-by Assistance /c HOB flat and no handrails.  Stand pivot transfers with Contact Guard Assistance.  Toilet transfer to bedside commode with Contact Guard Assistance.  Right Upper extremity exercise program 3 x 10 reps per handout, with independence.  Patient will complete a standing activity for 8 min with S in order to perform self care tasks.    Outcome: Ongoing (interventions implemented as appropriate)  .    Comments: .

## 2019-05-23 NOTE — PT/OT/SLP PROGRESS
Physical Therapy  Treatment    Siomara Flores   MRN: [id number]   Admitting Diagnosis: Closed displaced intertrochanteric fracture of left femur    PT Received On: 05/23/19          Billable Minutes:  Gait Training 15, Therapeutic Activity 15 and Therapeutic Exercise 12    Treatment Type: Treatment  PT/PTA: PT     PTA Visit Number: 0       General Precautions: Standard, fall  Orthopedic Precautions: LUE non weight bearing, LLE weight bearing as tolerated   Braces: Sling and swathe         Subjective:  Communicated with Patient prior to session.  Agreeable to session;  Patient has 2 friends visiting and asks if they can come to the gym.    Pain/Comfort  Pain Rating 1: 5/10  Location - Side 1: Left  Location - Orientation 1: generalized  Location 1: leg  Pain Addressed 1: Reposition, Distraction, Cessation of Activity  Pain Rating Post-Intervention 1: 6/10    Objective:  Patient found seated in bedside chair with       AM-PAC 6 CLICK MOBILITY  Total Score:16    Bed Mobility:  Sit>Supine:not performed  Supine>Sit: not performed    Transfers:  Sit<>Stand: to/from bedside chair w/ CGA; to/from w/c w/ HW and CGA x2 trials  Stand Pivot Transfer: w/c<>chair w/o AD w/ CGA;  Extra time    Gait:  Amb w/ HW and CGA 43 feet, including turning to walk in a [place], w/o LBO in turns, returning to sit in w/c. Slow pace, small steps, step to gait.  Attempt second trial and patient unable to ambulated due to reports on LLE pain.     Advanced Gait:  Stairs: unable   Curb Step: unable    Wheelchair Mobility:  Patient propels w/c w/ RU/LE and min assistance and max cues 45 feet.     Therex:  Quad sets,   Glute sets,   Ankle  Pumps,   hip abduction/adduction,  LAQ   Hip flexion  x20 reps w/ assist as needed      Balance:  Stands w/ HW and CGA, briefly. Unable to stand sufficiently for an activity    Additional Treatment:  Patient educated on gait and trf technique, need for assistance at time of discharge    Patient left up in chair with  call button in reach and friends present. Lunch set up.    Assessment:  Siomara Flores is a 86 y.o. female with a medical diagnosis of Closed displaced intertrochanteric fracture of left femur.  Patient is NWB LUE and WBAT LLE. She is improving slowly w/ gait and trfs w/ HW but continues to need assistance for safety and limited by fatigue and pain. Patient will benefit from continued physical therapy to address deficits and improve safety and functional mobility. Continue with physical therapy plan of care. .    Rehab identified problem list/impairments: weakness, impaired endurance, impaired functional mobilty, impaired self care skills, gait instability, impaired balance, decreased upper extremity function, decreased lower extremity function, pain, orthopedic precautions    Rehab potential is good.    Activity tolerance: Good    Discharge recommendations: home with home health     Barriers to discharge: Inaccessible home environment, Decreased caregiver support    Equipment recommendations: walker, papi, commode, tub bench(HW or QC pending progress)     GOALS:   Multidisciplinary Problems     Physical Therapy Goals        Problem: Physical Therapy Goal    Goal Priority Disciplines Outcome Goal Variances Interventions   Physical Therapy Goal     PT, PT/OT Ongoing (interventions implemented as appropriate)     Description:  Goals to be met by: 14 days 2019     Patient will increase functional independence with mobility by performin. Supine to sit with supervision  2. Sit to supine with supervision  3. Sit to stand transfer with Stand-by Assistance  4. Bed to chair transfer with Stand-by Assistance using hemiwalker or least restrictive assistive device  5. Gait  x 100 feet with Stand-by Assistance using  hemiwalker or least restrictive assistive device. REVISE 2019   REVISED GOAL: gait x 75 feet w/ HW or least restrictive device and SBA = not met  6. Ascend/Descend 4 inch curb step with Contact  Guard Assistance using  hemiwalker or least restrictive assistive device.  7. Stand for 3 minutes with Contact Guard Assistance using  hemiwalker or least restrictive assistive device, intermittently,  and perform an activity  8. Lower extremity exercise program x20 reps per handout, with assistance as needed and gym therex  NEW GOAL 5/17/2019 9. Patient will propel w/c w/ RUE and RLE or w/ BLE 75 feet including two turns w/ SBA= not met                        PLAN:    Patient to be seen 5 x/week  to address the above listed problems via gait training, therapeutic activities, therapeutic exercises, wheelchair management/training  Plan of Care expires: 06/09/19  Plan of Care reviewed with: patient    Darlene Jaimes, PT  05/23/2019

## 2019-05-23 NOTE — PROGRESS NOTES
[facility]                                  Skilled Nursing Facility                   Progress Note     Admit Date: 2019  RAVI 2019  Principal Problem:  Closed displaced intertrochanteric fracture of left femurHPI obtained from patient interview and chart review     Chief Complaint:  Revaluation of medical treatment and therapy status: Lab review    HPI:   Mrs. Flores is a 86 year old. female Anabaptism with PMHx of  Bronchiectasis, SVT who presents to SNF following a hospitalization for left intertrochanteric, left proximal humerus, and left distal radius fracture s/p left femur intramedullary marc after a mechanical fall. She was admitted to [facility] from  until . Dr. Owusu performed a Cephalomedullary nail fixation of left intertrochanteric femur fracture on 5/3/19.     Interval history: All labs reviewed.  No leukocytosis.  H&H improved to 8.1/26 from 7.7/25.  Patient's 24 hr blood pressure range is 107/52 to 149/67. Patient progessing slowly with PT/OT.   Patient is Anabaptism and does not accept blood transfusions.  Continuing to follow and treat all acute and chronic conditions.    Past Medical History: Patient has a past medical history of Bronchiectasis without complication, Hypertension, and Supraventricular tachycardia.    Past Surgical History: Patient has a past surgical history that includes Cholecystectomy; Tonsillectomy; and Intramedullary rodding of femur (Left, 5/3/2019).    Social History: Patient reports that she has never smoked. She has never used smokeless tobacco. She reports that she does not drink alcohol or use drugs.  Patient's ill   about a month ago    Family History:  No family significant history to report    Allergies: Patient is allergic to aspirin (bulk).    ROS  Constitutional: Negative for fever or fatigue.   Eyes: Negative for blurred vision, double vision and discharge.    Respiratory: Negative for cough, shortness of breath and wheezing.    Cardiovascular: Negative for chest pain, palpitations, claudication, and leg swelling.   Gastrointestinal: Negative for abdominal pain, constipation, diarrhea, nausea and vomiting.   Genitourinary: Negative for dysuria, frequency and urgency.   Musculoskeletal:  + generalized weakness, pain to left arm and hip. Negative for back pain and myalgias.   Skin: Negative for itching and rash.   Neurological: Negative for dizziness, speech change, and headaches.   Psychiatric/Behavioral: + for depression. The patient is not nervous/anxious.      PEx  Temp:  [97.9 °F (36.6 °C)-98.1 °F (36.7 °C)]   Pulse:  [77-83]   Resp:  [16-18]   BP: (107-149)/(52-67)   SpO2:  [97 %-100 %]      Constitutional: Patient appears frail  and in no distress   HENT:   Head: Normocephalic and atraumatic.   Eyes: Pupils are equal, round, and reactive to light.   Neck: Normal range of motion. Neck supple.   Cardiovascular: Normal rate, regular rhythm and normal heart sounds.    Pulmonary/Chest: Effort normal and breath sounds are clear  Abdominal: Soft. Bowel sounds are normal.   Musculoskeletal: decreased range of motion to left arm, sling in place.   Neurological: Alert and oriented to person, place, and time.   Skin: Skin is warm and dry. Pt has extremely long toe nails that are painful to the touch.  Patient has skin overgrowth to scalp.  Aquacel dressing in place to left leg without drainage- to be removed 5/21, no swelling noted to periwound area.    Psychiatric: mood is sad      Assessment and Plan:    Hx of HTN  - currently patient is experiencing hypotension   - 5/13 decrease losartan from 50 mg to 25 mg daily, continue atenolol 25 mg daily.  Holding parameters in place to hold for systolic blood pressure less than 100.   - 5/14 holding home losartan at this time. patient's 24 hr blood pressure 102/49 to 119/50.   - 5/15 decrease atenolol to 12.5 mg daily; 24 hr blood  pressure today is 95/46 to 108/54.    - 5/16 BP slightly improved with the decrease in atenolol dose. Patient's 24 hr blood pressure range is 98/48 to 129/62  - 5/20 patient's 24 hr blood pressure ranges 96/50 to 121/57  - 5/23 patient's 24 hr blood pressure ranges 107/52 to 149/67, the systolic of 149 is isolated and due to increase in pain. Will continue to monitor blood pressures.     Acute blood loss anemia  - continue ferrous sulfate 325 mg t.i.d.  - patient is a Gnosticist who refuses blood transfusions  - Post-op H/H 6.9/21.8 with baseline 8.4/26.9  - pediatric blood draws only, avoid IVF  - 5/13 initiated procrit 20,000 units   - 5/16 H&H improved to 7.0/23 from 6.3/20 after Procrit injection and continue iron supplementation  - 5/20 H&H improved to 7.7/25  - 5/23 H&H improved to 8.1/26    Severe malnutrition  - Energy Intake: <75% of estimated energy requirement for > 3 months  - Body Fat Depletion: moderate depletion of orbitals and triceps and buccal area  - Muscle Mass Depletion: severe depletion of clavicle region and scapular region moderate at temples, hands and calf  - Weight Loss: > 7.5% % x < 3 months   - Continue regular diet, honor food intolerances, recommend change boost breeze to boost plus TID no chocolate  per pt request.  - 5/23 patient states better appetite increased p.o. consumption    CONTINUE    Onychogryphosis  - likely from  Insufficient consumtion of essential nutrients  - 5/14- Message sent to  and appointment scheduled are to make patient a Podiatry appointment to cut down toenails, patient's toenails are so long and painful it hurts her to walk. - patient to see Podiatry tomorrow morning  - 5/15 patient was able to see provider today and Podiatry.  I got her an appointment Friday before her ortho follow-up to have her toes cut.  - 5/20 patient went to podiatry appointment in all toenails were trimmed to a normal length.  Patient's toenail pain is significantly  improved    Hx of SVT  - continue verapamil 80 mg TID  - 5/16 with reduction of atenolol.  Heart rate is increased to the 90s.  Will continue to assess blood pressure and heart rate daily.     Seborrheic Keratosis   - 5/15 differential diagnosis includes melanoma- ambulatory referral to Dermatology placed.  Lesion appears as a skin overgrowth to scalp that is waxy brown and tan in color, slightly raise with a bumpy appearance.  Lesion is about  5 cm in diameter and circular.      Closed displaced intratrochanteric fracture of left femur  s/p left Cephalomedullary nail fixation of left  femur fracture on 5/3/19  Postoperative pain  - WBAT to LLE, NWB to LUE  - 5/14 initiated acetaminophen 1 g q.8 hours  - 5/15 pain is better controlled today with scheduled Tylenol    Debility   - Continue with PT/OT for gait training and strengthening and restoration of ADL's   - Encourage mobility, OOB in chair, and early ambulation as appropriate  - Fall precautions   - Monitor for bowel and bladder dysfunction  - Monitor for and prevent skin breakdown and pressure ulcers  - Continue DVT prophylaxis with  enoxaparin 30 mg daily    Hx of Bronchiectasis  - continue duo nebs q.4 hours while awake and tiotropium 18mcg daily     DC Update: pt lives alone and is asking about nursing home type placement after SNF.     Future Appointments   Date Time Provider Department Center   5/31/2019 11:00 AM Mirna Crews PA-C [facility] ORTHO Jayden Ibrahim NP    DOS: 5/23/2019

## 2019-05-23 NOTE — PT/OT/SLP PROGRESS
Occupational Therapy  Treatment    Siomara Flores   MRN: [id number]   Admitting Diagnosis: Closed displaced intertrochanteric fracture of left femur    OT Date of Treatment: 05/23/19       Billable Minutes:  Therapeutic Activity 40    General Precautions: Standard, fall  Orthopedic Precautions: LUE non weight bearing  Braces: Sling and swathe(and L wrist brace)         Subjective:  Communicated with nsg prior to session.  I am doing well today    Pain/Comfort  Pain Rating 1: 5/10  Location - Side 1: Left  Location - Orientation 1: generalized  Location 1: leg  Pain Addressed 1: Pre-medicate for activity, Reposition, Distraction  Pain Rating Post-Intervention 1: 5/10    Objective:   Pt. Seated in chair with friend present     Occupational Performance:    Bed Mobility:    ·  Not tested     Functional Mobility/Transfers:  · Patient completed Sit <> Stand Transfer with minimum assistance  with  hand-held assist from chair <> w/c with cues for safety toward R side     Activities of Daily Living:  ·  Not tested     AMPA 6 Click:  AMPA Total Score: 17    Additional Treatment:  Pt. With standing act on this day with task. Pt. With CGA for balance aspects with task with  AD at raised counter Pt with visual perception task with discrimination of various shapes and sizes x 5 min  And then 2 min with standing bal and min cues through out with weight shifting and use of RUE incorporated and crossing over mid line and facilitation with posture in prep for home management .      Pt. With 1# dumbbell activity with RUE  x 10 reps reps with  shd flex, bicep curls horz adb/add and forward flex motion to increase BUE ROM and strength.  Pt. With AAROM with LUE on this day from elbow to digits with flex/ext pattern 2x10 reps and wrist brace removed for skin inspection and lite ROM on wrist and washing of fore arm and digits.  Brace then placed back in place with digits ROM sling and swathe removed and then replaced back for ROM  Pt.  With standing and therex performed to increase ROM, endurance selfcare task and fxl mobility for independence   Patient left up in chair with all lines intact and call button in reach       Patient left up in chair with all lines intact and call button in reach    ASSESSMENT:  Siomara Flores is a 86 y.o. female with a medical diagnosis of Closed displaced intertrochanteric fracture of left femur Pt. participated well with session on this day.Pt demos physical deficits with balance  functional mobility, UB strength, endurance  level of functional indep with daily tasks and activities and selfcare skills .Pt. Will continue to benefit from continued OT to progress towards goals  .    Rehab identified problem list/impairments: weakness, impaired endurance, impaired self care skills, impaired functional mobilty, gait instability, impaired balance, decreased upper extremity function, decreased lower extremity function, pain, orthopedic precautions    Rehab potential is fair    Activity tolerance: Fair    Discharge recommendations: home with home health     Barriers to discharge: Decreased caregiver support     Equipment recommendations: walker, papi, commode, tub bench(Need to confirm with family (Patient reports she has equipment from her ))     GOALS:   Multidisciplinary Problems     Occupational Therapy Goals        Problem: Occupational Therapy Goal    Goal Priority Disciplines Outcome Interventions   Occupational Therapy Goal     OT, PT/OT Ongoing (interventions implemented as appropriate)    Description:  Goals to be met by: 5/23/2019     Patient will increase functional independence with ADLs by performing:    UE Dressing with Minimal Assistance.  LE Dressing with Minimal Assistance.  Grooming while standing at sink with Stand-by Assistance.  Toileting from bedside commode with Minimal Assistance for hygiene and clothing management.   Bathing with Minimal Assistance.  Supine to sit with Stand-by Assistance  /c HOB flat and no handrails.  Stand pivot transfers with Contact Guard Assistance.  Toilet transfer to bedside commode with Contact Guard Assistance.  Right Upper extremity exercise program 3 x 10 reps per handout, with independence.  Patient will complete a standing activity for 8 min with S in order to perform self care tasks.                   Plan:  Patient to be seen 5 x/week to address the above listed problems via therapeutic exercises, therapeutic activities, self-care/home management  Plan of Care expires: 06/10/19  Plan of Care reviewed with: patient    KAMILLA Deras/SOLANGE  05/23/2019

## 2019-05-24 PROCEDURE — 25000003 PHARM REV CODE 250: Performed by: NURSE PRACTITIONER

## 2019-05-24 PROCEDURE — 63600175 PHARM REV CODE 636 W HCPCS: Performed by: HOSPITALIST

## 2019-05-24 PROCEDURE — 25000003 PHARM REV CODE 250: Performed by: HOSPITALIST

## 2019-05-24 PROCEDURE — 97530 THERAPEUTIC ACTIVITIES: CPT

## 2019-05-24 PROCEDURE — 97116 GAIT TRAINING THERAPY: CPT

## 2019-05-24 PROCEDURE — 97803 MED NUTRITION INDIV SUBSEQ: CPT

## 2019-05-24 PROCEDURE — 11000004 HC SNF PRIVATE

## 2019-05-24 PROCEDURE — 97110 THERAPEUTIC EXERCISES: CPT

## 2019-05-24 PROCEDURE — 25000242 PHARM REV CODE 250 ALT 637 W/ HCPCS: Performed by: HOSPITALIST

## 2019-05-24 RX ADMIN — CALCIUM CARBONATE (ANTACID) CHEW TAB 500 MG 500 MG: 500 CHEW TAB at 10:05

## 2019-05-24 RX ADMIN — FERROUS SULFATE TAB EC 325 MG (65 MG FE EQUIVALENT) 325 MG: 325 (65 FE) TABLET DELAYED RESPONSE at 09:05

## 2019-05-24 RX ADMIN — VERAPAMIL HYDROCHLORIDE 80 MG: 80 TABLET, FILM COATED ORAL at 02:05

## 2019-05-24 RX ADMIN — ENOXAPARIN SODIUM 30 MG: 100 INJECTION SUBCUTANEOUS at 06:05

## 2019-05-24 RX ADMIN — ACETAMINOPHEN 1000 MG: 325 TABLET ORAL at 09:05

## 2019-05-24 RX ADMIN — ATENOLOL 12.5 MG: 25 TABLET ORAL at 09:05

## 2019-05-24 RX ADMIN — TIOTROPIUM BROMIDE 18 MCG: 18 CAPSULE ORAL; RESPIRATORY (INHALATION) at 09:05

## 2019-05-24 RX ADMIN — SENNOSIDES AND DOCUSATE SODIUM 1 TABLET: 8.6; 5 TABLET ORAL at 09:05

## 2019-05-24 RX ADMIN — ACETAMINOPHEN 1000 MG: 325 TABLET ORAL at 02:05

## 2019-05-24 RX ADMIN — VERAPAMIL HYDROCHLORIDE 80 MG: 80 TABLET, FILM COATED ORAL at 09:05

## 2019-05-24 RX ADMIN — FERROUS SULFATE TAB EC 325 MG (65 MG FE EQUIVALENT) 325 MG: 325 (65 FE) TABLET DELAYED RESPONSE at 02:05

## 2019-05-24 NOTE — PT/OT/SLP PROGRESS
"Physical Therapy  Treatment    Siomara Flores   MRN: [id number]   Admitting Diagnosis: Closed displaced intertrochanteric fracture of left femur    PT Received On: 05/24/19          Billable Minutes:  Gait Training 15, Therapeutic Activity 8 and Therapeutic Exercise 15    Treatment Type: Treatment  PT/PTA: PTA     PTA Visit Number: 1       General Precautions: Standard, fall  Orthopedic Precautions: LUE non weight bearing, LLE weight bearing as tolerated   Braces: Sling and swathe     Subjective:  "alright"    Pain/Comfort  Pain Rating 1: 6/10  Location - Side 1: Left  Location - Orientation 1: generalized  Location 1: leg(and L arm)  Pain Addressed 1: Nurse notified(meds requested/given during session)  Pain Rating Post-Intervention 1: 6/10    Objective:   Patient found with: (in wc with LUE sling/swathe)     AM-PAC 6 CLICK MOBILITY  Total Score:16    Transfers:  Sit<>Stand: with HW CGA    Gait:  Amb with HW CGA occ vcs for HW mgmt/seq/placement 20 ft and 30 ft pt thought she had pains meds/tylenol but did not nsg notified     Therex:  2x10 reps AP,GS,LAQ,hip flex,abd/add    Balance:  Dyn and static standing activity with RW uni lat support tossing bean bag with RUE into small basket placed ~ 5 ft away x 1 trials for 3:02 minutes with (CGA)      Patient left up in chair with call button in reach and belonging in reach.    Assessment:  Siomara Flores is a 86 y.o. female with a medical diagnosis of Closed displaced intertrochanteric fracture of left femur.  Pt tolerated well, pt would continue to benefit from skilled PT services to improve overall functional mobility, strength and endurance.  .    Rehab identified problem list/impairments: weakness, impaired endurance, impaired functional mobilty, impaired self care skills, gait instability, impaired balance, decreased upper extremity function, decreased lower extremity function, pain, orthopedic precautions    Rehab potential is good.    Activity tolerance: " Fair    Discharge recommendations: home with home health(with A/S for safety, pt repts sister looking into NH placeme)     Barriers to discharge: Inaccessible home environment, Decreased caregiver support    Equipment recommendations: walker, papi, commode, tub bench(HW or QC pending progress)     GOALS:   Multidisciplinary Problems     Physical Therapy Goals        Problem: Physical Therapy Goal    Goal Priority Disciplines Outcome Goal Variances Interventions   Physical Therapy Goal     PT, PT/OT Ongoing (interventions implemented as appropriate)     Description:  Goals to be met by: 14 days 2019     Patient will increase functional independence with mobility by performin. Supine to sit with supervision  2. Sit to supine with supervision  3. Sit to stand transfer with Stand-by Assistance  4. Bed to chair transfer with Stand-by Assistance using hemiwalker or least restrictive assistive device  5. Gait  x 100 feet with Stand-by Assistance using  hemiwalker or least restrictive assistive device. REVISE 2019   REVISED GOAL: gait x 75 feet w/ HW or least restrictive device and SBA = not met  6. Ascend/Descend 4 inch curb step with Contact Guard Assistance using  hemiwalker or least restrictive assistive device.  7. Stand for 3 minutes with Contact Guard Assistance using  hemiwalker or least restrictive assistive device, intermittently,  and perform an activity  8. Lower extremity exercise program x20 reps per handout, with assistance as needed and gym therex  NEW GOAL 2019 9. Patient will propel w/c w/ RUE and RLE or w/ BLE 75 feet including two turns w/ SBA= not met                        PLAN:    Patient to be seen 5 x/week  to address the above listed problems via gait training, therapeutic activities, therapeutic exercises, wheelchair management/training  Plan of Care expires: 19  Plan of Care reviewed with: patient    Daja Alvarenag, PTA  2019

## 2019-05-24 NOTE — PLAN OF CARE
Problem: Adult Inpatient Plan of Care  Goal: Plan of Care Review  Outcome: Ongoing (interventions implemented as appropriate)  Plan of care reviewed with patient. Will continue to monitor.

## 2019-05-24 NOTE — PLAN OF CARE
Problem: Physical Therapy Goal  Goal: Physical Therapy Goal  Goals to be met by: 14 days 2019     Patient will increase functional independence with mobility by performin. Supine to sit with supervision  2. Sit to supine with supervision  3. Sit to stand transfer with Stand-by Assistance  4. Bed to chair transfer with Stand-by Assistance using hemiwalker or least restrictive assistive device  5. Gait  x 100 feet with Stand-by Assistance using  hemiwalker or least restrictive assistive device. REVISE 2019   REVISED GOAL: gait x 75 feet w/ HW or least restrictive device and SBA = not met  6. Ascend/Descend 4 inch curb step with Contact Guard Assistance using  hemiwalker or least restrictive assistive device.  7. Stand for 3 minutes with Contact Guard Assistance using  hemiwalker or least restrictive assistive device, intermittently,  and perform an activity  8. Lower extremity exercise program x20 reps per handout, with assistance as needed and gym therex  NEW GOAL 2019 9. Patient will propel w/c w/ RUE and RLE or w/ BLE 75 feet including two turns w/ SBA= not met       Outcome: Ongoing (interventions implemented as appropriate)  Goals remain appropriate

## 2019-05-24 NOTE — PROGRESS NOTES
[facility] - Skilled Nursing Care  Adult Nutrition  Progress Note    SUMMARY   Recommendations    Recommendation/Intervention: Continue regular diet, Optisource supplement BID, encourage po intake  Goals: PO to meet 50% of needs with ONS by next RD visit  Nutrition Goal Status: progressing towards goal  Communication of RD Recs: other (comment)(POC)    Reason for Assessment    Reason For Assessment: RD follow-up  Diagnosis: (debilty sp fall , sp rodding L femur)  Relevant Medical History: HTN, anemia,   Interdisciplinary Rounds: attended  General Information Comments: pt asking about dc disposition, discussed with , pt states she feels a little stronger , is walking farther.   Nutrition Discharge Planning: Dc on regular diet with ONS of choice for weight gain and wound healing    Nutrition Risk Screen    Nutrition Risk Screen: no indicators present    Nutrition/Diet History    Patient Reported Diet/Restrictions/Preferences: general  Typical Food/Fluid Intake: lactose free milk, is willing to try boost plus rather than boost breeze  Food Preferences: sensitive stomach, will not eat beans, or cruciferous vegetable, corse foods,   Spiritual, Cultural Beliefs, Rastafari Practices, Values that Affect Care: yes  Food Allergies: NKFA  Factors Affecting Nutritional Intake: altered gastrointestinal function, cultural/Confucianist beliefs, decreased appetite(may have been too distracted or too exhausted to eat and take care of herself)    Anthropometrics    Temp: 98.1 °F (36.7 °C)  Height Method: Stated  Height: 5' (152.4 cm)  Height (inches): 60 in  Weight Method: Standard Scale  Weight: 33.8 kg (74 lb 8.3 oz)  Weight (lb): 74.52 lb  Ideal Body Weight (IBW), Female: 100 lb  % Ideal Body Weight, Female (lb): 80.03 lb  BMI (Calculated): 15.7  BMI Grade: less than 16 protein-energy malnutrition grade III  Weight Loss: unintentional  Usual Body Weight (UBW), k.8 kg  Weight Change Amount: (wt loss since admit, small  appetite, oral supplement TID)  % Usual Body Weight: 87.02  % Weight Change From Usual Weight: -13.16 %       Lab/Procedures/Meds    Pertinent Labs Reviewed: reviewed  Pertinent Labs Comments: Na 135  Pertinent Medications Reviewed: reviewed  Pertinent Medications Comments: Epo         Estimated/Assessed Needs    Weight Used For Calorie Calculations: 42 kg (92 lb 9.5 oz)(UBW in elderly )  Energy Calorie Requirements (kcal): 2228-5784  Energy Need Method: Kcal/kg(30-35 kcal/kg)  Protein Requirements: 55-63gm  Weight Used For Protein Calculations: 42 kg (92 lb 9.5 oz)(x 1.3-1.5 2/2 malnutrition)  Fluid Requirements (mL): per MD  Estimated Fluid Requirement Method: RDA Method  RDA Method (mL): 1260  CHO Requirement: -      Nutrition Prescription Ordered    Current Diet Order: regular  Nutrition Order Comments: PO %  Oral Nutrition Supplement: boost plus TID was changed to optisource BID temporarily, explained to patient and checked renal labs    Evaluation of Received Nutrient/Fluid Intake    Energy Calories Required: not meeting needs  Protein Required: meeting needs  Fluid Required: meeting needs  Comments: increased calories in the new oral supplement, encourage po intake   Tolerance: tolerating  % Intake of Estimated Energy Needs: 50 - 75 %  % Meal Intake: 50 - 75 %    Nutrition Risk    Level of Risk/Frequency of Follow-up: high     Assessment and Plan  Severe malnutrition  Malnutrition in the context of Social/Environmental Circumstances     Related to (etiology):  Starvation and lack of self care while caregiver, grief response, reduced appetite and po intake     Signs and Symptoms (as evidenced by):  Energy Intake: <75% of estimated energy requirement for > 3 months  Body Fat Depletion: moderate depletion of orbitals and triceps and buccal area  Muscle Mass Depletion: severe depletion of clavicle region and scapular region moderate at temples, hands and calf  Weight Loss: > 7.5% % x < 3 months  Fluid  Accumulation: mild, no longer see edema 5/24/19    Monitor and Evaluation    Food and Nutrient Intake: food and beverage intake  Physical Activity and Function: nutrition-related ADLs and IADLs  Anthropometric Measurements: weight change  Biochemical Data, Medical Tests and Procedures: electrolyte and renal panel  Nutrition-Focused Physical Findings: overall appearance     Malnutrition Assessment    5/10/19  Malnutrition Type: social/environmental circumstances  Energy Intake: severe energy intake  Skin (Micronutrient): dry  Hair/Scalp (Micronutrient): dull, dry  Eyes (Micronutrient): conjunctiva dull  Teeth (Micronutrient): (plates)  Neck/Chest (Micronutrient): bony prominence, muscle wasting, subcutaneous fat loss  Musculoskeletal/Lower Extremities: edema, joints painful, muscle wasting, subcutaneous fat loss       Weight Loss (Malnutrition): greater than 7.5% in 3 months  Subcutaneous Fat (Malnutrition): severe depletion  Muscle Mass (Malnutrition): severe depletion  Fluid Accumulation (Malnutrition): mild   Orbital Region (Subcutaneous Fat Loss): moderate depletion  Upper Arm Region (Subcutaneous Fat Loss): moderate depletion  Thoracic and Lumbar Region: moderate depletion   [place] (Muscle Loss): moderate depletion  Clavicle Bone Region (Muscle Loss): severe depletion  Clavicle and Acromion Bone Region (Muscle Loss): severe depletion  Scapular Bone Region (Muscle Loss): severe depletion  Dorsal Hand (Muscle Loss): moderate depletion  Posterior Calf Region (Muscle Loss): mild depletion            Severe Weight Loss (Malnutrition): greater than 7.5% in 3 months    Nutrition Follow-Up    RD Follow-up?: Yes

## 2019-05-24 NOTE — PT/OT/SLP PROGRESS
Occupational Therapy  Treatment    Siomara Flores   MRN: [id number]   Admitting Diagnosis: Closed displaced intertrochanteric fracture of left femur    OT Date of Treatment: 05/24/19       Billable Minutes:  Therapeutic Activity 40    General Precautions: Standard, fall  Orthopedic Precautions: LUE non weight bearing  Braces: Sling and swathe(and L wrist brace)         Subjective:  Communicated with nsg prior to session.  I am doing well today    Pain/Comfort  Pain Rating 1: 5/10  Location - Side 1: Left  Location - Orientation 1: generalized  Location 1: leg  Pain Addressed 1: Nurse notified  Pain Rating Post-Intervention 1: 6/10    Objective:   PT. Seated in chair on arrival    Occupational Performance:    Bed Mobility:    · Not tested     Functional Mobility/Transfers:  · Patient completed Sit <> Stand Transfer with contact guard assistance  with  hand-held assist to and from w/c <>chair with cues for safety    Activities of Daily Living:  · Lower Body Dressing: moderate assistance with doffing/donning RLE sock and pants. Pt requiring (A) for LLE sock and pants over hips    AMPA 6 Click:  [facility] Total Score: 17      Additional Treatment:  Pt. With standing act on this day with task. Pt. With CGA for balance aspects with task with  AD at raised counter Pt with visual perception task with discrimination of various shapes and sizes x 5 min  with standing bal and min cues through out with weight shifting and use of RUE incorporated and crossing over mid line and facilitation with posture in prep for home management .      Pt. With 1# dumbbell activity with RUE  x 10 reps reps with  shd flex, bicep curls horz adb/add and forward flex motion to increase BUE ROM and strength.  Pt. With AAROM with LUE on this day from elbow to digits with flex/ext pattern 10 reps and wrist brace removed for skin inspection and lite ROM on wrist and digits.  Brace then placed back in place with digits ROM sling and swathe removed and then  replaced back for ROM  Pt. With standing and therex performed to increase ROM, endurance selfcare task and fxl mobility for independence     Patient left up in chair with all lines intact and call button in reach    ASSESSMENT:  Siomara Flores is a 86 y.o. female with a medical diagnosis of Closed displaced intertrochanteric fracture of left femur Pt. participated well with session on this day.Pt demos physical deficits with balance  functional mobility, UB strength, endurance  level of functional indep with daily tasks and activities and selfcare skills .Pt. Will continue to benefit from continued OT to progress towards goals  .    Rehab identified problem list/impairments: weakness, impaired endurance, impaired self care skills, impaired functional mobilty, gait instability, impaired balance, decreased upper extremity function, decreased lower extremity function, pain, orthopedic precautions    Rehab potential is fair    Activity tolerance: Fair    Discharge recommendations: home with home health     Barriers to discharge: Decreased caregiver support     Equipment recommendations: walker, papi, commode, tub bench(Need to confirm with family (Patient reports she has equipment from her ))     GOALS:   Multidisciplinary Problems     Occupational Therapy Goals        Problem: Occupational Therapy Goal    Goal Priority Disciplines Outcome Interventions   Occupational Therapy Goal     OT, PT/OT Ongoing (interventions implemented as appropriate)    Description:  Goals to be met by: 5/23/2019     Patient will increase functional independence with ADLs by performing:    UE Dressing with Minimal Assistance.  LE Dressing with Minimal Assistance.  Grooming while standing at sink with Stand-by Assistance.  Toileting from bedside commode with Minimal Assistance for hygiene and clothing management.   Bathing with Minimal Assistance.  Supine to sit with Stand-by Assistance /c HOB flat and no handrails.  Stand pivot  transfers with Contact Guard Assistance.  Toilet transfer to bedside commode with Contact Guard Assistance.  Right Upper extremity exercise program 3 x 10 reps per handout, with independence.  Patient will complete a standing activity for 8 min with S in order to perform self care tasks.                     Plan:  Patient to be seen 5 x/week to address the above listed problems via therapeutic exercises, therapeutic activities, self-care/home management  Plan of Care expires: 06/10/19  Plan of Care reviewed with: patient    KOBY Deras  05/24/2019

## 2019-05-25 PROCEDURE — 11000004 HC SNF PRIVATE

## 2019-05-25 PROCEDURE — 25000003 PHARM REV CODE 250: Performed by: NURSE PRACTITIONER

## 2019-05-25 PROCEDURE — 63600175 PHARM REV CODE 636 W HCPCS: Performed by: HOSPITALIST

## 2019-05-25 PROCEDURE — 25000242 PHARM REV CODE 250 ALT 637 W/ HCPCS: Performed by: HOSPITALIST

## 2019-05-25 PROCEDURE — 25000003 PHARM REV CODE 250: Performed by: HOSPITALIST

## 2019-05-25 RX ADMIN — ATENOLOL 12.5 MG: 25 TABLET ORAL at 10:05

## 2019-05-25 RX ADMIN — FERROUS SULFATE TAB EC 325 MG (65 MG FE EQUIVALENT) 325 MG: 325 (65 FE) TABLET DELAYED RESPONSE at 09:05

## 2019-05-25 RX ADMIN — ACETAMINOPHEN 1000 MG: 325 TABLET ORAL at 05:05

## 2019-05-25 RX ADMIN — TIOTROPIUM BROMIDE 18 MCG: 18 CAPSULE ORAL; RESPIRATORY (INHALATION) at 10:05

## 2019-05-25 RX ADMIN — ACETAMINOPHEN 1000 MG: 325 TABLET ORAL at 02:05

## 2019-05-25 RX ADMIN — ENOXAPARIN SODIUM 30 MG: 100 INJECTION SUBCUTANEOUS at 05:05

## 2019-05-25 RX ADMIN — FERROUS SULFATE TAB EC 325 MG (65 MG FE EQUIVALENT) 325 MG: 325 (65 FE) TABLET DELAYED RESPONSE at 03:05

## 2019-05-25 RX ADMIN — VERAPAMIL HYDROCHLORIDE 80 MG: 80 TABLET, FILM COATED ORAL at 03:05

## 2019-05-25 RX ADMIN — VERAPAMIL HYDROCHLORIDE 80 MG: 80 TABLET, FILM COATED ORAL at 09:05

## 2019-05-25 RX ADMIN — VERAPAMIL HYDROCHLORIDE 80 MG: 80 TABLET, FILM COATED ORAL at 10:05

## 2019-05-25 RX ADMIN — ACETAMINOPHEN 1000 MG: 325 TABLET ORAL at 09:05

## 2019-05-25 RX ADMIN — POLYETHYLENE GLYCOL 3350 17 G: 17 POWDER, FOR SOLUTION ORAL at 10:05

## 2019-05-25 RX ADMIN — FERROUS SULFATE TAB EC 325 MG (65 MG FE EQUIVALENT) 325 MG: 325 (65 FE) TABLET DELAYED RESPONSE at 10:05

## 2019-05-25 NOTE — PLAN OF CARE
Problem: Fall Injury Risk  Goal: Absence of Fall and Fall-Related Injury  Outcome: Ongoing (interventions implemented as appropriate)  Remained free from fall or injury this shift. All safety measures maintained

## 2019-05-26 PROCEDURE — 25000003 PHARM REV CODE 250: Performed by: HOSPITALIST

## 2019-05-26 PROCEDURE — 25000003 PHARM REV CODE 250: Performed by: NURSE PRACTITIONER

## 2019-05-26 PROCEDURE — 11000004 HC SNF PRIVATE

## 2019-05-26 PROCEDURE — 97535 SELF CARE MNGMENT TRAINING: CPT

## 2019-05-26 PROCEDURE — 25000242 PHARM REV CODE 250 ALT 637 W/ HCPCS: Performed by: HOSPITALIST

## 2019-05-26 PROCEDURE — 63600175 PHARM REV CODE 636 W HCPCS: Performed by: HOSPITALIST

## 2019-05-26 RX ADMIN — ATENOLOL 12.5 MG: 25 TABLET ORAL at 09:05

## 2019-05-26 RX ADMIN — FERROUS SULFATE TAB EC 325 MG (65 MG FE EQUIVALENT) 325 MG: 325 (65 FE) TABLET DELAYED RESPONSE at 02:05

## 2019-05-26 RX ADMIN — TIOTROPIUM BROMIDE 18 MCG: 18 CAPSULE ORAL; RESPIRATORY (INHALATION) at 09:05

## 2019-05-26 RX ADMIN — VERAPAMIL HYDROCHLORIDE 80 MG: 80 TABLET, FILM COATED ORAL at 09:05

## 2019-05-26 RX ADMIN — FERROUS SULFATE TAB EC 325 MG (65 MG FE EQUIVALENT) 325 MG: 325 (65 FE) TABLET DELAYED RESPONSE at 09:05

## 2019-05-26 RX ADMIN — VERAPAMIL HYDROCHLORIDE 80 MG: 80 TABLET, FILM COATED ORAL at 02:05

## 2019-05-26 RX ADMIN — POLYETHYLENE GLYCOL 3350 17 G: 17 POWDER, FOR SOLUTION ORAL at 09:05

## 2019-05-26 RX ADMIN — ENOXAPARIN SODIUM 30 MG: 100 INJECTION SUBCUTANEOUS at 05:05

## 2019-05-26 RX ADMIN — ACETAMINOPHEN 1000 MG: 325 TABLET ORAL at 02:05

## 2019-05-26 RX ADMIN — ACETAMINOPHEN 1000 MG: 325 TABLET ORAL at 09:05

## 2019-05-26 NOTE — PLAN OF CARE
Problem: Adult Inpatient Plan of Care  Goal: Plan of Care Review  Reviewed plan of care with patient. Will continue to monitor.

## 2019-05-26 NOTE — PT/OT/SLP PROGRESS
Occupational Therapy  Treatment    Siomara Flores   MRN: [id number]   Admitting Diagnosis: Closed displaced intertrochanteric fracture of left femur    OT Date of Treatment: 05/26/19       Billable Minutes:  Self Care/Home Management 48    General Precautions: Standard, fall  Orthopedic Precautions: LUE non weight bearing  Braces: Sling and swathe(and L wrist brace)         Subjective:  Communicated with nsg prior to session.  I am doing well today    Pain/Comfort  Pain Rating 1: 4/10  Location - Side 1: Left  Location 1: leg  Pain Addressed 1: Nurse notified  Pain Rating Post-Intervention 1: 4/10    Objective:   Pt. Supine on arrival    Occupational Performance:    Bed Mobility:    · Patient completed Supine to Sit with minimum assistance     Functional Mobility/Transfers:  · Patient completed Sit <> Stand Transfer with contact guard assistance  with  hand-held assist   · Patient completed Bed <> Chair Transfer using Stand Pivot technique with contact guard assistance with hand-held assist  · Patient completed Toilet Transfer Stand Pivot technique with contact guard assistance with  hand-held assist  · Patient completed  Shower Transfer Stand Pivot technique with minimum assistance with hand-held assist      Activities of Daily Living:  · Feeding:  sert up with brakfast .  · Grooming: set up (A) .  · Bathing: minimum assistance for BLE feet and RUE in shower seated on shower bench  · Upper Body Dressing: moderate assistance to manage gown and Total A to sling swath and brace  · Lower Body Dressing: minimum assistance to jose alejandro uw/pants (A) to manage over hips isntace and mod A for BLE socks   · Toileting: minimum assistance to manage clothing aspects    [facility] 6 Click:  [facility] Total Score: 17      Patient left up in chair with all lines intact and call button in reach    ASSESSMENT:  Siomara Flores is a 86 y.o. female with a medical diagnosis of Closed displaced intertrochanteric fracture of left femur Pt.  participated well with session on this day.Pt demos physical deficits with balance  functional mobility, UB strength, endurance  level of functional indep with daily tasks and activities and selfcare skills .Pt. Will continue to benefit from continued OT to progress towards goals  .    Rehab identified problem list/impairments: weakness, impaired endurance, impaired self care skills, impaired functional mobilty, gait instability, impaired balance, decreased upper extremity function, decreased lower extremity function, pain, orthopedic precautions    Rehab potential is fair    Activity tolerance: Fair    Discharge recommendations: home with home health     Barriers to discharge: Decreased caregiver support     Equipment recommendations: walker, papi, commode, tub bench(Need to confirm with family (Patient reports she has equipment from her ))     GOALS:   Multidisciplinary Problems     Occupational Therapy Goals        Problem: Occupational Therapy Goal    Goal Priority Disciplines Outcome Interventions   Occupational Therapy Goal     OT, PT/OT Ongoing (interventions implemented as appropriate)    Description:  Goals to be met by: 5/23/2019     Patient will increase functional independence with ADLs by performing:    UE Dressing with Minimal Assistance.  LE Dressing with Minimal Assistance.  Grooming while standing at sink with Stand-by Assistance.  Toileting from bedside commode with Minimal Assistance for hygiene and clothing management.   Bathing with Minimal Assistance.  Supine to sit with Stand-by Assistance /c HOB flat and no handrails.  Stand pivot transfers with Contact Guard Assistance.  Toilet transfer to bedside commode with Contact Guard Assistance.  Right Upper extremity exercise program 3 x 10 reps per handout, with independence.  Patient will complete a standing activity for 8 min with S in order to perform self care tasks.                   Plan:  Patient to be seen 5 x/week to address the  above listed problems via therapeutic exercises, therapeutic activities, self-care/home management  Plan of Care expires: 06/10/19  Plan of Care reviewed with: patient    KAMILLA Deras/SOLANGE  05/26/2019

## 2019-05-26 NOTE — PLAN OF CARE
Problem: Adult Inpatient Plan of Care  Goal: Plan of Care Review  Outcome: Ongoing (interventions implemented as appropriate)  Plan of care reviewed with patient interventions documented  On MAR and  Flow sheet. Rested quietly through out night. Afebrile. No falls or injury noted on this shift. All safety measures maintained.

## 2019-05-26 NOTE — PLAN OF CARE
Problem: Occupational Therapy Goal  Goal: Occupational Therapy Goal  Goals to be met by: 5/23/2019     Patient will increase functional independence with ADLs by performing:    UE Dressing with Minimal Assistance.  LE Dressing with Minimal Assistance.  Grooming while standing at sink with Stand-by Assistance.  Toileting from bedside commode with Minimal Assistance for hygiene and clothing management.   Bathing with Minimal Assistance.-MET 5/26/2019  Supine to sit with Stand-by Assistance /c HOB flat and no handrails.  Stand pivot transfers with Contact Guard Assistance.-MET. 5/26/2019  Toilet transfer to bedside commode with Contact Guard Assistance.-met 5/26/2019  Right Upper extremity exercise program 3 x 10 reps per handout, with independence.  Patient will complete a standing activity for 8 min with S in order to perform self care tasks.    Outcome: Ongoing (interventions implemented as appropriate)  .

## 2019-05-27 LAB
ANION GAP SERPL CALC-SCNC: 7 MMOL/L (ref 8–16)
BASOPHILS # BLD AUTO: 0.07 K/UL (ref 0–0.2)
BASOPHILS NFR BLD: 1.7 % (ref 0–1.9)
BUN SERPL-MCNC: 13 MG/DL (ref 8–23)
CALCIUM SERPL-MCNC: 9.4 MG/DL (ref 8.7–10.5)
CHLORIDE SERPL-SCNC: 99 MMOL/L (ref 95–110)
CO2 SERPL-SCNC: 31 MMOL/L (ref 23–29)
CREAT SERPL-MCNC: 0.6 MG/DL (ref 0.5–1.4)
DIFFERENTIAL METHOD: ABNORMAL
EOSINOPHIL # BLD AUTO: 0.2 K/UL (ref 0–0.5)
EOSINOPHIL NFR BLD: 4.1 % (ref 0–8)
ERYTHROCYTE [DISTWIDTH] IN BLOOD BY AUTOMATED COUNT: 18.1 % (ref 11.5–14.5)
EST. GFR  (AFRICAN AMERICAN): >60 ML/MIN/1.73 M^2
EST. GFR  (NON AFRICAN AMERICAN): >60 ML/MIN/1.73 M^2
GLUCOSE SERPL-MCNC: 84 MG/DL (ref 70–110)
HCT VFR BLD AUTO: 30.3 % (ref 37–48.5)
HGB BLD-MCNC: 8.9 G/DL (ref 12–16)
IMM GRANULOCYTES # BLD AUTO: 0.01 K/UL (ref 0–0.04)
IMM GRANULOCYTES NFR BLD AUTO: 0.2 % (ref 0–0.5)
LYMPHOCYTES # BLD AUTO: 1.1 K/UL (ref 1–4.8)
LYMPHOCYTES NFR BLD: 25.8 % (ref 18–48)
MAGNESIUM SERPL-MCNC: 2.1 MG/DL (ref 1.6–2.6)
MCH RBC QN AUTO: 26.3 PG (ref 27–31)
MCHC RBC AUTO-ENTMCNC: 29.4 G/DL (ref 32–36)
MCV RBC AUTO: 90 FL (ref 82–98)
MONOCYTES # BLD AUTO: 0.5 K/UL (ref 0.3–1)
MONOCYTES NFR BLD: 11.7 % (ref 4–15)
NEUTROPHILS # BLD AUTO: 2.3 K/UL (ref 1.8–7.7)
NEUTROPHILS NFR BLD: 56.5 % (ref 38–73)
NRBC BLD-RTO: 0 /100 WBC
PHOSPHATE SERPL-MCNC: 3.7 MG/DL (ref 2.7–4.5)
PLATELET # BLD AUTO: 344 K/UL (ref 150–350)
PMV BLD AUTO: 10 FL (ref 9.2–12.9)
POTASSIUM SERPL-SCNC: 3.9 MMOL/L (ref 3.5–5.1)
RBC # BLD AUTO: 3.38 M/UL (ref 4–5.4)
SODIUM SERPL-SCNC: 137 MMOL/L (ref 136–145)
WBC # BLD AUTO: 4.11 K/UL (ref 3.9–12.7)

## 2019-05-27 PROCEDURE — 25000003 PHARM REV CODE 250: Performed by: NURSE PRACTITIONER

## 2019-05-27 PROCEDURE — 25000003 PHARM REV CODE 250: Performed by: HOSPITALIST

## 2019-05-27 PROCEDURE — 83735 ASSAY OF MAGNESIUM: CPT

## 2019-05-27 PROCEDURE — 97530 THERAPEUTIC ACTIVITIES: CPT

## 2019-05-27 PROCEDURE — 84100 ASSAY OF PHOSPHORUS: CPT

## 2019-05-27 PROCEDURE — 97110 THERAPEUTIC EXERCISES: CPT

## 2019-05-27 PROCEDURE — 97116 GAIT TRAINING THERAPY: CPT

## 2019-05-27 PROCEDURE — 36415 COLL VENOUS BLD VENIPUNCTURE: CPT

## 2019-05-27 PROCEDURE — 25000242 PHARM REV CODE 250 ALT 637 W/ HCPCS: Performed by: HOSPITALIST

## 2019-05-27 PROCEDURE — 85025 COMPLETE CBC W/AUTO DIFF WBC: CPT

## 2019-05-27 PROCEDURE — 97535 SELF CARE MNGMENT TRAINING: CPT

## 2019-05-27 PROCEDURE — 11000004 HC SNF PRIVATE

## 2019-05-27 PROCEDURE — 63600175 PHARM REV CODE 636 W HCPCS: Performed by: HOSPITALIST

## 2019-05-27 PROCEDURE — 80048 BASIC METABOLIC PNL TOTAL CA: CPT

## 2019-05-27 RX ADMIN — FERROUS SULFATE TAB EC 325 MG (65 MG FE EQUIVALENT) 325 MG: 325 (65 FE) TABLET DELAYED RESPONSE at 03:05

## 2019-05-27 RX ADMIN — FERROUS SULFATE TAB EC 325 MG (65 MG FE EQUIVALENT) 325 MG: 325 (65 FE) TABLET DELAYED RESPONSE at 10:05

## 2019-05-27 RX ADMIN — VERAPAMIL HYDROCHLORIDE 80 MG: 80 TABLET, FILM COATED ORAL at 03:05

## 2019-05-27 RX ADMIN — VERAPAMIL HYDROCHLORIDE 80 MG: 80 TABLET, FILM COATED ORAL at 09:05

## 2019-05-27 RX ADMIN — ATENOLOL 12.5 MG: 25 TABLET ORAL at 10:05

## 2019-05-27 RX ADMIN — ACETAMINOPHEN 1000 MG: 325 TABLET ORAL at 09:05

## 2019-05-27 RX ADMIN — ACETAMINOPHEN 1000 MG: 325 TABLET ORAL at 06:05

## 2019-05-27 RX ADMIN — FERROUS SULFATE TAB EC 325 MG (65 MG FE EQUIVALENT) 325 MG: 325 (65 FE) TABLET DELAYED RESPONSE at 09:05

## 2019-05-27 RX ADMIN — SENNOSIDES AND DOCUSATE SODIUM 1 TABLET: 8.6; 5 TABLET ORAL at 10:05

## 2019-05-27 RX ADMIN — ENOXAPARIN SODIUM 30 MG: 100 INJECTION SUBCUTANEOUS at 06:05

## 2019-05-27 RX ADMIN — POLYETHYLENE GLYCOL 3350 17 G: 17 POWDER, FOR SOLUTION ORAL at 10:05

## 2019-05-27 RX ADMIN — ACETAMINOPHEN 1000 MG: 325 TABLET ORAL at 03:05

## 2019-05-27 RX ADMIN — VERAPAMIL HYDROCHLORIDE 80 MG: 80 TABLET, FILM COATED ORAL at 10:05

## 2019-05-27 RX ADMIN — TIOTROPIUM BROMIDE 18 MCG: 18 CAPSULE ORAL; RESPIRATORY (INHALATION) at 10:05

## 2019-05-27 NOTE — PROGRESS NOTES
[facility]                                  Skilled Nursing Facility                   Progress Note     Admit Date: 2019  RAVI 2019  Principal Problem:  Closed displaced intertrochanteric fracture of left femurHPI obtained from patient interview and chart review     Chief Complaint:  Revaluation of medical treatment and therapy status: Lab review    HPI:   Mrs. Flores is a 86 year old. female Confucianism with PMHx of  Bronchiectasis, SVT who presents to SNF following a hospitalization for left intertrochanteric, left proximal humerus, and left distal radius fracture s/p left femur intramedullary marc after a mechanical fall. She was admitted to [facility] from  until . Dr. Owusu performed a Cephalomedullary nail fixation of left intertrochanteric femur fracture on 5/3/19.     Interval history: All labs reviewed.  No leukocytosis.  H&H improved to 8.9/30 from 8.1/26. Patient's 24 hr blood pressure range is 136/60 to 106/50. Patient progessing slowly with PT/OT.   Patient is Confucianism and does not accept blood transfusions.  Continuing to follow and treat all acute and chronic conditions.    Past Medical History: Patient has a past medical history of Bronchiectasis without complication, Hypertension, and Supraventricular tachycardia.    Past Surgical History: Patient has a past surgical history that includes Cholecystectomy; Tonsillectomy; and Intramedullary rodding of femur (Left, 5/3/2019).    Social History: Patient reports that she has never smoked. She has never used smokeless tobacco. She reports that she does not drink alcohol or use drugs.  Patient's ill   about a month ago    Family History:  No family significant history to report    Allergies: Patient is allergic to aspirin (bulk).    ROS  Constitutional: Negative for fever or fatigue.   Eyes: Negative for blurred vision, double vision and discharge.    Respiratory: Negative for cough, shortness of breath and wheezing.    Cardiovascular: Negative for chest pain, palpitations, claudication, and leg swelling.   Gastrointestinal: Negative for abdominal pain, constipation, diarrhea, nausea and vomiting.   Genitourinary: Negative for dysuria, frequency and urgency.   Musculoskeletal:  + generalized weakness, pain to left arm and hip. Negative for back pain and myalgias.   Skin: Negative for itching and rash.   Neurological: Negative for dizziness, speech change, and headaches.   Psychiatric/Behavioral: + for depression. The patient is not nervous/anxious.      PEx  Temp:  [97.5 °F (36.4 °C)-97.6 °F (36.4 °C)]   Pulse:  [71-81]   Resp:  [16-18]   BP: (106-136)/(50-62)   SpO2:  [95 %-96 %]      Constitutional: Patient appears frail  and in no distress   HENT:   Head: Normocephalic and atraumatic.   Eyes: Pupils are equal, round, and reactive to light.   Neck: Normal range of motion. Neck supple.   Cardiovascular: Normal rate, regular rhythm and normal heart sounds.    Pulmonary/Chest: Effort normal and breath sounds are clear  Abdominal: Soft. Bowel sounds are normal.   Musculoskeletal: decreased range of motion to left arm, sling in place.   Neurological: Alert and oriented to person, place, and time.   Skin: Skin is warm and dry. Pt has extremely long toe nails that are painful to the touch.  Patient has skin overgrowth to scalp.  At the incision well-approximated no erythema, no swelling noted to periwound area.    Psychiatric: mood is sad      Assessment and Plan:    Hx of HTN  - currently patient is experiencing hypotension   - 5/13 decrease losartan from 50 mg to 25 mg daily, continue atenolol 25 mg daily.  Holding parameters in place to hold for systolic blood pressure less than 100.   - 5/14 holding home losartan at this time. patient's 24 hr blood pressure 102/49 to 119/50.   - 5/15 decrease atenolol to 12.5 mg daily; 24 hr blood pressure today is 95/46 to  108/54.    - 5/16 BP slightly improved with the decrease in atenolol dose. Patient's 24 hr blood pressure range is 98/48 to 129/62  - 5/20 patient's 24 hr blood pressure ranges 96/50 to 121/57  - 5/23 patient's 24 hr blood pressure ranges 107/52 to 149/67, the systolic of 149 is isolated and due to increase in pain. Will continue to monitor blood pressures.   - 5/27 patient's 24 hr blood pressure range is 136/52 to 106/50    Acute blood loss anemia  - continue ferrous sulfate 325 mg t.i.d.  - patient is a [employer] who refuses blood transfusions  - Post-op H/H 6.9/21.8 with baseline 8.4/26.9  - pediatric blood draws only, avoid IVF  - 5/13 initiated procrit 20,000 units   - 5/16 H&H improved to 7.0/23 from 6.3/20 after Procrit injection and continue iron supplementation  - 5/20 H&H improved to 7.7/25  - 5/23 H&H improved to 8.1/26  - 5/27 H&H improved to 8.9/30    CONTINUE    Severe malnutrition  - Energy Intake: <75% of estimated energy requirement for > 3 months  - Body Fat Depletion: moderate depletion of orbitals and triceps and buccal area  - Muscle Mass Depletion: severe depletion of clavicle region and scapular region moderate at temples, hands and calf  - Weight Loss: > 7.5% % x < 3 months   - Continue regular diet, honor food intolerances, recommend change boost breeze to boost plus TID no chocolate  per pt request.  - 5/23 patient states better appetite increased p.o. consumption    Onychogryphosis  - likely from  Insufficient consumtion of essential nutrients  - 5/14- Message sent to  and appointment scheduled are to make patient a Podiatry appointment to cut down toenails, patient's toenails are so long and painful it hurts her to walk. - patient to see Podiatry tomorrow morning  - 5/15 patient was able to see provider today and Podiatry.  I got her an appointment Friday before her ortho follow-up to have her toes cut.  - 5/20 patient went to podiatry appointment in all toenails were  trimmed to a normal length.  Patient's toenail pain is significantly improved    Hx of SVT  - continue verapamil 80 mg TID  - 5/16 with reduction of atenolol.  Heart rate is increased to the 90s.  Will continue to assess blood pressure and heart rate daily.     Seborrheic Keratosis   - 5/15 differential diagnosis includes melanoma- ambulatory referral to Dermatology placed.  Lesion appears as a skin overgrowth to scalp that is waxy brown and tan in color, slightly raise with a bumpy appearance.  Lesion is about  5 cm in diameter and circular.      Closed displaced intratrochanteric fracture of left femur  s/p left Cephalomedullary nail fixation of left  femur fracture on 5/3/19  Postoperative pain  - WBAT to LLE, NWB to LUE  - 5/14 initiated acetaminophen 1 g q.8 hours  - 5/15 pain is better controlled today with scheduled Tylenol    Debility   - Continue with PT/OT for gait training and strengthening and restoration of ADL's   - Encourage mobility, OOB in chair, and early ambulation as appropriate  - Fall precautions   - Monitor for bowel and bladder dysfunction  - Monitor for and prevent skin breakdown and pressure ulcers  - Continue DVT prophylaxis with  enoxaparin 30 mg daily    Hx of Bronchiectasis  - continue duo nebs q.4 hours while awake and tiotropium 18mcg daily     DC Update: pt lives alone and is asking about nursing home type placement after SNF.     Future Appointments   Date Time Provider Department Center   5/31/2019 11:00 AM [facility] Jayden Ibrahim NP    DOS: 5/27/2019

## 2019-05-27 NOTE — PT/OT/SLP PROGRESS
Physical Therapy  Treatment    Siomara Flores   MRN: [id number]   Admitting Diagnosis: Closed displaced intertrochanteric fracture of left femur    PT Received On: 05/27/19          Billable Minutes:  Gait Training 15, Therapeutic Activity 15 and Therapeutic Exercise 15    Treatment Type: Treatment  PT/PTA: PT     PTA Visit Number: 0       General Precautions: Standard, fall  Orthopedic Precautions: LUE non weight bearing, LLE weight bearing as tolerated   Braces: Sling and swathe         Subjective:  Communicated with patient prior to session.  Agreeable to session.    Pain/Comfort  Pain Rating 1: 6/10  Location - Side 1: Left  Location - Orientation 1: generalized  Location 1: arm  Pain Addressed 1: Distraction  Pain Rating Post-Intervention 1: 5/10  Pain Rating 2: 6/10  Location - Side 2: Left  Location 2: shoulder  Pain Addressed 2: Distraction  Pain Rating Post-Intervention 2: 5/10    Objective:  Patient found seated in bedside chair w/ sling and swath LUE with       AM-PAC 6 CLICK MOBILITY  Total Score:16    Bed Mobility:  Sit>Supine:not performed  Supine>Sit: not performed    Transfers:  Sit<>Stand: to/from w/c w/ HW and CGa; to/from chair w/ HW and CGA; to/from nustep w/ HW and CGA  Stand Pivot Transfer: nustep >w/c w/ HW and CGA      Gait:  Amb w/ HW and CGA 62 feet, 10 feet, slow pace. occ standing rest breaks     Advanced Gait:  Stairs: unable   Curb Step: unable    Wheelchair Mobility:  Not performed     Therex:  Patient performed 15 minutes on recumbent stepper with rightarms and legs on workload 2    Balance:  Stands w/ HW and CGA    Additional Treatment:  Educated on gait and trf technique.     Patient left up in chair with call button in reach and PCT present.    Assessment:  Siomara Flores is a 86 y.o. female with a medical diagnosis of Closed displaced intertrochanteric fracture of left femur.  Patient progressing slowly w/ gait and trfs. Patient will benefit from continued physical therapy to  address deficits and improve safety and functional mobility. Continue with physical therapy plan of care. .    Rehab identified problem list/impairments: weakness, impaired endurance, impaired functional mobilty, impaired self care skills, gait instability, impaired balance, decreased upper extremity function, decreased lower extremity function, pain, orthopedic precautions    Rehab potential is good.    Activity tolerance: Good    Discharge recommendations: home with home health(with A/S for safety, pt repts sister looking into NH placeme)     Barriers to discharge: Inaccessible home environment, Decreased caregiver support    Equipment recommendations: walker, papi, commode, tub bench(HW or QC pending progress)     GOALS:   Multidisciplinary Problems     Physical Therapy Goals        Problem: Physical Therapy Goal    Goal Priority Disciplines Outcome Goal Variances Interventions   Physical Therapy Goal     PT, PT/OT Ongoing (interventions implemented as appropriate)     Description:  Goals to be met by: 14 days 2019     Patient will increase functional independence with mobility by performin. Supine to sit with supervision  2. Sit to supine with supervision  3. Sit to stand transfer with Stand-by Assistance  4. Bed to chair transfer with Stand-by Assistance using hemiwalker or least restrictive assistive device  5. Gait  x 100 feet with Stand-by Assistance using  hemiwalker or least restrictive assistive device. REVISE 2019   REVISED GOAL: gait x 75 feet w/ HW or least restrictive device and SBA = not met  6. Ascend/Descend 4 inch curb step with Contact Guard Assistance using  hemiwalker or least restrictive assistive device.  7. Stand for 3 minutes with Contact Guard Assistance using  hemiwalker or least restrictive assistive device, intermittently,  and perform an activity  8. Lower extremity exercise program x20 reps per handout, with assistance as needed and gym therex  NEW GOAL 2019 9.  Patient will propel w/c w/ RUE and RLE or w/ BLE 75 feet including two turns w/ SBA= not met                        PLAN:    Patient to be seen 5 x/week  to address the above listed problems via gait training, therapeutic activities, therapeutic exercises, wheelchair management/training  Plan of Care expires: 06/09/19  Plan of Care reviewed with: patient    Darlene GRANDE Theodore, PT  05/27/2019

## 2019-05-27 NOTE — PLAN OF CARE
Problem: Occupational Therapy Goal  Goal: Occupational Therapy Goal  Goals to be met by: 5/23/2019     Patient will increase functional independence with ADLs by performing:    UE Dressing with Minimal Assistance.  LE Dressing with Minimal Assistance.  Grooming while standing at sink with Stand-by Assistance.  Toileting from bedside commode with Minimal Assistance for hygiene and clothing management.   Bathing with Minimal Assistance.-MET 5/26/2019  Supine to sit with Stand-by Assistance /c HOB flat and no handrails.  Stand pivot transfers with Contact Guard Assistance.-MET. 5/26/2019  Toilet transfer to bedside commode with Contact Guard Assistance.-met 5/26/2019  Right Upper extremity exercise program 3 x 10 reps per handout, with independence.  Patient will complete a standing activity for 8 min with S in order to perform self care tasks.     Outcome: Ongoing (interventions implemented as appropriate)  I certify that I was present in the room directing the student in service delivery and guiding them using my skilled judgment. As the co-signing therapist I have reviewed the students documentation and am responsible for the treatment, assessment, and plan.     ADDISON Ayala

## 2019-05-27 NOTE — PT/OT/SLP PROGRESS
Occupational Therapy  Treatment    Siomara Flores   MRN: [id number]   Admitting Diagnosis: Closed displaced intertrochanteric fracture of left femur    OT Date of Treatment: 05/27/19       Billable Minutes:  Self Care/Home Management 32    General Precautions: Standard, fall  Orthopedic Precautions: LUE non weight bearing  Braces: UE brace, UE Sling(L UE)         Subjective:  Communicated with nurse prior to session.    Pain/Comfort  Pain Rating 1: 0/10  Pain Rating 2: 0/10    Objective:     Occupational Performance:    Bed Mobility:    · Pt seated in bedside chair at onset of therapy session.      Functional Mobility/Transfers:  · Patient completed Sit <> Stand Transfer with stand by assistance  with  no assistive device   · Patient completed Toilet Transfer Stand Pivot technique with contact guard assistance with  grab bars    Activities of Daily Living:  · Grooming: minimum assistance to put toothpaste on toothbrush. Pt performed grooming at sink in w/c.  · Upper Body Dressing: minimum assistance to do/[facility] gown and total A to [facility] swath.   · Lower Body Dressing: minimum assistance to thread feet into underwear. Pt doffed/donned underwear and donned scrub pants using w/c and GB.   · Toileting: contact guard assistance to descend onto toilet and to steady when standing to manage underwear over hips.     [facility] 6 Click:  [facility] Total Score: 18      Patient left up in chair with call button in reach    ASSESSMENT:  Siomara Flores is a 86 y.o. female with a medical diagnosis of Closed displaced intertrochanteric fracture of left femur. Pt. participated well during her ADL session this morning. Pt demonstrated physical deficits with balance, functional mobility, UB and LB strength, endurance level of functional indep with daily tasks and selfcare skills. Pt. will continue to benefit from continued OT to progress towards goals.    Rehab identified problem list/impairments: weakness, impaired  endurance, impaired self care skills, impaired functional mobilty, gait instability, impaired balance, decreased upper extremity function, decreased lower extremity function, pain, orthopedic precautions    Rehab potential is good    Activity tolerance: Good    Discharge recommendations: home with home health     Barriers to discharge: Decreased caregiver support     Equipment recommendations: walker, papi, commode, tub bench(Need to confirm with family (Patient reports she has equipment from her ))     GOALS:   Multidisciplinary Problems     Occupational Therapy Goals        Problem: Occupational Therapy Goal    Goal Priority Disciplines Outcome Interventions   Occupational Therapy Goal     OT, PT/OT Ongoing (interventions implemented as appropriate)    Description:  Goals to be met by: 5/23/2019     Patient will increase functional independence with ADLs by performing:    UE Dressing with Minimal Assistance.  LE Dressing with Minimal Assistance.  Grooming while standing at sink with Stand-by Assistance.  Toileting from bedside commode with Minimal Assistance for hygiene and clothing management.   Bathing with Minimal Assistance.-MET 5/26/2019  Supine to sit with Stand-by Assistance /c HOB flat and no handrails.  Stand pivot transfers with Contact Guard Assistance.-MET. 5/26/2019  Toilet transfer to bedside commode with Contact Guard Assistance.-met 5/26/2019  Right Upper extremity exercise program 3 x 10 reps per handout, with independence.  Patient will complete a standing activity for 8 min with S in order to perform self care tasks.                      Plan:  Patient to be seen 5 x/week to address the above listed problems via therapeutic exercises, therapeutic activities, self-care/home management  Plan of Care expires: 06/10/19  Plan of Care reviewed with: patient    I certify that I was present in the room directing the student in service delivery and guiding them using my skilled judgment. As the  co-signing therapist I have reviewed the students documentation and am responsible for the treatment, assessment, and plan.     ADDISON Ayala SOT  05/27/2019

## 2019-05-28 PROCEDURE — 63600175 PHARM REV CODE 636 W HCPCS: Performed by: HOSPITALIST

## 2019-05-28 PROCEDURE — 97110 THERAPEUTIC EXERCISES: CPT

## 2019-05-28 PROCEDURE — 25000003 PHARM REV CODE 250: Performed by: NURSE PRACTITIONER

## 2019-05-28 PROCEDURE — 97530 THERAPEUTIC ACTIVITIES: CPT

## 2019-05-28 PROCEDURE — 25000003 PHARM REV CODE 250: Performed by: HOSPITALIST

## 2019-05-28 PROCEDURE — 11000004 HC SNF PRIVATE

## 2019-05-28 PROCEDURE — 25000242 PHARM REV CODE 250 ALT 637 W/ HCPCS: Performed by: HOSPITALIST

## 2019-05-28 PROCEDURE — 97116 GAIT TRAINING THERAPY: CPT

## 2019-05-28 RX ADMIN — FERROUS SULFATE TAB EC 325 MG (65 MG FE EQUIVALENT) 325 MG: 325 (65 FE) TABLET DELAYED RESPONSE at 09:05

## 2019-05-28 RX ADMIN — SENNOSIDES AND DOCUSATE SODIUM 1 TABLET: 8.6; 5 TABLET ORAL at 09:05

## 2019-05-28 RX ADMIN — POLYETHYLENE GLYCOL 3350 17 G: 17 POWDER, FOR SOLUTION ORAL at 09:05

## 2019-05-28 RX ADMIN — TIOTROPIUM BROMIDE 18 MCG: 18 CAPSULE ORAL; RESPIRATORY (INHALATION) at 09:05

## 2019-05-28 RX ADMIN — FERROUS SULFATE TAB EC 325 MG (65 MG FE EQUIVALENT) 325 MG: 325 (65 FE) TABLET DELAYED RESPONSE at 02:05

## 2019-05-28 RX ADMIN — ENOXAPARIN SODIUM 30 MG: 100 INJECTION SUBCUTANEOUS at 05:05

## 2019-05-28 RX ADMIN — VERAPAMIL HYDROCHLORIDE 80 MG: 80 TABLET, FILM COATED ORAL at 09:05

## 2019-05-28 RX ADMIN — ACETAMINOPHEN 1000 MG: 325 TABLET ORAL at 02:05

## 2019-05-28 RX ADMIN — ACETAMINOPHEN 1000 MG: 325 TABLET ORAL at 09:05

## 2019-05-28 RX ADMIN — ATENOLOL 12.5 MG: 25 TABLET ORAL at 09:05

## 2019-05-28 RX ADMIN — VERAPAMIL HYDROCHLORIDE 80 MG: 80 TABLET, FILM COATED ORAL at 02:05

## 2019-05-28 RX ADMIN — ACETAMINOPHEN 1000 MG: 325 TABLET ORAL at 06:05

## 2019-05-28 NOTE — PROGRESS NOTES
Ms. Flores was seen at [facility] where she is undergoing rehab following her left femur fracture.  She underwent an IM nailing by Dr. Bruce on 5/3/19.    Interval History:  She reports that she is doing well.  Pain is controlled with prescribed medication.  She is taking pain medication.  She is participating in her therapy sessions, per their note walking up to 62 feet with a papi walker.  She is progressing towards her stated goals per therapy note.    She denies fever, chills, and sweats since the time of the surgery.     Physical exam:  She is in a sling and swath to her left arm.  Radial pulse is 2+.  There is no edema about her hand.  Incision is now open to air and appears to be healing.  There is no redness or drainage present.  She has tactile stimulation to her left lower leg and palp pedal pulse x 2 to left foot.          RADS: none done today    Assessment:  Post-op visit (26 days)    Plan:  Current care, treatment plan, precautions, activity level/ modifications, limitations, rehabilitation exercises and proposed future treatment were discussed with the patient. We discussed the need to monitor for changes in symptoms and condition and report them to the physician.  Discussed importance of compliance with all appointments and follow up examinations.     - Pain medication: refill was not needed,   - Pain medication refill policy provided to patient for review, no  - Patient is to return to clinic as scheduled on 5/31/19, appointment with SAGE Rodarte  - DVT ppx Lovenox 30 mg daily.    Future Appointments   Date Time Provider Department Center   5/31/2019 11:00 AM Mirna Crews PA-C [facility]        If there are any questions prior to scheduled follow up, the patient was instructed to contact the office

## 2019-05-28 NOTE — PT/OT/SLP PROGRESS
Occupational Therapy  Treatment    Siomara Flores   MRN: [id number]   Admitting Diagnosis: Closed displaced intertrochanteric fracture of left femur    OT Date of Treatment: 05/16/19       Billable Minutes: 30  Therapeutic Activity 15 and Therapeutic Exercise 15    General Precautions: Standard, fall  Orthopedic Precautions: LUE non weight bearing  Braces: UE brace, UE Sling(L UE)         Subjective:  Communicated with patient prior to session.    Pain/Comfort  Pain Rating 1: 0/10    Objective:   Patient found up in bedside chair.    Occupational Performance:    Functional Mobility/Transfers:  · Patient completed Sit <> Stand Transfer with contact guard assistance  with  no assistive device   · Patient completed bedside chair <> wheelchair Stand Pivot technique with contact guard assistance with no assistive device    [facility] 6 Click:  [facility] Total Score: 18    OT Exercises: RUE ROM exercises using 2 lb dumbell x 3 sets 10 reps in all available pain free ranges to increase strength and endurance needed for self care and t/fs.     Additional Treatment:  Patient stood at counter level with CGA and no AD ~3 minutes and ~2 minutes with a seated rest break between each stand while retrieving items from overhead cabinet and placing them on counter in preparation of home management.     Patient left up in chair with call button in reach    ASSESSMENT:  Siomara Flores is a 86 y.o. female with a medical diagnosis of Closed displaced intertrochanteric fracture of left femur and presents with the deficits listed below. Patient presented with sling and swathe on incorrectly. Reapplied sling and swathe correctly. Patient's endurance and t/fs continue to improve. Will continue to benefit from OT.    Rehab identified problem list/impairments: weakness, impaired endurance, impaired self care skills, impaired functional mobilty, gait instability, impaired balance, decreased upper extremity function, decreased lower extremity function,  pain, orthopedic precautions    Rehab potential is good    Activity tolerance: Good    Discharge recommendations: home with home health     Barriers to discharge: Decreased caregiver support     Equipment recommendations: walker, papi, commode, tub bench(Need to confirm with family (Patient reports she has equipment from her ))     GOALS:   Multidisciplinary Problems     Occupational Therapy Goals        Problem: Occupational Therapy Goal    Goal Priority Disciplines Outcome Interventions   Occupational Therapy Goal     OT, PT/OT Ongoing (interventions implemented as appropriate)    Description:  Goals to be met by: 5/23/2019     Patient will increase functional independence with ADLs by performing:    UE Dressing with Minimal Assistance.  LE Dressing with Minimal Assistance.  Grooming while standing at sink with Stand-by Assistance.  Toileting from bedside commode with Minimal Assistance for hygiene and clothing management.   Bathing with Minimal Assistance.-MET 5/26/2019  Supine to sit with Stand-by Assistance /c HOB flat and no handrails.  Stand pivot transfers with Contact Guard Assistance.-MET. 5/26/2019  Toilet transfer to bedside commode with Contact Guard Assistance.-met 5/26/2019  Right Upper extremity exercise program 3 x 10 reps per handout, with independence.  Patient will complete a standing activity for 8 min with S in order to perform self care tasks.                      Plan:  Patient to be seen 5 x/week to address the above listed problems via therapeutic exercises, therapeutic activities, self-care/home management  Plan of Care expires: 06/10/19  Plan of Care reviewed with: patient    Marisa ASIM Keita  05/28/2019

## 2019-05-28 NOTE — PLAN OF CARE
Problem: Occupational Therapy Goal  Goal: Occupational Therapy Goal  Goals to be met by: 5/23/2019     Patient will increase functional independence with ADLs by performing:    UE Dressing with Minimal Assistance.  LE Dressing with Minimal Assistance.  Grooming while standing at sink with Stand-by Assistance.  Toileting from bedside commode with Minimal Assistance for hygiene and clothing management.   Bathing with Minimal Assistance.-MET 5/26/2019  Supine to sit with Stand-by Assistance /c HOB flat and no handrails.  Stand pivot transfers with Contact Guard Assistance.-MET. 5/26/2019  Toilet transfer to bedside commode with Contact Guard Assistance.-met 5/26/2019  Right Upper extremity exercise program 3 x 10 reps per handout, with independence.  Patient will complete a standing activity for 8 min with S in order to perform self care tasks.     Outcome: Ongoing (interventions implemented as appropriate)  Patient goals are appropriate.

## 2019-05-28 NOTE — PT/OT/SLP PROGRESS
Physical Therapy  Treatment    Siomara Flores   MRN: [id number]   Admitting Diagnosis: Closed displaced intertrochanteric fracture of left femur    PT Received On: 05/28/19          Billable Minutes:  Gait Training 15 and Therapeutic Exercise 10    Treatment Type: Treatment  PT/PTA: PT     PTA Visit Number: 0       General Precautions: Standard, fall  Orthopedic Precautions: LUE non weight bearing, LLE weight bearing as tolerated   Braces: Sling and swathe         Subjective:  Communicated with patient prior to session.  Agreeable to session. Friend Yvonne chairez and reports she will assist patient some at d/c.    Pain/Comfort  Pain Rating 1: 2/10  Location - Side 1: Left  Location - Orientation 1: generalized  Location 1: shoulder(and hip)  Pain Addressed 1: Pre-medicate for activity, Distraction  Pain Rating Post-Intervention 1: 2/10    Objective:  Patient found sitting in bedside chair with       AM-PAC 6 CLICK MOBILITY  Total Score:16    Bed Mobility:  Sit>Supine:not performed  Supine>Sit: not performed    Transfers:  Sit<>Stand: from bedside chair w/ HW and SBA; to/from w/c w/ HW and close SBA  Stand Pivot Transfer: w/c>nustep w/o AD w/ CGA  NWB LUE and wBAT LLE 20 feet. Slow pace. Distance limited by fatigue. Close SBA w/ w/c in tow    Gait:  Amb w/ HW and close SBA w/ w/c follow 10 feet, slow pace.       Advanced Gait:  Stairs: not performed  Curb Step: not performed    Wheelchair Mobility:  Patient pushed in w/c on unit     Therex:  Patient performed 10 minutes on recumbent stepper with bilateral arms and legs on workload 2      Balance:  Stands w/ HW and SBA    Additional Treatment:  Educate in gait and trf tech; NWB LUE; PT POC;    Patient left up in chair with call button in reach.    Assessment:  Siomara Flores is a 86 y.o. female with a medical diagnosis of Closed displaced intertrochanteric fracture of left femur.  Patient is NWB LUE and wearing sling and swath. WBAT LLE. Distance limited by  fatigue and pain today. Patient will benefit from continued physical therapy to address deficits and improve safety and functional mobility. Continue with physical therapy plan of care. .    Rehab identified problem list/impairments: weakness, impaired endurance, impaired functional mobilty, impaired self care skills, gait instability, impaired balance, decreased upper extremity function, decreased lower extremity function, pain, orthopedic precautions    Rehab potential is good.    Activity tolerance: Good    Discharge recommendations: home with home health(with A/S for safety, pt repts sister looking into NH placeme)     Barriers to discharge: Inaccessible home environment, Decreased caregiver support    Equipment recommendations: walker, papi, commode, tub bench(HW or QC pending progress)     GOALS:   Multidisciplinary Problems     Physical Therapy Goals        Problem: Physical Therapy Goal    Goal Priority Disciplines Outcome Goal Variances Interventions   Physical Therapy Goal     PT, PT/OT Ongoing (interventions implemented as appropriate)     Description:  Goals to be met by: 14 days 2019     Patient will increase functional independence with mobility by performin. Supine to sit with supervision  2. Sit to supine with supervision  3. Sit to stand transfer with Stand-by Assistance  4. Bed to chair transfer with Stand-by Assistance using hemiwalker or least restrictive assistive device  5. Gait  x 100 feet with Stand-by Assistance using  hemiwalker or least restrictive assistive device. REVISE 2019   REVISED GOAL: gait x 75 feet w/ HW or least restrictive device and SBA = not met  6. Ascend/Descend 4 inch curb step with Contact Guard Assistance using  hemiwalker or least restrictive assistive device.  7. Stand for 3 minutes with Contact Guard Assistance using  hemiwalker or least restrictive assistive device, intermittently,  and perform an activity  8. Lower extremity exercise program x20  reps per handout, with assistance as needed and gym therex  NEW GOAL 5/17/2019 9. Patient will propel w/c w/ RUE and RLE or w/ BLE 75 feet including two turns w/ SBA= not met                        PLAN:    Patient to be seen 5 x/week  to address the above listed problems via gait training, therapeutic activities, therapeutic exercises, wheelchair management/training  Plan of Care expires: 06/09/19  Plan of Care reviewed with: patient    Darlene Jaimes, PT  05/28/2019

## 2019-05-29 PROCEDURE — 25000003 PHARM REV CODE 250: Performed by: HOSPITALIST

## 2019-05-29 PROCEDURE — 25000242 PHARM REV CODE 250 ALT 637 W/ HCPCS: Performed by: HOSPITALIST

## 2019-05-29 PROCEDURE — 97530 THERAPEUTIC ACTIVITIES: CPT

## 2019-05-29 PROCEDURE — 25000003 PHARM REV CODE 250: Performed by: NURSE PRACTITIONER

## 2019-05-29 PROCEDURE — 97110 THERAPEUTIC EXERCISES: CPT

## 2019-05-29 PROCEDURE — 97116 GAIT TRAINING THERAPY: CPT

## 2019-05-29 PROCEDURE — 97803 MED NUTRITION INDIV SUBSEQ: CPT

## 2019-05-29 PROCEDURE — 63600175 PHARM REV CODE 636 W HCPCS: Performed by: HOSPITALIST

## 2019-05-29 PROCEDURE — 11000004 HC SNF PRIVATE

## 2019-05-29 RX ADMIN — FERROUS SULFATE TAB EC 325 MG (65 MG FE EQUIVALENT) 325 MG: 325 (65 FE) TABLET DELAYED RESPONSE at 08:05

## 2019-05-29 RX ADMIN — POLYETHYLENE GLYCOL 3350 17 G: 17 POWDER, FOR SOLUTION ORAL at 08:05

## 2019-05-29 RX ADMIN — SENNOSIDES AND DOCUSATE SODIUM 1 TABLET: 8.6; 5 TABLET ORAL at 08:05

## 2019-05-29 RX ADMIN — TIOTROPIUM BROMIDE 18 MCG: 18 CAPSULE ORAL; RESPIRATORY (INHALATION) at 08:05

## 2019-05-29 RX ADMIN — ATENOLOL 12.5 MG: 25 TABLET ORAL at 08:05

## 2019-05-29 RX ADMIN — ACETAMINOPHEN 1000 MG: 325 TABLET ORAL at 05:05

## 2019-05-29 RX ADMIN — VERAPAMIL HYDROCHLORIDE 80 MG: 80 TABLET, FILM COATED ORAL at 08:05

## 2019-05-29 RX ADMIN — ACETAMINOPHEN 1000 MG: 325 TABLET ORAL at 02:05

## 2019-05-29 RX ADMIN — FERROUS SULFATE TAB EC 325 MG (65 MG FE EQUIVALENT) 325 MG: 325 (65 FE) TABLET DELAYED RESPONSE at 02:05

## 2019-05-29 RX ADMIN — ENOXAPARIN SODIUM 30 MG: 100 INJECTION SUBCUTANEOUS at 04:05

## 2019-05-29 RX ADMIN — ACETAMINOPHEN 1000 MG: 325 TABLET ORAL at 10:05

## 2019-05-29 RX ADMIN — VERAPAMIL HYDROCHLORIDE 80 MG: 80 TABLET, FILM COATED ORAL at 02:05

## 2019-05-29 NOTE — PROGRESS NOTES
[facility]  Adult Nutrition  Progress Note    SUMMARY   Rcommendations  Recommendation/Intervention: Continue regular diet, boost plus strawberry TID, RD following  Goals: PO to meet 85% of needs with ONS by next RD visit  Nutrition Goal Status: progressing towards goal    Reason for Assessmente    Reason For Assessment: RD follow-up  Diagnosis: (debilty sp fall , sp rodding L femur)  Relevant Medical History: HTN, anemia,   Interdisciplinary Rounds: attended  General Information Comments: PO 50-75%, meds with applesauce  Nutrition Discharge Planning: Dc on regular diet with ONS of choice for weight gain and wound healing    Nutrition Risk Screen    Nutrition Risk Screen: no indicators present    Nutrition/Diet History    Patient Reported Diet/Restrictions/Preferences: general  Typical Food/Fluid Intake: lactose free milk, is willing to try boost plus rather than boost breeze  Food Preferences: sensitive stomach, will not eat beans, or cruciferous vegetable, corse foods,   Spiritual, Cultural Beliefs, Adventist Practices, Values that Affect Care: yes  Food Allergies: NKFA  Factors Affecting Nutritional Intake: altered gastrointestinal function, cultural/Orthodox beliefs, decreased appetite(may have been too distracted or too exhausted to eat and take care of herself)    Anthropometrics    Temp: 97.9 °F (36.6 °C)  Height Method: Stated  Height: 5' (152.4 cm)  Height (inches): 60 in  Weight Method: Standard Scale  Weight: 32.5 kg (71 lb 10.4 oz)  Weight (lb): 71.65 lb  Ideal Body Weight (IBW), Female: 100 lb  % Ideal Body Weight, Female (lb): 80.03 lb  BMI (Calculated): 15.7  BMI Grade: less than 16 protein-energy malnutrition grade III  Weight Loss: unintentional  Usual Body Weight (UBW), k.8 kg  Weight Change Amount: (wt loss since admit, small appetite, oral supplement TID)  % Usual Body Weight: 87.02  % Weight Change From Usual Weight: -13.16 %       Lab/Procedures/Meds    Pertinent Labs  Reviewed: reviewed  Pertinent Labs Comments: Na 135  Pertinent Medications Reviewed: reviewed  Pertinent Medications Comments: Epo         Estimated/Assessed Needs    Weight Used For Calorie Calculations: 42 kg (92 lb 9.5 oz)(UBW in elderly )  Energy Calorie Requirements (kcal): 5513-6124  Energy Need Method: Kcal/kg(30-35 kcal/kg)  Protein Requirements: 55-63gm  Weight Used For Protein Calculations: 42 kg (92 lb 9.5 oz)(x 1.3-1.5 2/2 malnutrition)  Fluid Requirements (mL): per MD  Estimated Fluid Requirement Method: RDA Method  RDA Method (mL): 1260  CHO Requirement: -  Some weight loss due to edema, now resolved  Nutrition Prescription Ordered    Current Diet Order: Regular, lactose free  Nutrition Order Comments: PO %  Oral Nutrition Supplement: boost plus strawbery TID    Evaluation of Received Nutrient/Fluid Intake    Energy Calories Required: not meeting needs  Protein Required: meeting needs, not meeting needs  Fluid Required: meeting needs  Comments: encouraged PO intake, generally small volume of food  Tolerance: tolerating  % Intake of Estimated Energy Needs: 50 - 75 %  % Meal Intake: 50 - 75 %    Nutrition Risk    Level of Risk/Frequency of Follow-up: low     Assessment and Plan   Severe malnutrition  Malnutrition in the context of Social/Environmental Circumstances     Related to (etiology):  Starvation and lack of self care while caregiver, grief response, reduced appetite and po intake     Signs and Symptoms (as evidenced by):  Energy Intake: <75% of estimated energy requirement for > 3 months  Body Fat Depletion: moderate depletion of orbitals and triceps and buccal area  Muscle Mass Depletion: severe depletion of clavicle region and scapular region moderate at temples, hands and calf  Weight Loss: > 7.5% % x < 3 months  Fluid Accumulation: mild, no longer see edema 5/24/19    Monitor and Evaluation    Food and Nutrient Intake: food and beverage intake  Physical Activity and Function:  nutrition-related ADLs and IADLs  Anthropometric Measurements: weight change  Biochemical Data, Medical Tests and Procedures: electrolyte and renal panel  Nutrition-Focused Physical Findings: overall appearance     Malnutrition Assessment  Malnutrition Type: social/environmental circumstances  Energy Intake: severe energy intake  Skin (Micronutrient): dry  Hair/Scalp (Micronutrient): dull, dry  Eyes (Micronutrient): conjunctiva dull  Teeth (Micronutrient): (plates)  Neck/Chest (Micronutrient): bony prominence, muscle wasting, subcutaneous fat loss  Musculoskeletal/Lower Extremities: edema, joints painful, muscle wasting, subcutaneous fat loss       Weight Loss (Malnutrition): greater than 7.5% in 3 months  Subcutaneous Fat (Malnutrition): severe depletion  Muscle Mass (Malnutrition): severe depletion  Fluid Accumulation (Malnutrition): mild   Orbital Region (Subcutaneous Fat Loss): moderate depletion  Upper Arm Region (Subcutaneous Fat Loss): moderate depletion  Thoracic and Lumbar Region: moderate depletion   [place] Region (Muscle Loss): moderate depletion  Clavicle Bone Region (Muscle Loss): severe depletion  Clavicle and Acromion Bone Region (Muscle Loss): severe depletion  Scapular Bone Region (Muscle Loss): severe depletion  Dorsal Hand (Muscle Loss): moderate depletion  Posterior Calf Region (Muscle Loss): mild depletion            Severe Weight Loss (Malnutrition): greater than 7.5% in 3 months    Nutrition Follow-Up    RD Follow-up?: Yes

## 2019-05-29 NOTE — PLAN OF CARE
Problem: Adult Inpatient Plan of Care  Goal: Plan of Care Review  Outcome: Ongoing (interventions implemented as appropriate)  Recommendation/Intervention: Continue regular diet, boost plus strawberry TID, RD following  Goals: PO to meet 85% of needs with ONS by next RD visit  Nutrition Goal Status: progressing towards goal

## 2019-05-29 NOTE — PT/OT/SLP PROGRESS
"Physical Therapy  Treatment    Siomara Flores   MRN: [id number]   Admitting Diagnosis: Closed displaced intertrochanteric fracture of left femur    PT Received On: 05/29/19          Billable Minutes:  Gait Training 15, Therapeutic Activity 23 and Therapeutic Exercise 15=53 mins    Treatment Type: Treatment  PT/PTA: PT     PTA Visit Number: 0       General Precautions: Standard, fall  Orthopedic Precautions: LUE non weight bearing, LLE weight bearing as tolerated   Braces: Sling and swathe         Subjective:  Communicated with nurse prior to session.  Agreeable to PT services. "They're trying to get someone to come help me at home. I didn't have any children. I have nieces and nephews. My  passed away recently."    Pain/Comfort  Pain Rating 1: 2/10  Location - Side 1: Left  Location - Orientation 1: generalized  Location 1: shoulder  Pain Addressed 1: Reposition  Pain Rating Post-Intervention 1: 2/10    Objective:  Patient found seated in WC.       AM-PAC 6 CLICK MOBILITY  Total Score:16    Bed Mobility:  Sit>Supine: Min A on mat  Supine>Sit:  Min A on mat    Transfers:  Sit<>Stand: SBA  Stand Pivot Transfer: SBA, HW (WC<>mat)    Gait:  Amb 27, 24 feet with seated rest break in between trials with use of HW, SBA with swing-to gait pattern.     Advanced Gait:  Curb Step: 4 inch curb step with use of HW, CGA.      Therex (x20 reps):  Hip abduction  Heel slides  SLR      Balance:  Needs HW for static and dynamic balance-- Sup-SBA.    Additional Treatment:  Completed 10 minutes on recumbent stepper at level 3 (progression from L2) to improve U/LE strength and endurance.       Patient left up in chair with call button in reach.    Assessment:  Siomara Flores is a 86 y.o. female with a medical diagnosis of Closed displaced intertrochanteric fracture of left femur.  Ms. Flores met three goals today, showing progress, but she will require assistance upon return home, as she can only utilize one UE, using her other " free UE for balance with use of a hemiwalker.     Rehab identified problem list/impairments: weakness, impaired endurance, impaired functional mobilty, impaired self care skills, gait instability, impaired balance, decreased upper extremity function, decreased lower extremity function, pain, orthopedic precautions    Rehab potential is good.    Activity tolerance: Good    Discharge recommendations: home with home health(with A/S for safety, pt repts sister looking into NH placeme)     Barriers to discharge: Inaccessible home environment, Decreased caregiver support    Equipment recommendations: walker, papi, commode, tub bench(HW or QC pending progress)     GOALS:   Multidisciplinary Problems     Physical Therapy Goals        Problem: Physical Therapy Goal    Goal Priority Disciplines Outcome Goal Variances Interventions   Physical Therapy Goal     PT, PT/OT Ongoing (interventions implemented as appropriate)     Description:  Goals to be met by:6/10/19    Patient will increase functional independence with mobility by performin. Supine to sit with supervision  2. Sit to supine with supervision  3. Sit to stand transfer with Stand-by Assistance. Met (2019)  4. Bed to chair transfer with Stand-by Assistance using hemiwalker or least restrictive assistive device. Met (2019)  5. Gait  x 100 feet with Stand-by Assistance using  hemiwalker or least restrictive assistive device. REVISE 2019   REVISED GOAL: gait x 75 feet w/ HW or least restrictive device and SBA = not met  6. Ascend/Descend 4 inch curb step with Contact Guard Assistance using  hemiwalker or least restrictive assistive device. Met (2019)  7. Stand for 3 minutes with Contact Guard Assistance using  hemiwalker or least restrictive assistive device, intermittently,  and perform an activity  8. Lower extremity exercise program x20 reps per handout, with assistance as needed and gym therex  NEW GOAL 2019 9. Patient will propel w/c  w/ RUE and RLE or w/ BLE 75 feet including two turns w/ SBA= not met                         PLAN:    Patient to be seen 5 x/week  to address the above listed problems via gait training, therapeutic activities, therapeutic exercises, wheelchair management/training  Plan of Care expires: 06/09/19  Plan of Care reviewed with: patient    Penelope ARCOS Bao, PT  05/29/2019

## 2019-05-29 NOTE — PLAN OF CARE
Problem: Physical Therapy Goal  Goal: Physical Therapy Goal  Goals to be met by:6/10/19    Patient will increase functional independence with mobility by performin. Supine to sit with supervision  2. Sit to supine with supervision  3. Sit to stand transfer with Stand-by Assistance. Met (2019)  4. Bed to chair transfer with Stand-by Assistance using hemiwalker or least restrictive assistive device. Met (2019)  5. Gait  x 100 feet with Stand-by Assistance using  hemiwalker or least restrictive assistive device. REVISE 2019   REVISED GOAL: gait x 75 feet w/ HW or least restrictive device and SBA = not met  6. Ascend/Descend 4 inch curb step with Contact Guard Assistance using  hemiwalker or least restrictive assistive device. Met (2019)  7. Stand for 3 minutes with Contact Guard Assistance using  hemiwalker or least restrictive assistive device, intermittently,  and perform an activity  8. Lower extremity exercise program x20 reps per handout, with assistance as needed and gym therex  NEW GOAL 2019 9. Patient will propel w/c w/ RUE and RLE or w/ BLE 75 feet including two turns w/ SBA= not met       Outcome: Ongoing (interventions implemented as appropriate)  Met three goals today.

## 2019-05-29 NOTE — PT/OT/SLP PROGRESS
Occupational Therapy  Treatment    Siomara Flores   MRN: [id number]   Admitting Diagnosis: Closed displaced intertrochanteric fracture of left femur    OT Date of Treatment: 05/29/19       Billable Minutes:  Therapeutic Activity 40    General Precautions: Standard, fall  Orthopedic Precautions: LUE non weight bearing  Braces: UE brace, UE Sling(L UE)         Subjective:  Communicated with patient prior to session.    Pain/Comfort  Pain Rating 1: 6/10  Location - Side 1: Left  Location - Orientation 1: generalized  Location 1: knee  Pain Addressed 1: Reposition, Distraction  Pain Rating Post-Intervention 1: 6/10    Objective:   Patient found up in chair.    Occupational Performance:    Functional Mobility/Transfers:  · Patient completed Sit <> Stand Transfer with stand by assistance  with  no assistive device   · Patient completed chair <> w/c stand pivot transfer technique with CGA and no aD    AMPA 6 Click:  [facility] Total Score: 18      Additional Treatment:  Patient stood at table top level with SBA and no AD ~ 15 minutes and ~8 minutes with a  Seated rest break between each stand while matching cards requiring crossing midline, fine motor skills, and visual perceptual skills in preparation of self care. Patient participated in balloon toss x 25 reps using RUE with 1 lb weight on R wrist seated in w/c to increase strength and endurance needed for adls.    Patient left up in chair with call button in reach    ASSESSMENT:  Siomara Flores is a 86 y.o. female with a medical diagnosis of Closed displaced intertrochanteric fracture of left femur and presents with the following deficits. Patient tolerated session well and will continue to benefit from OT.    Rehab identified problem list/impairments: weakness, impaired endurance, impaired self care skills, impaired functional mobilty, gait instability, impaired balance, decreased upper extremity function, decreased lower extremity function, pain, orthopedic  precautions    Rehab potential is good    Activity tolerance: Good    Discharge recommendations: home with home health     Barriers to discharge: Decreased caregiver support     Equipment recommendations: walker, papi, commode, tub bench(Need to confirm with family (Patient reports she has equipment from her ))     GOALS:   Multidisciplinary Problems     Occupational Therapy Goals        Problem: Occupational Therapy Goal    Goal Priority Disciplines Outcome Interventions   Occupational Therapy Goal     OT, PT/OT Ongoing (interventions implemented as appropriate)    Description:  Goals to be met by: 5/23/2019     Patient will increase functional independence with ADLs by performing:    UE Dressing with Minimal Assistance.  LE Dressing with Minimal Assistance.  Grooming while standing at sink with Stand-by Assistance.  Toileting from bedside commode with Minimal Assistance for hygiene and clothing management.   Bathing with Minimal Assistance.-MET 5/26/2019  Supine to sit with Stand-by Assistance /c HOB flat and no handrails.  Stand pivot transfers with Contact Guard Assistance.-MET. 5/26/2019  Toilet transfer to bedside commode with Contact Guard Assistance.-met 5/26/2019  Right Upper extremity exercise program 3 x 10 reps per handout, with independence.  Patient will complete a standing activity for 8 min with S in order to perform self care tasks.                      Plan:  Patient to be seen 5 x/week to address the above listed problems via therapeutic exercises, therapeutic activities, self-care/home management  Plan of Care expires: 06/10/19  Plan of Care reviewed with: patient    ASIM Lobo  05/29/2019

## 2019-05-29 NOTE — PLAN OF CARE
Problem: Adult Inpatient Plan of Care  Goal: Plan of Care Review  Outcome: Ongoing (interventions implemented as appropriate)  Free of falls throughout shift. Call light in reach and bed in lowest. Some complaints of pain and relieved with scheduled Acetaminophen 1,000 mg PO. Left arm remains in sling. Afebrile. Will continue to monitor.

## 2019-05-30 LAB
ANION GAP SERPL CALC-SCNC: 6 MMOL/L (ref 8–16)
BASOPHILS # BLD AUTO: 0.06 K/UL (ref 0–0.2)
BASOPHILS NFR BLD: 1.3 % (ref 0–1.9)
BUN SERPL-MCNC: 14 MG/DL (ref 8–23)
CALCIUM SERPL-MCNC: 9.7 MG/DL (ref 8.7–10.5)
CHLORIDE SERPL-SCNC: 99 MMOL/L (ref 95–110)
CO2 SERPL-SCNC: 32 MMOL/L (ref 23–29)
CREAT SERPL-MCNC: 0.6 MG/DL (ref 0.5–1.4)
DIFFERENTIAL METHOD: ABNORMAL
EOSINOPHIL # BLD AUTO: 0.2 K/UL (ref 0–0.5)
EOSINOPHIL NFR BLD: 3.8 % (ref 0–8)
ERYTHROCYTE [DISTWIDTH] IN BLOOD BY AUTOMATED COUNT: 18.1 % (ref 11.5–14.5)
EST. GFR  (AFRICAN AMERICAN): >60 ML/MIN/1.73 M^2
EST. GFR  (NON AFRICAN AMERICAN): >60 ML/MIN/1.73 M^2
GLUCOSE SERPL-MCNC: 89 MG/DL (ref 70–110)
HCT VFR BLD AUTO: 31.1 % (ref 37–48.5)
HGB BLD-MCNC: 9.2 G/DL (ref 12–16)
IMM GRANULOCYTES # BLD AUTO: 0.01 K/UL (ref 0–0.04)
IMM GRANULOCYTES NFR BLD AUTO: 0.2 % (ref 0–0.5)
LYMPHOCYTES # BLD AUTO: 1.2 K/UL (ref 1–4.8)
LYMPHOCYTES NFR BLD: 26.6 % (ref 18–48)
MAGNESIUM SERPL-MCNC: 2.1 MG/DL (ref 1.6–2.6)
MCH RBC QN AUTO: 26.4 PG (ref 27–31)
MCHC RBC AUTO-ENTMCNC: 29.6 G/DL (ref 32–36)
MCV RBC AUTO: 89 FL (ref 82–98)
MONOCYTES # BLD AUTO: 0.5 K/UL (ref 0.3–1)
MONOCYTES NFR BLD: 12.1 % (ref 4–15)
NEUTROPHILS # BLD AUTO: 2.5 K/UL (ref 1.8–7.7)
NEUTROPHILS NFR BLD: 56 % (ref 38–73)
NRBC BLD-RTO: 0 /100 WBC
PHOSPHATE SERPL-MCNC: 3.3 MG/DL (ref 2.7–4.5)
PLATELET # BLD AUTO: 274 K/UL (ref 150–350)
PMV BLD AUTO: 10.3 FL (ref 9.2–12.9)
POTASSIUM SERPL-SCNC: 4.2 MMOL/L (ref 3.5–5.1)
RBC # BLD AUTO: 3.48 M/UL (ref 4–5.4)
SODIUM SERPL-SCNC: 137 MMOL/L (ref 136–145)
WBC # BLD AUTO: 4.47 K/UL (ref 3.9–12.7)

## 2019-05-30 PROCEDURE — 84100 ASSAY OF PHOSPHORUS: CPT

## 2019-05-30 PROCEDURE — 63600175 PHARM REV CODE 636 W HCPCS: Performed by: HOSPITALIST

## 2019-05-30 PROCEDURE — 25000003 PHARM REV CODE 250: Performed by: NURSE PRACTITIONER

## 2019-05-30 PROCEDURE — 36415 COLL VENOUS BLD VENIPUNCTURE: CPT

## 2019-05-30 PROCEDURE — 25000242 PHARM REV CODE 250 ALT 637 W/ HCPCS: Performed by: HOSPITALIST

## 2019-05-30 PROCEDURE — 97530 THERAPEUTIC ACTIVITIES: CPT

## 2019-05-30 PROCEDURE — 97535 SELF CARE MNGMENT TRAINING: CPT

## 2019-05-30 PROCEDURE — 97110 THERAPEUTIC EXERCISES: CPT

## 2019-05-30 PROCEDURE — 11000004 HC SNF PRIVATE

## 2019-05-30 PROCEDURE — 83735 ASSAY OF MAGNESIUM: CPT

## 2019-05-30 PROCEDURE — 80048 BASIC METABOLIC PNL TOTAL CA: CPT

## 2019-05-30 PROCEDURE — 85025 COMPLETE CBC W/AUTO DIFF WBC: CPT

## 2019-05-30 PROCEDURE — 25000003 PHARM REV CODE 250: Performed by: HOSPITALIST

## 2019-05-30 RX ADMIN — FERROUS SULFATE TAB EC 325 MG (65 MG FE EQUIVALENT) 325 MG: 325 (65 FE) TABLET DELAYED RESPONSE at 08:05

## 2019-05-30 RX ADMIN — SENNOSIDES AND DOCUSATE SODIUM 1 TABLET: 8.6; 5 TABLET ORAL at 08:05

## 2019-05-30 RX ADMIN — VERAPAMIL HYDROCHLORIDE 80 MG: 80 TABLET, FILM COATED ORAL at 09:05

## 2019-05-30 RX ADMIN — ACETAMINOPHEN 1000 MG: 325 TABLET ORAL at 06:05

## 2019-05-30 RX ADMIN — FERROUS SULFATE TAB EC 325 MG (65 MG FE EQUIVALENT) 325 MG: 325 (65 FE) TABLET DELAYED RESPONSE at 09:05

## 2019-05-30 RX ADMIN — ENOXAPARIN SODIUM 30 MG: 100 INJECTION SUBCUTANEOUS at 04:05

## 2019-05-30 RX ADMIN — ATENOLOL 12.5 MG: 25 TABLET ORAL at 08:05

## 2019-05-30 RX ADMIN — ACETAMINOPHEN 1000 MG: 325 TABLET ORAL at 02:05

## 2019-05-30 RX ADMIN — VERAPAMIL HYDROCHLORIDE 80 MG: 80 TABLET, FILM COATED ORAL at 08:05

## 2019-05-30 RX ADMIN — FERROUS SULFATE TAB EC 325 MG (65 MG FE EQUIVALENT) 325 MG: 325 (65 FE) TABLET DELAYED RESPONSE at 02:05

## 2019-05-30 RX ADMIN — ACETAMINOPHEN 1000 MG: 325 TABLET ORAL at 09:05

## 2019-05-30 RX ADMIN — TIOTROPIUM BROMIDE 18 MCG: 18 CAPSULE ORAL; RESPIRATORY (INHALATION) at 08:05

## 2019-05-30 RX ADMIN — SENNOSIDES AND DOCUSATE SODIUM 1 TABLET: 8.6; 5 TABLET ORAL at 09:05

## 2019-05-30 NOTE — PT/OT/SLP PROGRESS
Occupational Therapy  Treatment    Siomara Flores   MRN: [id number]   Admitting Diagnosis: Closed displaced intertrochanteric fracture of left femur    OT Date of Treatment: 05/30/19       Billable Minutes:  Self Care/Home Management 15, Therapeutic Activity 10 and Therapeutic Exercise 20    General Precautions: Standard, fall  Orthopedic Precautions: LUE non weight bearing  Braces: UE brace, UE Sling(L UE)         Subjective:  Communicated with patient prior to session.      Pain/Comfort  Pain Rating 1: 5/10  Location - Side 1: Left  Location - Orientation 1: generalized  Location 1: (L leg and arm)  Pain Addressed 1: Pre-medicate for activity, Distraction    Objective:   Patient found up in chair.     Occupational Performance:    Functional Mobility/Transfers:  · Patient completed Sit <> Stand Transfer with contact guard assistance  with  no assistive device   · Patient completed bedside chair <> wheelchair Stand Pivot technique with contact guard assistance with no assistive device    Activities of Daily Living:  · Grooming: set up assistance performing oral hygiene seated in w/c at sink level    [facility] 6 Click:  [facility] Total Score: 18    OT Exercises: UE Ergometer 15 minutes with minimum resistance using RUE to increase endurance needed for adls.     Additional Treatment:  Pt. stood at counter level with CGA and no AD ~8 minutes while copying patterns using small pegs requiring crossing midline, fine motor skills, and visual motor skills in preparation of self care.    Patient left up in chair with call button in reach    ASSESSMENT:  Siomara lFores is a 86 y.o. female with a medical diagnosis of Closed displaced intertrochanteric fracture of left femur and presents with the deficits listed below. Patient's tolerance continues to increase. Will benefit from continues OT services.     Rehab identified problem list/impairments: weakness, impaired endurance, impaired self care skills, impaired functional mobilty,  gait instability, impaired balance, decreased upper extremity function, decreased lower extremity function, pain, orthopedic precautions    Rehab potential is good    Activity tolerance: Good    Discharge recommendations: home with home health     Barriers to discharge: Decreased caregiver support     Equipment recommendations: walker, papi, commode, tub bench(Need to confirm with family (Patient reports she has equipment from her ))     GOALS:   Multidisciplinary Problems     Occupational Therapy Goals        Problem: Occupational Therapy Goal    Goal Priority Disciplines Outcome Interventions   Occupational Therapy Goal     OT, PT/OT Ongoing (interventions implemented as appropriate)    Description:  Goals to be met by: 5/23/2019     Patient will increase functional independence with ADLs by performing:    UE Dressing with Minimal Assistance.  LE Dressing with Minimal Assistance.  Grooming while standing at sink with Stand-by Assistance.  Toileting from bedside commode with Minimal Assistance for hygiene and clothing management.   Bathing with Minimal Assistance.-MET 5/26/2019  Supine to sit with Stand-by Assistance /c HOB flat and no handrails.  Stand pivot transfers with Contact Guard Assistance.-MET. 5/26/2019  Toilet transfer to bedside commode with Contact Guard Assistance.-met 5/26/2019  Right Upper extremity exercise program 3 x 10 reps per handout, with independence.  Patient will complete a standing activity for 8 min with S in order to perform self care tasks.   Met                    Plan:  Patient to be seen 5 x/week to address the above listed problems via therapeutic exercises, therapeutic activities, self-care/home management  Plan of Care expires: 06/10/19  Plan of Care reviewed with: patient    Marisa HaynesASIM rios  05/30/2019

## 2019-05-30 NOTE — PLAN OF CARE
Problem: Adult Inpatient Plan of Care  Goal: Plan of Care Review  Outcome: Revised  Repositions minimal assist, no new skin breakdowns noted. MERARI sling in use. Afebrile. Monitored for pain and safety. Safety maintained. Denies pain

## 2019-05-30 NOTE — PLAN OF CARE
Problem: Occupational Therapy Goal  Goal: Occupational Therapy Goal  Goals to be met by: 5/23/2019     Patient will increase functional independence with ADLs by performing:    UE Dressing with Minimal Assistance.  LE Dressing with Minimal Assistance.  Grooming while standing at sink with Stand-by Assistance.  Toileting from bedside commode with Minimal Assistance for hygiene and clothing management.   Bathing with Minimal Assistance.-MET 5/26/2019  Supine to sit with Stand-by Assistance /c HOB flat and no handrails.  Stand pivot transfers with Contact Guard Assistance.-MET. 5/26/2019  Toilet transfer to bedside commode with Contact Guard Assistance.-met 5/26/2019  Right Upper extremity exercise program 3 x 10 reps per handout, with independence.  Patient will complete a standing activity for 8 min with S in order to perform self care tasks.   Met  Outcome: Ongoing (interventions implemented as appropriate)  Patient goals are appropriate.

## 2019-05-30 NOTE — PROGRESS NOTES
[facility]                                  Skilled Nursing Facility                   Progress Note     Admit Date: 2019  RAVI 6/10/2019  Principal Problem:  Closed displaced intertrochanteric fracture of left femurHPI obtained from patient interview and chart review     Chief Complaint:  Revaluation of medical treatment and therapy status: Lab review    HPI:   Mrs. Flores is a 86 year old. female Buddhist with PMHx of  Bronchiectasis, SVT who presents to SNF following a hospitalization for left intertrochanteric, left proximal humerus, and left distal radius fracture s/p left femur intramedullary marc after a mechanical fall. She was admitted to [facility] from  until . Dr. Owusu performed a Cephalomedullary nail fixation of left intertrochanteric femur fracture on 5/3/19.     Interval history: All labs reviewed.  No leukocytosis.  H&H improved to 9.2/31 from 8.9/3. Patient's 24 hr blood pressure range is 106/52 to 136/62 . Patient progessing slowly with PT/OT.   Patient is Buddhist and does not accept blood transfusions.  Continuing to follow and treat all acute and chronic conditions.    Past Medical History: Patient has a past medical history of Bronchiectasis without complication, Hypertension, and Supraventricular tachycardia.    Past Surgical History: Patient has a past surgical history that includes Cholecystectomy; Tonsillectomy; and Intramedullary rodding of femur (Left, 5/3/2019).    Social History: Patient reports that she has never smoked. She has never used smokeless tobacco. She reports that she does not drink alcohol or use drugs.  Patient's ill   about a month ago    Family History:  No family significant history to report    Allergies: Patient is allergic to aspirin (bulk).    ROS  Constitutional: Negative for fever or fatigue.   Eyes: Negative for blurred vision, double vision and discharge.    Respiratory: Negative for cough, shortness of breath and wheezing.    Cardiovascular: Negative for chest pain, palpitations, claudication, and leg swelling.   Gastrointestinal: Negative for abdominal pain, constipation, diarrhea, nausea and vomiting.   Genitourinary: Negative for dysuria, frequency and urgency.   Musculoskeletal:  + generalized weakness, pain to left arm and hip. Negative for back pain and myalgias.   Skin: Negative for itching and rash.   Neurological: Negative for dizziness, speech change, and headaches.   Psychiatric/Behavioral: + for depression. The patient is not nervous/anxious.      PEx  Temp:  [97.9 °F (36.6 °C)-98.4 °F (36.9 °C)]   Pulse:  [74-77]   Resp:  [18]   BP: (106-136)/(52-62)   SpO2:  [99 %]      Constitutional: Patient appears frail  and in no distress   HENT:   Head: Normocephalic and atraumatic.   Eyes: Pupils are equal, round, and reactive to light.   Neck: Normal range of motion. Neck supple.   Cardiovascular: Normal rate, regular rhythm and normal heart sounds.    Pulmonary/Chest: Effort normal and breath sounds are clear  Abdominal: Soft. Bowel sounds are normal.   Musculoskeletal: decreased range of motion to left arm, sling in place.   Neurological: Alert and oriented to person, place, and time.   Skin: Skin is warm and dry.  Patient has skin overgrowth to scalp. Incisions 2cm to left hip and 3cm to left leg- well-approximated no erythema, no swelling noted to periwound area  Psychiatric: mood is sad      Assessment and Plan:    CONTINUE    Hx of HTN  - currently patient is experiencing hypotension   - 5/13 decrease losartan from 50 mg to 25 mg daily, continue atenolol 25 mg daily.  Holding parameters in place to hold for systolic blood pressure less than 100.   - 5/14 holding home losartan at this time. patient's 24 hr blood pressure 102/49 to 119/50.   - 5/15 decrease atenolol to 12.5 mg daily; 24 hr blood pressure today is 95/46 to 108/54.    - 5/16 BP slightly  improved with the decrease in atenolol dose. Patient's 24 hr blood pressure range is 98/48 to 129/62  - 5/20 patient's 24 hr blood pressure ranges 96/50 to 121/57  - 5/23 patient's 24 hr blood pressure ranges 107/52 to 149/67, the systolic of 149 is isolated and due to increase in pain. Will continue to monitor blood pressures.   - 5/27 patient's 24 hr blood pressure range is 136/52 to 106/50    Acute blood loss anemia  - continue ferrous sulfate 325 mg t.i.d.  - patient is a [employer] who refuses blood transfusions  - Post-op H/H 6.9/21.8 with baseline 8.4/26.9  - pediatric blood draws only, avoid IVF  - 5/13 initiated procrit 20,000 units   - 5/16 H&H improved to 7.0/23 from 6.3/20 after Procrit injection and continue iron supplementation  - 5/20 H&H improved to 7.7/25  - 5/23 H&H improved to 8.1/26  - 5/27 H&H improved to 8.9/30    Severe malnutrition  - Energy Intake: <75% of estimated energy requirement for > 3 months  - Body Fat Depletion: moderate depletion of orbitals and triceps and buccal area  - Muscle Mass Depletion: severe depletion of clavicle region and scapular region moderate at temples, hands and calf  - Weight Loss: > 7.5% % x < 3 months   - Continue regular diet, honor food intolerances, recommend change boost breeze to boost plus TID no chocolate  per pt request.  - 5/23 patient states better appetite increased p.o. consumption    Onychogryphosis  - likely from  Insufficient consumtion of essential nutrients  - 5/14- Message sent to  and appointment scheduled are to make patient a Podiatry appointment to cut down toenails, patient's toenails are so long and painful it hurts her to walk. - patient to see Podiatry tomorrow morning  - 5/15 patient was able to see provider today and Podiatry.  I got her an appointment Friday before her ortho follow-up to have her toes cut.  - 5/20 patient went to podiatry appointment in all toenails were trimmed to a normal length.  Patient's  toenail pain is significantly improved    Hx of SVT  - continue verapamil 80 mg TID  - 5/16 with reduction of atenolol.  Heart rate is increased to the 90s.  Will continue to assess blood pressure and heart rate daily.     Seborrheic Keratosis   - 5/15 differential diagnosis includes melanoma- ambulatory referral to Dermatology placed.  Lesion appears as a skin overgrowth to scalp that is waxy brown and tan in color, slightly raise with a bumpy appearance.  Lesion is about  5 cm in diameter and circular.      Closed displaced intratrochanteric fracture of left femur  s/p left Cephalomedullary nail fixation of left  femur fracture on 5/3/19  Postoperative pain  - WBAT to LLE, NWB to LUE  - 5/14 initiated acetaminophen 1 g q.8 hours  - 5/15 pain is better controlled today with scheduled Tylenol    Debility   - Continue with PT/OT for gait training and strengthening and restoration of ADL's   - Encourage mobility, OOB in chair, and early ambulation as appropriate  - Fall precautions   - Monitor for bowel and bladder dysfunction  - Monitor for and prevent skin breakdown and pressure ulcers  - Continue DVT prophylaxis with  enoxaparin 30 mg daily    Hx of Bronchiectasis  - continue duo nebs q.4 hours while awake and tiotropium 18mcg daily     DC Update: pt lives alone and is asking about nursing home type placement after SNF.     Future Appointments   Date Time Provider Department Center   5/31/2019 11:00 AM Mirna Crews PA-C [facility] ORTHO Jayden Ibrahim NP    DOS: 5/30/2019

## 2019-05-31 ENCOUNTER — OFFICE VISIT (OUTPATIENT)
Dept: ORTHOPEDICS | Facility: CLINIC | Age: 84
DRG: 535 | End: 2019-05-31
Attending: INTERNAL MEDICINE
Payer: MEDICARE

## 2019-05-31 DIAGNOSIS — S52.502A CLOSED FRACTURE OF DISTAL END OF LEFT RADIUS, UNSPECIFIED FRACTURE MORPHOLOGY, INITIAL ENCOUNTER: Primary | ICD-10-CM

## 2019-05-31 DIAGNOSIS — S72.142D CLOSED DISPLACED INTERTROCHANTERIC FRACTURE OF LEFT FEMUR WITH ROUTINE HEALING, SUBSEQUENT ENCOUNTER: ICD-10-CM

## 2019-05-31 DIAGNOSIS — S42.295A OTHER CLOSED NONDISPLACED FRACTURE OF PROXIMAL END OF LEFT HUMERUS, INITIAL ENCOUNTER: ICD-10-CM

## 2019-05-31 PROCEDURE — 25000242 PHARM REV CODE 250 ALT 637 W/ HCPCS: Performed by: HOSPITALIST

## 2019-05-31 PROCEDURE — 25000003 PHARM REV CODE 250: Performed by: NURSE PRACTITIONER

## 2019-05-31 PROCEDURE — 97535 SELF CARE MNGMENT TRAINING: CPT

## 2019-05-31 PROCEDURE — 97530 THERAPEUTIC ACTIVITIES: CPT

## 2019-05-31 PROCEDURE — 99999 PR PBB SHADOW E&M-EST. PATIENT-LVL IV: ICD-10-PCS | Mod: PBBFAC,,, | Performed by: PHYSICIAN ASSISTANT

## 2019-05-31 PROCEDURE — 99024 POSTOP FOLLOW-UP VISIT: CPT | Mod: S$GLB,,, | Performed by: PHYSICIAN ASSISTANT

## 2019-05-31 PROCEDURE — 99024 PR POST-OP FOLLOW-UP VISIT: ICD-10-PCS | Mod: S$GLB,,, | Performed by: PHYSICIAN ASSISTANT

## 2019-05-31 PROCEDURE — 97110 THERAPEUTIC EXERCISES: CPT

## 2019-05-31 PROCEDURE — 63600175 PHARM REV CODE 636 W HCPCS: Performed by: HOSPITALIST

## 2019-05-31 PROCEDURE — 99999 PR PBB SHADOW E&M-EST. PATIENT-LVL IV: CPT | Mod: PBBFAC,,, | Performed by: PHYSICIAN ASSISTANT

## 2019-05-31 PROCEDURE — 11000004 HC SNF PRIVATE

## 2019-05-31 PROCEDURE — 97116 GAIT TRAINING THERAPY: CPT

## 2019-05-31 PROCEDURE — 25000003 PHARM REV CODE 250: Performed by: HOSPITALIST

## 2019-05-31 RX ADMIN — SENNOSIDES AND DOCUSATE SODIUM 1 TABLET: 8.6; 5 TABLET ORAL at 08:05

## 2019-05-31 RX ADMIN — FERROUS SULFATE TAB EC 325 MG (65 MG FE EQUIVALENT) 325 MG: 325 (65 FE) TABLET DELAYED RESPONSE at 02:05

## 2019-05-31 RX ADMIN — ENOXAPARIN SODIUM 30 MG: 100 INJECTION SUBCUTANEOUS at 05:05

## 2019-05-31 RX ADMIN — FERROUS SULFATE TAB EC 325 MG (65 MG FE EQUIVALENT) 325 MG: 325 (65 FE) TABLET DELAYED RESPONSE at 08:05

## 2019-05-31 RX ADMIN — VERAPAMIL HYDROCHLORIDE 80 MG: 80 TABLET, FILM COATED ORAL at 08:05

## 2019-05-31 RX ADMIN — ACETAMINOPHEN 1000 MG: 325 TABLET ORAL at 02:05

## 2019-05-31 RX ADMIN — ACETAMINOPHEN 1000 MG: 325 TABLET ORAL at 05:05

## 2019-05-31 RX ADMIN — TIOTROPIUM BROMIDE 18 MCG: 18 CAPSULE ORAL; RESPIRATORY (INHALATION) at 08:05

## 2019-05-31 RX ADMIN — ACETAMINOPHEN 1000 MG: 325 TABLET ORAL at 09:05

## 2019-05-31 RX ADMIN — VERAPAMIL HYDROCHLORIDE 80 MG: 80 TABLET, FILM COATED ORAL at 02:05

## 2019-05-31 RX ADMIN — ATENOLOL 12.5 MG: 25 TABLET ORAL at 08:05

## 2019-05-31 NOTE — PLAN OF CARE
Problem: Physical Therapy Goal  Goal: Physical Therapy Goal  Goals to be met by:6/10/19    Patient will increase functional independence with mobility by performin. Supine to sit with supervision  2. Sit to supine with supervision  3. Sit to stand transfer with Stand-by Assistance. Met (2019)  4. Bed to chair transfer with Stand-by Assistance using hemiwalker or least restrictive assistive device. Met (2019)  5. Gait  x 100 feet with Stand-by Assistance using  hemiwalker or least restrictive assistive device. REVISE 2019   REVISED GOAL: gait x 75 feet w/ HW or least restrictive device and SBA = not met  6. Ascend/Descend 4 inch curb step with Contact Guard Assistance using  hemiwalker or least restrictive assistive device. Met (2019)  7. Stand for 3 minutes with Contact Guard Assistance using  hemiwalker or least restrictive assistive device, intermittently,  and perform an activity  8. Lower extremity exercise program x20 reps per handout, with assistance as needed and gym therex  NEW GOAL 2019 9. Patient will propel w/c w/ RUE and RLE or w/ BLE 75 feet including two turns w/ SBA= not met        Goals remain appropriate. Continue with Physical therapy Plan of Care. Darlene Jaimes, PT 2019

## 2019-05-31 NOTE — PT/OT/SLP PROGRESS
Occupational Therapy  Treatment    Siomara Flores   MRN: [id number]   Admitting Diagnosis: Closed displaced intertrochanteric fracture of left femur    OT Date of Treatment: 05/31/19       Billable Minutes:  Self Care/Home Management 30 and Therapeutic Exercise 15    General Precautions: Standard, fall  Orthopedic Precautions: LUE non weight bearing  Braces: UE brace, UE Sling(L UE)         Subjective:  Communicated with patient prior to session.      Pain/Comfort  Pain Rating 1: (Pt. did not rate. )  Location - Side 1: Left  Location - Orientation 1: generalized  Location 1: arm  Pain Addressed 1: Distraction, Pre-medicate for activity  Pain Rating 2: (Pt. did not rate.)  Location - Side 2: Left  Location - Orientation 2: generalized  Location 2: leg  Pain Addressed 2: Reposition, Distraction    Objective:   Pt. found up in w/c.    Occupational Performance:    Functional Mobility/Transfers:  · Patient completed Sit <> Stand Transfer with contact guard assistance  with  grab bars(s)   · Patient completed Chair <> wheelchiar Stand Pivot technique with contact guard assistance with no assistive device  · Patient completed Toilet Transfer w/c <> toilet Stand Pivot technique with contact guard assistance with  grab bars    Activities of Daily Living:  · Grooming: supervision performing oral hygiene, washing face, and washing hands seated in w/c at sink level  · Lower Body Dressing: moderate assistance to jose alejandro panties and pants over feet seated in w/c and over hips standing with CGA and no AD  · Toileting: minimum assistance for clothing management     [facility] 6 Click:  [facility] Total Score: 18    OT Exercises: UE Ergometer 15 minutes with minimum resistance and rest breaks x 2 to increase endurance needed for adls.     Patient left up in chair with call button in reach    ASSESSMENT:  Siomara Flores is a 86 y.o. female with a medical diagnosis of Closed displaced intertrochanteric fracture of left femur and presents with  the deficits listed below. Patient's independence and endurance is improving. Will continue to benefit from OT services.     Rehab identified problem list/impairments: weakness, impaired endurance, impaired self care skills, impaired functional mobilty, gait instability, impaired balance, decreased upper extremity function, decreased lower extremity function, pain, orthopedic precautions    Rehab potential is good    Activity tolerance: Good    Discharge recommendations: home with home health     Barriers to discharge: Decreased caregiver support     Equipment recommendations: walker, papi, commode, tub bench(Need to confirm with family (Patient reports she has equipment from her ))     GOALS:   Multidisciplinary Problems     Occupational Therapy Goals        Problem: Occupational Therapy Goal    Goal Priority Disciplines Outcome Interventions   Occupational Therapy Goal     OT, PT/OT Ongoing (interventions implemented as appropriate)    Description:  Goals to be met by: 5/23/2019     Patient will increase functional independence with ADLs by performing:    UE Dressing with Minimal Assistance.  LE Dressing with Minimal Assistance.  Grooming while standing at sink with Stand-by Assistance.  Toileting from bedside commode with Minimal Assistance for hygiene and clothing management.   Bathing with Minimal Assistance.-MET 5/26/2019  Supine to sit with Stand-by Assistance /c HOB flat and no handrails.  Stand pivot transfers with Contact Guard Assistance.-MET. 5/26/2019  Toilet transfer to bedside commode with Contact Guard Assistance.-met 5/26/2019  Right Upper extremity exercise program 3 x 10 reps per handout, with independence.  Patient will complete a standing activity for 8 min with S in order to perform self care tasks.   Met                    Plan:  Patient to be seen 5 x/week to address the above listed problems via therapeutic exercises, therapeutic activities, self-care/home management  Plan of Care  expires: 06/10/19  Plan of Care reviewed with: patient    Marisa Keita, ASIM  05/31/2019

## 2019-05-31 NOTE — PROGRESS NOTES
Ms. Flores is 86-year-old female who fell from a standing height resulting in an osteoporotic pathologic left intertrochanteric femur fracture treated with CM nail on 05/03/2019 as well as non-displaced left proximal humerus fracture and left distal radius fracture.     Ms. Flores is here today for a post-operative visit after a   Cephalomedullary nail fixation of left intertrochanteric femur fracture  by Dr. Bruce on 05/03/2019.  As well as follow up for non-operative treatment of left proximal humerus and distal radius fractures.     IMPLANTS:  Synthes trochanteric fixation nail advanced 11 x 360 mm with 80 mm hip screw and single distal interlocking screw.    Interval History:    she reports that she is doing ok.  Pain is controlled.  she is  taking pain medication.    She is at [facility]. She is receiving PT.   she denies fever, chills, and sweats since the time of the surgery.     Physical exam: arrived to clinic in wheelchair  WRIST: brace remove, TTP at distal radius, able to make a full fist, decreased range of motion of fingers in all planes.   ELBOW: full range of motion  Shoulder: range of motion flexion 50 with mild pain, no TTP  HIP: no TTP, FROM of knee and ankle.     RADS:   WRIST:Generalized osteopenia.  Alignment is satisfactory and joint spaces are fairly well maintained.  No definite fracture or dislocation is seen.  No callus formation identified.  Mild degenerative changes are present.  No soft tissue swelling appreciated.    SHOULDER:There is a healing left humeral neck fracture, unchanged since prior radiograph and in proper position.  Redemonstration of mild to moderate degenerative changes of the left shoulder.  No dislocation.  No significant soft tissue edema or joint effusion.  Aortic calcifications are present at the arch.    FEMUR: ORIF at the left femur with unchanged position and alignment    Assessment:  Post-op visit ( 4 weeks)    Plan:  Current care, treatment plan, precautions,  activity level/ modifications, limitations, rehabilitation exercises and proposed future treatment were discussed with the patient. We discussed the need to monitor for changes in symptoms and condition and report them to the physician.  Discussed importance of compliance with all appointments and follow up examinations.   - The patient was advised to keep the incision clean and dry for the next 24 hours after which she may wash the area with antibacterial soap in the shower. Will not submerge until the incision is completely healed  -Patient was advised to monitor wound closely and multiple times daily for any problems. Call clinic immediately or report to ED for immediate medical attention for any complications including reopening of wound, drainage, purulence, redness, streaking, odor, pain out of proportion, fever, chills, etc.   -PT/OT, SNF, Patient is responsible to establish and continue care   HIP:      -range of motion as tolerated     - weight bearing as tolerated   SHOULDER:     - range of motion: PROm x 1 weeks then AAROM x 2 weeks then AROM    - NWB   WRIST:     - NWB    -continue brace, remove for range of motion gentle. range of motion of fingers,.    - pain medication: no refill needed,    Pain medication refill policy provided to patient for review.   - Patient is to return to clinic in 26weeks  At time x-ray of her femur, wrist and 2 view shoulder AP scap Y is needed     If there are any questions prior to scheduled follow up, the patient was instructed to contact the office

## 2019-05-31 NOTE — LETTER
May 31, 2019      Daniel Valentino MD  [address]  [facility] [address]           [facility]  [address], [address]  [facility] [address]  Phone: [phone]          Patient: Siomara Flores   MR Number: [id number]   YOB: 1933   Date of Visit: 5/31/2019       Dear Dr. Daniel Valentino:    Thank you for referring Siomara Flores to me for evaluation. Attached you will find relevant portions of my assessment and plan of care.    If you have questions, please do not hesitate to call me. I look forward to following Siomara Flores along with you.    Sincerely,    Mirna Crews PA-C    Enclosure  CC:  No Recipients    If you would like to receive this communication electronically, please contact [email or web address] or [phone] to request more information on [employer] access.    For providers and/or their staff who would like to refer a patient to [facility], please contact us through our one-stop-shop provider referral line, [facility], at [phone].    If you feel you have received this communication in error or would no longer like to receive these types of communications, please e-mail [email or web address]

## 2019-05-31 NOTE — PT/OT/SLP PROGRESS
Physical Therapy  Treatment    Siomara Flores   MRN: [id number]   Admitting Diagnosis: Closed displaced intertrochanteric fracture of left femur    PT Received On: 05/31/19          Billable Minutes:  Gait Training 15, Therapeutic Activity 10 and Therapeutic Exercise 14=39    Treatment Type: Treatment  PT/PTA: PT     PTA Visit Number: 0       General Precautions: Standard, fall  Orthopedic Precautions: LUE non weight bearing, LLE weight bearing as tolerated   Braces: Sling and swathe         Subjective:  Communicated with patient prior to session.  Patient reports trying to arrange for some help at home. Will not go to NH due to cost. Reports a lower income since her  passed away.    Pain/Comfort  Pain Rating 1: 3/10  Location - Side 1: Left  Location - Orientation 1: generalized  Location 1: shoulder  Pain Addressed 1: Distraction  Pain Rating Post-Intervention 1: 3/10  Pain Rating 2: 3/10  Location - Side 2: Left  Location - Orientation 2: generalized  Location 2: hip  Pain Addressed 2: Distraction  Pain Rating Post-Intervention 2: 3/10    Objective:  Patient found seated in w/c w/ sling and swath, wrist splint with       AM-PAC 6 CLICK MOBILITY  Total Score:16    Bed Mobility:  Sit>Supine:on mat w/ SBA to left  Supine>Sit: on mat w/ SBA to  Left w/ cues and multiple attempts for trunk elevation w/ RUE    Transfers:  Sit<>Stand: to/from w/c w/ HW and close SBA; to/from mat w/ HW and close SBA  Stand Pivot Transfer: w/ HW and close SBA to mat  Extra time, cues    Gait:  Amb w/ HW and close SBA 45 feet  Walking Speed Test:   Patient Instructions: Pt is instructed to walk a set distance (11.6 meters in gym).  Distance covered is divided by the time it took to walk that distance. Calculate Average of 3 trials. Assistive device can be used but must be kept consistent.  **only one trial performed   Gait Speed = 0.056 m/s with hemiwalker assistive device at preferred speed  3 min 27 seconds to walk 38 feet or 11/6  meters.  Interpretation:   Small meaningful change=.05 m/s  Substantial meaningful change=.13 m/s    < 0.4 m/s were more likely to be household ambulators  0.4 - 0.8 m/s limited community ambulators  > 0.8 m/s were community ambulators  Walking speed chart: speeds of approx 0.18 to 1 m/s indicate need for intervention to reduce falls; speeds of 0 to approx 0.7 m/s indicate increased risk of death, hospitalization and falls.       Advanced Gait:  Stairs: not performed  Curb Step: not performed    Wheelchair Mobility:  Not performed     Therex:  Quad sets,   Glute sets,   Ankle  Pumps,   hip abduction/adduction,  heelslides,   SAQ,   LAQ   Hip flexion  x20 reps w/ assist as needed      Balance:  Stands w/ HW and SBA    Additional Treatment:  Educated on gait and trf tech w/ HW; gait speed, LE exercises, DME needs    Patient left up in chair with call button in reach.    Assessment:  Siomara Flores is a 86 y.o. female with a medical diagnosis of Closed displaced intertrochanteric fracture of left femur.  Patient has f/u w/ ortho today and will revise goals afterwards if appropriate. Patient progressing slowly, still high risk for falls. Extremely slow walking speed w/ HW. Patient will benefit from continued physical therapy to address deficits and improve safety and functional mobility. Continue with physical therapy plan of care. .    Rehab identified problem list/impairments: weakness, impaired endurance, impaired functional mobilty, impaired self care skills, gait instability, impaired balance, decreased upper extremity function, decreased lower extremity function, pain, orthopedic precautions    Rehab potential is good.    Activity tolerance: Good    Discharge recommendations: home with home health(with A/S for safety, pt repts sister looking into NH placeme)     Barriers to discharge: Inaccessible home environment, Decreased caregiver support    Equipment recommendations: walker, papi, commode, tub bench(HW or  QC pending progress)     GOALS:   Multidisciplinary Problems     Physical Therapy Goals        Problem: Physical Therapy Goal    Goal Priority Disciplines Outcome Goal Variances Interventions   Physical Therapy Goal     PT, PT/OT Ongoing (interventions implemented as appropriate)     Description:  Goals to be met by:6/10/19    Patient will increase functional independence with mobility by performin. Supine to sit with supervision  2. Sit to supine with supervision  3. Sit to stand transfer with Stand-by Assistance. Met (2019)  4. Bed to chair transfer with Stand-by Assistance using hemiwalker or least restrictive assistive device. Met (2019)  5. Gait  x 100 feet with Stand-by Assistance using  hemiwalker or least restrictive assistive device. REVISE 2019   REVISED GOAL: gait x 75 feet w/ HW or least restrictive device and SBA = not met  6. Ascend/Descend 4 inch curb step with Contact Guard Assistance using  hemiwalker or least restrictive assistive device. Met (2019)  7. Stand for 3 minutes with Contact Guard Assistance using  hemiwalker or least restrictive assistive device, intermittently,  and perform an activity  8. Lower extremity exercise program x20 reps per handout, with assistance as needed and gym therex  NEW GOAL 2019 9. Patient will propel w/c w/ RUE and RLE or w/ BLE 75 feet including two turns w/ SBA= not met                         PLAN:    Patient to be seen 5 x/week  to address the above listed problems via gait training, therapeutic activities, therapeutic exercises, wheelchair management/training  Plan of Care expires: 19  Plan of Care reviewed with: patient    Darlene Jaimes, PT  2019

## 2019-06-01 PROCEDURE — 25000242 PHARM REV CODE 250 ALT 637 W/ HCPCS: Performed by: HOSPITALIST

## 2019-06-01 PROCEDURE — 63600175 PHARM REV CODE 636 W HCPCS: Performed by: HOSPITALIST

## 2019-06-01 PROCEDURE — 11000004 HC SNF PRIVATE

## 2019-06-01 PROCEDURE — 25000003 PHARM REV CODE 250: Performed by: NURSE PRACTITIONER

## 2019-06-01 PROCEDURE — 25000003 PHARM REV CODE 250: Performed by: HOSPITALIST

## 2019-06-01 RX ADMIN — VERAPAMIL HYDROCHLORIDE 80 MG: 80 TABLET, FILM COATED ORAL at 02:06

## 2019-06-01 RX ADMIN — TIOTROPIUM BROMIDE 18 MCG: 18 CAPSULE ORAL; RESPIRATORY (INHALATION) at 08:06

## 2019-06-01 RX ADMIN — ACETAMINOPHEN 1000 MG: 325 TABLET ORAL at 09:06

## 2019-06-01 RX ADMIN — ENOXAPARIN SODIUM 30 MG: 100 INJECTION SUBCUTANEOUS at 04:06

## 2019-06-01 RX ADMIN — VERAPAMIL HYDROCHLORIDE 80 MG: 80 TABLET, FILM COATED ORAL at 09:06

## 2019-06-01 RX ADMIN — FERROUS SULFATE TAB EC 325 MG (65 MG FE EQUIVALENT) 325 MG: 325 (65 FE) TABLET DELAYED RESPONSE at 08:06

## 2019-06-01 RX ADMIN — SENNOSIDES AND DOCUSATE SODIUM 1 TABLET: 8.6; 5 TABLET ORAL at 09:06

## 2019-06-01 RX ADMIN — FERROUS SULFATE TAB EC 325 MG (65 MG FE EQUIVALENT) 325 MG: 325 (65 FE) TABLET DELAYED RESPONSE at 09:06

## 2019-06-01 RX ADMIN — ATENOLOL 12.5 MG: 25 TABLET ORAL at 08:06

## 2019-06-01 RX ADMIN — VERAPAMIL HYDROCHLORIDE 80 MG: 80 TABLET, FILM COATED ORAL at 08:06

## 2019-06-01 RX ADMIN — FERROUS SULFATE TAB EC 325 MG (65 MG FE EQUIVALENT) 325 MG: 325 (65 FE) TABLET DELAYED RESPONSE at 02:06

## 2019-06-01 RX ADMIN — ACETAMINOPHEN 1000 MG: 325 TABLET ORAL at 02:06

## 2019-06-01 RX ADMIN — ACETAMINOPHEN 1000 MG: 325 TABLET ORAL at 05:06

## 2019-06-01 NOTE — PLAN OF CARE
Problem: Adult Inpatient Plan of Care  Goal: Plan of Care Review  Outcome: Revised  Repositions minimal assist, no new skin breakdowns noted. Left forearm immobilizer in use. No edema, +movement/sensation. Afebrile. Monitored for pain and safety. Safety maintained. Denies pain

## 2019-06-02 PROCEDURE — 25000003 PHARM REV CODE 250: Performed by: NURSE PRACTITIONER

## 2019-06-02 PROCEDURE — 97110 THERAPEUTIC EXERCISES: CPT

## 2019-06-02 PROCEDURE — 11000004 HC SNF PRIVATE

## 2019-06-02 PROCEDURE — 63600175 PHARM REV CODE 636 W HCPCS: Performed by: HOSPITALIST

## 2019-06-02 PROCEDURE — 25000242 PHARM REV CODE 250 ALT 637 W/ HCPCS: Performed by: HOSPITALIST

## 2019-06-02 PROCEDURE — 97530 THERAPEUTIC ACTIVITIES: CPT

## 2019-06-02 PROCEDURE — 97116 GAIT TRAINING THERAPY: CPT

## 2019-06-02 PROCEDURE — 25000003 PHARM REV CODE 250: Performed by: HOSPITALIST

## 2019-06-02 RX ADMIN — ENOXAPARIN SODIUM 30 MG: 100 INJECTION SUBCUTANEOUS at 04:06

## 2019-06-02 RX ADMIN — ATENOLOL 12.5 MG: 25 TABLET ORAL at 08:06

## 2019-06-02 RX ADMIN — SENNOSIDES AND DOCUSATE SODIUM 1 TABLET: 8.6; 5 TABLET ORAL at 08:06

## 2019-06-02 RX ADMIN — FERROUS SULFATE TAB EC 325 MG (65 MG FE EQUIVALENT) 325 MG: 325 (65 FE) TABLET DELAYED RESPONSE at 08:06

## 2019-06-02 RX ADMIN — FERROUS SULFATE TAB EC 325 MG (65 MG FE EQUIVALENT) 325 MG: 325 (65 FE) TABLET DELAYED RESPONSE at 02:06

## 2019-06-02 RX ADMIN — POLYETHYLENE GLYCOL 3350 17 G: 17 POWDER, FOR SOLUTION ORAL at 08:06

## 2019-06-02 RX ADMIN — ACETAMINOPHEN 1000 MG: 325 TABLET ORAL at 02:06

## 2019-06-02 RX ADMIN — VERAPAMIL HYDROCHLORIDE 80 MG: 80 TABLET, FILM COATED ORAL at 08:06

## 2019-06-02 RX ADMIN — ACETAMINOPHEN 1000 MG: 325 TABLET ORAL at 09:06

## 2019-06-02 RX ADMIN — TIOTROPIUM BROMIDE 18 MCG: 18 CAPSULE ORAL; RESPIRATORY (INHALATION) at 08:06

## 2019-06-02 RX ADMIN — VERAPAMIL HYDROCHLORIDE 80 MG: 80 TABLET, FILM COATED ORAL at 02:06

## 2019-06-02 RX ADMIN — ACETAMINOPHEN 1000 MG: 325 TABLET ORAL at 05:06

## 2019-06-02 NOTE — PLAN OF CARE
Problem: Adult Inpatient Plan of Care  Goal: Plan of Care Review  Outcome: Revised  Repositions with assist, no new skin breakdowns noted. Afebrile. Left forearm brace in use. +movement/sensation. Monitored for pain and safety. Safety maintained. Denies pain

## 2019-06-02 NOTE — PT/OT/SLP PROGRESS
"Physical Therapy  Treatment    Siomara Flores   MRN: [id number]   Admitting Diagnosis: Closed displaced intertrochanteric fracture of left femur    PT Received On: 06/02/19        Billable Minutes:  Gait Training 15, Therapeutic Activity 10 and Therapeutic Exercise 20    Treatment Type: Treatment  PT/PTA: PTA     PTA Visit Number: 1       General Precautions: Standard, fall  Orthopedic Precautions: LUE non weight bearing, LLE weight bearing as tolerated   Braces: Sling and swathe     Subjective:  "I'm ready now"    Pain/Comfort  Pain Rating 1: 5/10  Location - Side 1: Left  Location - Orientation 1: generalized  Location 1: arm(and leg)  Pain Addressed 1: Pre-medicate for activity  Pain Rating Post-Intervention 1: 4/10    Objective:   Patient found with: (in BSC, A to jose alejandro sling/swathe and pants)     AM-PAC 6 CLICK MOBILITY  Total Score:16    Transfers:  Sit<>Stand: with no AD SBA, with HW SBA vcs for reaching back for arm rest before sitting  Stand Pivot Transfer: SBA no AD BSC>WC<>nustep    Gait:  Amb with HW SBA ~ 40 ft x 2 trials no LOB, seated rest break occ vcs for proper placement not too far when getting fatigued    Therex:  2x10 reps AP,GS,LAQ,hip flex    Additional Treatment:  Recumbent cross  x 10 min L-3    Patient left up in chair with call button in reach and belongings in reach.    Assessment:  Siomara Flores is a 86 y.o. female with a medical diagnosis of Closed displaced intertrochanteric fracture of left femur.  Pt tolerated well, pt would continue to benefit from skilled PT services to improve overall functional mobility, strength and endurance.  .  Rehab identified problem list/impairments: weakness, impaired endurance, impaired functional mobilty, impaired self care skills, gait instability, impaired balance, decreased upper extremity function, decreased lower extremity function, pain, orthopedic precautions    Rehab potential is good.    Activity tolerance: Fair    Discharge " recommendations: home with home health(with A/S for safety, pt repts sister looking into NH placeme)     Barriers to discharge: Inaccessible home environment, Decreased caregiver support    Equipment recommendations: walker, papi, commode, tub bench(HW or QC pending progress)     GOALS:   Multidisciplinary Problems     Physical Therapy Goals        Problem: Physical Therapy Goal    Goal Priority Disciplines Outcome Goal Variances Interventions   Physical Therapy Goal     PT, PT/OT Ongoing (interventions implemented as appropriate)     Description:  Goals to be met by:6/10/19    Patient will increase functional independence with mobility by performin. Supine to sit with supervision  2. Sit to supine with supervision  3. Sit to stand transfer with Stand-by Assistance. Met (2019)  4. Bed to chair transfer with Stand-by Assistance using hemiwalker or least restrictive assistive device. Met (2019)  5. Gait  x 100 feet with Stand-by Assistance using  hemiwalker or least restrictive assistive device. REVISE 2019   REVISED GOAL: gait x 75 feet w/ HW or least restrictive device and SBA = not met  6. Ascend/Descend 4 inch curb step with Contact Guard Assistance using  hemiwalker or least restrictive assistive device. Met (2019)  7. Stand for 3 minutes with Contact Guard Assistance using  hemiwalker or least restrictive assistive device, intermittently,  and perform an activity  8. Lower extremity exercise program x20 reps per handout, with assistance as needed and gym therex  NEW GOAL 2019 9. Patient will propel w/c w/ RUE and RLE or w/ BLE 75 feet including two turns w/ SBA= not met                         PLAN:    Patient to be seen 5 x/week  to address the above listed problems via gait training, therapeutic activities, therapeutic exercises, wheelchair management/training  Plan of Care expires: 19  Plan of Care reviewed with: patient    Daja Alvarenga, PTA  2019

## 2019-06-02 NOTE — PLAN OF CARE
Problem: Physical Therapy Goal  Goal: Physical Therapy Goal  Goals to be met by:6/10/19    Patient will increase functional independence with mobility by performin. Supine to sit with supervision  2. Sit to supine with supervision  3. Sit to stand transfer with Stand-by Assistance. Met (2019)  4. Bed to chair transfer with Stand-by Assistance using hemiwalker or least restrictive assistive device. Met (2019)  5. Gait  x 100 feet with Stand-by Assistance using  hemiwalker or least restrictive assistive device. REVISE 2019   REVISED GOAL: gait x 75 feet w/ HW or least restrictive device and SBA = not met  6. Ascend/Descend 4 inch curb step with Contact Guard Assistance using  hemiwalker or least restrictive assistive device. Met (2019)  7. Stand for 3 minutes with Contact Guard Assistance using  hemiwalker or least restrictive assistive device, intermittently,  and perform an activity  8. Lower extremity exercise program x20 reps per handout, with assistance as needed and gym therex  NEW GOAL 2019 9. Patient will propel w/c w/ RUE and RLE or w/ BLE 75 feet including two turns w/ SBA= not met        Outcome: Ongoing (interventions implemented as appropriate)  Goals remain appropriate

## 2019-06-03 PROCEDURE — 97110 THERAPEUTIC EXERCISES: CPT

## 2019-06-03 PROCEDURE — 11000004 HC SNF PRIVATE

## 2019-06-03 PROCEDURE — 97803 MED NUTRITION INDIV SUBSEQ: CPT

## 2019-06-03 PROCEDURE — 97116 GAIT TRAINING THERAPY: CPT

## 2019-06-03 PROCEDURE — 97530 THERAPEUTIC ACTIVITIES: CPT

## 2019-06-03 PROCEDURE — 97535 SELF CARE MNGMENT TRAINING: CPT

## 2019-06-03 PROCEDURE — 25000003 PHARM REV CODE 250: Performed by: NURSE PRACTITIONER

## 2019-06-03 PROCEDURE — 25000242 PHARM REV CODE 250 ALT 637 W/ HCPCS: Performed by: HOSPITALIST

## 2019-06-03 PROCEDURE — 25000003 PHARM REV CODE 250: Performed by: HOSPITALIST

## 2019-06-03 PROCEDURE — 63600175 PHARM REV CODE 636 W HCPCS: Performed by: HOSPITALIST

## 2019-06-03 RX ADMIN — ENOXAPARIN SODIUM 30 MG: 100 INJECTION SUBCUTANEOUS at 04:06

## 2019-06-03 RX ADMIN — FERROUS SULFATE TAB EC 325 MG (65 MG FE EQUIVALENT) 325 MG: 325 (65 FE) TABLET DELAYED RESPONSE at 02:06

## 2019-06-03 RX ADMIN — TIOTROPIUM BROMIDE 18 MCG: 18 CAPSULE ORAL; RESPIRATORY (INHALATION) at 08:06

## 2019-06-03 RX ADMIN — VERAPAMIL HYDROCHLORIDE 80 MG: 80 TABLET, FILM COATED ORAL at 08:06

## 2019-06-03 RX ADMIN — FERROUS SULFATE TAB EC 325 MG (65 MG FE EQUIVALENT) 325 MG: 325 (65 FE) TABLET DELAYED RESPONSE at 08:06

## 2019-06-03 RX ADMIN — ACETAMINOPHEN 1000 MG: 325 TABLET ORAL at 09:06

## 2019-06-03 RX ADMIN — ACETAMINOPHEN 1000 MG: 325 TABLET ORAL at 05:06

## 2019-06-03 RX ADMIN — SENNOSIDES AND DOCUSATE SODIUM 1 TABLET: 8.6; 5 TABLET ORAL at 08:06

## 2019-06-03 RX ADMIN — ACETAMINOPHEN 1000 MG: 325 TABLET ORAL at 01:06

## 2019-06-03 RX ADMIN — VERAPAMIL HYDROCHLORIDE 80 MG: 80 TABLET, FILM COATED ORAL at 02:06

## 2019-06-03 RX ADMIN — ATENOLOL 12.5 MG: 25 TABLET ORAL at 08:06

## 2019-06-03 NOTE — PT/OT/SLP PROGRESS
Occupational Therapy  Treatment    Siomara Flores   MRN: [id number]   Admitting Diagnosis: Closed displaced intertrochanteric fracture of left femur    OT Date of Treatment: 06/03/19       Billable Minutes:  Self Care/Home Management 20, Therapeutic Activity 30 and Therapeutic Exercise 10    General Precautions: Standard, fall  Orthopedic Precautions: LUE non weight bearing  Braces: UE brace, UE Sling(L UE)         Subjective:  Communicated with patient prior to session.    Pain/Comfort  Pain Rating 1: 7/10  Location - Side 1: Left  Location - Orientation 1: generalized  Location 1: leg  Pain Addressed 1: Pre-medicate for activity, Reposition, Distraction  Pain Rating Post-Intervention 1: 2/10    Objective:   Patient found up in bedside chair.    Occupational Performance:    Functional Mobility/Transfers:  · Patient completed Sit <> Stand Transfer with stand by assistance  with  no assistive device   · Patient completed bedside chair <> wheelchair Stand Pivot technique with stand by assistance with no assistive device  · Patient completed Toilet Transfer w/c <> toilet Stand Pivot technique with stand by assistance with  grab bars    Activities of Daily Living:  · Grooming: supervision performing oral hygiene and washing hand seated in w/c at sink level  · Toileting: minimum assistance for clothing management    · Lower Body Dressing: Modified independence doffing socks seated in w/c and donning socks with min(A) seated in w/c     AMPA 6 Click:  [facility] Total Score:      OT Exercises: RUE ROM exercise using 2 lb dumbell x 3 sets 10 reps each in all available pain free ranges to increase strength and endurance needed for adls. AAROM x 10 reps each of L elbow flexion/extension and L PIP/DIP/MCP flexion and extension to increase and maintain ROM of LUE.     Additional Treatment:  Patient stood with CGA and no AD ~ 3 minutes x 2 with a seated rest break between each stand while participating in game of bean bag toss to  address dynamic standing tolerance and balance needed for adls.     Patient left up in chair with call button in reach    ASSESSMENT:  Siomara Flores is a 86 y.o. female with a medical diagnosis of Closed displaced intertrochanteric fracture of left femur and presents with the deficits listed below. Patient can now remove sling and swathe for ROM of L elbow and fingers. Patient's activity tolerance and independence continues to improve. Will continue to ebenfit from continued OT services.     Rehab identified problem list/impairments: weakness, impaired endurance, impaired self care skills, impaired functional mobilty, gait instability, impaired balance, decreased upper extremity function, decreased lower extremity function, pain, orthopedic precautions    Rehab potential is good    Activity tolerance: Good    Discharge recommendations: home with home health     Barriers to discharge: Decreased caregiver support     Equipment recommendations: walker, papi, commode, tub bench(Need to confirm with family (Patient reports she has equipment from her ))     GOALS:   Multidisciplinary Problems     Occupational Therapy Goals        Problem: Occupational Therapy Goal    Goal Priority Disciplines Outcome Interventions   Occupational Therapy Goal     OT, PT/OT Ongoing (interventions implemented as appropriate)    Description:  Goals to be met by: 5/23/2019     Patient will increase functional independence with ADLs by performing:    UE Dressing with Minimal Assistance.  LE Dressing with Minimal Assistance.  Grooming while standing at sink with Stand-by Assistance.  Toileting from bedside commode with Minimal Assistance for hygiene and clothing management. Met 6/3/2019  Bathing with Minimal Assistance.-MET 5/26/2019  Supine to sit with Stand-by Assistance /c HOB flat and no handrails.  Stand pivot transfers with Contact Guard Assistance.-MET. 5/26/2019  Toilet transfer to bedside commode with Contact Guard  Assistance.-met 5/26/2019  Right Upper extremity exercise program 3 x 10 reps per handout, with independence.  Patient will complete a standing activity for 8 min with S in order to perform self care tasks.   Met                     Plan:  Patient to be seen 5 x/week to address the above listed problems via therapeutic exercises, therapeutic activities, self-care/home management  Plan of Care expires: 06/10/19  Plan of Care reviewed with: patient    ASIM Lobo  06/03/2019

## 2019-06-03 NOTE — PLAN OF CARE
Problem: Adult Inpatient Plan of Care  Goal: Plan of Care Review  Outcome: Ongoing (interventions implemented as appropriate)  FALL PRECAUTIONS MAINTAINED NO INJURIES NOTED.

## 2019-06-03 NOTE — PLAN OF CARE
Problem: Occupational Therapy Goal  Goal: Occupational Therapy Goal  Goals to be met by: 5/23/2019     Patient will increase functional independence with ADLs by performing:    UE Dressing with Minimal Assistance.  LE Dressing with Minimal Assistance.  Grooming while standing at sink with Stand-by Assistance.  Toileting from bedside commode with Minimal Assistance for hygiene and clothing management. Met 6/3/2019  Bathing with Minimal Assistance.-MET 5/26/2019  Supine to sit with Stand-by Assistance /c HOB flat and no handrails.  Stand pivot transfers with Contact Guard Assistance.-MET. 5/26/2019  Toilet transfer to bedside commode with Contact Guard Assistance.-met 5/26/2019  Right Upper extremity exercise program 3 x 10 reps per handout, with independence.  Patient will complete a standing activity for 8 min with S in order to perform self care tasks.   Met   Outcome: Ongoing (interventions implemented as appropriate)  Patient goals are appropriate.

## 2019-06-03 NOTE — PLAN OF CARE
Problem: Physical Therapy Goal  Goal: Physical Therapy Goal  Goals to be met by:6/10/19    Patient will increase functional independence with mobility by performin. Supine to sit with supervision  2. Sit to supine with supervision  3. Sit to stand transfer with Stand-by Assistance. Met (2019)  4. Bed to chair transfer with Stand-by Assistance using hemiwalker or least restrictive assistive device. Met (2019)  5. Gait  x 100 feet with Stand-by Assistance using  hemiwalker or least restrictive assistive device. REVISE 2019   REVISED GOAL: gait x 75 feet w/ HW or least restrictive device and SBA = not met  6. Ascend/Descend 4 inch curb step with Contact Guard Assistance using  hemiwalker or least restrictive assistive device. Met (2019)  7. Stand for 3 minutes with Contact Guard Assistance using  hemiwalker or least restrictive assistive device, intermittently,  and perform an activity  8. Lower extremity exercise program x20 reps per handout, with assistance as needed and gym therex  NEW GOAL 2019 9. Patient will propel w/c w/ RUE and RLE or w/ BLE 75 feet including two turns w/ SBA= not met        Goals remain appropriate. Continue with PT POC as indicated.

## 2019-06-03 NOTE — PT/OT/SLP PROGRESS
Physical Therapy  Treatment    Siomara Flores   MRN: [id number]   Admitting Diagnosis: Closed displaced intertrochanteric fracture of left femur    PT Received On: 06/03/19  Total Time (min): (--)       Billable Minutes:  Gait Training 12, Therapeutic Activity 14 and Therapeutic Exercise 20    Treatment Type: Treatment  PT/PTA: PTA     PTA Visit Number: 2       General Precautions: Standard, fall  Orthopedic Precautions: LUE non weight bearing, LLE weight bearing as tolerated   Braces: Sling and swathe         Subjective:  Communicated with nursing prior to session.  Pt agreed to work with therapy.     Pain/Comfort  Pain Rating 1: 0/10  Pain Rating Post-Intervention 1: 0/10    Objective:  Patient found seated w/c.       AM-PAC 6 CLICK MOBILITY  Total Score:16    Bed Mobility:  Sit>Supine:not performed  Supine>Sit: not performed    Transfers:  Sit<>Stand: to/from w/c, to/from recumbent stepper w/ HW and SBA  Stand Pivot Transfer: w/c<>recumbent stepper w/ HW and SBA    Gait:  Amb 40ft w/ HW and SBA. Occasional cueing for proper placement of HW. No LOB noted. Distance limited 2* to fatigue on this date.     Therex:  BLE therex 2x10 reps:   -AP  -LAQ  -Hip Flexion   -GS    Additional Treatment:  -Recumbent stepper x15 min    Patient left up in chair with call button in reach.    Assessment:  Siomara Flores is a 86 y.o. female with a medical diagnosis of Closed displaced intertrochanteric fracture of left femur.  Pt tolerated treatment well, and will continue to benefit from PT services at this time. Continue with PT POC as indicated.    Rehab identified problem list/impairments: weakness, impaired endurance, impaired functional mobilty, impaired self care skills, gait instability, impaired balance, decreased upper extremity function, decreased lower extremity function, pain, orthopedic precautions    Rehab potential is good.    Activity tolerance: Good    Discharge recommendations: home with home health(with A/S for  safety, pt repts sister looking into NH placeme)     Barriers to discharge: Inaccessible home environment, Decreased caregiver support    Equipment recommendations: walker, papi, commode, tub bench(HW or QC pending progress)     GOALS:   Multidisciplinary Problems     Physical Therapy Goals        Problem: Physical Therapy Goal    Goal Priority Disciplines Outcome Goal Variances Interventions   Physical Therapy Goal     PT, PT/OT Ongoing (interventions implemented as appropriate)     Description:  Goals to be met by:6/10/19    Patient will increase functional independence with mobility by performin. Supine to sit with supervision  2. Sit to supine with supervision  3. Sit to stand transfer with Stand-by Assistance. Met (2019)  4. Bed to chair transfer with Stand-by Assistance using hemiwalker or least restrictive assistive device. Met (2019)  5. Gait  x 100 feet with Stand-by Assistance using  hemiwalker or least restrictive assistive device. REVISE 2019   REVISED GOAL: gait x 75 feet w/ HW or least restrictive device and SBA = not met  6. Ascend/Descend 4 inch curb step with Contact Guard Assistance using  hemiwalker or least restrictive assistive device. Met (2019)  7. Stand for 3 minutes with Contact Guard Assistance using  hemiwalker or least restrictive assistive device, intermittently,  and perform an activity  8. Lower extremity exercise program x20 reps per handout, with assistance as needed and gym therex  NEW GOAL 2019 9. Patient will propel w/c w/ RUE and RLE or w/ BLE 75 feet including two turns w/ SBA= not met                         PLAN:    Patient to be seen 5 x/week  to address the above listed problems via gait training, therapeutic activities, therapeutic exercises, wheelchair management/training  Plan of Care expires: 19  Plan of Care reviewed with: patient    Vita Winkler, PTA  2019

## 2019-06-04 LAB
ANION GAP SERPL CALC-SCNC: 6 MMOL/L (ref 8–16)
BASOPHILS # BLD AUTO: 0.06 K/UL (ref 0–0.2)
BASOPHILS NFR BLD: 1.3 % (ref 0–1.9)
BUN SERPL-MCNC: 16 MG/DL (ref 8–23)
CALCIUM SERPL-MCNC: 9.4 MG/DL (ref 8.7–10.5)
CHLORIDE SERPL-SCNC: 98 MMOL/L (ref 95–110)
CO2 SERPL-SCNC: 32 MMOL/L (ref 23–29)
CREAT SERPL-MCNC: 0.6 MG/DL (ref 0.5–1.4)
DIFFERENTIAL METHOD: ABNORMAL
EOSINOPHIL # BLD AUTO: 0.2 K/UL (ref 0–0.5)
EOSINOPHIL NFR BLD: 4.2 % (ref 0–8)
ERYTHROCYTE [DISTWIDTH] IN BLOOD BY AUTOMATED COUNT: 18.3 % (ref 11.5–14.5)
EST. GFR  (AFRICAN AMERICAN): >60 ML/MIN/1.73 M^2
EST. GFR  (NON AFRICAN AMERICAN): >60 ML/MIN/1.73 M^2
GLUCOSE SERPL-MCNC: 88 MG/DL (ref 70–110)
HCT VFR BLD AUTO: 32.7 % (ref 37–48.5)
HGB BLD-MCNC: 9.7 G/DL (ref 12–16)
IMM GRANULOCYTES # BLD AUTO: 0.01 K/UL (ref 0–0.04)
IMM GRANULOCYTES NFR BLD AUTO: 0.2 % (ref 0–0.5)
LYMPHOCYTES # BLD AUTO: 1.3 K/UL (ref 1–4.8)
LYMPHOCYTES NFR BLD: 28 % (ref 18–48)
MAGNESIUM SERPL-MCNC: 2 MG/DL (ref 1.6–2.6)
MCH RBC QN AUTO: 26.6 PG (ref 27–31)
MCHC RBC AUTO-ENTMCNC: 29.7 G/DL (ref 32–36)
MCV RBC AUTO: 90 FL (ref 82–98)
MONOCYTES # BLD AUTO: 0.4 K/UL (ref 0.3–1)
MONOCYTES NFR BLD: 9.3 % (ref 4–15)
NEUTROPHILS # BLD AUTO: 2.7 K/UL (ref 1.8–7.7)
NEUTROPHILS NFR BLD: 57 % (ref 38–73)
NRBC BLD-RTO: 0 /100 WBC
PHOSPHATE SERPL-MCNC: 3.5 MG/DL (ref 2.7–4.5)
PLATELET # BLD AUTO: 243 K/UL (ref 150–350)
PMV BLD AUTO: 10.8 FL (ref 9.2–12.9)
POTASSIUM SERPL-SCNC: 3.9 MMOL/L (ref 3.5–5.1)
RBC # BLD AUTO: 3.65 M/UL (ref 4–5.4)
SODIUM SERPL-SCNC: 136 MMOL/L (ref 136–145)
WBC # BLD AUTO: 4.71 K/UL (ref 3.9–12.7)

## 2019-06-04 PROCEDURE — 80048 BASIC METABOLIC PNL TOTAL CA: CPT

## 2019-06-04 PROCEDURE — 25000003 PHARM REV CODE 250: Performed by: NURSE PRACTITIONER

## 2019-06-04 PROCEDURE — 36415 COLL VENOUS BLD VENIPUNCTURE: CPT

## 2019-06-04 PROCEDURE — 97530 THERAPEUTIC ACTIVITIES: CPT

## 2019-06-04 PROCEDURE — 83735 ASSAY OF MAGNESIUM: CPT

## 2019-06-04 PROCEDURE — 97116 GAIT TRAINING THERAPY: CPT

## 2019-06-04 PROCEDURE — 11000004 HC SNF PRIVATE

## 2019-06-04 PROCEDURE — 97110 THERAPEUTIC EXERCISES: CPT

## 2019-06-04 PROCEDURE — 25000003 PHARM REV CODE 250: Performed by: HOSPITALIST

## 2019-06-04 PROCEDURE — 63600175 PHARM REV CODE 636 W HCPCS: Performed by: HOSPITALIST

## 2019-06-04 PROCEDURE — 85025 COMPLETE CBC W/AUTO DIFF WBC: CPT

## 2019-06-04 PROCEDURE — 25000242 PHARM REV CODE 250 ALT 637 W/ HCPCS: Performed by: HOSPITALIST

## 2019-06-04 PROCEDURE — 97535 SELF CARE MNGMENT TRAINING: CPT

## 2019-06-04 PROCEDURE — 84100 ASSAY OF PHOSPHORUS: CPT

## 2019-06-04 RX ADMIN — ACETAMINOPHEN 1000 MG: 325 TABLET ORAL at 05:06

## 2019-06-04 RX ADMIN — SENNOSIDES AND DOCUSATE SODIUM 1 TABLET: 8.6; 5 TABLET ORAL at 08:06

## 2019-06-04 RX ADMIN — FERROUS SULFATE TAB EC 325 MG (65 MG FE EQUIVALENT) 325 MG: 325 (65 FE) TABLET DELAYED RESPONSE at 03:06

## 2019-06-04 RX ADMIN — ACETAMINOPHEN 1000 MG: 325 TABLET ORAL at 01:06

## 2019-06-04 RX ADMIN — ENOXAPARIN SODIUM 30 MG: 100 INJECTION SUBCUTANEOUS at 04:06

## 2019-06-04 RX ADMIN — ATENOLOL 12.5 MG: 25 TABLET ORAL at 08:06

## 2019-06-04 RX ADMIN — FERROUS SULFATE TAB EC 325 MG (65 MG FE EQUIVALENT) 325 MG: 325 (65 FE) TABLET DELAYED RESPONSE at 08:06

## 2019-06-04 RX ADMIN — VERAPAMIL HYDROCHLORIDE 80 MG: 80 TABLET, FILM COATED ORAL at 08:06

## 2019-06-04 RX ADMIN — TIOTROPIUM BROMIDE 18 MCG: 18 CAPSULE ORAL; RESPIRATORY (INHALATION) at 08:06

## 2019-06-04 RX ADMIN — ACETAMINOPHEN 1000 MG: 325 TABLET ORAL at 09:06

## 2019-06-04 NOTE — PT/OT/SLP PROGRESS
Occupational Therapy  Treatment    Siomara Flores   MRN: [id number]   Admitting Diagnosis: Closed displaced intertrochanteric fracture of left femur    OT Date of Treatment: 06/04/19       Billable Minutes:  Self Care/Home Management 15 and Therapeutic Exercise 38    General Precautions: Standard, fall  Orthopedic Precautions: LUE non weight bearing, L LE Weightbearing as tolerated    HIP:         -range of motion as tolerated     - weight bearing as tolerated   SHOULDER:     - range of motion: PROm x 1 weeks then AAROM x 2 weeks then AROM     - NWB   WRIST:      - NWB        -continue brace, remove for range of motion gentle. range of motion of fingers.    Braces: UE brace, UE Sling(L UE)       Subjective:  Communicated with patient prior to session.    Pain/Comfort  Pain Rating 1: 0/10  Pain Rating Post-Intervention 1: 0/10    Objective:       Occupational Performance:    Functional Mobility/Transfers:  · Patient completed Sit <> Stand Transfer with stand by assistance  with  no assistive device and grab bars(s)   · Patient completed Bedside chair <> w/c Transfer using Stand Pivot technique with stand by assistance with no assistive device  · Patient completed Toilet Transfer w/c<>toilet Stand Pivot technique with stand by assistance with  grab bars    Activities of Daily Living:  · Grooming: stand by assistance oral care, washing/drying R hand seated w/c at sink  · Upper Body Dressing: [place] and donning sling and swathe and R hand wrist brace    · Toileting: moderate assistance      [facility] 6 Click:  AMPA Total Score: 17    OT Exercises:   RUE ROM exercise using 2 lb dumbell x 3 sets 10 reps each in all available pain free ranges to increase strength and endurance needed for adls.     Gentle AAROM 2 x 10 reps each of L elbow flexion/extension, supination/pronation, shoulder flexion/extension, shoulder abduction/adduction and L PIP/DIP/MCP flexion and extension to increase and maintain ROM of LUE and promote  normal movements.     Patient left up in chair with call button in reach and all needs met.     ASSESSMENT:  Siomara Flores is a 86 y.o. female with a medical diagnosis of Closed displaced intertrochanteric fracture of left femur and presents with the deficits listed below. Patient tolerated treatment session and was motivated to complete tasks. Patient continues to benefit from skilled OT services to achieve maximal independence.    Rehab identified problem list/impairments: weakness, impaired endurance, impaired self care skills, impaired functional mobilty, gait instability, impaired balance, decreased upper extremity function, decreased lower extremity function, pain, orthopedic precautions    Rehab potential is good    Activity tolerance: Good    Discharge recommendations: home with home health     Barriers to discharge: Decreased caregiver support     Equipment recommendations: walker, papi, commode, tub bench(Need to confirm with family (Patient reports she has equipment from her ))     GOALS:   Multidisciplinary Problems     Occupational Therapy Goals        Problem: Occupational Therapy Goal    Goal Priority Disciplines Outcome Interventions   Occupational Therapy Goal     OT, PT/OT Ongoing (interventions implemented as appropriate)    Description:  Goals to be met by: 6/17/2019    Patient will increase functional independence with ADLs by performing:    UE Dressing with Minimal Assistance.  LE Dressing with Minimal Assistance.  Grooming while standing at sink with Stand-by Assistance.  Toileting from bedside commode with Minimal Assistance for hygiene and clothing management. Met 6/3/2019  Bathing with Minimal Assistance.-MET 5/26/2019  Supine to sit with Stand-by Assistance /c HOB flat and no handrails.  Stand pivot transfers with Contact Guard Assistance.-MET. 5/26/2019  Toilet transfer to bedside commode with Contact Guard Assistance.-met 5/26/2019  Right Upper extremity exercise program 3 x 10  reps per handout, with independence.  Patient will complete a standing activity for 8 min with S in order to perform self care tasks.   Met                      Plan:  Patient to be seen 5 x/week to address the above listed problems via therapeutic exercises, therapeutic activities, self-care/home management  Plan of Care expires: 06/10/19  Plan of Care reviewed with: patient    DAGMAR Tate  06/04/2019

## 2019-06-04 NOTE — PROGRESS NOTES
[facility]                                  Skilled Nursing Facility                   Progress Note     Admit Date: 2019  RAVI 2019  Principal Problem:  Closed displaced intertrochanteric fracture of left femurHPI obtained from patient interview and chart review     Chief Complaint:  Pt and visitors requested NP to bedside    HPI:   Mrs. Flores is a 86 year old. female Faith with PMHx of  Bronchiectasis, SVT who presents to SNF following a hospitalization for left intertrochanteric, left proximal humerus, and left distal radius fracture s/p left femur intramedullary marc after a mechanical fall. She was admitted to [facility] from  until . Dr. Owusu performed a Cephalomedullary nail fixation of left intertrochanteric femur fracture on 5/3/19.     Interval history:  Patient has visitors requesting medical in therapy update.  Patient went to ortho appointment on .  Ortho set new precaution regimen for patient, please see below.  Patient's visitors are requesting an extension for patient to regain more strength so that she be able to return home.  Patient is progressing very slowly with therapy.  She arrived very debilitated and is making slow progress due to the nonweightbearing status to her left shoulder and wrist.     Past Medical History: Patient has a past medical history of Bronchiectasis without complication, Hypertension, and Supraventricular tachycardia.    Past Surgical History: Patient has a past surgical history that includes Cholecystectomy; Tonsillectomy; and Intramedullary rodding of femur (Left, 5/3/2019).    Social History: Patient reports that she has never smoked. She has never used smokeless tobacco. She reports that she does not drink alcohol or use drugs.  Patient's ill   about a month ago    Family History:  No family significant history to report    Allergies: Patient is  allergic to aspirin (bulk).    ROS  Constitutional: Negative for fever or fatigue.   Eyes: Negative for blurred vision, double vision and discharge.   Respiratory: Negative for cough, shortness of breath and wheezing.    Cardiovascular: Negative for chest pain, palpitations, claudication, and leg swelling.   Gastrointestinal: Negative for abdominal pain, constipation, diarrhea, nausea and vomiting.   Genitourinary: Negative for dysuria, frequency and urgency.   Musculoskeletal:  + generalized weakness, pain to left arm and hip. Negative for back pain and myalgias.   Skin: Negative for itching and rash.   Neurological: Negative for dizziness, speech change, and headaches.   Psychiatric/Behavioral: + for depression. The patient is not nervous/anxious.      PEx  Temp:  [97.8 °F (36.6 °C)]   Pulse:  [76-77]   Resp:  [18]   BP: (105-146)/(59-70)   SpO2:  [99 %]      Constitutional: Patient appears frail  and in no distress   HENT:   Head: Normocephalic and atraumatic.   Eyes: Pupils are equal, round, and reactive to light.   Neck: Normal range of motion. Neck supple.   Cardiovascular: Normal rate, regular rhythm and normal heart sounds.    Pulmonary/Chest: Effort normal and breath sounds are clear  Abdominal: Soft. Bowel sounds are normal.   Musculoskeletal: decreased range of motion to left arm, sling in place.   Neurological: Alert and oriented to person, place, and time.   Skin: Skin is warm and dry.  Patient has skin overgrowth to scalp. Incisions 2cm to left hip and 3cm to left leg- well-approximated no erythema, no swelling noted to periwound area  Psychiatric: mood is normal and improving daily      Assessment and Plan:    Closed displaced intratrochanteric fracture of left femur  s/p left Cephalomedullary nail fixation of left  femur fracture on 5/3/19  Postoperative pain  - WBAT to LLE, NWB to LUE  - 5/14 initiated acetaminophen 1 g q.8 hours  - 5/15 pain is better controlled today with scheduled Tylenol  - 6/3  patient went to her ortho follow-up appointment on 05/31-    HIP:          -range of motion as tolerated     - weight bearing as tolerated  SHOULDER:      - range of motion: PROm x 1 weeks then AAROM x 2 weeks then AROM     - NWB  WRIST:      - NWB    -continue brace, remove for range of motion gentle. range of motion of fingers    CONTINUE      Hx of HTN  - currently patient is experiencing hypotension   - 5/13 decrease losartan from 50 mg to 25 mg daily, continue atenolol 25 mg daily.  Holding parameters in place to hold for systolic blood pressure less than 100.   - 5/14 holding home losartan at this time. patient's 24 hr blood pressure 102/49 to 119/50.   - 5/15 decrease atenolol to 12.5 mg daily; 24 hr blood pressure today is 95/46 to 108/54.    - 5/16 BP slightly improved with the decrease in atenolol dose. Patient's 24 hr blood pressure range is 98/48 to 129/62  - 5/20 patient's 24 hr blood pressure ranges 96/50 to 121/57  - 5/23 patient's 24 hr blood pressure ranges 107/52 to 149/67, the systolic of 149 is isolated and due to increase in pain. Will continue to monitor blood pressures.   - 5/27 patient's 24 hr blood pressure range is 136/52 to 106/50    Acute blood loss anemia  - continue ferrous sulfate 325 mg t.i.d.  - patient is a [employer] who refuses blood transfusions  - Post-op H/H 6.9/21.8 with baseline 8.4/26.9  - pediatric blood draws only, avoid IVF  - 5/13 initiated procrit 20,000 units   - 5/16 H&H improved to 7.0/23 from 6.3/20 after Procrit injection and continue iron supplementation  - 5/20 H&H improved to 7.7/25  - 5/23 H&H improved to 8.1/26  - 5/27 H&H improved to 8.9/30    Severe malnutrition  - Energy Intake: <75% of estimated energy requirement for > 3 months  - Body Fat Depletion: moderate depletion of orbitals and triceps and buccal area  - Muscle Mass Depletion: severe depletion of clavicle region and scapular region moderate at temples, hands and calf  - Weight Loss: > 7.5% %  x < 3 months   - Continue regular diet, honor food intolerances, recommend change boost breeze to boost plus TID no chocolate  per pt request.  - 5/23 patient states better appetite increased p.o. consumption    Onychogryphosis  - likely from  Insufficient consumtion of essential nutrients  - 5/14- Message sent to  and appointment scheduled are to make patient a Podiatry appointment to cut down toenails, patient's toenails are so long and painful it hurts her to walk. - patient to see Podiatry tomorrow morning  - 5/15 patient was able to see provider today and Podiatry.  I got her an appointment Friday before her ortho follow-up to have her toes cut.  - 5/20 patient went to podiatry appointment in all toenails were trimmed to a normal length.  Patient's toenail pain is significantly improved    Hx of SVT  - continue verapamil 80 mg TID  - 5/16 with reduction of atenolol.  Heart rate is increased to the 90s.  Will continue to assess blood pressure and heart rate daily.     Seborrheic Keratosis   - 5/15 differential diagnosis includes melanoma- ambulatory referral to Dermatology placed.  Lesion appears as a skin overgrowth to scalp that is waxy brown and tan in color, slightly raise with a bumpy appearance.  Lesion is about  5 cm in diameter and circular.     Debility   - Continue with PT/OT for gait training and strengthening and restoration of ADL's   - Encourage mobility, OOB in chair, and early ambulation as appropriate  - Fall precautions   - Monitor for bowel and bladder dysfunction  - Monitor for and prevent skin breakdown and pressure ulcers  - Continue DVT prophylaxis with  enoxaparin 30 mg daily    Hx of Bronchiectasis  - continue duo nebs q.4 hours while awake and tiotropium 18mcg daily     DC Update:  Patient given extension until 6/17 in hopes that she is strong enough to return home rather than new placement in a long-term care facility.    Future Appointments   Date Time Provider Department  [place]   6/28/2019 10:45 AM Mirna Crews PA-C [facility] ORTHO [facility]       Edel Ibrahim NP    DOS: 6/3/2019

## 2019-06-04 NOTE — PROGRESS NOTES
Ms. Flores was seen at [facility] where she is undergoing rehab following her left femur fracture.  She underwent an IM nailing by Dr. Bruce on 5/3/19.    Interval History:  She reports that she is doing well.  Pain is controlled with Tylenol 1 GM tid.  She is participating in her therapy sessions, per their note walking up to 40 feet with a papi walker.  She is progressing towards her stated goals per therapy note.  She denies fever, chills, and sweats since the time of the surgery.     Physical exam:  She is in a sling and swath to her left arm.  Radial pulse is 2+.  There is no edema about her hand.  Incision is now open to air and appears to be healing.  There is no redness or drainage present.  She has tactile stimulation to her left lower leg and palp pedal pulse x 2 to left foot.          RADS: none done today    Assessment:  Post-op visit (33 days)    Plan:  Current care, treatment plan, precautions, activity level/ modifications, limitations, rehabilitation exercises and proposed future treatment were discussed with the patient. We discussed the need to monitor for changes in symptoms and condition and report them to the physician.  Discussed importance of compliance with all appointments and follow up examinations.     - Pain medication: refill was not needed,   - Pain medication refill policy provided to patient for review, no   - Patient to maintain NWB to LUE and WEIGHT BEAR AS TOLERATED to LLE.  - Patient is to return to clinic as scheduled on 6/28/19, appointment with SAGE Rodarte  - DVT ppx Lovenox 30 mg daily.    Future Appointments   Date Time Provider Department Center   6/28/2019 10:45 AM Mirna Cresw PA-C [facility] ORTHO Jayden Mejia        If there are any questions prior to scheduled follow up, the patient was instructed to contact the office

## 2019-06-04 NOTE — PROGRESS NOTES
[facility] - Skilled Nursing Care  Adult Nutrition  Progress Note    SUMMARY   Recommendations    Recommendation/Intervention: Regular diet, lactose free  Goals: PO to meet 85% of needs with ONS by next RD visit  Nutrition Goal Status: progressing towards goal  Reason for Assessment    Reason For Assessment: RD follow-up  Diagnosis: (debilty sp fall , sp rodding L femur)  Relevant Medical History: HTN, anemia,   Interdisciplinary Rounds: attended  General Information Comments: taking strawberry boost  Nutrition Discharge Planning: Dc on regular diet with ONS of choice for weight gain and wound healing    Nutrition/Diet History    Patient Reported Diet/Restrictions/Preferences: general  Typical Food/Fluid Intake: lactose free milk, is willing to try boost plus rather than boost breeze  Food Preferences: sensitive stomach, will not eat beans, or cruciferous vegetable, corse foods,   Spiritual, Cultural Beliefs, Temple Practices, Values that Affect Care: yes  Food Allergies: NKFA  Factors Affecting Nutritional Intake: altered gastrointestinal function, cultural/Restorationism beliefs, decreased appetite(may have been too distracted or too exhausted to eat and take care of herself)    Anthropometrics    Temp: 97.8 °F (36.6 °C)  Height Method: Stated  Height: 5' (152.4 cm)  Height (inches): 60 in  Weight Method: Standard Scale  Weight: 32.6 kg (71 lb 13.9 oz)  Weight (lb): 71.87 lb  Ideal Body Weight (IBW), Female: 100 lb  % Ideal Body Weight, Female (lb): 80.03 lb  BMI (Calculated): 15.7  BMI Grade: less than 16 protein-energy malnutrition grade III  Weight Loss: unintentional  Usual Body Weight (UBW), k.8 kg  Weight Change Amount: (wt loss since admit, small appetite, oral supplement TID)  % Usual Body Weight: 87.02  % Weight Change From Usual Weight: -13.16 %       Lab/Procedures/Meds  Pertinent Labs Reviewed: reviewed  Pertinent Medications Reviewed: reviewed       Estimated/Assessed Needs    Weight Used For Calorie  Calculations: 42 kg (92 lb 9.5 oz)(UBW in elderly )  Energy Calorie Requirements (kcal): 5426-7929  Energy Need Method: Kcal/kg(30-35 kcal/kg)  Protein Requirements: 55-63gm  Weight Used For Protein Calculations: 42 kg (92 lb 9.5 oz)(x 1.3-1.5 2/2 malnutrition)  Fluid Requirements (mL): per MD  Estimated Fluid Requirement Method: RDA Method  RDA Method (mL): 1260  CHO Requirement: -      Nutrition Prescription Ordered    Current Diet Order: Regular, lactose free  Nutrition Order Comments: pt did not accept other ONS products   Oral Nutrition Supplement: [place] plus strawberry TID     Evaluation of Received Nutrient/Fluid Intake    Energy Calories Required: not meeting needs  Protein Required: not meeting needs  Fluid Required: meeting needs  Comments: weight loss desirable, pt will only eat so much , encourage po and snacks   Tolerance: tolerating  % Intake of Estimated Energy Needs: 50 - 75 %  % Meal Intake: 50 - 75 %    Nutrition Risk    Level of Risk/Frequency of Follow-up: low     Assessment and Plan      Severe malnutrition  Malnutrition in the context of Social/Environmental Circumstances     Related to (etiology):  Starvation and lack of self care while caregiver, grief response, reduced appetite and po intake     Signs and Symptoms (as evidenced by):  Energy Intake: <75% of estimated energy requirement for > 3 months  Body Fat Depletion: moderate depletion of orbitals and triceps and buccal area  Muscle Mass Depletion: severe depletion of clavicle region and scapular region moderate at temples, hands and calf  Weight Loss: > 7.5% % x < 3 months  Fluid Accumulation: mild    Ongoing  Monitor and Evaluation    Food and Nutrient Intake: food and beverage intake  Physical Activity and Function: nutrition-related ADLs and IADLs  Anthropometric Measurements: weight change  Biochemical Data, Medical Tests and Procedures: electrolyte and renal panel  Nutrition-Focused Physical Findings: overall appearance      Malnutrition Assessment   5/10/19  Malnutrition Type: social/environmental circumstances  Energy Intake: severe energy intake  Skin (Micronutrient): dry  Hair/Scalp (Micronutrient): dull, dry  Eyes (Micronutrient): conjunctiva dull  Teeth (Micronutrient): (plates)  Neck/Chest (Micronutrient): bony prominence, muscle wasting, subcutaneous fat loss  Musculoskeletal/Lower Extremities: edema, joints painful, muscle wasting, subcutaneous fat loss       Weight Loss (Malnutrition): greater than 7.5% in 3 months  Subcutaneous Fat (Malnutrition): severe depletion  Muscle Mass (Malnutrition): severe depletion  Fluid Accumulation (Malnutrition): mild   Orbital Region (Subcutaneous Fat Loss): moderate depletion  Upper Arm Region (Subcutaneous Fat Loss): moderate depletion  Thoracic and Lumbar Region: moderate depletion   Quaker Region (Muscle Loss): moderate depletion  Clavicle Bone Region (Muscle Loss): severe depletion  Clavicle and Acromion Bone Region (Muscle Loss): severe depletion  Scapular Bone Region (Muscle Loss): severe depletion  Dorsal Hand (Muscle Loss): moderate depletion  Posterior Calf Region (Muscle Loss): mild depletion            Severe Weight Loss (Malnutrition): greater than 7.5% in 3 months    Nutrition Follow-Up    RD Follow-up?: Yes

## 2019-06-04 NOTE — PT/OT/SLP PROGRESS
Physical Therapy  Treatment    Siomara Flores   MRN: [id number]   Admitting Diagnosis: Closed displaced intertrochanteric fracture of left femur    PT Received On: 06/04/19  Total Time (min): (--)       Billable Minutes:  Gait Training 12, Therapeutic Activity 13 and Therapeutic Exercise 20    Treatment Type: Treatment  PT/PTA: PTA     PTA Visit Number: 3       General Precautions: Standard, fall  Orthopedic Precautions: LUE non weight bearing, LLE weight bearing as tolerated   Braces: Sling and swathe    Communicated with nursing prior to session.  Pt agreed to work with therapy.     Pain/Comfort  Pain Rating 1: 0/10  Pain Rating Post-Intervention 1: 0/10    Objective:  Patient found seated bedside chair.        AM-PAC 6 CLICK MOBILITY  Total Score:16    Bed Mobility:  Sit>Supine: not performed  Supine>Sit: not performed    Transfers:  Sit<>Stand: to/from bedside chair, to/from toilet, to/from recumbent stepper w/ HW and SBA  Stand Pivot Transfer: bedside chair to w/c w/ HW and SBA; wc<>recumbent stepper w/ HW and SBA; wc<>toilet w/ HW and SBA    Gait:  Amb  46ft and 12ft w/ HW and SBA. No LOB noted. Slow pace.     Therex:  BLE therex 2x12 reps:    -AP   -LAQ   -Hip Flexion    -GS    Additional Treatment:  -Card given to patient regarding scheduling a family training.   -Recumbent stepper x15 min L2 using RUE and BLE.     Patient left up in chair with call button in reach and nursing notified.    Assessment:  Siomara Flores is a 86 y.o. female with a medical diagnosis of Closed displaced intertrochanteric fracture of left femur.  Pt tolerated treatment well, and will continue to benefit from PT services. Continue with PT POC as indicated.    Rehab identified problem list/impairments: weakness, impaired endurance, impaired functional mobilty, impaired self care skills, gait instability, impaired balance, decreased upper extremity function, decreased lower extremity function, pain, orthopedic precautions    Rehab  potential is good.    Activity tolerance: Good    Discharge recommendations: home with home health(with A/S for safety, pt repts sister looking into NH placeme)     Barriers to discharge: Inaccessible home environment, Decreased caregiver support    Equipment recommendations: walker, papi, commode, tub bench(HW or QC pending progress)     GOALS:   Multidisciplinary Problems     Physical Therapy Goals        Problem: Physical Therapy Goal    Goal Priority Disciplines Outcome Goal Variances Interventions   Physical Therapy Goal     PT, PT/OT Ongoing (interventions implemented as appropriate)     Description:  Goals to be met by:6/10/19    Patient will increase functional independence with mobility by performin. Supine to sit with supervision  2. Sit to supine with supervision  3. Sit to stand transfer with Stand-by Assistance. Met (2019)  4. Bed to chair transfer with Stand-by Assistance using hemiwalker or least restrictive assistive device. Met (2019)  5. Gait  x 100 feet with Stand-by Assistance using  hemiwalker or least restrictive assistive device. REVISE 2019   REVISED GOAL: gait x 75 feet w/ HW or least restrictive device and SBA = not met  6. Ascend/Descend 4 inch curb step with Contact Guard Assistance using  hemiwalker or least restrictive assistive device. Met (2019)  7. Stand for 3 minutes with Contact Guard Assistance using  hemiwalker or least restrictive assistive device, intermittently,  and perform an activity  8. Lower extremity exercise program x20 reps per handout, with assistance as needed and gym therex  NEW GOAL 2019 9. Patient will propel w/c w/ RUE and RLE or w/ BLE 75 feet including two turns w/ SBA= not met                         PLAN:    Patient to be seen 5 x/week  to address the above listed problems via gait training, therapeutic activities, therapeutic exercises, wheelchair management/training  Plan of Care expires: 19  Plan of Care reviewed with:  patient    Vita Winkler, PTA  06/04/2019

## 2019-06-04 NOTE — PROGRESS NOTES
[facility]                                  Skilled Nursing Facility                   Progress Note     Admit Date: 2019  RAVI 2019  Principal Problem:  Closed displaced intertrochanteric fracture of left femurHPI obtained from patient interview and chart review     Chief Complaint:  Revaluation of medical treatment and therapy status: Lab review    HPI:   Mrs. Flores is a 86 year old. 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 She was admitted to [facility] from  until . Dr. Owusu performed a Cephalomedullary nail fixation of left intertrochanteric femur fracture on 5/3/19.     Interval history:  All labs reviewed. k 3.9, Cr stable at 0.6.  No leukocytosis.  H&H increased to 9.7/32 from 9.2/31.  Platelets 243.  Patient states today that she wishes to go to a nursing home after discharge from SNF. Patient progessing well with PT/OT. Patient medically stable. Continuing to follow and treat all acute and chronic conditions.    Past Medical History: Patient has a past medical history of Bronchiectasis without complication, Hypertension, and Supraventricular tachycardia.    Past Surgical History: Patient has a past surgical history that includes Cholecystectomy; Tonsillectomy; and Intramedullary rodding of femur (Left, 5/3/2019).    Social History: Patient reports that she has never smoked. She has never used smokeless tobacco. She reports that she does not drink alcohol or use drugs.  Patient's ill   about a month ago    Family History:  No family significant history to report    Allergies: Patient is allergic to aspirin (bulk).    ROS  Constitutional: Negative for fever or fatigue.   Eyes: Negative for blurred vision,  double vision and discharge.   Respiratory: Negative for cough, shortness of breath and wheezing.    Cardiovascular: Negative for chest pain, palpitations, claudication, and leg swelling.   Gastrointestinal: Negative for abdominal pain, constipation, diarrhea, nausea and vomiting.   Genitourinary: Negative for dysuria, frequency and urgency.   Musculoskeletal:  + generalized weakness, pain to left arm and hip. Negative for back pain and myalgias.   Skin: Negative for itching and rash.   Neurological: Negative for dizziness, speech change, and headaches.   Psychiatric/Behavioral: + for depression. The patient is not nervous/anxious.      PEx  Temp:  [97.6 °F (36.4 °C)-98.3 °F (36.8 °C)]   Pulse:  [74-81]   Resp:  [18]   BP: ()/(54-61)   SpO2:  [98 %-99 %]      Constitutional: Patient appears frail  and in no distress   HENT:   Head: Normocephalic and atraumatic.   Eyes: Pupils are equal, round, and reactive to light.   Neck: Normal range of motion. Neck supple.   Cardiovascular: Normal rate, regular rhythm and normal heart sounds.    Pulmonary/Chest: Effort normal and breath sounds are clear  Abdominal: Soft. Bowel sounds are normal.   Musculoskeletal: decreased range of motion to left arm, sling in place.   Neurological: Alert and oriented to person, place, and time.   Skin: Skin is warm and dry.  Patient has skin overgrowth to scalp. Incisions 2cm to left hip and 3cm to left leg- well-approximated no erythema, no swelling noted to periwound area  Psychiatric: mood is normal and improving daily      Assessment and Plan:    CONTINUE     Closed displaced intratrochanteric fracture of left femur  s/p left Cephalomedullary nail fixation of left  femur fracture on 5/3/19  Postoperative pain  - WBAT to LLE, NWB to LUE  - 5/14 initiated acetaminophen 1 g q.8 hours  - 5/15 pain is better controlled today with scheduled Tylenol  - 6/3 patient went to her ortho follow-up appointment on 05/31-    HIP:          -range of  motion as tolerated     - weight bearing as tolerated  SHOULDER:      - range of motion: PROm x 1 weeks then AAROM x 2 weeks then AROM     - NWB  WRIST:      - NWB    -continue brace, remove for range of motion gentle. range of motion of fingers     Hx of HTN  - currently patient is experiencing hypotension   - 5/13 decrease losartan from 50 mg to 25 mg daily, continue atenolol 25 mg daily.  Holding parameters in place to hold for systolic blood pressure less than 100.   - 5/14 holding home losartan at this time. patient's 24 hr blood pressure 102/49 to 119/50.   - 5/15 decrease atenolol to 12.5 mg daily; 24 hr blood pressure today is 95/46 to 108/54.    - 5/16 BP slightly improved with the decrease in atenolol dose. Patient's 24 hr blood pressure range is 98/48 to 129/62  - 5/20 patient's 24 hr blood pressure ranges 96/50 to 121/57  - 5/23 patient's 24 hr blood pressure ranges 107/52 to 149/67, the systolic of 149 is isolated and due to increase in pain. Will continue to monitor blood pressures.   - 5/27 patient's 24 hr blood pressure range is 136/52 to 106/50    Acute blood loss anemia  - continue ferrous sulfate 325 mg t.i.d.  - patient is a [employer] who refuses blood transfusions  - Post-op H/H 6.9/21.8 with baseline 8.4/26.9  - pediatric blood draws only, avoid IVF  - 5/13 initiated procrit 20,000 units   - 5/16 H&H improved to 7.0/23 from 6.3/20 after Procrit injection and continue iron supplementation  - 5/20 H&H improved to 7.7/25  - 5/23 H&H improved to 8.1/26  - 5/27 H&H improved to 8.9/30    Severe malnutrition  - Energy Intake: <75% of estimated energy requirement for > 3 months  - Body Fat Depletion: moderate depletion of orbitals and triceps and buccal area  - Muscle Mass Depletion: severe depletion of clavicle region and scapular region moderate at temples, hands and calf  - Weight Loss: > 7.5% % x < 3 months   - Continue regular diet, honor food intolerances, recommend change boost breeze to  boost plus TID no chocolate  per pt request.  - 5/23 patient states better appetite increased p.o. consumption    Onychogryphosis  - likely from  Insufficient consumtion of essential nutrients  - 5/14- Message sent to  and appointment scheduled are to make patient a Podiatry appointment to cut down toenails, patient's toenails are so long and painful it hurts her to walk. - patient to see Podiatry tomorrow morning  - 5/15 patient was able to see provider today and Podiatry.  I got her an appointment Friday before her ortho follow-up to have her toes cut.  - 5/20 patient went to podiatry appointment in all toenails were trimmed to a normal length.  Patient's toenail pain is significantly improved    Hx of SVT  - continue verapamil 80 mg TID  - 5/16 with reduction of atenolol.  Heart rate is increased to the 90s.  Will continue to assess blood pressure and heart rate daily.     Seborrheic Keratosis   - 5/15 differential diagnosis includes melanoma- ambulatory referral to Dermatology placed.  Lesion appears as a skin overgrowth to scalp that is waxy brown and tan in color, slightly raise with a bumpy appearance.  Lesion is about  5 cm in diameter and circular.     Debility   - Continue with PT/OT for gait training and strengthening and restoration of ADL's   - Encourage mobility, OOB in chair, and early ambulation as appropriate  - Fall precautions   - Monitor for bowel and bladder dysfunction  - Monitor for and prevent skin breakdown and pressure ulcers  - Continue DVT prophylaxis with  enoxaparin 30 mg daily    Hx of Bronchiectasis  - continue duo nebs q.4 hours while awake and tiotropium 18mcg daily     DC Update:  Patient states today that she would like to go to a nursing home upon discharge. Her sister-in-law and niece are looking into a nursing home on the [facility] [address].   updated.     Future Appointments   Date Time Provider Department Center   6/28/2019 10:45 AM Mirna HILL  [facility] Jayden Ibrahim NP    DOS: 6/4/2019

## 2019-06-05 PROCEDURE — 11000004 HC SNF PRIVATE

## 2019-06-05 PROCEDURE — 97535 SELF CARE MNGMENT TRAINING: CPT

## 2019-06-05 PROCEDURE — 63600175 PHARM REV CODE 636 W HCPCS: Performed by: HOSPITALIST

## 2019-06-05 PROCEDURE — 25000003 PHARM REV CODE 250: Performed by: NURSE PRACTITIONER

## 2019-06-05 PROCEDURE — 97530 THERAPEUTIC ACTIVITIES: CPT

## 2019-06-05 PROCEDURE — 97116 GAIT TRAINING THERAPY: CPT

## 2019-06-05 PROCEDURE — 25000242 PHARM REV CODE 250 ALT 637 W/ HCPCS: Performed by: HOSPITALIST

## 2019-06-05 PROCEDURE — 25000003 PHARM REV CODE 250: Performed by: HOSPITALIST

## 2019-06-05 PROCEDURE — 97110 THERAPEUTIC EXERCISES: CPT

## 2019-06-05 RX ADMIN — FERROUS SULFATE TAB EC 325 MG (65 MG FE EQUIVALENT) 325 MG: 325 (65 FE) TABLET DELAYED RESPONSE at 02:06

## 2019-06-05 RX ADMIN — TIOTROPIUM BROMIDE 18 MCG: 18 CAPSULE ORAL; RESPIRATORY (INHALATION) at 09:06

## 2019-06-05 RX ADMIN — ACETAMINOPHEN 1000 MG: 325 TABLET ORAL at 10:06

## 2019-06-05 RX ADMIN — VERAPAMIL HYDROCHLORIDE 80 MG: 80 TABLET, FILM COATED ORAL at 09:06

## 2019-06-05 RX ADMIN — ACETAMINOPHEN 1000 MG: 325 TABLET ORAL at 05:06

## 2019-06-05 RX ADMIN — ATENOLOL 12.5 MG: 25 TABLET ORAL at 09:06

## 2019-06-05 RX ADMIN — FERROUS SULFATE TAB EC 325 MG (65 MG FE EQUIVALENT) 325 MG: 325 (65 FE) TABLET DELAYED RESPONSE at 09:06

## 2019-06-05 RX ADMIN — FERROUS SULFATE TAB EC 325 MG (65 MG FE EQUIVALENT) 325 MG: 325 (65 FE) TABLET DELAYED RESPONSE at 10:06

## 2019-06-05 RX ADMIN — VERAPAMIL HYDROCHLORIDE 80 MG: 80 TABLET, FILM COATED ORAL at 02:06

## 2019-06-05 RX ADMIN — SENNOSIDES AND DOCUSATE SODIUM 1 TABLET: 8.6; 5 TABLET ORAL at 10:06

## 2019-06-05 RX ADMIN — ENOXAPARIN SODIUM 30 MG: 100 INJECTION SUBCUTANEOUS at 04:06

## 2019-06-05 RX ADMIN — ACETAMINOPHEN 1000 MG: 325 TABLET ORAL at 01:06

## 2019-06-05 RX ADMIN — VERAPAMIL HYDROCHLORIDE 80 MG: 80 TABLET, FILM COATED ORAL at 10:06

## 2019-06-05 NOTE — PLAN OF CARE
Problem: Occupational Therapy Goal  Goal: Occupational Therapy Goal  Goals to be met by: 6/17/2019    Patient will increase functional independence with ADLs by performing:    UE Dressing with Minimal Assistance.  LE Dressing with Minimal Assistance.  Grooming while standing at sink with Stand-by Assistance.  Toileting from bedside commode with Minimal Assistance for hygiene and clothing management. Met 6/3/2019  Bathing with Minimal Assistance.-MET 5/26/2019  Supine to sit with Stand-by Assistance /c HOB flat and no handrails.  Stand pivot transfers with Contact Guard Assistance.-MET. 5/26/2019  Toilet transfer to bedside commode with Contact Guard Assistance.-met 5/26/2019  Right Upper extremity exercise program 3 x 10 reps per handout, with independence.  Patient will complete a standing activity for 8 min with S in order to perform self care tasks.   Met     Outcome: Ongoing (interventions implemented as appropriate)  Patient goals are appropriate.

## 2019-06-05 NOTE — PLAN OF CARE
Problem: Adult Inpatient Plan of Care  Goal: Plan of Care Review  Outcome: Ongoing (interventions implemented as appropriate)  AAOx4. Free of falls throughout shift. Call light in reach and bed in lowest position. No complaints of pain. Weight bearing as tolerated to LLE, incision ASIM and closed/resurfaced. Ambulates with 4 prong cane to bathroom with stand by assist. Afebrile. Will continue to monitor.

## 2019-06-05 NOTE — PT/OT/SLP PROGRESS
Physical Therapy  Treatment    Siomara Flores   MRN: [id number]   Admitting Diagnosis: Closed displaced intertrochanteric fracture of left femur    PT Received On: 06/05/19  Total Time (min): (--)       Billable Minutes:  Gait Training 12, Therapeutic Activity 11 and Therapeutic Exercise 22    Treatment Type: Treatment  PT/PTA: PTA     PTA Visit Number: 4       General Precautions: Standard, fall  Orthopedic Precautions: LUE non weight bearing, LLE weight bearing as tolerated   Braces: Sling and swathe    Subjective:  Communicated with nursing prior to session.  Pt agreed to work with therapy.     Pain/Comfort  Pain Rating 1: 0/10  Pain Rating Post-Intervention 1: 0/10    Objective:  Patient found seated w/c.       AM-PAC 6 CLICK MOBILITY  Total Score:16    Bed Mobility:  Sit>Supine: not performed  Supine>Sit: not performed    Transfers:  Sit<>Stand: to/from w/c x4 trials w/ HW and SBA  Stand Pivot Transfer: w/c<>recumbent stepper w/ HW and SBA    Gait:  Amb x2 trials 28ft and 24ft w/ HW and SBA. No LOB noted. Slow pace. Distance limited 2* to fatigue.     Therex:  BLE therex 2x15 reps:    -AP   -LAQ   -Hip flexion    -GS   -hip abd/add    Additional Treatment:  -Recumbent stepper x15 min L2    Patient left up in chair with call button in reach.    Assessment:  Siomara Flores is a 86 y.o. female with a medical diagnosis of Closed displaced intertrochanteric fracture of left femur.  Pt tolerated treatment well, and will continue to benefit from PT services at this time. Continue with PT POC as indicated.    Rehab identified problem list/impairments: weakness, impaired endurance, impaired functional mobilty, impaired self care skills, gait instability, impaired balance, decreased upper extremity function, decreased lower extremity function, pain, orthopedic precautions    Rehab potential is good.    Activity tolerance: Good    Discharge recommendations: home with home health(with A/S for safety, pt repts sister  looking into NH placeme)     Barriers to discharge: Inaccessible home environment, Decreased caregiver support    Equipment recommendations: walker, papi, commode, tub bench(HW or QC pending progress)     GOALS:   Multidisciplinary Problems     Physical Therapy Goals        Problem: Physical Therapy Goal    Goal Priority Disciplines Outcome Goal Variances Interventions   Physical Therapy Goal     PT, PT/OT Ongoing (interventions implemented as appropriate)     Description:  Goals to be met by:6/10/19    Patient will increase functional independence with mobility by performin. Supine to sit with supervision  2. Sit to supine with supervision  3. Sit to stand transfer with Stand-by Assistance. Met (2019)  4. Bed to chair transfer with Stand-by Assistance using hemiwalker or least restrictive assistive device. Met (2019)  5. Gait  x 100 feet with Stand-by Assistance using  hemiwalker or least restrictive assistive device. REVISE 2019   REVISED GOAL: gait x 75 feet w/ HW or least restrictive device and SBA = not met  6. Ascend/Descend 4 inch curb step with Contact Guard Assistance using  hemiwalker or least restrictive assistive device. Met (2019)  7. Stand for 3 minutes with Contact Guard Assistance using  hemiwalker or least restrictive assistive device, intermittently,  and perform an activity  8. Lower extremity exercise program x20 reps per handout, with assistance as needed and gym therex  NEW GOAL 2019 9. Patient will propel w/c w/ RUE and RLE or w/ BLE 75 feet including two turns w/ SBA= not met                         PLAN:    Patient to be seen 5 x/week  to address the above listed problems via gait training, therapeutic activities, therapeutic exercises, wheelchair management/training  Plan of Care expires: 19  Plan of Care reviewed with: patient    Vita Peterson, PTA  2019

## 2019-06-05 NOTE — PT/OT/SLP PROGRESS
Occupational Therapy  Treatment    Siomara Flores   MRN: [id number]   Admitting Diagnosis: Closed displaced intertrochanteric fracture of left femur    OT Date of Treatment: 06/05/19       Billable Minutes:  Self Care/Home Management 8 and Therapeutic Exercise 42    General Precautions: Standard, fall  Orthopedic Precautions: LUE non weight bearing  Braces: UE brace, UE Sling(L UE)         Subjective:  Communicated with patient prior to session.    Pain/Comfort  Pain Rating 1: 0/10    Objective:   Patient found up in bedside chair.     Occupational Performance:    Functional Mobility/Transfers:  · Patient completed Chair <> Mat Stand Pivot technique with supervision with no assistive device    Activities of Daily Living:  · Grooming: stand by assistance performing oral hygiene standing with no AD at sink level    [facility] 6 Click:  [facility] Total Score: 17    OT Exercises: AROM supination/pronation of LUE x 3 sets 10 reps and AAROM L shoulder flexion/extension, L elbow flexion/extension, and L fingers and thumb flexion/extension x 3 sets 10 reps each to increase LUE ROM needed for ADLs. RUE AROM exercises using 1 lb dumbbell x 3 sets 10 reps in all available pain free ranges to increase strength needed for ADLs.     Patient left up in chair with call button in reach and friends present    ASSESSMENT:  Siomara Flores is a 86 y.o. female with a medical diagnosis of Closed displaced intertrochanteric fracture of left femur and presents with the deficits listed below. Decreased LUE ROM in shoulder and elbow noted. Patient's endurance and independence continues to improve. Pt. Will benefit from continued OT services to increase independence with ADLs.     Rehab identified problem list/impairments: weakness, impaired endurance, impaired self care skills, impaired functional mobilty, gait instability, impaired balance, decreased upper extremity function, decreased lower extremity function, pain, orthopedic precautions    Rehab  potential is good    Activity tolerance: Good    Discharge recommendations: home with home health     Barriers to discharge: Decreased caregiver support     Equipment recommendations: walker, papi, commode, tub bench(Need to confirm with family (Patient reports she has equipment from her ))     GOALS:   Multidisciplinary Problems     Occupational Therapy Goals        Problem: Occupational Therapy Goal    Goal Priority Disciplines Outcome Interventions   Occupational Therapy Goal     OT, PT/OT Ongoing (interventions implemented as appropriate)    Description:  Goals to be met by: 6/17/2019    Patient will increase functional independence with ADLs by performing:    UE Dressing with Minimal Assistance.  LE Dressing with Minimal Assistance.  Grooming while standing at sink with Stand-by Assistance.  Toileting from bedside commode with Minimal Assistance for hygiene and clothing management. Met 6/3/2019  Bathing with Minimal Assistance.-MET 5/26/2019  Supine to sit with Stand-by Assistance /c HOB flat and no handrails.  Stand pivot transfers with Contact Guard Assistance.-MET. 5/26/2019  Toilet transfer to bedside commode with Contact Guard Assistance.-met 5/26/2019  Right Upper extremity exercise program 3 x 10 reps per handout, with independence.  Patient will complete a standing activity for 8 min with S in order to perform self care tasks.   Met                      Plan:  Patient to be seen 5 x/week to address the above listed problems via therapeutic exercises, therapeutic activities, self-care/home management  Plan of Care expires: 06/10/19  Plan of Care reviewed with: patient    ASIM Lobo  06/05/2019

## 2019-06-06 PROCEDURE — 63600175 PHARM REV CODE 636 W HCPCS: Performed by: HOSPITALIST

## 2019-06-06 PROCEDURE — 25000003 PHARM REV CODE 250: Performed by: HOSPITALIST

## 2019-06-06 PROCEDURE — 11000004 HC SNF PRIVATE

## 2019-06-06 PROCEDURE — 97116 GAIT TRAINING THERAPY: CPT

## 2019-06-06 PROCEDURE — 97110 THERAPEUTIC EXERCISES: CPT

## 2019-06-06 PROCEDURE — 25000003 PHARM REV CODE 250: Performed by: NURSE PRACTITIONER

## 2019-06-06 PROCEDURE — 25000242 PHARM REV CODE 250 ALT 637 W/ HCPCS: Performed by: HOSPITALIST

## 2019-06-06 PROCEDURE — 97535 SELF CARE MNGMENT TRAINING: CPT

## 2019-06-06 PROCEDURE — 97530 THERAPEUTIC ACTIVITIES: CPT

## 2019-06-06 RX ADMIN — ACETAMINOPHEN 1000 MG: 325 TABLET ORAL at 09:06

## 2019-06-06 RX ADMIN — VERAPAMIL HYDROCHLORIDE 80 MG: 80 TABLET, FILM COATED ORAL at 09:06

## 2019-06-06 RX ADMIN — ATENOLOL 12.5 MG: 25 TABLET ORAL at 09:06

## 2019-06-06 RX ADMIN — FERROUS SULFATE TAB EC 325 MG (65 MG FE EQUIVALENT) 325 MG: 325 (65 FE) TABLET DELAYED RESPONSE at 09:06

## 2019-06-06 RX ADMIN — ACETAMINOPHEN 1000 MG: 325 TABLET ORAL at 02:06

## 2019-06-06 RX ADMIN — SENNOSIDES AND DOCUSATE SODIUM 1 TABLET: 8.6; 5 TABLET ORAL at 09:06

## 2019-06-06 RX ADMIN — ENOXAPARIN SODIUM 30 MG: 100 INJECTION SUBCUTANEOUS at 04:06

## 2019-06-06 RX ADMIN — ACETAMINOPHEN 1000 MG: 325 TABLET ORAL at 05:06

## 2019-06-06 RX ADMIN — FERROUS SULFATE TAB EC 325 MG (65 MG FE EQUIVALENT) 325 MG: 325 (65 FE) TABLET DELAYED RESPONSE at 02:06

## 2019-06-06 RX ADMIN — TIOTROPIUM BROMIDE 18 MCG: 18 CAPSULE ORAL; RESPIRATORY (INHALATION) at 09:06

## 2019-06-06 NOTE — PLAN OF CARE
Problem: Adult Inpatient Plan of Care  Goal: Plan of Care Review  Outcome: Ongoing (interventions implemented as appropriate)  Ms Mark remains AAO; pt is participating in occupational therapy at this time. Pt stated that the movement in her fingers on her left hand is getting bettter. No distress noted. Safety measures maintained. Will continue with plan of care.

## 2019-06-06 NOTE — PT/OT/SLP PROGRESS
Occupational Therapy  Treatment    Siomara Flores   MRN: [id number]   Admitting Diagnosis: Closed displaced intertrochanteric fracture of left femur    OT Date of Treatment: 06/06/19       Billable Minutes:  Self Care/Home Management 8 and Therapeutic Exercise 37    General Precautions: Standard, fall  Orthopedic Precautions: LUE non weight bearing  Braces: UE brace, UE Sling(L UE)         Subjective:  Communicated with patient prior to session.    Pain/Comfort  Pain Rating 1: 5/10  Location - Side 1: Left  Location - Orientation 1: generalized  Location 1: arm  Pain Addressed 1: Pre-medicate for activity, Distraction, Reposition  Pain Rating Post-Intervention 1: 0/10    Objective:   Patient found up in bedside chair.     Occupational Performance:    Functional Mobility/Transfers:  · Patient completed bedside chair <> w/c Stand Pivot technique with supervision with no assistive device    Activities of Daily Living:  · Lower Body Dressing: maximal assistance to jose alejandro socks and mod(I) doffing socks using dressing stick     [facility] 6 Click:  [facility] Total Score: 17    OT Exercises: PROM shoulder flexion and extension x 3 sets 10 reps, AAROM elbow flexion/extension, wrist flexion/extension, supination/pronation and AROM  fingers flexion/extension x  3 sets 10 reps each to increase LUE ROM needed for self care.      Patient left up in chair with call button in reach    ASSESSMENT:  Siomara Flores is a 86 y.o. female with a medical diagnosis of Closed displaced intertrochanteric fracture of left femur and presents with the deficits listed below. Patient's tolerance is improving. Will continue to benefit from OT services to increase independence with ADLs.     Rehab identified problem list/impairments: weakness, impaired endurance, impaired self care skills, impaired functional mobilty, gait instability, impaired balance, decreased upper extremity function, decreased lower extremity function, pain, orthopedic  precautions    Rehab potential is good    Activity tolerance: Good    Discharge recommendations: home with home health     Barriers to discharge: Decreased caregiver support     Equipment recommendations: walker, papi, commode, tub bench(Need to confirm with family (Patient reports she has equipment from her ))     GOALS:   Multidisciplinary Problems     Occupational Therapy Goals        Problem: Occupational Therapy Goal    Goal Priority Disciplines Outcome Interventions   Occupational Therapy Goal     OT, PT/OT Ongoing (interventions implemented as appropriate)    Description:  Goals to be met by: 6/17/2019    Patient will increase functional independence with ADLs by performing:    UE Dressing with Minimal Assistance.  LE Dressing with Minimal Assistance.  Grooming while standing at sink with Stand-by Assistance.  Toileting from bedside commode with Minimal Assistance for hygiene and clothing management. Met 6/3/2019  Bathing with Minimal Assistance.-MET 5/26/2019  Supine to sit with Stand-by Assistance /c HOB flat and no handrails.  Stand pivot transfers with Contact Guard Assistance.-MET. 5/26/2019  Toilet transfer to bedside commode with Contact Guard Assistance.-met 5/26/2019  Right Upper extremity exercise program 3 x 10 reps per handout, with independence.  Patient will complete a standing activity for 8 min with S in order to perform self care tasks.   Met                      Plan:  Patient to be seen 5 x/week to address the above listed problems via therapeutic exercises, therapeutic activities, self-care/home management  Plan of Care expires: 06/10/19  Plan of Care reviewed with: patient    ASIM Lobo  06/06/2019

## 2019-06-06 NOTE — PT/OT/SLP PROGRESS
Physical Therapy  Treatment    Siomara Flores   MRN: [id number]   Admitting Diagnosis: Closed displaced intertrochanteric fracture of left femur    PT Received On: 06/06/19  Total Time (min): (--)       Billable Minutes:  Gait Training 10, Therapeutic Activity 15 and Therapeutic Exercise 15    Treatment Type: Treatment  PT/PTA: PTA     PTA Visit Number: 5       General Precautions: Standard, fall  Orthopedic Precautions: LUE non weight bearing, LLE weight bearing as tolerated   Braces: Sling and swathe         Subjective:  Communicated with nursing prior to session.  Pt agreed to work with therapy.     Pain/Comfort  Pain Rating 1: 0/10  Pain Rating Post-Intervention 1: 0/10    Objective:  Patient found seated bedside chair. .       AM-PAC 6 CLICK MOBILITY  Total Score:16    Bed Mobility:  Sit>Supine: on mat w/ SPV  Supine>Sit: on mat w/ SPV    Transfers:  Sit<>Stand: with HW and SBA (multiple trials)   Stand Pivot Transfer: bedside chair to w/c w/ HW and SBA; w/c to recumbent stepper w/ HW and SBA    Gait:  Amb 32ft w/ HW and SBA. Slow pace. No LOB noted. Distance limited 2* to pt fatigue. Pt ambulated an additional 10ft in room w/ HW and SBA. No LOB noted.      Wheelchair Mobility:  Unable to perform at time 2* w/c height. W/C to bed adjusted (lowered) and attempt w/c activity in future session.      Therex:  -Supine BLE therex 2x15 reps:    -AP   -SAQ   -GS   -Hip abd/add  -Seated BLE therex 2x15 reps:    -LAQ   -Hip Flexion     Additional Treatment:  -Recumbent stepper x15 min using RUE and BLE.     Patient left up in chair with call button in reach and nursing notified.    Assessment:  Siomara Flores is a 86 y.o. female with a medical diagnosis of Closed displaced intertrochanteric fracture of left femur.  Pt requires SBA for transfers and gait using HW. Pt requires increased time to perform all activities. Pt will continue to benefit from PT services at this time to improve all deficits noted below. Continue  with PT POC as indicated.    Rehab identified problem list/impairments: weakness, impaired endurance, impaired functional mobilty, impaired self care skills, gait instability, impaired balance, decreased upper extremity function, decreased lower extremity function, pain, orthopedic precautions    Rehab potential is good.    Activity tolerance: Good    Discharge recommendations: home with home health(with A/S for safety, pt repts sister looking into NH placeme)     Barriers to discharge: Inaccessible home environment, Decreased caregiver support    Equipment recommendations: walker, papi, commode, tub bench(HW or QC pending progress)     GOALS:   Multidisciplinary Problems     Physical Therapy Goals        Problem: Physical Therapy Goal    Goal Priority Disciplines Outcome Goal Variances Interventions   Physical Therapy Goal     PT, PT/OT Ongoing (interventions implemented as appropriate)     Description:  Goals to be met by:6/10/19    Patient will increase functional independence with mobility by performin. Supine to sit with supervision. Met (2019)   2. Sit to supine with supervision. Met (2019)   3. Sit to stand transfer with Stand-by Assistance. Met (2019)  4. Bed to chair transfer with Stand-by Assistance using hemiwalker or least restrictive assistive device. Met (2019)  5. Gait  x 100 feet with Stand-by Assistance using  hemiwalker or least restrictive assistive device. REVISE 2019   REVISED GOAL: gait x 75 feet w/ HW or least restrictive device and SBA = not met  6. Ascend/Descend 4 inch curb step with Contact Guard Assistance using  hemiwalker or least restrictive assistive device. Met (2019)  7. Stand for 3 minutes with Contact Guard Assistance using  hemiwalker or least restrictive assistive device, intermittently,  and perform an activity  8. Lower extremity exercise program x20 reps per handout, with assistance as needed and gym therex  NEW GOAL 2019 9. Patient  will propel w/c w/ RUE and RLE or w/ BLE 75 feet including two turns w/ SBA= not met                          PLAN:    Patient to be seen 5 x/week  to address the above listed problems via gait training, therapeutic activities, therapeutic exercises, wheelchair management/training  Plan of Care expires: 06/09/19  Plan of Care reviewed with: patient    Vita Winkler, PTA  06/06/2019

## 2019-06-06 NOTE — PLAN OF CARE
Problem: Physical Therapy Goal  Goal: Physical Therapy Goal  Goals to be met by:6/10/19    Patient will increase functional independence with mobility by performin. Supine to sit with supervision. Met (2019)   2. Sit to supine with supervision. Met (2019)   3. Sit to stand transfer with Stand-by Assistance. Met (2019)  4. Bed to chair transfer with Stand-by Assistance using hemiwalker or least restrictive assistive device. Met (2019)  5. Gait  x 100 feet with Stand-by Assistance using  hemiwalker or least restrictive assistive device. REVISE 2019   REVISED GOAL: gait x 75 feet w/ HW or least restrictive device and SBA = not met  6. Ascend/Descend 4 inch curb step with Contact Guard Assistance using  hemiwalker or least restrictive assistive device. Met (2019)  7. Stand for 3 minutes with Contact Guard Assistance using  hemiwalker or least restrictive assistive device, intermittently,  and perform an activity  8. Lower extremity exercise program x20 reps per handout, with assistance as needed and gym therex  NEW GOAL 2019 9. Patient will propel w/c w/ RUE and RLE or w/ BLE 75 feet including two turns w/ SBA= not met        Goals remain appropriate. Continue with PT POC as indicated.

## 2019-06-07 LAB
ANION GAP SERPL CALC-SCNC: 7 MMOL/L (ref 8–16)
BASOPHILS # BLD AUTO: 0.07 K/UL (ref 0–0.2)
BASOPHILS NFR BLD: 1.6 % (ref 0–1.9)
BUN SERPL-MCNC: 19 MG/DL (ref 8–23)
CALCIUM SERPL-MCNC: 9.8 MG/DL (ref 8.7–10.5)
CHLORIDE SERPL-SCNC: 98 MMOL/L (ref 95–110)
CO2 SERPL-SCNC: 32 MMOL/L (ref 23–29)
CREAT SERPL-MCNC: 0.6 MG/DL (ref 0.5–1.4)
DIFFERENTIAL METHOD: ABNORMAL
EOSINOPHIL # BLD AUTO: 0.2 K/UL (ref 0–0.5)
EOSINOPHIL NFR BLD: 3.6 % (ref 0–8)
ERYTHROCYTE [DISTWIDTH] IN BLOOD BY AUTOMATED COUNT: 18.4 % (ref 11.5–14.5)
EST. GFR  (AFRICAN AMERICAN): >60 ML/MIN/1.73 M^2
EST. GFR  (NON AFRICAN AMERICAN): >60 ML/MIN/1.73 M^2
GLUCOSE SERPL-MCNC: 85 MG/DL (ref 70–110)
HCT VFR BLD AUTO: 34.1 % (ref 37–48.5)
HGB BLD-MCNC: 9.9 G/DL (ref 12–16)
IMM GRANULOCYTES # BLD AUTO: 0.01 K/UL (ref 0–0.04)
IMM GRANULOCYTES NFR BLD AUTO: 0.2 % (ref 0–0.5)
LYMPHOCYTES # BLD AUTO: 1.2 K/UL (ref 1–4.8)
LYMPHOCYTES NFR BLD: 26.6 % (ref 18–48)
MAGNESIUM SERPL-MCNC: 2.1 MG/DL (ref 1.6–2.6)
MCH RBC QN AUTO: 26.9 PG (ref 27–31)
MCHC RBC AUTO-ENTMCNC: 29 G/DL (ref 32–36)
MCV RBC AUTO: 93 FL (ref 82–98)
MONOCYTES # BLD AUTO: 0.5 K/UL (ref 0.3–1)
MONOCYTES NFR BLD: 10.7 % (ref 4–15)
NEUTROPHILS # BLD AUTO: 2.6 K/UL (ref 1.8–7.7)
NEUTROPHILS NFR BLD: 57.3 % (ref 38–73)
NRBC BLD-RTO: 0 /100 WBC
PHOSPHATE SERPL-MCNC: 3.8 MG/DL (ref 2.7–4.5)
PLATELET # BLD AUTO: 266 K/UL (ref 150–350)
PMV BLD AUTO: 10.8 FL (ref 9.2–12.9)
POTASSIUM SERPL-SCNC: 4.1 MMOL/L (ref 3.5–5.1)
RBC # BLD AUTO: 3.68 M/UL (ref 4–5.4)
SODIUM SERPL-SCNC: 137 MMOL/L (ref 136–145)
WBC # BLD AUTO: 4.47 K/UL (ref 3.9–12.7)

## 2019-06-07 PROCEDURE — 97530 THERAPEUTIC ACTIVITIES: CPT

## 2019-06-07 PROCEDURE — 83735 ASSAY OF MAGNESIUM: CPT

## 2019-06-07 PROCEDURE — 97110 THERAPEUTIC EXERCISES: CPT

## 2019-06-07 PROCEDURE — 63600175 PHARM REV CODE 636 W HCPCS: Performed by: HOSPITALIST

## 2019-06-07 PROCEDURE — 11000004 HC SNF PRIVATE

## 2019-06-07 PROCEDURE — 97116 GAIT TRAINING THERAPY: CPT

## 2019-06-07 PROCEDURE — 25000003 PHARM REV CODE 250: Performed by: NURSE PRACTITIONER

## 2019-06-07 PROCEDURE — 25000242 PHARM REV CODE 250 ALT 637 W/ HCPCS: Performed by: HOSPITALIST

## 2019-06-07 PROCEDURE — 25000003 PHARM REV CODE 250: Performed by: HOSPITALIST

## 2019-06-07 PROCEDURE — 84100 ASSAY OF PHOSPHORUS: CPT

## 2019-06-07 PROCEDURE — 36415 COLL VENOUS BLD VENIPUNCTURE: CPT

## 2019-06-07 PROCEDURE — 99900058 HC 022 PAID UNDER SNF PPS

## 2019-06-07 PROCEDURE — 80048 BASIC METABOLIC PNL TOTAL CA: CPT

## 2019-06-07 PROCEDURE — 97535 SELF CARE MNGMENT TRAINING: CPT

## 2019-06-07 PROCEDURE — 85025 COMPLETE CBC W/AUTO DIFF WBC: CPT

## 2019-06-07 RX ADMIN — SENNOSIDES AND DOCUSATE SODIUM 1 TABLET: 8.6; 5 TABLET ORAL at 09:06

## 2019-06-07 RX ADMIN — ATENOLOL 12.5 MG: 25 TABLET ORAL at 10:06

## 2019-06-07 RX ADMIN — VERAPAMIL HYDROCHLORIDE 80 MG: 80 TABLET, FILM COATED ORAL at 10:06

## 2019-06-07 RX ADMIN — ACETAMINOPHEN 1000 MG: 325 TABLET ORAL at 02:06

## 2019-06-07 RX ADMIN — FERROUS SULFATE TAB EC 325 MG (65 MG FE EQUIVALENT) 325 MG: 325 (65 FE) TABLET DELAYED RESPONSE at 02:06

## 2019-06-07 RX ADMIN — FERROUS SULFATE TAB EC 325 MG (65 MG FE EQUIVALENT) 325 MG: 325 (65 FE) TABLET DELAYED RESPONSE at 09:06

## 2019-06-07 RX ADMIN — ACETAMINOPHEN 1000 MG: 325 TABLET ORAL at 05:06

## 2019-06-07 RX ADMIN — ACETAMINOPHEN 1000 MG: 325 TABLET ORAL at 09:06

## 2019-06-07 RX ADMIN — VERAPAMIL HYDROCHLORIDE 80 MG: 80 TABLET, FILM COATED ORAL at 09:06

## 2019-06-07 RX ADMIN — ENOXAPARIN SODIUM 30 MG: 100 INJECTION SUBCUTANEOUS at 05:06

## 2019-06-07 RX ADMIN — TIOTROPIUM BROMIDE 18 MCG: 18 CAPSULE ORAL; RESPIRATORY (INHALATION) at 10:06

## 2019-06-07 RX ADMIN — FERROUS SULFATE TAB EC 325 MG (65 MG FE EQUIVALENT) 325 MG: 325 (65 FE) TABLET DELAYED RESPONSE at 10:06

## 2019-06-07 NOTE — PROGRESS NOTES
[facility]                                  Skilled Nursing Facility                   Progress Note     Admit Date: 2019  RAVI 2019  Principal Problem:  Closed displaced intertrochanteric fracture of left femurHPI obtained from patient interview and chart review     Chief Complaint:  Revaluation of medical treatment and therapy status: Lab review    HPI:   Mrs. Flores is a 86 year old. female Quaker with PMHx of  Bronchiectasis, SVT who presents to SNF following a hospitalization for left intertrochanteric, left proximal humerus, and left distal radius fracture s/p left femur intramedullary marc after a mechanical fall. [place] [facility] [place] Dr. Owusu performed a Cephalomedullary nail fixation of left intertrochanteric femur fracture on 5/3/19.     Interval history:  All labs reviewed. k 4.1, Cr stable at 0.6.  No leukocytosis.  H&H increased to 9.9/34 from 9.7/32.  Platelets 266.  Patient continues to wish to go to a nursing home after discharge from SNF. Patient progessing well with PT/OT. Patient medically stable. Continuing to follow and treat all acute and chronic conditions.    Past Medical History: Patient has a past medical history of Bronchiectasis without complication, Hypertension, and Supraventricular tachycardia.    Past Surgical History: Patient has a past surgical history that includes Cholecystectomy; Tonsillectomy; and Intramedullary rodding of femur (Left, 5/3/2019).    Social History: Patient reports that she has never smoked. She has never used smokeless tobacco. She reports that she does not drink alcohol or use drugs.  Patient's ill   about a month ago    Family History:  No family significant history to report    Allergies: Patient is allergic to aspirin (bulk).    ROS  Constitutional: Negative for fever or fatigue.   Eyes: Negative for blurred vision, double vision  and discharge.   Respiratory: Negative for cough, shortness of breath and wheezing.    Cardiovascular: Negative for chest pain, palpitations, claudication, and leg swelling.   Gastrointestinal: Negative for abdominal pain, constipation, diarrhea, nausea and vomiting.   Genitourinary: Negative for dysuria, frequency and urgency.   Musculoskeletal:  + generalized weakness, pain to left arm and hip. Negative for back pain and myalgias.   Skin: Negative for itching and rash.   Neurological: Negative for dizziness, speech change, and headaches.   Psychiatric/Behavioral: + for depression. The patient is not nervous/anxious.      PEx  Temp:  [96.4 °F (35.8 °C)]   Pulse:  [79-82]   Resp:  [16]   BP: ()/(45-58)   SpO2:  [99 %]      Constitutional: Patient appears frail  and in no distress   HENT:   Head: Normocephalic and atraumatic.   Eyes: Pupils are equal, round, and reactive to light.   Neck: Normal range of motion. Neck supple.   Cardiovascular: Normal rate, regular rhythm and normal heart sounds.    Pulmonary/Chest: Effort normal and breath sounds are clear  Abdominal: Soft. Bowel sounds are normal.   Musculoskeletal: decreased range of motion to left arm, sling in place.   Neurological: Alert and oriented to person, place, and time.   Skin: Skin is warm and dry.  Patient has skin overgrowth to scalp. Incisions 2cm to left hip and 3cm to left leg- well-approximated no erythema, no swelling noted to periwound area  Psychiatric: mood is normal and improving daily      Assessment and Plan:    Discharge planning  - 6/7 patient's sister-in-law is looking at a facility today for her.  She will update us on her choice so that we can set up for stay post SNF DC.     CONTINUE     Closed displaced intratrochanteric fracture of left femur  s/p left Cephalomedullary nail fixation of left  femur fracture on 5/3/19  Postoperative pain  - WBAT to LLE, NWB to LUE  - 5/14 initiated acetaminophen 1 g q.8 hours  - 5/15 pain is  better controlled today with scheduled Tylenol  - 6/3 patient went to her ortho follow-up appointment on 05/31-    HIP:          -range of motion as tolerated     - weight bearing as tolerated  SHOULDER:      - range of motion: PROm x 1 weeks then AAROM x 2 weeks then AROM     - NWB  WRIST:      - NWB    -continue brace, remove for range of motion gentle. range of motion of fingers     Hx of HTN  - currently patient is experiencing hypotension   - 5/13 decrease losartan from 50 mg to 25 mg daily, continue atenolol 25 mg daily.  Holding parameters in place to hold for systolic blood pressure less than 100.   - 5/14 holding home losartan at this time. patient's 24 hr blood pressure 102/49 to 119/50.   - 5/15 decrease atenolol to 12.5 mg daily; 24 hr blood pressure today is 95/46 to 108/54.    - 5/16 BP slightly improved with the decrease in atenolol dose. Patient's 24 hr blood pressure range is 98/48 to 129/62  - 5/20 patient's 24 hr blood pressure ranges 96/50 to 121/57  - 5/23 patient's 24 hr blood pressure ranges 107/52 to 149/67, the systolic of 149 is isolated and due to increase in pain. Will continue to monitor blood pressures.   - 5/27 patient's 24 hr blood pressure range is 136/52 to 106/50    Acute blood loss anemia  - continue ferrous sulfate 325 mg t.i.d.  - patient is a Voodoo who refuses blood transfusions  - Post-op H/H 6.9/21.8 with baseline 8.4/26.9  - pediatric blood draws only, avoid IVF  - 5/13 initiated procrit 20,000 units   - 5/16 H&H improved to 7.0/23 from 6.3/20 after Procrit injection and continue iron supplementation  - 5/20 H&H improved to 7.7/25  - 5/23 H&H improved to 8.1/26  - 5/27 H&H improved to 8.9/30    Severe malnutrition  - Energy Intake: <75% of estimated energy requirement for > 3 months  - Body Fat Depletion: moderate depletion of orbitals and triceps and buccal area  - Muscle Mass Depletion: severe depletion of clavicle region and scapular region moderate at  temples, hands and calf  - Weight Loss: > 7.5% % x < 3 months   - Continue regular diet, honor food intolerances, recommend change boost breeze to boost plus TID no chocolate  per pt request.  - 5/23 patient states better appetite increased p.o. consumption    Onychogryphosis  - likely from  Insufficient consumtion of essential nutrients  - 5/14- Message sent to  and appointment scheduled are to make patient a Podiatry appointment to cut down toenails, patient's toenails are so long and painful it hurts her to walk. - patient to see Podiatry tomorrow morning  - 5/15 patient was able to see provider today and Podiatry.  I got her an appointment Friday before her ortho follow-up to have her toes cut.  - 5/20 patient went to podiatry appointment in all toenails were trimmed to a normal length.  Patient's toenail pain is significantly improved    Hx of SVT  - continue verapamil 80 mg TID  - 5/16 with reduction of atenolol.  Heart rate is increased to the 90s.  Will continue to assess blood pressure and heart rate daily.     Seborrheic Keratosis   - 5/15 differential diagnosis includes melanoma- ambulatory referral to Dermatology placed.  Lesion appears as a skin overgrowth to scalp that is waxy brown and tan in color, slightly raise with a bumpy appearance.  Lesion is about  5 cm in diameter and circular.     Debility   - Continue with PT/OT for gait training and strengthening and restoration of ADL's   - Encourage mobility, OOB in chair, and early ambulation as appropriate  - Fall precautions   - Monitor for bowel and bladder dysfunction  - Monitor for and prevent skin breakdown and pressure ulcers  - Continue DVT prophylaxis with  enoxaparin 30 mg daily    Hx of Bronchiectasis  - continue duo nebs q.4 hours while awake and tiotropium 18mcg daily       Future Appointments   Date Time Provider Department Center   6/28/2019 10:45 AM [facility] Jayden Ibrahim NP    DOS:  6/7/2019

## 2019-06-07 NOTE — CLINICAL REVIEW
Monthly Medication Regimen Review    I have reviewed Siomara Flores's medications in compliance with CMS Regulation F329 of the [place] Appendix PP.  No irregularities were noted at this time.        Vita Del Valle, Pharm. D.  Clinical Pharmacist  [facility]

## 2019-06-07 NOTE — PLAN OF CARE
Problem: Physical Therapy Goal  Goal: Physical Therapy Goal  Goals to be met by:6/10/19  2019 goals to be met by 19  Patient will increase functional independence with mobility by performin. Supine to sit with supervision.= Met (2019)   2. Sit to supine with supervision. =Met (2019)   3. Sit to stand transfer with Stand-by Assistance. =Met (2019)  4. Bed to chair transfer with Stand-by Assistance using hemiwalker or least restrictive assistive device. =Met (2019)  5. Gait  x 100 feet with Stand-by Assistance using  hemiwalker or least restrictive assistive device. REVISE 2019   REVISED GOAL: gait x 75 feet w/ HW or least restrictive device and SBA = not met  6. Ascend/Descend 4 inch curb step with Contact Guard Assistance using  hemiwalker or least restrictive assistive device.= Met (2019)  7. Stand for 3 minutes with Contact Guard Assistance using  hemiwalker or least restrictive assistive device, intermittently,  and perform an activity  8. Lower extremity exercise program x20 reps per handout, with assistance as needed and gym therex  NEW GOAL 2019 9. Patient will propel w/c w/ RUE and RLE or w/ BLE 75 feet including two turns w/ SBA= not met  NEW GOAL 2019 10. Patient will perform stand pivot transfer w/ supervision w/ HW or appropriate assistive device. = not met         Goals extended/updated as noted.

## 2019-06-07 NOTE — PT/OT/SLP PROGRESS
Physical Therapy  Treatment    Siomara Flores   MRN: [id number]   Admitting Diagnosis: Closed displaced intertrochanteric fracture of left femur    PT Received On: 06/07/19          Billable Minutes:  Gait Training 15, Therapeutic Activity 10 and Therapeutic Exercise 20=45    Treatment Type: Treatment  PT/PTA: PT     PTA Visit Number: 0       General Precautions: Standard, fall  Orthopedic Precautions: LUE non weight bearing, LLE weight bearing as tolerated   Braces: Sling and swathe         Subjective:  Communicated with patient prior to session.  Patient agreeable to therapy.  Patient reports she is working on her d/c plan; reports her MORRO is looking at a place in [place].    Pain/Comfort  Pain Rating 1: 4/10  Location - Side 1: Left  Location - Orientation 1: generalized  Location 1: arm  Pain Addressed 1: Pre-medicate for activity, Reposition, Distraction  Pain Rating Post-Intervention 1: 5/10  Pain Rating 2: 4/10  Location - Side 2: Left  Location - Orientation 2: generalized  Location 2: hip  Pain Addressed 2: Reposition, Distraction, Cessation of Activity  Pain Rating Post-Intervention 2: 5/10    Objective:  Patient found seated in w/c w/ sling and swath LUR with       AM-PAC 6 CLICK MOBILITY  Total Score:16    Bed Mobility:  Sit>Supine:not performed  Supine>Sit: not performed    Transfers:  Sit<>Stand: w/ HW and SBA to/from w/c, nustep  Stand Pivot Transfer: to/from nustep w/ HW and SBA  In room, walk from w/c and pivot to chair w/ supervision, using HW to walk but then leaving HW and pivoting w/o it to sit in chair, reaching w/ RUE to chair armrest as touch point  Slowly; NWB LUE    Gait:  Amb w/ HW and SBA w/ w/c in tow 64 feet. Slow pace; step to gait; encouragement to continue trial.   In room, amb w/ HW and SBA w/c to chair w/ supervision/SBA 8 feet    Advanced Gait:  Curb Step: not performed    Wheelchair Mobility:  Patient propels w/c w/ BLE and RUE 77 feet w/ 3 turns, requiring SBA to min assist for  turns. (will transition patient to a papi height w/c when possible; for this trial, w/c cushion placed behing patient's back rather than patient sitting on cushion, to reduce height)     Therex:  Patient performed 15 minutes on recumbent stepper with bilateral legs and RUE on workload 3 w/ occ rest breaks for strength and endurance to improve functional mobility      Balance:  Stands and walks w/ HW and SBA w/o LOB    Additional Treatment:  Educate on gait and trf technique; w/c mobility    Patient left up in chair with call button in reach.    Assessment:  Siomara Flores is a 86 y.o. female with a medical diagnosis of Closed displaced intertrochanteric fracture of left femur.  Patient is NWB LUE and WBAT LLE. Patient will benefit from continued physical therapy to address deficits and improve safety and functional mobility. Continue with physical therapy plan of care. .    Rehab identified problem list/impairments: weakness, impaired endurance, impaired functional mobilty, impaired self care skills, gait instability, impaired balance, decreased upper extremity function, decreased lower extremity function, pain, orthopedic precautions    Rehab potential is good.    Activity tolerance: Good    Discharge recommendations: home with home health(with A/S for safety, pt repts sister looking into NH placeme)     Barriers to discharge: Inaccessible home environment, Decreased caregiver support    Equipment recommendations: walker, papi, commode, tub bench(HW or QC pending progress)     GOALS:   Multidisciplinary Problems     Physical Therapy Goals        Problem: Physical Therapy Goal    Goal Priority Disciplines Outcome Goal Variances Interventions   Physical Therapy Goal     PT, PT/OT Ongoing (interventions implemented as appropriate)     Description:  Goals to be met by:6/10/19  2019 goals to be met by 19  Patient will increase functional independence with mobility by performin. Supine to sit with  supervision.= Met (6/7/2019)   2. Sit to supine with supervision. =Met (6/7/2019)   3. Sit to stand transfer with Stand-by Assistance. =Met (5/29/2019)  4. Bed to chair transfer with Stand-by Assistance using hemiwalker or least restrictive assistive device. =Met (5/29/2019)  5. Gait  x 100 feet with Stand-by Assistance using  hemiwalker or least restrictive assistive device. REVISE 5/17/2019   REVISED GOAL: gait x 75 feet w/ HW or least restrictive device and SBA = not met  6. Ascend/Descend 4 inch curb step with Contact Guard Assistance using  hemiwalker or least restrictive assistive device.= Met (5/29/2019)  7. Stand for 3 minutes with Contact Guard Assistance using  hemiwalker or least restrictive assistive device, intermittently,  and perform an activity  8. Lower extremity exercise program x20 reps per handout, with assistance as needed and gym therex  NEW GOAL 5/17/2019 9. Patient will propel w/c w/ RUE and RLE or w/ BLE 75 feet including two turns w/ SBA= not met  NEW GOAL 6/7/2019 10. Patient will perform stand pivot transfer w/ supervision w/ HW or appropriate assistive device. = not met                           PLAN:    Patient to be seen 5 x/week  to address the above listed problems via gait training, therapeutic activities, therapeutic exercises, wheelchair management/training  Plan of Care expires: 06/09/19  Plan of Care reviewed with: patient    Darlene Jaimes, PT  06/07/2019

## 2019-06-07 NOTE — PT/OT/SLP PROGRESS
Occupational Therapy  Treatment    Siomara Flores   MRN: [id number]   Admitting Diagnosis: Closed displaced intertrochanteric fracture of left femur    OT Date of Treatment: 06/07/19       Billable Minutes:  Self Care/Home Management 10, Therapeutic Activity 8 and Therapeutic Exercise 25    General Precautions: Standard, fall  Orthopedic Precautions: LUE non weight bearing  Braces: UE brace, UE Sling(L UE)         Subjective:  Communicated with patient prior to session.    Pain/Comfort  Pain Rating 1: 5/10  Location - Side 1: Left  Location - Orientation 1: generalized  Location 1: arm  Pain Addressed 1: Pre-medicate for activity, Reposition, Distraction  Pain Rating Post-Intervention 1: 5/10  Pain Rating 2: 5/10  Location - Side 2: Left  Location - Orientation 2: generalized  Location 2: leg  Pain Addressed 2: Reposition, Distraction, Pre-medicate for activity  Pain Rating Post-Intervention 2: 5/10    Objective:       Occupational Performance:    Functional Mobility/Transfers:  · Patient completed Sit <> Stand Transfer with supervision  with  no assistive device   · Patient completed Chair <> Mat Stand Pivot technique with supervision with no assistive device  · Patient completed Toilet Transfer ambulation technique with stand by assistance with  quad cane  · Functional Mobility: Patient performed functional mobility bedside chair > toilet with SBA using quad cane.     Activities of Daily Living:  · Toileting: independence with hygiene and maximal assistance for clothing management     AMPA 6 Click:  [facility] Total Score: 17    OT Exercises: AAROM LUE elbow flexion/extension x 3 sets 10 reps each; AROM LUE supination/pronation and finger flexion/extension x 3 sets 10 reps; and, PROM  LUE shoulder flexion/extension x 3 sets 10 reps each to increase ROM needed for ADLs.     Additional Treatment:  Patient stood at counter level with S and no AD while retrieving items from overhead cabinet, placing them on counter, and  then placing them back in cabinet x ~5 minutes in preparation of home management.     Patient left up in chair with call button in reach    ASSESSMENT:  Siomara Flores is a 86 y.o. female with a medical diagnosis of Closed displaced intertrochanteric fracture of left femur and presents with the deficits listed below. Decreased LUE ROM noted. Patient is able to pronate L forearm to neutral and flex L shoulder to ~80 degrees. Patient required verbal cues to reach back for chair before sitting. Patient will continue to benefit from OT services to increase independence with ADLs.     Rehab identified problem list/impairments: weakness, impaired endurance, impaired self care skills, impaired functional mobilty, gait instability, impaired balance, decreased upper extremity function, decreased lower extremity function, pain, orthopedic precautions    Rehab potential is good    Activity tolerance: Good    Discharge recommendations: home with home health     Barriers to discharge: Decreased caregiver support     Equipment recommendations: walker, papi, commode, tub bench(Need to confirm with family (Patient reports she has equipment from her ))     GOALS:   Multidisciplinary Problems     Occupational Therapy Goals        Problem: Occupational Therapy Goal    Goal Priority Disciplines Outcome Interventions   Occupational Therapy Goal     OT, PT/OT Ongoing (interventions implemented as appropriate)    Description:  Goals to be met by: 6/17/2019    Patient will increase functional independence with ADLs by performing:    UE Dressing with Minimal Assistance.  LE Dressing with Minimal Assistance.  Grooming while standing at sink with Stand-by Assistance.  Toileting from bedside commode with Minimal Assistance for hygiene and clothing management. Met 6/3/2019  Bathing with Minimal Assistance.-MET 5/26/2019  Supine to sit with Stand-by Assistance /c HOB flat and no handrails.  Stand pivot transfers with Contact Guard  Assistance.-MET. 5/26/2019  Toilet transfer to bedside commode with Contact Guard Assistance.-met 5/26/2019  Right Upper extremity exercise program 3 x 10 reps per handout, with independence.  Patient will complete a standing activity for 8 min with S in order to perform self care tasks.   Met                      Plan:  Patient to be seen 5 x/week to address the above listed problems via therapeutic exercises, therapeutic activities, self-care/home management  Plan of Care expires: 06/10/19  Plan of Care reviewed with: patient    Marisa ASIM Keita  06/07/2019

## 2019-06-08 PROCEDURE — 25000242 PHARM REV CODE 250 ALT 637 W/ HCPCS: Performed by: HOSPITALIST

## 2019-06-08 PROCEDURE — 25000003 PHARM REV CODE 250: Performed by: NURSE PRACTITIONER

## 2019-06-08 PROCEDURE — 11000004 HC SNF PRIVATE

## 2019-06-08 PROCEDURE — 63600175 PHARM REV CODE 636 W HCPCS: Performed by: HOSPITALIST

## 2019-06-08 PROCEDURE — 25000003 PHARM REV CODE 250: Performed by: HOSPITALIST

## 2019-06-08 RX ADMIN — ENOXAPARIN SODIUM 30 MG: 100 INJECTION SUBCUTANEOUS at 05:06

## 2019-06-08 RX ADMIN — ACETAMINOPHEN 1000 MG: 325 TABLET ORAL at 09:06

## 2019-06-08 RX ADMIN — SENNOSIDES AND DOCUSATE SODIUM 1 TABLET: 8.6; 5 TABLET ORAL at 08:06

## 2019-06-08 RX ADMIN — ATENOLOL 12.5 MG: 25 TABLET ORAL at 08:06

## 2019-06-08 RX ADMIN — FERROUS SULFATE TAB EC 325 MG (65 MG FE EQUIVALENT) 325 MG: 325 (65 FE) TABLET DELAYED RESPONSE at 09:06

## 2019-06-08 RX ADMIN — FERROUS SULFATE TAB EC 325 MG (65 MG FE EQUIVALENT) 325 MG: 325 (65 FE) TABLET DELAYED RESPONSE at 08:06

## 2019-06-08 RX ADMIN — TIOTROPIUM BROMIDE 18 MCG: 18 CAPSULE ORAL; RESPIRATORY (INHALATION) at 08:06

## 2019-06-08 RX ADMIN — POLYETHYLENE GLYCOL 3350 17 G: 17 POWDER, FOR SOLUTION ORAL at 08:06

## 2019-06-08 RX ADMIN — ACETAMINOPHEN 1000 MG: 325 TABLET ORAL at 06:06

## 2019-06-08 RX ADMIN — VERAPAMIL HYDROCHLORIDE 86 MG: 80 TABLET, FILM COATED ORAL at 02:06

## 2019-06-08 RX ADMIN — VERAPAMIL HYDROCHLORIDE 80 MG: 80 TABLET, FILM COATED ORAL at 08:06

## 2019-06-08 RX ADMIN — ACETAMINOPHEN 1000 MG: 325 TABLET ORAL at 02:06

## 2019-06-08 RX ADMIN — FERROUS SULFATE TAB EC 325 MG (65 MG FE EQUIVALENT) 325 MG: 325 (65 FE) TABLET DELAYED RESPONSE at 03:06

## 2019-06-08 RX ADMIN — VERAPAMIL HYDROCHLORIDE 80 MG: 80 TABLET, FILM COATED ORAL at 09:06

## 2019-06-08 NOTE — PLAN OF CARE
Problem: Adult Inpatient Plan of Care  Goal: Plan of Care Review  Outcome: Ongoing (interventions implemented as appropriate)  Plan of care review with patient. Instructed patient on medications and blood pressure review. Pt responded by stating she understood and will continue to monitor.

## 2019-06-09 PROCEDURE — 97535 SELF CARE MNGMENT TRAINING: CPT

## 2019-06-09 PROCEDURE — 25000003 PHARM REV CODE 250: Performed by: NURSE PRACTITIONER

## 2019-06-09 PROCEDURE — 63600175 PHARM REV CODE 636 W HCPCS: Performed by: HOSPITALIST

## 2019-06-09 PROCEDURE — 11000004 HC SNF PRIVATE

## 2019-06-09 PROCEDURE — 25000003 PHARM REV CODE 250: Performed by: HOSPITALIST

## 2019-06-09 PROCEDURE — 25000242 PHARM REV CODE 250 ALT 637 W/ HCPCS: Performed by: HOSPITALIST

## 2019-06-09 RX ADMIN — ACETAMINOPHEN 1000 MG: 325 TABLET ORAL at 05:06

## 2019-06-09 RX ADMIN — FERROUS SULFATE TAB EC 325 MG (65 MG FE EQUIVALENT) 325 MG: 325 (65 FE) TABLET DELAYED RESPONSE at 02:06

## 2019-06-09 RX ADMIN — ENOXAPARIN SODIUM 30 MG: 100 INJECTION SUBCUTANEOUS at 05:06

## 2019-06-09 RX ADMIN — FERROUS SULFATE TAB EC 325 MG (65 MG FE EQUIVALENT) 325 MG: 325 (65 FE) TABLET DELAYED RESPONSE at 09:06

## 2019-06-09 RX ADMIN — TIOTROPIUM BROMIDE 18 MCG: 18 CAPSULE ORAL; RESPIRATORY (INHALATION) at 09:06

## 2019-06-09 RX ADMIN — ATENOLOL 12.5 MG: 25 TABLET ORAL at 09:06

## 2019-06-09 RX ADMIN — POLYETHYLENE GLYCOL 3350 17 G: 17 POWDER, FOR SOLUTION ORAL at 09:06

## 2019-06-09 RX ADMIN — ACETAMINOPHEN 1000 MG: 325 TABLET ORAL at 02:06

## 2019-06-09 RX ADMIN — VERAPAMIL HYDROCHLORIDE 80 MG: 80 TABLET, FILM COATED ORAL at 02:06

## 2019-06-09 RX ADMIN — ACETAMINOPHEN 1000 MG: 325 TABLET ORAL at 10:06

## 2019-06-09 RX ADMIN — VERAPAMIL HYDROCHLORIDE 80 MG: 80 TABLET, FILM COATED ORAL at 09:06

## 2019-06-09 RX ADMIN — SENNOSIDES AND DOCUSATE SODIUM 1 TABLET: 8.6; 5 TABLET ORAL at 10:06

## 2019-06-09 RX ADMIN — FERROUS SULFATE TAB EC 325 MG (65 MG FE EQUIVALENT) 325 MG: 325 (65 FE) TABLET DELAYED RESPONSE at 10:06

## 2019-06-09 RX ADMIN — VERAPAMIL HYDROCHLORIDE 80 MG: 80 TABLET, FILM COATED ORAL at 10:06

## 2019-06-09 RX ADMIN — SENNOSIDES AND DOCUSATE SODIUM 1 TABLET: 8.6; 5 TABLET ORAL at 09:06

## 2019-06-09 NOTE — PLAN OF CARE
Problem: Occupational Therapy Goal  Goal: Occupational Therapy Goal  Goals to be met by: 6/17/2019    Patient will increase functional independence with ADLs by performing:    UE Dressing with Minimal Assistance.  LE Dressing with Minimal Assistance.  Grooming while standing at sink with Stand-by Assistance.  Toileting from bedside commode with Minimal Assistance for hygiene and clothing management. Met 6/3/2019  Bathing with Minimal Assistance.-MET 5/26/2019  Supine to sit with Stand-by Assistance /c HOB flat and no handrails.  Stand pivot transfers with Contact Guard Assistance.-MET. 5/26/2019  Toilet transfer to bedside commode with Contact Guard Assistance.-met 5/26/2019  Right Upper extremity exercise program 3 x 10 reps per handout, with independence.  Patient will complete a standing activity for 8 min with S in order to perform self care tasks.   Met     Outcome: Ongoing (interventions implemented as appropriate)  .

## 2019-06-09 NOTE — PT/OT/SLP PROGRESS
Occupational Therapy  Treatment    Siomara Flores   MRN: [id number]   Admitting Diagnosis: Closed displaced intertrochanteric fracture of left femur    OT Date of Treatment: 06/09/19       Billable Minutes:  Self Care/Home Management 48    General Precautions: Standard, fall  Orthopedic Precautions: LUE non weight bearing  Braces: UE brace, UE Sling(L UE)         Subjective:  Communicated with nsg prior to session.  I am doing well today    Pain/Comfort  Pain Rating 1: 6/10  Location - Side 1: Left  Location 1: arm  Pain Rating Post-Intervention 1: 6/10    Objective:   Pt seated in chair on arrival     Occupational Performance:    Bed Mobility:    Not tested     Functional Mobility/Transfers:  · Patient completed Sit <> Stand Transfer with stand by assistance and contact guard assistance  with  hemiwalker   · Patient completed Toilet Transfer Step Transfer technique with contact guard assistance with  hemiwalker  · Patient completed  Shower Transfer Step Transfer technique with contact guard assistance with hemiwalker  · Functional Mobility: Pt.with fxl mobility to and from [facility] bath with CGA with      Activities of Daily Living:  · Feeding:  sert up with brakfast .  · Grooming: set up (A) at sink level  · Bathing: minimum assistance for BLE feet and RUE in shower seated on shower bench  · Upper Body Dressing: moderate assistance to manage gown and Total A do doff/jose alejandro sling swath and brace  · Lower Body Dressing: minimum assistance to jose alejandro uw/pants (A) to manage over hips isntace and mod A for LLE socks   · Toileting: minimum assistance to manage clothing aspects     AMPA 6 Click:  AMPA Total Score: 18    Patient left up in chair with all lines intact and call button in reach    ASSESSMENT:  Siomara Flores is a 86 y.o. female with a medical diagnosis of Closed displaced intertrochanteric fracture of left femur Pt. participated well with session on this day. PT. Still continues to require Min to Mod A for  selfcare related task on this day. Pt. Is progressing with task  But at at slow pace. Pt demos physical deficits with balance  functional mobility, UB strength, endurance  level of functional indep with daily tasks and activities and selfcare skills .Pt. Will continue to benefit from continued OT to progress towards goals  .    Rehab identified problem list/impairments: weakness, impaired endurance, impaired self care skills, impaired functional mobilty, gait instability, impaired balance, decreased upper extremity function, decreased lower extremity function, pain, orthopedic precautions    Rehab potential is fair    Activity tolerance: Fair    Discharge recommendations: home with home health     Barriers to discharge: Decreased caregiver support     Equipment recommendations: walker, papi, commode, tub bench(Need to confirm with family (Patient reports she has equipment from her ))     GOALS:   Multidisciplinary Problems     Occupational Therapy Goals        Problem: Occupational Therapy Goal    Goal Priority Disciplines Outcome Interventions   Occupational Therapy Goal     OT, PT/OT Ongoing (interventions implemented as appropriate)    Description:  Goals to be met by: 6/17/2019    Patient will increase functional independence with ADLs by performing:    UE Dressing with Minimal Assistance.  LE Dressing with Minimal Assistance.  Grooming while standing at sink with Stand-by Assistance.  Toileting from bedside commode with Minimal Assistance for hygiene and clothing management. Met 6/3/2019  Bathing with Minimal Assistance.-MET 5/26/2019  Supine to sit with Stand-by Assistance /c HOB flat and no handrails.  Stand pivot transfers with Contact Guard Assistance.-MET. 5/26/2019  Toilet transfer to bedside commode with Contact Guard Assistance.-met 5/26/2019  Right Upper extremity exercise program 3 x 10 reps per handout, with independence.  Patient will complete a standing activity for 8 min with S in order to  perform self care tasks.   Met                      Plan:  Patient to be seen 5 x/week to address the above listed problems via therapeutic exercises, therapeutic activities, self-care/home management  Plan of Care expires: 06/10/19  Plan of Care reviewed with: patient    KOBY Deras  06/09/2019

## 2019-06-10 PROCEDURE — 63600175 PHARM REV CODE 636 W HCPCS: Performed by: HOSPITALIST

## 2019-06-10 PROCEDURE — 97110 THERAPEUTIC EXERCISES: CPT

## 2019-06-10 PROCEDURE — 25000003 PHARM REV CODE 250: Performed by: HOSPITALIST

## 2019-06-10 PROCEDURE — 25000242 PHARM REV CODE 250 ALT 637 W/ HCPCS: Performed by: HOSPITALIST

## 2019-06-10 PROCEDURE — 25000003 PHARM REV CODE 250: Performed by: NURSE PRACTITIONER

## 2019-06-10 PROCEDURE — 97535 SELF CARE MNGMENT TRAINING: CPT

## 2019-06-10 PROCEDURE — 97116 GAIT TRAINING THERAPY: CPT

## 2019-06-10 PROCEDURE — 11000004 HC SNF PRIVATE

## 2019-06-10 PROCEDURE — 97803 MED NUTRITION INDIV SUBSEQ: CPT

## 2019-06-10 RX ADMIN — SENNOSIDES AND DOCUSATE SODIUM 1 TABLET: 8.6; 5 TABLET ORAL at 10:06

## 2019-06-10 RX ADMIN — FERROUS SULFATE TAB EC 325 MG (65 MG FE EQUIVALENT) 325 MG: 325 (65 FE) TABLET DELAYED RESPONSE at 02:06

## 2019-06-10 RX ADMIN — POLYETHYLENE GLYCOL 3350 17 G: 17 POWDER, FOR SOLUTION ORAL at 08:06

## 2019-06-10 RX ADMIN — FERROUS SULFATE TAB EC 325 MG (65 MG FE EQUIVALENT) 325 MG: 325 (65 FE) TABLET DELAYED RESPONSE at 08:06

## 2019-06-10 RX ADMIN — ENOXAPARIN SODIUM 30 MG: 100 INJECTION SUBCUTANEOUS at 05:06

## 2019-06-10 RX ADMIN — VERAPAMIL HYDROCHLORIDE 80 MG: 80 TABLET, FILM COATED ORAL at 08:06

## 2019-06-10 RX ADMIN — TIOTROPIUM BROMIDE 18 MCG: 18 CAPSULE ORAL; RESPIRATORY (INHALATION) at 08:06

## 2019-06-10 RX ADMIN — ATENOLOL 12.5 MG: 25 TABLET ORAL at 08:06

## 2019-06-10 RX ADMIN — ACETAMINOPHEN 1000 MG: 325 TABLET ORAL at 10:06

## 2019-06-10 RX ADMIN — ACETAMINOPHEN 1000 MG: 325 TABLET ORAL at 02:06

## 2019-06-10 RX ADMIN — ACETAMINOPHEN 1000 MG: 325 TABLET ORAL at 05:06

## 2019-06-10 RX ADMIN — VERAPAMIL HYDROCHLORIDE 80 MG: 80 TABLET, FILM COATED ORAL at 10:06

## 2019-06-10 RX ADMIN — FERROUS SULFATE TAB EC 325 MG (65 MG FE EQUIVALENT) 325 MG: 325 (65 FE) TABLET DELAYED RESPONSE at 10:06

## 2019-06-10 NOTE — PLAN OF CARE
Problem: Physical Therapy Goal  Goal: Physical Therapy Goal  Goals to be met by:6/10/19  2019 goals to be met by 19  Patient will increase functional independence with mobility by performin. Supine to sit with supervision.= Met (2019)   2. Sit to supine with supervision. =Met (2019)   3. Sit to stand transfer with Stand-by Assistance. =Met (2019)  4. Bed to chair transfer with Stand-by Assistance using hemiwalker or least restrictive assistive device. =Met (2019)  5. Gait  x 100 feet with Stand-by Assistance using  hemiwalker or least restrictive assistive device. REVISE 2019   REVISED GOAL: gait x 75 feet w/ HW or least restrictive device and SBA = not met  6. Ascend/Descend 4 inch curb step with Contact Guard Assistance using  hemiwalker or least restrictive assistive device.= Met (2019)  7. Stand for 3 minutes with Contact Guard Assistance using  hemiwalker or least restrictive assistive device, intermittently,  and perform an activity. Met (6/10/2019)  8. Lower extremity exercise program x20 reps per handout, with assistance as needed and gym therex  NEW GOAL 2019 9. Patient will propel w/c w/ RUE and RLE or w/ BLE 75 feet including two turns w/ SBA= not met  NEW GOAL 2019 10. Patient will perform stand pivot transfer w/ supervision w/ HW or appropriate assistive device. = met (6/10/2019)          Outcome: Ongoing (interventions implemented as appropriate)  Met two goals.

## 2019-06-10 NOTE — PT/OT/SLP PROGRESS
Occupational Therapy  Treatment    Siomara Flores   MRN: [id number]   Admitting Diagnosis: Closed displaced intertrochanteric fracture of left femur    OT Date of Treatment: 06/10/19       Billable Minutes:  Self Care/Home Management 10 and Therapeutic Exercise 23    General Precautions: Standard, fall  Orthopedic Precautions: LUE non weight bearing  Braces: UE brace, UE Sling(L UE)         Subjective:  Communicated with patient prior to session.    Pain/Comfort  Pain Rating 1: 5/10  Location - Side 1: Left  Location - Orientation 1: generalized  Location 1: arm  Pain Addressed 1: Pre-medicate for activity, Reposition, Distraction, Cessation of Activity  Pain Rating Post-Intervention 1: 5/10  Pain Rating 2: 5/10  Location - Side 2: Left  Location - Orientation 2: generalized  Location 2: leg  Pain Addressed 2: Reposition, Distraction, Pre-medicate for activity  Pain Rating Post-Intervention 2: 5/10    Objective:   Patient found up in bedside chair.     Occupational Performance:    Functional Mobility/Transfers:  · Patient completed Sit <> Stand Transfer with supervision  with  Lars walker    · Patient completed bedside chair <> wheelchair Stand Pivot technique with supervision with no assistive device  · Functional Mobility: Patient performed functional mobility with SBA using quad cane with RUE from bedside chair > bathroom.     Activities of Daily Living:  · Grooming: set up assistance performing oral hygiene standing at sink level   · Lower Body Dressing: maximal assistance donning pants over feet seated in w/c and over hips in standing    AMPA 6 Click:  [facility] Total Score: 18    OT Exercises: LUE AROM finger and thumb flexion/extension and elbow flexion/extension x 10 reps each to increase functional use of LUE.  LUE AAROM wrist flexion/extension, shoulder flexion/extension and external rotation/horizontal adduction x 10 reps each to increase functional use of LUE. UE Ergometer 5 minutes using RUE to increase  endurance needed for ADLs.    Patient left up in chair with call button in reach    ASSESSMENT:  Siomara Flores is a 86 y.o. female with a medical diagnosis of Closed displaced intertrochanteric fracture of left femur and presents with the deficits listed below. Patient presents with decreased LUE ROM. Patient's endurance and strength continues to improve. Will continue to benefit from OT services to increase independence with ADLs.     Rehab identified problem list/impairments: weakness, impaired endurance, impaired self care skills, impaired functional mobilty, gait instability, impaired balance, decreased upper extremity function, decreased lower extremity function, pain, orthopedic precautions    Rehab potential is good    Activity tolerance: Good    Discharge recommendations: home with home health     Barriers to discharge: Decreased caregiver support     Equipment recommendations: walker, papi, commode, tub bench(Need to confirm with family (Patient reports she has equipment from her ))     GOALS:   Multidisciplinary Problems     Occupational Therapy Goals        Problem: Occupational Therapy Goal    Goal Priority Disciplines Outcome Interventions   Occupational Therapy Goal     OT, PT/OT Ongoing (interventions implemented as appropriate)    Description:  Goals to be met by: 6/17/2019    Patient will increase functional independence with ADLs by performing:    UE Dressing with Minimal Assistance.  LE Dressing with Minimal Assistance.  Grooming while standing at sink with Stand-by Assistance.  Toileting from bedside commode with Minimal Assistance for hygiene and clothing management. Met 6/3/2019  Bathing with Minimal Assistance.-MET 5/26/2019  Supine to sit with Stand-by Assistance /c HOB flat and no handrails.  Stand pivot transfers with Contact Guard Assistance.-MET. 5/26/2019  Toilet transfer to bedside commode with Contact Guard Assistance.-met 5/26/2019  Right Upper extremity exercise program 3 x  10 reps per handout, with independence.  Patient will complete a standing activity for 8 min with S in order to perform self care tasks.   Met                      Plan:  Patient to be seen 5 x/week to address the above listed problems via therapeutic exercises, therapeutic activities, self-care/home management  Plan of Care expires: 06/10/19  Plan of Care reviewed with: patient    Marisa ASIM Keita  06/10/2019

## 2019-06-10 NOTE — PROGRESS NOTES
[facility] - Skilled Nursing Care  Adult Nutrition  Progress Note    SUMMARY   Recommendations  Recommendation/Intervention: Continue regular , boost plus TID strawberry  Goals: PO to meet 50% of needs with ONS by next RD visit  Nutrition Goal Status: goal met  Reason for Assessment    Reason For Assessment: RD follow-up  Diagnosis: (debilty sp fall , sp rodding L femur)  Relevant Medical History: HTN, anemia,   Interdisciplinary Rounds: attended  General Information Comments: taking strawberry boost  Nutrition Discharge Planning: Dc on regular diet with ONS of choice for weight gain and wound healing    Anthropometrics    Temp: 98.2 °F (36.8 °C)  Height Method: Stated  Height: 5' (152.4 cm)  Height (inches): 60 in  Weight Method: Standard Scale  Weight: 33.1 kg (72 lb 15.6 oz)  Weight (lb): 72.97 lb  Ideal Body Weight (IBW), Female: 100 lb  % Ideal Body Weight, Female (lb): 80.03 lb  BMI (Calculated): 15.7  BMI Grade: less than 16 protein-energy malnutrition grade III  Weight Loss: unintentional  Usual Body Weight (UBW), k.8 kg  Weight Change Amount: (wt loss since admit, small appetite, oral supplement TID)  % Usual Body Weight: 87.02  % Weight Change From Usual Weight: -13.16 %       Lab/Procedures/Meds    Pertinent Labs Reviewed: reviewed  Pertinent Labs Comments: Na 135  Pertinent Medications Reviewed: reviewed  Pertinent Medications Comments: Epo    Physical Findings/Assessment         Estimated/Assessed Needs    Weight Used For Calorie Calculations: 42 kg (92 lb 9.5 oz)(UBW in elderly )  Energy Calorie Requirements (kcal): 8552-5161  Energy Need Method: Kcal/kg(30-35 kcal/kg)  Protein Requirements: 55-63gm  Weight Used For Protein Calculations: 42 kg (92 lb 9.5 oz)(x 1.3-1.5 2/2 malnutrition)  Fluid Requirements (mL): per MD  Estimated Fluid Requirement Method: RDA Method  RDA Method (mL): 1260  CHO Requirement: -      Nutrition Prescription Ordered    Current Diet Order: Regular , lactose free  Nutrition  Order Comments: %  Oral Nutrition Supplement: Boot Plus TID    Evaluation of Received Nutrient/Fluid Intake    Energy Calories Required: not meeting needs  Protein Required: meeting needs  Fluid Required: meeting needs  Comments: pt can only eat so much food at one time, taking boost plus well  Tolerance: tolerating  % Intake of Estimated Energy Needs: 75 - 100 %  % Meal Intake: 75 - 100 %    Nutrition Risk    Level of Risk/Frequency of Follow-up: low     Assessment and Plan  Severe malnutrition  Malnutrition in the context of Social/Environmental Circumstances     Related to (etiology):  Starvation and lack of self care while caregiver, grief response, reduced appetite and po intake     Signs and Symptoms (as evidenced by):  Energy Intake: <75% of estimated energy requirement for > 3 months  Body Fat Depletion: moderate depletion of orbitals and triceps and buccal area  Muscle Mass Depletion: severe depletion of clavicle region and scapular region moderate at temples, hands and calf  Weight Loss: > 7.5% % x < 3 months  Fluid Accumulation: mild    Ongoing  Monitor and Evaluation    Food and Nutrient Intake: food and beverage intake  Physical Activity and Function: nutrition-related ADLs and IADLs  Anthropometric Measurements: weight change  Biochemical Data, Medical Tests and Procedures: electrolyte and renal panel  Nutrition-Focused Physical Findings: overall appearance     Malnutrition Assessment 5/10/19   6/10/19  Malnutrition Type: social/environmental circumstances  Energy Intake: severe energy intake  Skin (Micronutrient): dry  Hair/Scalp (Micronutrient): dull, dry  Eyes (Micronutrient): conjunctiva dull  Teeth (Micronutrient): (plates)  Neck/Chest (Micronutrient): bony prominence, muscle wasting, subcutaneous fat loss  Musculoskeletal/Lower Extremities: edema, joints painful, muscle wasting, subcutaneous fat loss       Weight Loss (Malnutrition): greater than 7.5% in 3 months  Subcutaneous Fat  (Malnutrition): severe depletion  Muscle Mass (Malnutrition): severe depletion  Fluid Accumulation (Malnutrition): mild   Orbital Region (Subcutaneous Fat Loss): moderate depletion  Upper Arm Region (Subcutaneous Fat Loss): moderate depletion  Thoracic and Lumbar Region: moderate depletion   [place] Region (Muscle Loss): moderate depletion  Clavicle Bone Region (Muscle Loss): severe depletion  Clavicle and Acromion Bone Region (Muscle Loss): severe depletion  Scapular Bone Region (Muscle Loss): severe depletion  Dorsal Hand (Muscle Loss): moderate depletion  Posterior Calf Region (Muscle Loss): mild depletion            Severe Weight Loss (Malnutrition): greater than 7.5% in 3 months    Nutrition Follow-Up    RD Follow-up?: Yes

## 2019-06-10 NOTE — PT/OT/SLP PROGRESS
Physical Therapy  Treatment    Siomara Flores   MRN: [id number]   Admitting Diagnosis: Closed displaced intertrochanteric fracture of left femur    PT Received On: 06/10/19          Billable Minutes:  Gait Training 30 and Therapeutic Exercise 8    Treatment Type: Treatment  PT/PTA: PT     PTA Visit Number: 0       General Precautions: Standard, fall  Orthopedic Precautions: LUE non weight bearing, LLE weight bearing as tolerated   Braces: Sling and swathe         Subjective:  Agreeable to PT services.     Pain/Comfort  Pain Rating 1: 0/10    Objective:  Patient found seated in bedside chair.       AM-PAC 6 CLICK MOBILITY  Total Score:16    Transfers:  Sit<>Stand: Supervision  Stand Pivot Transfer: SBA with unilateral UE support    Gait:  Amb 85 feet with HW, swing-to gait pattern, slow fidencio-- SBA.  Amb 40 feet x 2 trials (seated rest break in between) using narrow-base quad cane with SBA-CGA, swing-to/through gait pattern, slight increase in gait speed/fidencio.       Therex:  Standing: Hip flexion, hip abduction, heel raises 2x10 reps with UE support      Balance:  Requires UE support while standing dynamically-- SBA-CGA required.    Patient left up in chair with call button in reach.    Assessment:  Siomara Flores is a 86 y.o. female with a medical diagnosis of Closed displaced intertrochanteric fracture of left femur.  Ms. Flores met two goals today and was able to tolerate the progression of gait from use of a hemiwalker to narrow-base quad cane. Will continue to benefit from skilled therapy services.     Rehab identified problem list/impairments: weakness, impaired endurance, impaired functional mobilty, impaired self care skills, gait instability, impaired balance, decreased upper extremity function, decreased lower extremity function, pain, orthopedic precautions    Rehab potential is good.    Activity tolerance: Good    Discharge recommendations: home with home health(with A/S for safety, pt repts sister  looking into NH placeme)     Barriers to discharge: Inaccessible home environment, Decreased caregiver support    Equipment recommendations: walker, papi, commode, tub bench(HW or QC pending progress)     GOALS:   Multidisciplinary Problems     Physical Therapy Goals        Problem: Physical Therapy Goal    Goal Priority Disciplines Outcome Goal Variances Interventions   Physical Therapy Goal     PT, PT/OT Ongoing (interventions implemented as appropriate)     Description:  Goals to be met by:6/10/19  2019 goals to be met by 19  Patient will increase functional independence with mobility by performin. Supine to sit with supervision.= Met (2019)   2. Sit to supine with supervision. =Met (2019)   3. Sit to stand transfer with Stand-by Assistance. =Met (2019)  4. Bed to chair transfer with Stand-by Assistance using hemiwalker or least restrictive assistive device. =Met (2019)  5. Gait  x 100 feet with Stand-by Assistance using  hemiwalker or least restrictive assistive device. REVISE 2019   REVISED GOAL: gait x 75 feet w/ HW or least restrictive device and SBA = not met  6. Ascend/Descend 4 inch curb step with Contact Guard Assistance using  hemiwalker or least restrictive assistive device.= Met (2019)  7. Stand for 3 minutes with Contact Guard Assistance using  hemiwalker or least restrictive assistive device, intermittently,  and perform an activity. Met (6/10/2019)  8. Lower extremity exercise program x20 reps per handout, with assistance as needed and gym therex  NEW GOAL 2019 9. Patient will propel w/c w/ RUE and RLE or w/ BLE 75 feet including two turns w/ SBA= not met  NEW GOAL 2019 10. Patient will perform stand pivot transfer w/ supervision w/ HW or appropriate assistive device. = met (6/10/2019)                            PLAN:    Patient to be seen 5 x/week  to address the above listed problems via gait training, therapeutic activities, therapeutic  exercises, wheelchair management/training  Plan of Care expires: 06/09/19  Plan of Care reviewed with: patient    Penelope Gore, PT  06/10/2019

## 2019-06-11 LAB
ANION GAP SERPL CALC-SCNC: 9 MMOL/L (ref 8–16)
BASOPHILS # BLD AUTO: 0.08 K/UL (ref 0–0.2)
BASOPHILS NFR BLD: 1.7 % (ref 0–1.9)
BUN SERPL-MCNC: 20 MG/DL (ref 8–23)
CALCIUM SERPL-MCNC: 9.9 MG/DL (ref 8.7–10.5)
CHLORIDE SERPL-SCNC: 99 MMOL/L (ref 95–110)
CO2 SERPL-SCNC: 29 MMOL/L (ref 23–29)
CREAT SERPL-MCNC: 0.6 MG/DL (ref 0.5–1.4)
DIFFERENTIAL METHOD: ABNORMAL
EOSINOPHIL # BLD AUTO: 0.2 K/UL (ref 0–0.5)
EOSINOPHIL NFR BLD: 4.3 % (ref 0–8)
ERYTHROCYTE [DISTWIDTH] IN BLOOD BY AUTOMATED COUNT: 18.1 % (ref 11.5–14.5)
EST. GFR  (AFRICAN AMERICAN): >60 ML/MIN/1.73 M^2
EST. GFR  (NON AFRICAN AMERICAN): >60 ML/MIN/1.73 M^2
GLUCOSE SERPL-MCNC: 86 MG/DL (ref 70–110)
HCT VFR BLD AUTO: 34.4 % (ref 37–48.5)
HGB BLD-MCNC: 10.2 G/DL (ref 12–16)
IMM GRANULOCYTES # BLD AUTO: 0.01 K/UL (ref 0–0.04)
IMM GRANULOCYTES NFR BLD AUTO: 0.2 % (ref 0–0.5)
LYMPHOCYTES # BLD AUTO: 1.2 K/UL (ref 1–4.8)
LYMPHOCYTES NFR BLD: 26.2 % (ref 18–48)
MAGNESIUM SERPL-MCNC: 2.1 MG/DL (ref 1.6–2.6)
MCH RBC QN AUTO: 27.1 PG (ref 27–31)
MCHC RBC AUTO-ENTMCNC: 29.7 G/DL (ref 32–36)
MCV RBC AUTO: 91 FL (ref 82–98)
MONOCYTES # BLD AUTO: 0.4 K/UL (ref 0.3–1)
MONOCYTES NFR BLD: 9.4 % (ref 4–15)
NEUTROPHILS # BLD AUTO: 2.7 K/UL (ref 1.8–7.7)
NEUTROPHILS NFR BLD: 58.2 % (ref 38–73)
NRBC BLD-RTO: 0 /100 WBC
PHOSPHATE SERPL-MCNC: 3.8 MG/DL (ref 2.7–4.5)
PLATELET # BLD AUTO: 279 K/UL (ref 150–350)
PMV BLD AUTO: 10.9 FL (ref 9.2–12.9)
POTASSIUM SERPL-SCNC: 4.2 MMOL/L (ref 3.5–5.1)
RBC # BLD AUTO: 3.77 M/UL (ref 4–5.4)
SODIUM SERPL-SCNC: 137 MMOL/L (ref 136–145)
WBC # BLD AUTO: 4.7 K/UL (ref 3.9–12.7)

## 2019-06-11 PROCEDURE — 25000003 PHARM REV CODE 250: Performed by: NURSE PRACTITIONER

## 2019-06-11 PROCEDURE — 11000004 HC SNF PRIVATE

## 2019-06-11 PROCEDURE — 97530 THERAPEUTIC ACTIVITIES: CPT

## 2019-06-11 PROCEDURE — 85025 COMPLETE CBC W/AUTO DIFF WBC: CPT

## 2019-06-11 PROCEDURE — 25000003 PHARM REV CODE 250: Performed by: HOSPITALIST

## 2019-06-11 PROCEDURE — 97110 THERAPEUTIC EXERCISES: CPT

## 2019-06-11 PROCEDURE — 83735 ASSAY OF MAGNESIUM: CPT

## 2019-06-11 PROCEDURE — 25000242 PHARM REV CODE 250 ALT 637 W/ HCPCS: Performed by: HOSPITALIST

## 2019-06-11 PROCEDURE — 84100 ASSAY OF PHOSPHORUS: CPT

## 2019-06-11 PROCEDURE — 30200315 PPD INTRADERMAL TEST REV CODE 302: Performed by: NURSE PRACTITIONER

## 2019-06-11 PROCEDURE — 63600175 PHARM REV CODE 636 W HCPCS: Performed by: HOSPITALIST

## 2019-06-11 PROCEDURE — 80048 BASIC METABOLIC PNL TOTAL CA: CPT

## 2019-06-11 PROCEDURE — 36415 COLL VENOUS BLD VENIPUNCTURE: CPT

## 2019-06-11 PROCEDURE — 97116 GAIT TRAINING THERAPY: CPT

## 2019-06-11 PROCEDURE — 86580 TB INTRADERMAL TEST: CPT | Performed by: NURSE PRACTITIONER

## 2019-06-11 RX ORDER — ACETAMINOPHEN 500 MG
1000 TABLET ORAL 2 TIMES DAILY
Status: DISCONTINUED | OUTPATIENT
Start: 2019-06-11 | End: 2019-06-18 | Stop reason: HOSPADM

## 2019-06-11 RX ADMIN — SENNOSIDES AND DOCUSATE SODIUM 1 TABLET: 8.6; 5 TABLET ORAL at 09:06

## 2019-06-11 RX ADMIN — FERROUS SULFATE TAB EC 325 MG (65 MG FE EQUIVALENT) 325 MG: 325 (65 FE) TABLET DELAYED RESPONSE at 09:06

## 2019-06-11 RX ADMIN — ACETAMINOPHEN 1000 MG: 500 TABLET ORAL at 09:06

## 2019-06-11 RX ADMIN — FERROUS SULFATE TAB EC 325 MG (65 MG FE EQUIVALENT) 325 MG: 325 (65 FE) TABLET DELAYED RESPONSE at 03:06

## 2019-06-11 RX ADMIN — TIOTROPIUM BROMIDE 18 MCG: 18 CAPSULE ORAL; RESPIRATORY (INHALATION) at 09:06

## 2019-06-11 RX ADMIN — Medication 5 UNITS: at 04:06

## 2019-06-11 RX ADMIN — POLYETHYLENE GLYCOL 3350 17 G: 17 POWDER, FOR SOLUTION ORAL at 09:06

## 2019-06-11 RX ADMIN — VERAPAMIL HYDROCHLORIDE 80 MG: 80 TABLET, FILM COATED ORAL at 09:06

## 2019-06-11 RX ADMIN — ENOXAPARIN SODIUM 30 MG: 100 INJECTION SUBCUTANEOUS at 04:06

## 2019-06-11 RX ADMIN — ATENOLOL 12.5 MG: 25 TABLET ORAL at 09:06

## 2019-06-11 RX ADMIN — ACETAMINOPHEN 1000 MG: 325 TABLET ORAL at 06:06

## 2019-06-11 RX ADMIN — VERAPAMIL HYDROCHLORIDE 80 MG: 80 TABLET, FILM COATED ORAL at 03:06

## 2019-06-11 NOTE — PT/OT/SLP PROGRESS
Physical Therapy  Treatment    Siomara Flores   MRN: [id number]   Admitting Diagnosis: Closed displaced intertrochanteric fracture of left femur    PT Received On: 06/11/19  Total Time (min): (--)       Billable Minutes:  Gait Training 10, Therapeutic Activity 10 and Therapeutic Exercise 18    Treatment Type: Treatment  PT/PTA: PTA     PTA Visit Number: 1       General Precautions: Standard, fall  Orthopedic Precautions: LUE non weight bearing, LLE weight bearing as tolerated   Braces: Sling and swathe    Subjective:  Communicated with nursing prior to session.  Pt agreed to work with therapy.     Pain/Comfort  Pain Rating 1: 0/10  Pain Rating Post-Intervention 1: 0/10    Objective:  Patient found seated bedside chair.        AM-PAC 6 CLICK MOBILITY  Total Score:16    Bed Mobility:  Sit>Supine: not performed  Supine>Sit: not performed    Transfers:  Sit<>Stand: SBA (x5 trials)   Stand Pivot Transfer: SBA     Gait:  Amb x2 trials 58ft and 40ft w/ SBQC and SBA. Slow pace. No LOB noted.      Therex:  BLE therex 2x10 reps:    -AP   -LAQ   -Hip Flexion    -G    Additional Treatment:  Recumbent stepper x15 min L2 using RUE and BLE.     Patient left up in chair with staff in gym..    Assessment:  Siomara Flores is a 86 y.o. female with a medical diagnosis of Closed displaced intertrochanteric fracture of left femur.  Pt tolerated treatment well, and will continue to benefit from PT services at this time. Continue with PT POC as indicated..    Rehab identified problem list/impairments: weakness, impaired endurance, impaired functional mobilty, impaired self care skills, gait instability, impaired balance, decreased upper extremity function, decreased lower extremity function, pain, orthopedic precautions    Rehab potential is good.    Activity tolerance: Good    Discharge recommendations: home with home health(with A/S for safety, pt repts sister looking into NH placeme)     Barriers to discharge: Inaccessible home  environment, Decreased caregiver support    Equipment recommendations: walker, papi, commode, tub bench(HW or QC pending progress)     GOALS:   Multidisciplinary Problems     Physical Therapy Goals        Problem: Physical Therapy Goal    Goal Priority Disciplines Outcome Goal Variances Interventions   Physical Therapy Goal     PT, PT/OT Ongoing (interventions implemented as appropriate)     Description:  Goals to be met by:6/10/19  2019 goals to be met by 19  Patient will increase functional independence with mobility by performin. Supine to sit with supervision.= Met (2019)   2. Sit to supine with supervision. =Met (2019)   3. Sit to stand transfer with Stand-by Assistance. =Met (2019)  4. Bed to chair transfer with Stand-by Assistance using hemiwalker or least restrictive assistive device. =Met (2019)  5. Gait  x 100 feet with Stand-by Assistance using  hemiwalker or least restrictive assistive device. REVISE 2019   REVISED GOAL: gait x 75 feet w/ HW or least restrictive device and SBA = not met  6. Ascend/Descend 4 inch curb step with Contact Guard Assistance using  hemiwalker or least restrictive assistive device.= Met (2019)  7. Stand for 3 minutes with Contact Guard Assistance using  hemiwalker or least restrictive assistive device, intermittently,  and perform an activity. Met (6/10/2019)  8. Lower extremity exercise program x20 reps per handout, with assistance as needed and gym therex  NEW GOAL 2019 9. Patient will propel w/c w/ RUE and RLE or w/ BLE 75 feet including two turns w/ SBA= not met  NEW GOAL 2019 10. Patient will perform stand pivot transfer w/ supervision w/ HW or appropriate assistive device. = met (6/10/2019)                            PLAN:    Patient to be seen 5 x/week  to address the above listed problems via gait training, therapeutic activities, therapeutic exercises, wheelchair management/training  Plan of Care expires:  06/09/19  Plan of Care reviewed with: patient    Vita Winkler, PTA  06/11/2019

## 2019-06-11 NOTE — PROGRESS NOTES
Ms. Flores was seen at [facility] where she is undergoing rehab following her left femur fracture.  She underwent an IM nailing by Dr. Bruce on 5/3/19.    Interval History:  She reports that she is doing well.  Pain is controlled with Tylenol 1 GM tid.  She is participating in her therapy sessions, per their note walking up to 85 feet with a papi walker.  She is progressing towards her stated goals per therapy note.  She denies fever, chills, and sweats since the time of the surgery.     Physical exam:  She is in a sling and swath to her left arm.  Radial pulse is 2+.  There is no edema about her hand.  Incision is now open to air and appears to be healing.  There is no redness or drainage present.  She has tactile stimulation to her left lower leg and palp pedal pulse x 2 to left foot.          RADS: none done today    Assessment:  Post-op visit (40 days)    Plan:  Current care, treatment plan, precautions, activity level/ modifications, limitations, rehabilitation exercises and proposed future treatment were discussed with the patient. We discussed the need to monitor for changes in symptoms and condition and report them to the physician.  Discussed importance of compliance with all appointments and follow up examinations.     - Pain medication: refill was not needed,   - Pain medication refill policy provided to patient for review, no   - Patient to maintain NWB to LUE and WEIGHT BEAR AS TOLERATED to LLE.  - Patient is to return to clinic as scheduled on 6/28/19, appointment with SAGE Rodarte  - DVT ppx Lovenox 30 mg daily.    Future Appointments   Date Time Provider Department Center   6/28/2019 10:45 AM Mirna Crews PA-C [facility] ORTHO Jayden Mejia        If there are any questions prior to scheduled follow up, the patient was instructed to contact the office

## 2019-06-11 NOTE — PT/OT/SLP PROGRESS
Occupational Therapy  Treatment    Siomara Flores   MRN: [id number]   Admitting Diagnosis: Closed displaced intertrochanteric fracture of left femur    OT Date of Treatment: 06/11/19       Billable Minutes:  Therapeutic Exercise 30    General Precautions: Standard, fall  Orthopedic Precautions: LUE non weight bearing  Braces: UE brace, UE Sling(L UE)         Subjective:  Communicated with patient prior to session.    Pain/Comfort  Pain Rating 1: 5/10  Location - Side 1: Right  Location - Orientation 1: generalized  Location 1: arm  Pain Addressed 1: Pre-medicate for activity, Distraction  Pain Rating Post-Intervention 1: 5/10  Pain Rating 2: 5/10  Location - Side 2: Left  Location - Orientation 2: generalized  Location 2: leg  Pain Addressed 2: Pre-medicate for activity, Distraction  Pain Rating Post-Intervention 2: 5/10    Objective:   Patient up in w/c in rehab gym with LUE swathe, sling, and brace.     Occupational Performance:    Functional Mobility/Transfers:  · Patient completed wheelchair > bedside chair Stand Pivot technique with supervision with no assistive device    [facility] 6 Click:  [facility] Total Score: 18    OT Exercises: AROM RUE exercises using 1 lb dumbbell x 2 sets 10 reps in all available pain free ranges to increase strength and endurance needed for ADLs. AAROM L shoulder flexion/extension and external rotation/internal rotation x 3 sets 10 reps each; AAROM L wrist flexion/extension x 3 sets 10 reps each; AROM L elbow flexion/extension x 3 sets 10 reps each; AROM L supination/pronation x 3 sets 10 reps each; AROM L finger flexion/extension to icnrease functional ROM of LUE needed for ADLS.       Patient left up in chair with call button in reach    ASSESSMENT:  Siomara Flores is a 86 y.o. female with a medical diagnosis of Closed displaced intertrochanteric fracture of left femur and presents with the deficits listed below. Increased LUE pronation noted this session. Decreased LUE ROM overall.  Patient tolerated session well. Will continue to benefit from OT services to increase independence with ADLS.     Rehab identified problem list/impairments: weakness, impaired endurance, impaired self care skills, impaired functional mobilty, gait instability, impaired balance, decreased upper extremity function, decreased lower extremity function, pain, orthopedic precautions    Rehab potential is good    Activity tolerance: Good    Discharge recommendations: home with home health     Barriers to discharge: Decreased caregiver support     Equipment recommendations: walker, papi, commode, tub bench(Need to confirm with family (Patient reports she has equipment from her ))     GOALS:   Multidisciplinary Problems     Occupational Therapy Goals        Problem: Occupational Therapy Goal    Goal Priority Disciplines Outcome Interventions   Occupational Therapy Goal     OT, PT/OT Ongoing (interventions implemented as appropriate)    Description:  Goals to be met by: 6/17/2019    Patient will increase functional independence with ADLs by performing:    UE Dressing with Minimal Assistance.  LE Dressing with Minimal Assistance.  Grooming while standing at sink with Stand-by Assistance.  Toileting from bedside commode with Minimal Assistance for hygiene and clothing management. Met 6/3/2019  Bathing with Minimal Assistance.-MET 5/26/2019  Supine to sit with Stand-by Assistance /c HOB flat and no handrails.  Stand pivot transfers with Contact Guard Assistance.-MET. 5/26/2019  Toilet transfer to bedside commode with Contact Guard Assistance.-met 5/26/2019  Right Upper extremity exercise program 3 x 10 reps per handout, with independence.  Patient will complete a standing activity for 8 min with S in order to perform self care tasks.   Met                      Plan:  Patient to be seen 5 x/week to address the above listed problems via therapeutic exercises, therapeutic activities, self-care/home management  Plan of Care  expires: 06/10/19  Plan of Care reviewed with: patient    Marisa Keita, ASIM  06/11/2019

## 2019-06-11 NOTE — PLAN OF CARE
Problem: Adult Inpatient Plan of Care  Goal: Plan of Care Review  Outcome: Revised  Repositions minimal assist, no new skin breakdowns, left hip resurfaces, MERARI sling in place, afebrile. Monitored for pain and safety. Safety maintained. Denies pain

## 2019-06-11 NOTE — PLAN OF CARE
Problem: Adult Inpatient Plan of Care  Goal: Plan of Care Review  Outcome: Ongoing (interventions implemented as appropriate)     06/11/19 0342   Plan of Care Review   Plan of Care Reviewed With patient       Problem: Fall Injury Risk  Goal: Absence of Fall and Fall-Related Injury    Intervention: Identify and Manage Contributors to Fall Injury Risk     06/11/19 0342   Manage Acute Allergic Reaction   Medication Review/Management medications reviewed   Identify and Manage Contributors to Fall Injury Risk   Self-Care Promotion BADL personal objects within reach;BADL personal routines maintained;safe use of adaptive equipment encouraged

## 2019-06-11 NOTE — PROGRESS NOTES
[facility]                                  Skilled Nursing Facility                   Progress Note     Admit Date: 2019  RAVI 2019  Principal Problem:  Closed displaced intertrochanteric fracture of left femurHPI obtained from patient interview and chart review     Chief Complaint:  Revaluation of medical treatment and therapy status: Lab review    HPI:   Mrs. Flores is a 86 year old. female [employer] with PMHx of  Bronchiectasis, SVT who presents to SNF following a hospitalization for left intertrochanteric, left proximal humerus, and left distal radius fracture s/p left femur intramedullary marc after a mechanical fall. She was admitted to [facility] from  until . Dr. Owusu performed a Cephalomedullary nail fixation of left intertrochanteric femur fracture on 5/3/19.     Interval history:  All labs reviewed. k 4.2, Cr stable at 0.6.  No leukocytosis.  H&H increased to 10.2/34 from 9.9/34.  Platelets 279.  Patient continues to wish to go to a nursing home after discharge from SNF- workup ordered. Patient progessing well with PT/OT. Patient medically stable. Continuing to follow and treat all acute and chronic conditions.    Past Medical History: Patient has a past medical history of Bronchiectasis without complication, Hypertension, and Supraventricular tachycardia.    Past Surgical History: Patient has a past surgical history that includes Cholecystectomy; Tonsillectomy; and Intramedullary rodding of femur (Left, 5/3/2019).    Social History: Patient reports that she has never smoked. She has never used smokeless tobacco. She reports that she does not drink alcohol or use drugs.  Patient's ill   about a month ago    Family History:  No family significant history to report    Allergies: Patient is allergic to aspirin (bulk).    ROS  Constitutional: Negative for fever or fatigue.   Eyes: Negative for blurred  vision, double vision and discharge.   Respiratory: Negative for cough, shortness of breath and wheezing.    Cardiovascular: Negative for chest pain, palpitations, claudication, and leg swelling.   Gastrointestinal: Negative for abdominal pain, constipation, diarrhea, nausea and vomiting.   Genitourinary: Negative for dysuria, frequency and urgency.   Musculoskeletal:  + generalized weakness, pain to left arm and hip. Negative for back pain and myalgias.   Skin: Negative for itching and rash.   Neurological: Negative for dizziness, speech change, and headaches.   Psychiatric/Behavioral: + for depression. The patient is not nervous/anxious.      PEx  Temp:  [97.7 °F (36.5 °C)-98 °F (36.7 °C)]   Pulse:  [77-84]   Resp:  [18]   BP: (100-128)/(49-62)   SpO2:  [95 %-100 %]      Constitutional: Patient appears frail  and in no distress   HENT:   Head: Normocephalic and atraumatic.   Eyes: Pupils are equal, round, and reactive to light.   Neck: Normal range of motion. Neck supple.   Cardiovascular: Normal rate, regular rhythm and normal heart sounds.  Pulmonary/Chest: Effort normal and breath sounds are clear  Abdominal: Soft. Bowel sounds are normal.   Musculoskeletal: decreased range of motion to left arm, sling in place.   Neurological: Alert and oriented to person, place, and time.   Skin: Skin is warm and dry.  Patient has skin overgrowth to scalp. Incisions 2cm to left hip and 3cm to left leg- well-approximated no erythema, no swelling noted to periwound area  Psychiatric: mood is normal and improving daily      Assessment and Plan:    Discharge planning  - 6/7 patient's sister-in-law is looking at a facility today for her.  She will update us on her choice so that we can set up for stay post SNF DC.   - 6/11 TB skin test and CXR ordered for nursing home placement     Closed displaced intratrochanteric fracture of left femur  s/p left Cephalomedullary nail fixation of left  femur fracture on 5/3/19  Postoperative  pain  - WBAT to LLE, NWB to LUE  - 5/14 initiated acetaminophen 1 g q.8 hours  - 5/15 pain is better controlled today with scheduled Tylenol  - 6/3 patient went to her ortho follow-up appointment on 05/31-    HIP:          -range of motion as tolerated     - weight bearing as tolerated  SHOULDER:      - range of motion: PROm x 1 weeks then AAROM x 2 weeks then AROM     - NWB  WRIST:      - NWB    -continue brace, remove for range of motion gentle. range of motion of fingers   - 6/11 decrease scheduled acetaminophen to 1 g b.i.d;  Per Ortho- Patient to maintain NWB to LUE and WEIGHT BEAR AS TOLERATED to LLE. Patient is to return to clinic as scheduled on 6/28/19, appointment with SAGE Rodarte    Hx of HTN  - currently patient is experiencing hypotension   - 5/13 decrease losartan from 50 mg to 25 mg daily, continue atenolol 25 mg daily.  Holding parameters in place to hold for systolic blood pressure less than 100.   - 5/14 holding home losartan at this time. patient's 24 hr blood pressure 102/49 to 119/50.   - 5/15 decrease atenolol to 12.5 mg daily; 24 hr blood pressure today is 95/46 to 108/54.    - 5/16 BP slightly improved with the decrease in atenolol dose. Patient's 24 hr blood pressure range is 98/48 to 129/62  - 5/20 patient's 24 hr blood pressure ranges 96/50 to 121/57  - 5/23 patient's 24 hr blood pressure ranges 107/52 to 149/67, the systolic of 149 is isolated and due to increase in pain. Will continue to monitor blood pressures.   - 5/27 patient's 24 hr blood pressure range is 136/52 to 106/50  - 6/11 patient's 24 hr blood pressure range is 100/49 to 128/62    Acute blood loss anemia  - continue ferrous sulfate 325 mg t.i.d.  - patient is a [employer] who refuses blood transfusions  - Post-op H/H 6.9/21.8 with baseline 8.4/26.9  - pediatric blood draws only, avoid IVF  - 5/13 initiated procrit 20,000 units   - 5/16 H&H improved to 7.0/23 from 6.3/20 after Procrit injection and continue iron  supplementation  - 5/20 H&H improved to 7.7/25  - 5/23 H&H improved to 8.1/26  - 5/27 H&H improved to 8.9/30  - 6/11 H&H improved to 10.2/34    CONTINUE     Severe malnutrition  - Energy Intake: <75% of estimated energy requirement for > 3 months  - Body Fat Depletion: moderate depletion of orbitals and triceps and buccal area  - Muscle Mass Depletion: severe depletion of clavicle region and scapular region moderate at temples, hands and calf  - Weight Loss: > 7.5% % x < 3 months   - Continue regular diet, honor food intolerances, recommend change boost breeze to boost plus TID no chocolate  per pt request.  - 5/23 patient states better appetite increased p.o. consumption    Onychogryphosis  - likely from  Insufficient consumtion of essential nutrients  - 5/14- Message sent to  and appointment scheduled are to make patient a Podiatry appointment to cut down toenails, patient's toenails are so long and painful it hurts her to walk. - patient to see Podiatry tomorrow morning  - 5/15 patient was able to see provider today and Podiatry.  I got her an appointment Friday before her ortho follow-up to have her toes cut.  - 5/20 patient went to podiatry appointment in all toenails were trimmed to a normal length.  Patient's toenail pain is significantly improved    Hx of SVT  - continue verapamil 80 mg TID  - 5/16 with reduction of atenolol.  Heart rate is increased to the 90s.  Will continue to assess blood pressure and heart rate daily.     Seborrheic Keratosis   - 5/15 differential diagnosis includes melanoma- ambulatory referral to Dermatology placed.  Lesion appears as a skin overgrowth to scalp that is waxy brown and tan in color, slightly raise with a bumpy appearance.  Lesion is about  5 cm in diameter and circular.     Debility   - Continue with PT/OT for gait training and strengthening and restoration of ADL's   - Encourage mobility, OOB in chair, and early ambulation as appropriate  - Fall  precautions   - Monitor for bowel and bladder dysfunction  - Monitor for and prevent skin breakdown and pressure ulcers  - Continue DVT prophylaxis with  enoxaparin 30 mg daily    Hx of Bronchiectasis  - continue duo nebs q.4 hours while awake and tiotropium 18mcg daily       Future Appointments   Date Time Provider Department Center   6/28/2019 10:45 AM Mirna Crews PA-C [facility] Roberto Ibrahim NP    DOS: 6/11/2019

## 2019-06-11 NOTE — PLAN OF CARE
Problem: Physical Therapy Goal  Goal: Physical Therapy Goal  Goals to be met by:6/10/19  2019 goals to be met by 19  Patient will increase functional independence with mobility by performin. Supine to sit with supervision.= Met (2019)   2. Sit to supine with supervision. =Met (2019)   3. Sit to stand transfer with Stand-by Assistance. =Met (2019)  4. Bed to chair transfer with Stand-by Assistance using hemiwalker or least restrictive assistive device. =Met (2019)  5. Gait  x 100 feet with Stand-by Assistance using  hemiwalker or least restrictive assistive device. REVISE 2019   REVISED GOAL: gait x 75 feet w/ HW or least restrictive device and SBA = not met  6. Ascend/Descend 4 inch curb step with Contact Guard Assistance using  hemiwalker or least restrictive assistive device.= Met (2019)  7. Stand for 3 minutes with Contact Guard Assistance using  hemiwalker or least restrictive assistive device, intermittently,  and perform an activity. Met (6/10/2019)  8. Lower extremity exercise program x20 reps per handout, with assistance as needed and gym therex  NEW GOAL 2019 9. Patient will propel w/c w/ RUE and RLE or w/ BLE 75 feet including two turns w/ SBA= not met  NEW GOAL 2019 10. Patient will perform stand pivot transfer w/ supervision w/ HW or appropriate assistive device. = met (6/10/2019)           Goals remain appropriate. Continue with PT POC as indicated.

## 2019-06-12 PROCEDURE — 97530 THERAPEUTIC ACTIVITIES: CPT

## 2019-06-12 PROCEDURE — 11000004 HC SNF PRIVATE

## 2019-06-12 PROCEDURE — 25000003 PHARM REV CODE 250: Performed by: NURSE PRACTITIONER

## 2019-06-12 PROCEDURE — 25000242 PHARM REV CODE 250 ALT 637 W/ HCPCS: Performed by: HOSPITALIST

## 2019-06-12 PROCEDURE — 25000003 PHARM REV CODE 250: Performed by: HOSPITALIST

## 2019-06-12 PROCEDURE — 63600175 PHARM REV CODE 636 W HCPCS: Performed by: HOSPITALIST

## 2019-06-12 PROCEDURE — 97116 GAIT TRAINING THERAPY: CPT

## 2019-06-12 PROCEDURE — 97110 THERAPEUTIC EXERCISES: CPT

## 2019-06-12 RX ADMIN — ACETAMINOPHEN 1000 MG: 500 TABLET ORAL at 10:06

## 2019-06-12 RX ADMIN — VERAPAMIL HYDROCHLORIDE 80 MG: 80 TABLET, FILM COATED ORAL at 09:06

## 2019-06-12 RX ADMIN — FERROUS SULFATE TAB EC 325 MG (65 MG FE EQUIVALENT) 325 MG: 325 (65 FE) TABLET DELAYED RESPONSE at 09:06

## 2019-06-12 RX ADMIN — FERROUS SULFATE TAB EC 325 MG (65 MG FE EQUIVALENT) 325 MG: 325 (65 FE) TABLET DELAYED RESPONSE at 02:06

## 2019-06-12 RX ADMIN — ACETAMINOPHEN 1000 MG: 500 TABLET ORAL at 09:06

## 2019-06-12 RX ADMIN — TIOTROPIUM BROMIDE 18 MCG: 18 CAPSULE ORAL; RESPIRATORY (INHALATION) at 10:06

## 2019-06-12 RX ADMIN — ATENOLOL 12.5 MG: 25 TABLET ORAL at 10:06

## 2019-06-12 RX ADMIN — POLYETHYLENE GLYCOL 3350 17 G: 17 POWDER, FOR SOLUTION ORAL at 10:06

## 2019-06-12 RX ADMIN — FERROUS SULFATE TAB EC 325 MG (65 MG FE EQUIVALENT) 325 MG: 325 (65 FE) TABLET DELAYED RESPONSE at 10:06

## 2019-06-12 RX ADMIN — VERAPAMIL HYDROCHLORIDE 80 MG: 80 TABLET, FILM COATED ORAL at 10:06

## 2019-06-12 RX ADMIN — ENOXAPARIN SODIUM 30 MG: 100 INJECTION SUBCUTANEOUS at 04:06

## 2019-06-12 NOTE — PT/OT/SLP PROGRESS
Physical Therapy  Treatment    Siomara Flores   MRN: [id number]   Admitting Diagnosis: Closed displaced intertrochanteric fracture of left femur    PT Received On: 06/12/19  Total Time (min): (--)       Billable Minutes:  Gait Training 12 and Therapeutic Activity 20    Treatment Type: Treatment  PT/PTA: PTA     PTA Visit Number: 2       General Precautions: Standard, fall  Orthopedic Precautions: LUE non weight bearing, LLE weight bearing as tolerated   Braces: Sling and swathe         Subjective:  Communicated with OT prior to session.  Pt agreed to work with therapy.     Pain/Comfort  Pain Rating 1: (Pt did not rate. )  Location - Side 1: Right  Location - Orientation 1: generalized  Location 1: arm  Pain Addressed 1: Reposition, Distraction, Pre-medicate for activity  Pain Rating Post-Intervention 1: (Pt did not rate. )    Objective:  Patient found supine on mat  with  OT present.      AM-PAC 6 CLICK MOBILITY  Total Score:16    Bed Mobility:  Sit>Supine: not performed  Supine>Sit: on mat w/ min A for trunk elevation.     Transfers:  Sit<>Stand: to/from mat, to/from w/c w/ SBQC and SBA. Cueing for hand placement.     Gait:  Amb 48ft w/ SBQC and SBA. Slow pace. No LOB noted.      Therex:  BLE therex 2x15 reps:    -AP   -LAQ   -Hip Flexion   -GS   -Hip abd/add    Patient left up in chair with call button in reach.    Assessment:  Siomara Flores is a 86 y.o. female with a medical diagnosis of Closed displaced intertrochanteric fracture of left femur.  Pt requires SBA for t/fs and gait. Pt tolerated treatment well, and will continue to benefit from PT services at this time. Continue with PT POC as indicated    Rehab identified problem list/impairments: weakness, impaired endurance, impaired functional mobilty, impaired self care skills, gait instability, impaired balance, decreased upper extremity function, decreased lower extremity function, pain, orthopedic precautions    Rehab potential is good.    Activity  tolerance: Good    Discharge recommendations: home with home health(with A/S for safety, pt repts sister looking into NH placeme)     Barriers to discharge: Inaccessible home environment, Decreased caregiver support    Equipment recommendations: walker, papi, commode, tub bench(HW or QC pending progress)     GOALS:   Multidisciplinary Problems     Physical Therapy Goals        Problem: Physical Therapy Goal    Goal Priority Disciplines Outcome Goal Variances Interventions   Physical Therapy Goal     PT, PT/OT Ongoing (interventions implemented as appropriate)     Description:  Goals to be met by:6/10/19  2019 goals to be met by 19  Patient will increase functional independence with mobility by performin. Supine to sit with supervision.= Met (2019)   2. Sit to supine with supervision. =Met (2019)   3. Sit to stand transfer with Stand-by Assistance. =Met (2019)  4. Bed to chair transfer with Stand-by Assistance using hemiwalker or least restrictive assistive device. =Met (2019)  5. Gait  x 100 feet with Stand-by Assistance using  hemiwalker or least restrictive assistive device. REVISE 2019   REVISED GOAL: gait x 75 feet w/ HW or least restrictive device and SBA = not met  6. Ascend/Descend 4 inch curb step with Contact Guard Assistance using  hemiwalker or least restrictive assistive device.= Met (2019)  7. Stand for 3 minutes with Contact Guard Assistance using  hemiwalker or least restrictive assistive device, intermittently,  and perform an activity. Met (6/10/2019)  8. Lower extremity exercise program x20 reps per handout, with assistance as needed and gym therex  NEW GOAL 2019 9. Patient will propel w/c w/ RUE and RLE or w/ BLE 75 feet including two turns w/ SBA= not met  NEW GOAL 2019 10. Patient will perform stand pivot transfer w/ supervision w/ HW or appropriate assistive device. = met (6/10/2019)                            PLAN:    Patient to be seen 5  x/week  to address the above listed problems via gait training, therapeutic activities, therapeutic exercises, wheelchair management/training  Plan of Care expires: 06/09/19  Plan of Care reviewed with: patient    Vita Vanmer, PTA  06/12/2019

## 2019-06-13 PROCEDURE — 97530 THERAPEUTIC ACTIVITIES: CPT

## 2019-06-13 PROCEDURE — 25000003 PHARM REV CODE 250: Performed by: NURSE PRACTITIONER

## 2019-06-13 PROCEDURE — 97535 SELF CARE MNGMENT TRAINING: CPT

## 2019-06-13 PROCEDURE — 11000004 HC SNF PRIVATE

## 2019-06-13 PROCEDURE — 63600175 PHARM REV CODE 636 W HCPCS: Performed by: HOSPITALIST

## 2019-06-13 PROCEDURE — 97110 THERAPEUTIC EXERCISES: CPT

## 2019-06-13 PROCEDURE — 25000242 PHARM REV CODE 250 ALT 637 W/ HCPCS: Performed by: HOSPITALIST

## 2019-06-13 PROCEDURE — 25000003 PHARM REV CODE 250: Performed by: HOSPITALIST

## 2019-06-13 PROCEDURE — 97116 GAIT TRAINING THERAPY: CPT

## 2019-06-13 RX ADMIN — VERAPAMIL HYDROCHLORIDE 80 MG: 80 TABLET, FILM COATED ORAL at 10:06

## 2019-06-13 RX ADMIN — SENNOSIDES AND DOCUSATE SODIUM 1 TABLET: 8.6; 5 TABLET ORAL at 08:06

## 2019-06-13 RX ADMIN — ATENOLOL 12.5 MG: 25 TABLET ORAL at 10:06

## 2019-06-13 RX ADMIN — VERAPAMIL HYDROCHLORIDE 80 MG: 80 TABLET, FILM COATED ORAL at 08:06

## 2019-06-13 RX ADMIN — POLYETHYLENE GLYCOL 3350 17 G: 17 POWDER, FOR SOLUTION ORAL at 10:06

## 2019-06-13 RX ADMIN — FERROUS SULFATE TAB EC 325 MG (65 MG FE EQUIVALENT) 325 MG: 325 (65 FE) TABLET DELAYED RESPONSE at 08:06

## 2019-06-13 RX ADMIN — ACETAMINOPHEN 1000 MG: 500 TABLET ORAL at 10:06

## 2019-06-13 RX ADMIN — ENOXAPARIN SODIUM 30 MG: 100 INJECTION SUBCUTANEOUS at 04:06

## 2019-06-13 RX ADMIN — SENNOSIDES AND DOCUSATE SODIUM 1 TABLET: 8.6; 5 TABLET ORAL at 10:06

## 2019-06-13 RX ADMIN — TIOTROPIUM BROMIDE 18 MCG: 18 CAPSULE ORAL; RESPIRATORY (INHALATION) at 10:06

## 2019-06-13 RX ADMIN — ACETAMINOPHEN 1000 MG: 500 TABLET ORAL at 08:06

## 2019-06-13 RX ADMIN — FERROUS SULFATE TAB EC 325 MG (65 MG FE EQUIVALENT) 325 MG: 325 (65 FE) TABLET DELAYED RESPONSE at 02:06

## 2019-06-13 RX ADMIN — FERROUS SULFATE TAB EC 325 MG (65 MG FE EQUIVALENT) 325 MG: 325 (65 FE) TABLET DELAYED RESPONSE at 10:06

## 2019-06-13 NOTE — PT/OT/SLP PROGRESS
"Physical Therapy  Treatment    Siomara Flores   MRN: [id number]   Admitting Diagnosis: Closed displaced intertrochanteric fracture of left femur    PT Received On: 06/13/19  Total Time (min): (--)       Billable Minutes:  Gait Training 10, Therapeutic Activity 13    Treatment Type: Treatment  PT/PTA: PTA     PTA Visit Number: 3       General Precautions: Standard, fall  Orthopedic Precautions: LUE non weight bearing, LLE weight bearing as tolerated   Braces: Sling and swathe         Subjective:  Communicated with nursing prior to session.  "I have to go to the restroom."    Pain/Comfort  Pain Rating 1: 0/10  Pain Rating Post-Intervention 1: 0/10    Objective:  Patient found seated bedside chair.     AM-PAC 6 CLICK MOBILITY  Total Score:16    Bed Mobility:  Sit>Supine:not performed  Supine>Sit: not performed    Transfers:  Sit<>Stand: to/from bedside chair, to/from toilet, to/from w/c w/ SBQC and SBA  Stand Pivot Transfer: w/c to bedside chair w/ HW and SBA    Gait:  Amb 42ft w/ SBQC and SBA. No LOB noted. Pt ambulated an additional 18ft x2 trials in room at start of treatment session w/ SBQC and SBA. No LOB noted.      Therex:  BLE therex 2x15 reps:    -AP   -LAQ   -Hip Flexion    -GS    Additional Treatment:  -Pt required assistance w/ clothing mgmt on this date.   -Pt did not require assistance w/ toileting on this date.     Patient left up in chair with call button in reach.    Assessment:  Siomara Flores is a 86 y.o. female with a medical diagnosis of Closed displaced intertrochanteric fracture of left femur.  Pt tolerated treatment well, and will continue to benefit from PT services at this time. Continue with PT POC as indicated.    Rehab identified problem list/impairments: weakness, impaired endurance, impaired functional mobilty, impaired self care skills, gait instability, impaired balance, decreased upper extremity function, decreased lower extremity function, pain, orthopedic precautions    Rehab " potential is good.    Activity tolerance: Good    Discharge recommendations: home with home health(with A/S for safety, pt repts sister looking into NH placeme)     Barriers to discharge: Inaccessible home environment, Decreased caregiver support    Equipment recommendations: walker, papi, commode, tub bench(HW or QC pending progress)     GOALS:   Multidisciplinary Problems     Physical Therapy Goals        Problem: Physical Therapy Goal    Goal Priority Disciplines Outcome Goal Variances Interventions   Physical Therapy Goal     PT, PT/OT Ongoing (interventions implemented as appropriate)     Description:  Goals to be met by:6/10/19  2019 goals to be met by 19  Patient will increase functional independence with mobility by performin. Supine to sit with supervision.= Met (2019)   2. Sit to supine with supervision. =Met (2019)   3. Sit to stand transfer with Stand-by Assistance. =Met (2019)  4. Bed to chair transfer with Stand-by Assistance using hemiwalker or least restrictive assistive device. =Met (2019)  5. Gait  x 100 feet with Stand-by Assistance using  hemiwalker or least restrictive assistive device. REVISE 2019   REVISED GOAL: gait x 75 feet w/ HW or least restrictive device and SBA = not met  6. Ascend/Descend 4 inch curb step with Contact Guard Assistance using  hemiwalker or least restrictive assistive device.= Met (2019)  7. Stand for 3 minutes with Contact Guard Assistance using  hemiwalker or least restrictive assistive device, intermittently,  and perform an activity. Met (6/10/2019)  8. Lower extremity exercise program x20 reps per handout, with assistance as needed and gym therex  NEW GOAL 2019 9. Patient will propel w/c w/ RUE and RLE or w/ BLE 75 feet including two turns w/ SBA= not met  NEW GOAL 2019 10. Patient will perform stand pivot transfer w/ supervision w/ HW or appropriate assistive device. = met (6/10/2019)                             PLAN:    Patient to be seen 5 x/week  to address the above listed problems via gait training, therapeutic activities, therapeutic exercises, wheelchair management/training  Plan of Care expires: 06/09/19  Plan of Care reviewed with: patient    Vita Peterson, PTA  06/13/2019

## 2019-06-13 NOTE — PT/OT/SLP PROGRESS
Occupational Therapy  Treatment    Siomara Flores   MRN: [id number]   Admitting Diagnosis: Closed displaced intertrochanteric fracture of left femur    OT Date of Treatment: 06/13/19       Billable Minutes:  Self Care/Home Management 10  and Therapeutic Exercise 40    General Precautions: Standard, fall  Orthopedic Precautions: LUE non weight bearing  Braces: Sling and swathe(sling and swathe for comfort only; L wrist brace off for rx.)         Subjective:  Communicated with patient prior to session.    Pain/Comfort  Pain Rating 1: 0/10  Pain Rating Post-Intervention 1: 0/10    Objective:   Patient found seated in bedside chair with sling, swathe, and brace on LUE.    Occupational Performance:    Functional Mobility/Transfers:  · Patient completed Sit <> Stand Transfer with stand by assistance  with  hemiwalker    · Patient completed w/c <> mat t/f stand pivot technique with SBA and no AD   · Functional Mobility: Patient ambulated from bedside chair to sink in bathroom using hemiwalker with SBA.    Activities of Daily Living:  · Grooming: stand by assistance performing oral hygiene and washing hands standing at sink level  · Lower Body Dressing: stand by assistance donning pants over hips while standing with hemiwalker     [facility] 6 Click:  [facility] Total Score: 18    OT Exercises: AAROM R side lying on mat performing external rotation, supine on mat performing AAROM/AROM shoulder flexion with distraction, and supine on mat performing AROM elbow flexion/extension reaching to R shoulder to increase functional use of LUE for ADLS. Retrograde massage of L elbow to decrease edema in area around elbow in order to improve functional use of LUE.   LUE forearm pronation/ supination, wrist flexion/ extension arom.  OT educated pt in importance of increasing time out of sling and swathe, proper positioning to prevent dependent edema L elbow and to utilize LUE as a functional assist during ADL's.    Patient left up in chair with  call button in reach    ASSESSMENT:  Siomara Flores is a 86 y.o. female with a medical diagnosis of Closed displaced intertrochanteric fracture of left femur and presents with the deficits listed below. Patient's LUE ROM continues to be limited, but is improving. Patient requires verbal cues to not compensate by elevating shoulder as this significantly increases risk of developing shoulder impingement. Patient tolerated session well and will continue to benefit from OT services to improve functional use of LUE and increase independence with ADLs.      Rehab identified problem list/impairments: weakness, impaired endurance, impaired self care skills, impaired functional mobilty, gait instability, impaired balance, decreased upper extremity function, decreased lower extremity function, pain, orthopedic precautions    Rehab potential is good    Activity tolerance: Good    Discharge recommendations: home with home health     Barriers to discharge: Decreased caregiver support      Equipment recommendations: walker, papi, commode, tub bench(Need to confirm with family (Patient reports she has equipment from her ))     GOALS:   Multidisciplinary Problems     Occupational Therapy Goals        Problem: Occupational Therapy Goal    Goal Priority Disciplines Outcome Interventions   Occupational Therapy Goal     OT, PT/OT Ongoing (interventions implemented as appropriate)    Description:  Goals to be met by: 6/17/2019    Patient will increase functional independence with ADLs by performing:  LUE shoulder flexion 0-100* with aarom.  LUE shoulder flexion 0-120* prom  LUE elbow flexion and extension WNL arom  LUE wrist extension 0-50* arom  LUE wrist flexion 0-45* arom  UE Dressing with Minimal Assistance.  LE Dressing with Minimal Assistance.  Grooming while standing at sink with Stand-by Assistance. Met 3/13/19  Toileting from bedside commode with Minimal Assistance for hygiene and clothing management. Met  6/3/2019  Bathing with Minimal Assistance.-MET 5/26/2019  Supine to sit with Stand-by Assistance /c HOB flat and no handrails.  Stand pivot transfers with Contact Guard Assistance.-MET. 5/26/2019  Toilet transfer to bedside commode with Contact Guard Assistance.-met 5/26/2019  Right Upper extremity exercise program 3 x 10 reps per handout, with independence.  Patient will complete a standing activity for 8 min with S in order to perform self care tasks.   Met                        Plan:  Patient to be seen 5 x/week to address the above listed problems via therapeutic exercises, therapeutic activities, self-care/home management  Plan of Care expires: 06/10/19  Plan of Care reviewed with: patient   The DAGMAR and KAMILLA have collaborated and discussed the patient's status, treatment plan and progress toward established goals.   ASIM Lobo 6/13/2019     ASIM Lobo  06/13/2019

## 2019-06-13 NOTE — PLAN OF CARE
Problem: Skin Injury Risk Increased  Goal: Skin Health and Integrity  Outcome: Ongoing (interventions implemented as appropriate)  Discussed plan of care with patient for this shift. Encouraged patient to call for pain medication prior to pain becoming too intense.  Explained to patient to adjust her position to maintain skin integrity and call for assistance if needed to reposition sling. Patient verbalized understanding. Safety precautions maintained, will follow up as needed.

## 2019-06-13 NOTE — PLAN OF CARE
Problem: Occupational Therapy Goal  Goal: Occupational Therapy Goal  Goals to be met by: 6/17/2019    Patient will increase functional independence with ADLs by performing:  LUE shoulder flexion 0-100* with aarom.  LUE shoulder flexion 0-120* prom  LUE elbow flexion and extension WNL arom  LUE wrist extension 0-50* arom  LUE wrist flexion 0-45* arom  UE Dressing with Minimal Assistance.  LE Dressing with Minimal Assistance.  Grooming while standing at sink with Stand-by Assistance. Met 3/13/19  Toileting from bedside commode with Minimal Assistance for hygiene and clothing management. Met 6/3/2019  Bathing with Minimal Assistance.-MET 5/26/2019  Supine to sit with Stand-by Assistance /c HOB flat and no handrails.  Stand pivot transfers with Contact Guard Assistance.-MET. 5/26/2019  Toilet transfer to bedside commode with Contact Guard Assistance.-met 5/26/2019  Right Upper extremity exercise program 3 x 10 reps per handout, with independence.  Patient will complete a standing activity for 8 min with S in order to perform self care tasks.   Met      Outcome: Ongoing (interventions implemented as appropriate)  Patient goals are appropriate.

## 2019-06-13 NOTE — PT/OT/SLP PROGRESS
Occupational Therapy  Treatment    Siomara Flores   MRN: [id number]   Admitting Diagnosis: Closed displaced intertrochanteric fracture of left femur    OT Date of Treatment: 06/12/19       Billable Minutes:  Therapeutic Exercise 45    General Precautions: Standard, fall  Orthopedic Precautions: LUE non weight bearing  Braces: UE brace, UE Sling(L UE)    Subjective:  Communicated with nursing prior to session.  Pt reported that she is concerned re: edema proximal to L elbow (just above wrist splint)  OT discussed with Bipin Linda NP in ortho clinic, who approved weaning of LUE sling and swathe throughout the day and night, to continue for comfort as needed. Also, removal of L wrist splint during therapy but continued use in non-therapy times for support. F/u ortho appt. Scheduled next week.     Objective:  Bed Mobility:    · Min A overall due to impaired use of LUE for assist     OT Exercises: AROM L elbow flexion/ extension, forearm pronation/ supination and wrist flexion/ extension with therapist providing guided support throughout range.  AAROM L shoulder flexion, IR/ER while pt in supine position with guided assist.  Strengthening: LUE bicep curls with 1#    Focus of treatment on strengthening of long head of biceps , teres minor, infraspinatus and post. delt to improve proximal stability at shoulder joint in preparation for arom.    Patient left up in chair with call button in reach    ASSESSMENT:  Siomara Flores is a 86 y.o. female with a medical diagnosis of Closed displaced intertrochanteric fracture of left femur who presents with instability Lshoulder which is increasing in nature due to prolonged immobility. Therefore, this OT discussed case with ortho NP who approved increased activity. This patient is at high risk for further loss of function and increased pain without such focus of treatment.     Rehab identified problem list/impairments: weakness, impaired endurance, impaired self care skills,  impaired functional mobilty, gait instability, impaired balance, decreased upper extremity function, decreased lower extremity function, pain, orthopedic precautions    Rehab potential is good    Activity tolerance: Good    Discharge recommendations: home with home health     Barriers to discharge: Decreased caregiver support     Equipment recommendations: walker, papi, commode, tub bench(Need to confirm with family (Patient reports she has equipment from her ))     GOALS:   Multidisciplinary Problems     Occupational Therapy Goals        Problem: Occupational Therapy Goal    Goal Priority Disciplines Outcome Interventions   Occupational Therapy Goal     OT, PT/OT Ongoing (interventions implemented as appropriate)    Description:  Goals to be met by: 6/17/2019    Patient will increase functional independence with ADLs by performing:  LUE shoulder flexion 0-100* with aarom.  LUE shoulder flexion 0-120* prom  LUE elbow flexion and extension WNL arom  LUE wrist extension 0-50* arom  LUE wrist flexion 0-45* arom  UE Dressing with Minimal Assistance.  LE Dressing with Minimal Assistance.  Grooming while standing at sink with Stand-by Assistance.  Toileting from bedside commode with Minimal Assistance for hygiene and clothing management. Met 6/3/2019  Bathing with Minimal Assistance.-MET 5/26/2019  Supine to sit with Stand-by Assistance /c HOB flat and no handrails.  Stand pivot transfers with Contact Guard Assistance.-MET. 5/26/2019  Toilet transfer to bedside commode with Contact Guard Assistance.-met 5/26/2019  Right Upper extremity exercise program 3 x 10 reps per handout, with independence.  Patient will complete a standing activity for 8 min with S in order to perform self care tasks.   Met                       Plan:  Patient to be seen 5 x/week to address the above listed problems via therapeutic exercises, therapeutic activities, self-care/home management  Plan of Care expires: 06/10/19  Plan of Care  reviewed with: patient    Adelaidealanis Clementjanessa, OT  06/12/2019

## 2019-06-13 NOTE — PROGRESS NOTES
Nursing Home referral packet sent to [facility]. PASRR completed and [facility] called in.

## 2019-06-13 NOTE — PLAN OF CARE
Problem: Occupational Therapy Goal  Goal: Occupational Therapy Goal  Goals to be met by: 6/17/2019    Patient will increase functional independence with ADLs by performing:  LUE shoulder flexion 0-100* with aarom.  LUE shoulder flexion 0-120* prom  LUE elbow flexion and extension WNL arom  LUE wrist extension 0-50* arom  LUE wrist flexion 0-45* arom  UE Dressing with Minimal Assistance.  LE Dressing with Minimal Assistance.  Grooming while standing at sink with Stand-by Assistance.  Toileting from bedside commode with Minimal Assistance for hygiene and clothing management. Met 6/3/2019  Bathing with Minimal Assistance.-MET 5/26/2019  Supine to sit with Stand-by Assistance /c HOB flat and no handrails.  Stand pivot transfers with Contact Guard Assistance.-MET. 5/26/2019  Toilet transfer to bedside commode with Contact Guard Assistance.-met 5/26/2019  Right Upper extremity exercise program 3 x 10 reps per handout, with independence.  Patient will complete a standing activity for 8 min with S in order to perform self care tasks.   Met     Outcome: Ongoing (interventions implemented as appropriate)  Adelaide Gongora OT  6/12/2019

## 2019-06-14 LAB
ANION GAP SERPL CALC-SCNC: 9 MMOL/L (ref 8–16)
BASOPHILS # BLD AUTO: 0.07 K/UL (ref 0–0.2)
BASOPHILS NFR BLD: 1.6 % (ref 0–1.9)
BUN SERPL-MCNC: 22 MG/DL (ref 8–23)
CALCIUM SERPL-MCNC: 10.2 MG/DL (ref 8.7–10.5)
CHLORIDE SERPL-SCNC: 99 MMOL/L (ref 95–110)
CO2 SERPL-SCNC: 31 MMOL/L (ref 23–29)
CREAT SERPL-MCNC: 0.6 MG/DL (ref 0.5–1.4)
DIFFERENTIAL METHOD: ABNORMAL
EOSINOPHIL # BLD AUTO: 0.2 K/UL (ref 0–0.5)
EOSINOPHIL NFR BLD: 5.5 % (ref 0–8)
ERYTHROCYTE [DISTWIDTH] IN BLOOD BY AUTOMATED COUNT: 18.2 % (ref 11.5–14.5)
EST. GFR  (AFRICAN AMERICAN): >60 ML/MIN/1.73 M^2
EST. GFR  (NON AFRICAN AMERICAN): >60 ML/MIN/1.73 M^2
GLUCOSE SERPL-MCNC: 95 MG/DL (ref 70–110)
HCT VFR BLD AUTO: 34.1 % (ref 37–48.5)
HGB BLD-MCNC: 10.5 G/DL (ref 12–16)
IMM GRANULOCYTES # BLD AUTO: 0 K/UL (ref 0–0.04)
IMM GRANULOCYTES NFR BLD AUTO: 0 % (ref 0–0.5)
LYMPHOCYTES # BLD AUTO: 1.3 K/UL (ref 1–4.8)
LYMPHOCYTES NFR BLD: 29.6 % (ref 18–48)
MAGNESIUM SERPL-MCNC: 2.3 MG/DL (ref 1.6–2.6)
MCH RBC QN AUTO: 27.4 PG (ref 27–31)
MCHC RBC AUTO-ENTMCNC: 30.8 G/DL (ref 32–36)
MCV RBC AUTO: 89 FL (ref 82–98)
MONOCYTES # BLD AUTO: 0.4 K/UL (ref 0.3–1)
MONOCYTES NFR BLD: 9.3 % (ref 4–15)
NEUTROPHILS # BLD AUTO: 2.4 K/UL (ref 1.8–7.7)
NEUTROPHILS NFR BLD: 54 % (ref 38–73)
NRBC BLD-RTO: 0 /100 WBC
PHOSPHATE SERPL-MCNC: 3.7 MG/DL (ref 2.7–4.5)
PLATELET # BLD AUTO: 285 K/UL (ref 150–350)
PMV BLD AUTO: 10.6 FL (ref 9.2–12.9)
POTASSIUM SERPL-SCNC: 4.2 MMOL/L (ref 3.5–5.1)
RBC # BLD AUTO: 3.83 M/UL (ref 4–5.4)
SODIUM SERPL-SCNC: 139 MMOL/L (ref 136–145)
TB INDURATION 48 - 72 HR READ: 0 MM
WBC # BLD AUTO: 4.39 K/UL (ref 3.9–12.7)

## 2019-06-14 PROCEDURE — 97535 SELF CARE MNGMENT TRAINING: CPT

## 2019-06-14 PROCEDURE — 25000242 PHARM REV CODE 250 ALT 637 W/ HCPCS: Performed by: HOSPITALIST

## 2019-06-14 PROCEDURE — 25000003 PHARM REV CODE 250: Performed by: HOSPITALIST

## 2019-06-14 PROCEDURE — 36415 COLL VENOUS BLD VENIPUNCTURE: CPT

## 2019-06-14 PROCEDURE — 25000003 PHARM REV CODE 250: Performed by: NURSE PRACTITIONER

## 2019-06-14 PROCEDURE — 83735 ASSAY OF MAGNESIUM: CPT

## 2019-06-14 PROCEDURE — 63600175 PHARM REV CODE 636 W HCPCS: Performed by: HOSPITALIST

## 2019-06-14 PROCEDURE — 11000004 HC SNF PRIVATE

## 2019-06-14 PROCEDURE — 85025 COMPLETE CBC W/AUTO DIFF WBC: CPT

## 2019-06-14 PROCEDURE — 80048 BASIC METABOLIC PNL TOTAL CA: CPT

## 2019-06-14 PROCEDURE — 84100 ASSAY OF PHOSPHORUS: CPT

## 2019-06-14 RX ADMIN — TIOTROPIUM BROMIDE 18 MCG: 18 CAPSULE ORAL; RESPIRATORY (INHALATION) at 09:06

## 2019-06-14 RX ADMIN — ENOXAPARIN SODIUM 30 MG: 100 INJECTION SUBCUTANEOUS at 06:06

## 2019-06-14 RX ADMIN — FERROUS SULFATE TAB EC 325 MG (65 MG FE EQUIVALENT) 325 MG: 325 (65 FE) TABLET DELAYED RESPONSE at 10:06

## 2019-06-14 RX ADMIN — ACETAMINOPHEN 500 MG: 500 TABLET ORAL at 09:06

## 2019-06-14 RX ADMIN — VERAPAMIL HYDROCHLORIDE 80 MG: 80 TABLET, FILM COATED ORAL at 09:06

## 2019-06-14 RX ADMIN — FERROUS SULFATE TAB EC 325 MG (65 MG FE EQUIVALENT) 325 MG: 325 (65 FE) TABLET DELAYED RESPONSE at 09:06

## 2019-06-14 RX ADMIN — ACETAMINOPHEN 1000 MG: 500 TABLET ORAL at 10:06

## 2019-06-14 RX ADMIN — POLYETHYLENE GLYCOL 3350 17 G: 17 POWDER, FOR SOLUTION ORAL at 09:06

## 2019-06-14 RX ADMIN — FERROUS SULFATE TAB EC 325 MG (65 MG FE EQUIVALENT) 325 MG: 325 (65 FE) TABLET DELAYED RESPONSE at 02:06

## 2019-06-14 RX ADMIN — VERAPAMIL HYDROCHLORIDE 80 MG: 80 TABLET, FILM COATED ORAL at 10:06

## 2019-06-14 RX ADMIN — ATENOLOL 12.5 MG: 25 TABLET ORAL at 09:06

## 2019-06-14 NOTE — PLAN OF CARE
Problem: Occupational Therapy Goal  Goal: Occupational Therapy Goal  Goals to be met by: 6/17/2019    Patient will increase functional independence with ADLs by performing:  LUE shoulder flexion 0-100* with aarom.  LUE shoulder flexion 0-120* prom  LUE elbow flexion and extension WNL arom  LUE wrist extension 0-50* arom  LUE wrist flexion 0-45* arom  UE Dressing with Minimal Assistance. Met 6/14/19  LE Dressing with Minimal Assistance.  Grooming while standing at sink with Stand-by Assistance. Met 3/13/19  Toileting from bedside commode with Minimal Assistance for hygiene and clothing management. Met 6/3/2019  Bathing with Minimal Assistance.-MET 5/26/2019  Supine to sit with Stand-by Assistance /c HOB flat and no handrails.  Stand pivot transfers with Contact Guard Assistance.-MET. 5/26/2019  Toilet transfer to bedside commode with Contact Guard Assistance.-met 5/26/2019  Right Upper extremity exercise program 3 x 10 reps per handout, with independence.  Patient will complete a standing activity for 8 min with S in order to perform self care tasks.   Met       Outcome: Ongoing (interventions implemented as appropriate)  Patient goals are appropriate.

## 2019-06-15 PROCEDURE — 25000242 PHARM REV CODE 250 ALT 637 W/ HCPCS: Performed by: HOSPITALIST

## 2019-06-15 PROCEDURE — 25000003 PHARM REV CODE 250: Performed by: NURSE PRACTITIONER

## 2019-06-15 PROCEDURE — 25000003 PHARM REV CODE 250: Performed by: HOSPITALIST

## 2019-06-15 PROCEDURE — 63600175 PHARM REV CODE 636 W HCPCS: Performed by: HOSPITALIST

## 2019-06-15 PROCEDURE — 11000004 HC SNF PRIVATE

## 2019-06-15 RX ADMIN — TIOTROPIUM BROMIDE 18 MCG: 18 CAPSULE ORAL; RESPIRATORY (INHALATION) at 05:06

## 2019-06-15 RX ADMIN — FERROUS SULFATE TAB EC 325 MG (65 MG FE EQUIVALENT) 325 MG: 325 (65 FE) TABLET DELAYED RESPONSE at 09:06

## 2019-06-15 RX ADMIN — FERROUS SULFATE TAB EC 325 MG (65 MG FE EQUIVALENT) 325 MG: 325 (65 FE) TABLET DELAYED RESPONSE at 02:06

## 2019-06-15 RX ADMIN — ACETAMINOPHEN 1000 MG: 500 TABLET ORAL at 09:06

## 2019-06-15 RX ADMIN — FERROUS SULFATE TAB EC 325 MG (65 MG FE EQUIVALENT) 325 MG: 325 (65 FE) TABLET DELAYED RESPONSE at 08:06

## 2019-06-15 RX ADMIN — ATENOLOL 12.5 MG: 25 TABLET ORAL at 09:06

## 2019-06-15 RX ADMIN — ACETAMINOPHEN 1000 MG: 500 TABLET ORAL at 08:06

## 2019-06-15 RX ADMIN — VERAPAMIL HYDROCHLORIDE 80 MG: 80 TABLET, FILM COATED ORAL at 09:06

## 2019-06-15 RX ADMIN — VERAPAMIL HYDROCHLORIDE 80 MG: 80 TABLET, FILM COATED ORAL at 08:06

## 2019-06-15 RX ADMIN — ENOXAPARIN SODIUM 30 MG: 100 INJECTION SUBCUTANEOUS at 04:06

## 2019-06-15 RX ADMIN — POLYETHYLENE GLYCOL 3350 17 G: 17 POWDER, FOR SOLUTION ORAL at 09:06

## 2019-06-15 NOTE — PLAN OF CARE
Problem: Adult Inpatient Plan of Care  Goal: Plan of Care Review  Outcome: Ongoing (interventions implemented as appropriate)  Ms Mark requested assistance with ambulating to the bathroom to attempt void. Pt received contact guard assistance with using a papi-cane to ambulate to the bathroom. Safety measures maintained. Pt had a medium BM. She was assisted back into recliner and call light placed within reach. Will continue with plan of care.

## 2019-06-16 PROCEDURE — 25000242 PHARM REV CODE 250 ALT 637 W/ HCPCS: Performed by: HOSPITALIST

## 2019-06-16 PROCEDURE — 25000003 PHARM REV CODE 250: Performed by: NURSE PRACTITIONER

## 2019-06-16 PROCEDURE — 63600175 PHARM REV CODE 636 W HCPCS: Performed by: HOSPITALIST

## 2019-06-16 PROCEDURE — 25000003 PHARM REV CODE 250: Performed by: HOSPITALIST

## 2019-06-16 PROCEDURE — 97110 THERAPEUTIC EXERCISES: CPT

## 2019-06-16 PROCEDURE — 11000004 HC SNF PRIVATE

## 2019-06-16 PROCEDURE — 97535 SELF CARE MNGMENT TRAINING: CPT

## 2019-06-16 RX ADMIN — FERROUS SULFATE TAB EC 325 MG (65 MG FE EQUIVALENT) 325 MG: 325 (65 FE) TABLET DELAYED RESPONSE at 02:06

## 2019-06-16 RX ADMIN — VERAPAMIL HYDROCHLORIDE 80 MG: 80 TABLET, FILM COATED ORAL at 02:06

## 2019-06-16 RX ADMIN — FERROUS SULFATE TAB EC 325 MG (65 MG FE EQUIVALENT) 325 MG: 325 (65 FE) TABLET DELAYED RESPONSE at 09:06

## 2019-06-16 RX ADMIN — POLYETHYLENE GLYCOL 3350 17 G: 17 POWDER, FOR SOLUTION ORAL at 09:06

## 2019-06-16 RX ADMIN — ACETAMINOPHEN 1000 MG: 500 TABLET ORAL at 09:06

## 2019-06-16 RX ADMIN — VERAPAMIL HYDROCHLORIDE 80 MG: 80 TABLET, FILM COATED ORAL at 09:06

## 2019-06-16 RX ADMIN — ENOXAPARIN SODIUM 30 MG: 100 INJECTION SUBCUTANEOUS at 04:06

## 2019-06-16 RX ADMIN — ATENOLOL 12.5 MG: 25 TABLET ORAL at 09:06

## 2019-06-16 RX ADMIN — TIOTROPIUM BROMIDE 18 MCG: 18 CAPSULE ORAL; RESPIRATORY (INHALATION) at 09:06

## 2019-06-16 NOTE — PT/OT/SLP PROGRESS
Occupational Therapy  Treatment    Siomara Flores   MRN: [id number]   Admitting Diagnosis: Closed displaced intertrochanteric fracture of left femur    OT Date of Treatment: 06/16/19       Billable Minutes:  Self Care/Home Management 20 and Therapeutic Exercise 25    General Precautions: Standard, fall  Orthopedic Precautions: LUE non weight bearing  Braces: Sling and swathe(sling and swathe for comfort only; L wrist brace off for rx.)         Subjective:  Communicated with patient prior to session.      Pain/Comfort  Pain Rating 1: 0/10  Pain Rating Post-Intervention 1: 0/10    Objective:       Occupational Performance:    Bed Mobility:    · Patient completed Supine to Sit with SBA /c HOB flat and not using handrail    Functional Mobility/Transfers:  · Patient completed Sit <> Stand Transfer with supervision  with  hemiwalker   · Patient completed Bed <> Chair Transfer using Stand Pivot technique with stand by assistance with no assistive device  · Patient completed Toilet Transfer bed>toilet; toilet>w/c /c functional mobility technique with stand by assistance with  hemiwalker    Activities of Daily Living:  · Grooming: stand by assistance washing face and hands   · Upper Body Dressing: contact guard assistance Donning back gown standing  · Lower Body Dressing: stand by assistance Donning pants  · Toileting: supervision      [facility] 6 Click:  [facility] Total Score: 18    OT Exercises: Patient performed gentle AAROM exercises to L shoulder (for abduction/adduction and shoulder flexion). Patient perform AROM to L hand for finger flexion, thumb opposition, supination and pronation, and elbow flexion/extension) 3 x 10 each in order to facilitate normal movement.     Patient left up in chair with call button in reach and all needs met.     ASSESSMENT:  Siomara Flores is a 86 y.o. female with a medical diagnosis of Closed displaced intertrochanteric fracture of left femur and presents with the deficits listed below. Patient  tolerated treatment session and was motivated to complete tasks. Patient continues to benefit from skilled OT services to achieve maximal independence..    Rehab identified problem list/impairments: weakness, impaired endurance, impaired self care skills, impaired functional mobilty, gait instability, impaired balance, decreased upper extremity function, decreased lower extremity function, pain, orthopedic precautions    Rehab potential is good    Activity tolerance: Good    Discharge recommendations: home with home health     Barriers to discharge: Decreased caregiver support     Equipment recommendations: walker, papi, commode, tub bench(Need to confirm with family (Patient reports she has equipment from her ))     GOALS:   Multidisciplinary Problems     Occupational Therapy Goals        Problem: Occupational Therapy Goal    Goal Priority Disciplines Outcome Interventions   Occupational Therapy Goal     OT, PT/OT Ongoing (interventions implemented as appropriate)    Description:  Goals to be met by: 6/17/2019    Patient will increase functional independence with ADLs by performing:  LUE shoulder flexion 0-100* with aarom.  LUE shoulder flexion 0-120* prom  LUE elbow flexion and extension WNL arom  LUE wrist extension 0-50* arom  LUE wrist flexion 0-45* arom  UE Dressing with Minimal Assistance. Met 6/14/19  LE Dressing with Minimal Assistance.  Grooming while standing at sink with Stand-by Assistance. Met 3/13/19  Toileting from bedside commode with Minimal Assistance for hygiene and clothing management. Met 6/3/2019  Bathing with Minimal Assistance.-MET 5/26/2019  Supine to sit with Stand-by Assistance /c HOB flat and no handrails.  Stand pivot transfers with Contact Guard Assistance.-MET. 5/26/2019  Toilet transfer to bedside commode with Contact Guard Assistance.-met 5/26/2019  Right Upper extremity exercise program 3 x 10 reps per handout, with independence.  Patient will complete a standing activity  for 8 min with S in order to perform self care tasks.   Met                         Plan:  Patient to be seen 5 x/week to address the above listed problems via therapeutic exercises, therapeutic activities, self-care/home management  Plan of Care expires: 06/10/19  Plan of Care reviewed with: patient    DAGMAR Tate  06/16/2019

## 2019-06-16 NOTE — PLAN OF CARE
Problem: Adult Inpatient Plan of Care  Goal: Plan of Care Review  Outcome: Ongoing (interventions implemented as appropriate)  Scheduled Lovenox injection explained and administered. Ms Mark requested assistance with transferring from bed to recliner. Pt received contact guard assistance with using papi-cane to transfer into to the recliner. Safety measures maintained. Call light placed within reach. Will continue with plan of care.

## 2019-06-16 NOTE — TREATMENT PLAN
Rehab Services' DME recommendations    Siomara Flores  MRN: [id number]    [x] Cane: Narrow base quad    [x] Wheelchair  Number of hours up in a wheelchair per day 6-8       Style Light weight        Justification for light weight w/c: patient cannot propel in a standard wheelchair, patient can and does self-propel in a lightweight wheelchair, patient has impaired ability to participate in MRADLs, mobility limitations cannot be sifficiently resolved with a cane/walker, the home provides adequate access between rooms for a wheelchair, a wheelchair will significantly improve the ability to participate in MRADLs and will be used in the home on a regular basis, the patient is willing to use a wheelchair in the home and the patient has a caregiver who is available, willing, and able to provide assistance with the wheelchair    Seat Width 16 (Small adult)    Seat Depth 16    Back Height Standard    Leg Support Standard    Arm Height Desk and Swing Away    Lap Belt Velcro    Cushion Basic    Justification for Cushion Decrease pressure sores and maintain skin integrity    Justification for wheelchair order: (Please select all that apply) Caregiver is capable and willing to operate wheelchair safely, Patient's upper body strength is sufficient for propulsion, The patient requires the use of a wheelchair for ADLs within the home and Patient mobility limitations cannot be sufficiently resolved by the use of other ambulatory therapies    [x] Home health PT and OT    DAGMAR Tate 6/16/2019

## 2019-06-16 NOTE — PLAN OF CARE
Problem: Adult Inpatient Plan of Care  Goal: Plan of Care Review  Outcome: Ongoing (interventions implemented as appropriate)  Discussed plan of care for the shift. Patient BP WNL and will administer scheduled medication. Will continue to encourage patient to call for staff assistance as needed. Safety precautions maintained.

## 2019-06-17 PROCEDURE — 97803 MED NUTRITION INDIV SUBSEQ: CPT

## 2019-06-17 PROCEDURE — 97535 SELF CARE MNGMENT TRAINING: CPT

## 2019-06-17 PROCEDURE — 25000003 PHARM REV CODE 250: Performed by: NURSE PRACTITIONER

## 2019-06-17 PROCEDURE — 25000003 PHARM REV CODE 250: Performed by: HOSPITALIST

## 2019-06-17 PROCEDURE — 97110 THERAPEUTIC EXERCISES: CPT

## 2019-06-17 PROCEDURE — 97116 GAIT TRAINING THERAPY: CPT

## 2019-06-17 PROCEDURE — 97530 THERAPEUTIC ACTIVITIES: CPT

## 2019-06-17 PROCEDURE — 63600175 PHARM REV CODE 636 W HCPCS: Performed by: HOSPITALIST

## 2019-06-17 PROCEDURE — 25000242 PHARM REV CODE 250 ALT 637 W/ HCPCS: Performed by: HOSPITALIST

## 2019-06-17 PROCEDURE — 11000004 HC SNF PRIVATE

## 2019-06-17 PROCEDURE — 97537 COMMUNITY/WORK REINTEGRATION: CPT

## 2019-06-17 RX ORDER — AMOXICILLIN 250 MG
1 CAPSULE ORAL 2 TIMES DAILY PRN
COMMUNITY
Start: 2019-06-17

## 2019-06-17 RX ORDER — POLYETHYLENE GLYCOL 3350 17 G/17G
17 POWDER, FOR SOLUTION ORAL DAILY
Refills: 0
Start: 2019-06-18

## 2019-06-17 RX ORDER — ATENOLOL 25 MG/1
12.5 TABLET ORAL DAILY
Qty: 15 TABLET | Refills: 11 | Status: SHIPPED | OUTPATIENT
Start: 2019-06-18

## 2019-06-17 RX ORDER — VERAPAMIL HYDROCHLORIDE 80 MG/1
80 TABLET ORAL 3 TIMES DAILY
Qty: 90 TABLET | Refills: 11 | Status: SHIPPED | OUTPATIENT
Start: 2019-06-17 | End: 2020-06-16

## 2019-06-17 RX ORDER — SIMETHICONE 80 MG
1 TABLET,CHEWABLE ORAL 3 TIMES DAILY PRN
Status: DISCONTINUED | OUTPATIENT
Start: 2019-06-17 | End: 2019-06-18 | Stop reason: HOSPADM

## 2019-06-17 RX ORDER — FERROUS SULFATE 325(65) MG
325 TABLET, DELAYED RELEASE (ENTERIC COATED) ORAL DAILY
Refills: 0 | COMMUNITY
Start: 2019-06-19 | End: 2019-07-19

## 2019-06-17 RX ORDER — ACETAMINOPHEN 500 MG
1000 TABLET ORAL 2 TIMES DAILY PRN
Refills: 0 | COMMUNITY
Start: 2019-06-17

## 2019-06-17 RX ORDER — TIOTROPIUM BROMIDE 18 UG/1
1 CAPSULE ORAL; RESPIRATORY (INHALATION) DAILY
Qty: 1 BOX | Refills: 11 | Status: SHIPPED | OUTPATIENT
Start: 2019-06-18 | End: 2020-06-17

## 2019-06-17 RX ADMIN — VERAPAMIL HYDROCHLORIDE 80 MG: 80 TABLET, FILM COATED ORAL at 08:06

## 2019-06-17 RX ADMIN — ATENOLOL 12.5 MG: 25 TABLET ORAL at 09:06

## 2019-06-17 RX ADMIN — SENNOSIDES AND DOCUSATE SODIUM 1 TABLET: 8.6; 5 TABLET ORAL at 09:06

## 2019-06-17 RX ADMIN — SIMETHICONE CHEW TAB 80 MG 80 MG: 80 TABLET ORAL at 10:06

## 2019-06-17 RX ADMIN — VERAPAMIL HYDROCHLORIDE 80 MG: 80 TABLET, FILM COATED ORAL at 09:06

## 2019-06-17 RX ADMIN — TIOTROPIUM BROMIDE 18 MCG: 18 CAPSULE ORAL; RESPIRATORY (INHALATION) at 09:06

## 2019-06-17 RX ADMIN — ACETAMINOPHEN 1000 MG: 500 TABLET ORAL at 08:06

## 2019-06-17 RX ADMIN — ENOXAPARIN SODIUM 30 MG: 100 INJECTION SUBCUTANEOUS at 04:06

## 2019-06-17 RX ADMIN — FERROUS SULFATE TAB EC 325 MG (65 MG FE EQUIVALENT) 325 MG: 325 (65 FE) TABLET DELAYED RESPONSE at 08:06

## 2019-06-17 RX ADMIN — ACETAMINOPHEN 1000 MG: 500 TABLET ORAL at 09:06

## 2019-06-17 RX ADMIN — CALCIUM CARBONATE (ANTACID) CHEW TAB 500 MG 500 MG: 500 CHEW TAB at 12:06

## 2019-06-17 RX ADMIN — FERROUS SULFATE TAB EC 325 MG (65 MG FE EQUIVALENT) 325 MG: 325 (65 FE) TABLET DELAYED RESPONSE at 03:06

## 2019-06-17 RX ADMIN — POLYETHYLENE GLYCOL 3350 17 G: 17 POWDER, FOR SOLUTION ORAL at 09:06

## 2019-06-17 RX ADMIN — VERAPAMIL HYDROCHLORIDE 80 MG: 80 TABLET, FILM COATED ORAL at 03:06

## 2019-06-17 RX ADMIN — FERROUS SULFATE TAB EC 325 MG (65 MG FE EQUIVALENT) 325 MG: 325 (65 FE) TABLET DELAYED RESPONSE at 09:06

## 2019-06-17 NOTE — PLAN OF CARE
Problem: Occupational Therapy Goal  Goal: Occupational Therapy Goal  Goals to be met by: 6/17/2019    Patient will increase functional independence with ADLs by performing:  LUE shoulder flexion 0-100* with aarom.  LUE shoulder flexion 0-120* prom  LUE elbow flexion and extension WNL arom  LUE wrist extension 0-50* arom  LUE wrist flexion 0-45* arom  UE Dressing with Minimal Assistance. Met 6/14/19  LE Dressing with Minimal Assistance. Met  Grooming while standing at sink with Stand-by Assistance. Met 3/13/19  Toileting from bedside commode with Minimal Assistance for hygiene and clothing management. Met 6/3/2019  Bathing with Minimal Assistance.-MET 5/26/2019  Supine to sit with Stand-by Assistance /c HOB flat and no handrails.  Stand pivot transfers with Contact Guard Assistance.-MET. 5/26/2019  Toilet transfer to bedside commode with Contact Guard Assistance.-met 5/26/2019  Right Upper extremity exercise program 3 x 10 reps per handout, with independence.  Patient will complete a standing activity for 8 min with S in order to perform self care tasks.   Met        Outcome: Ongoing (interventions implemented as appropriate)  Patient goals are appropriate.

## 2019-06-17 NOTE — PT/OT/SLP PROGRESS
Physical Therapy  Treatment    Siomara Flores   MRN: [id number]   Admitting Diagnosis: Closed displaced intertrochanteric fracture of left femur    PT Received On: 06/17/19  Total Time (min): (--)       Billable Minutes:  Gait Training 13, Therapeutic Activity 10 and Therapeutic Exercise 15    Treatment Type: Treatment  PT/PTA: PTA     PTA Visit Number: 4       General Precautions: Standard, fall  Orthopedic Precautions: LUE non weight bearing, LLE weight bearing as tolerated   Braces: Sling and swathe         Subjective:  Communicated with nursing prior to session.  Pt agreed to work with therapy.     Pain/Comfort  Pain Rating 1: 0/10  Pain Rating Post-Intervention 1: 0/10    Objective:  Patient found seated w/c.       AM-PAC 6 CLICK MOBILITY  Total Score:16    Bed Mobility:  Sit>Supine:not performed  Supine>Sit: not performed    Transfers:  Sit<>Stand: to/from w/c w/ SBQC and SBA (x4 trials)  Stand Pivot Transfer: w/c<>recumbent stepper w/ SBQC and SBA    Gait:  Amb x2 trials 40ft and 38ft w/ SBQC and SBA. No LOB noted. Slow pace.      Therex:  BLE therex 2x15 reps:    -AP   -LAQ   -hip flexion    -GS    Additional Treatment:  -recumbent stepper x16 min L2-3 using RUE and BLE.    Patient left up in chair with call button in reach and nursing notified.    Assessment:  Siomara Flores is a 86 y.o. female with a medical diagnosis of Closed displaced intertrochanteric fracture of left femur.  Pt tolerated treatment well, and will continue to benefit from PT services at this time. Continue with PT POC as indicated.    Rehab identified problem list/impairments: weakness, impaired endurance, impaired functional mobilty, impaired self care skills, gait instability, impaired balance, decreased upper extremity function, decreased lower extremity function, pain, orthopedic precautions    Rehab potential is good.    Activity tolerance: Good    Discharge recommendations: home with home health(with A/S for safety, pt repts  sister looking into NH placeme)     Barriers to discharge: Inaccessible home environment, Decreased caregiver support    Equipment recommendations: walker, papi, commode, tub bench(HW or QC pending progress)     GOALS:   Multidisciplinary Problems     Physical Therapy Goals        Problem: Physical Therapy Goal    Goal Priority Disciplines Outcome Goal Variances Interventions   Physical Therapy Goal     PT, PT/OT Ongoing (interventions implemented as appropriate)     Description:  Goals to be met by:6/10/19  2019 goals to be met by 19  Patient will increase functional independence with mobility by performin. Supine to sit with supervision.= Met (2019)   2. Sit to supine with supervision. =Met (2019)   3. Sit to stand transfer with Stand-by Assistance. =Met (2019)  4. Bed to chair transfer with Stand-by Assistance using hemiwalker or least restrictive assistive device. =Met (2019)  5. Gait  x 100 feet with Stand-by Assistance using  hemiwalker or least restrictive assistive device. REVISE 2019   REVISED GOAL: gait x 75 feet w/ HW or least restrictive device and SBA = not met  6. Ascend/Descend 4 inch curb step with Contact Guard Assistance using  hemiwalker or least restrictive assistive device.= Met (2019)  7. Stand for 3 minutes with Contact Guard Assistance using  hemiwalker or least restrictive assistive device, intermittently,  and perform an activity. Met (6/10/2019)  8. Lower extremity exercise program x20 reps per handout, with assistance as needed and gym therex  NEW GOAL 2019 9. Patient will propel w/c w/ RUE and RLE or w/ BLE 75 feet including two turns w/ SBA= not met  NEW GOAL 2019 10. Patient will perform stand pivot transfer w/ supervision w/ HW or appropriate assistive device. = met (6/10/2019)                            PLAN:    Patient to be seen 5 x/week  to address the above listed problems via gait training, therapeutic activities, therapeutic  exercises, wheelchair management/training  Plan of Care expires: 06/09/19  Plan of Care reviewed with: patient    Vita Peterson, PTA  06/17/2019

## 2019-06-17 NOTE — PLAN OF CARE
Problem: Fall Injury Risk  Goal: Absence of Fall and Fall-Related Injury  Outcome: Ongoing (interventions implemented as appropriate)  No falls or injury noted this shift. All safety measures maintained

## 2019-06-17 NOTE — PROGRESS NOTES
[facility] - Skilled Nursing Care  Adult Nutrition  Progress Note    SUMMARY   Recommendations    Recommendation/Intervention: Continue regular diet, boost plus TID  Goals: PO to meet 50% of needs with ONS by next RD visit  Nutrition Goal Status: goal met  Reason for Assessment    Reason For Assessment: RD follow-up  Diagnosis: (debilty sp fall , sp rodding L femur)  Relevant Medical History: HTN, anemia,   Interdisciplinary Rounds: attended  General Information Comments: taking strawberry boost  Nutrition Discharge Planning: Dc on regular diet with ONS of choice for weight gain and wound healing  Nutrition/Diet History    Patient Reported Diet/Restrictions/Preferences: general  Typical Food/Fluid Intake: lactose free milk, is willing to try boost plus rather than boost breeze  Food Preferences: sensitive stomach, will not eat beans, or cruciferous vegetable, corse foods,   Spiritual, Cultural Beliefs, Synagogue Practices, Values that Affect Care: yes  Food Allergies: NKFA  Factors Affecting Nutritional Intake: altered gastrointestinal function, cultural/Uatsdin beliefs, decreased appetite(may have been too distracted or too exhausted to eat and take care of herself)    Anthropometrics    Temp: 97.6 °F (36.4 °C)  Height Method: Stated  Height: 5' (152.4 cm)  Height (inches): 60 in  Weight Method: Standard Scale  Weight: 34.1 kg (75 lb 2.8 oz)  Weight (lb): 75.18 lb  Ideal Body Weight (IBW), Female: 100 lb  % Ideal Body Weight, Female (lb): 80.03 lb  BMI (Calculated): 15.7  BMI Grade: less than 16 protein-energy malnutrition grade III  Weight Loss: unintentional  Usual Body Weight (UBW), k.8 kg  Weight Change Amount: (wt loss since admit, small appetite, oral supplement TID)  % Usual Body Weight: 87.02  % Weight Change From Usual Weight: -13.16 %       Lab/Procedures/Meds    Pertinent Labs Reviewed: reviewed    Pertinent Medications Reviewed: reviewed       Estimated/Assessed Needs    Weight Used For Calorie  Calculations: 42 kg (92 lb 9.5 oz)(UBW in elderly )  Energy Calorie Requirements (kcal): 8380-9184  Energy Need Method: Kcal/kg(30-35 kcal/kg)  Protein Requirements: 55-63gm  Weight Used For Protein Calculations: 42 kg (92 lb 9.5 oz)(x 1.3-1.5 2/2 malnutrition)  Fluid Requirements (mL): per MD  Estimated Fluid Requirement Method: RDA Method  RDA Method (mL): 1260  CHO Requirement: -      Nutrition Prescription Ordered    Current Diet Order: Regular  Nutrition Order Comments: PO 50-75%  Oral Nutrition Supplement: Boost plus strawberry TID    Evaluation of Received Nutrient/Fluid Intake    Energy Calories Required: meeting needs  Protein Required: meeting needs  Fluid Required: meeting needs  Comments: weight is trending up now  Tolerance: tolerating  % Intake of Estimated Energy Needs: 75 - 100 %  % Meal Intake: 50 - 75 %    Nutrition Risk    Level of Risk/Frequency of Follow-up: low     Assessment and Plan   Severe malnutrition  Malnutrition in the context of Social/Environmental Circumstances     Related to (etiology):  Starvation and lack of self care while caregiver, grief response, reduced appetite and po intake     Signs and Symptoms (as evidenced by):  Energy Intake: <75% of estimated energy requirement for > 3 months  Body Fat Depletion: moderate depletion of orbitals and triceps and buccal area  Muscle Mass Depletion: severe depletion of clavicle region and scapular region moderate at temples, hands and calf  Weight Loss: > 7.5% % x < 3 months  Fluid Accumulation: mild  Ongoing  Monitor and Evaluation    Food and Nutrient Intake: food and beverage intake  Physical Activity and Function: nutrition-related ADLs and IADLs  Anthropometric Measurements: weight change  Biochemical Data, Medical Tests and Procedures: electrolyte and renal panel  Nutrition-Focused Physical Findings: overall appearance     Malnutrition Assessment 5/10/19, 6/10/19  Malnutrition Type: social/environmental circumstances  Energy Intake:  severe energy intake  Skin (Micronutrient): dry  Hair/Scalp (Micronutrient): dull, dry  Eyes (Micronutrient): conjunctiva dry, conjunctiva dull  Teeth (Micronutrient): (plates)  Neck/Chest (Micronutrient): muscle wasting  Musculoskeletal/Lower Extremities: muscle wasting, subcutaneous fat loss, joints painful       Weight Loss (Malnutrition): 7.5% in 3 months  Subcutaneous Fat (Malnutrition): severe depletion  Muscle Mass (Malnutrition): severe depletion  Fluid Accumulation (Malnutrition): mild   Orbital Region (Subcutaneous Fat Loss): moderate depletion  Upper Arm Region (Subcutaneous Fat Loss): moderate depletion  Thoracic and Lumbar Region: severe depletion   Worship Region (Muscle Loss): moderate depletion  Clavicle Bone Region (Muscle Loss): severe depletion  Clavicle and Acromion Bone Region (Muscle Loss): severe depletion  Scapular Bone Region (Muscle Loss): severe depletion  Dorsal Hand (Muscle Loss): moderate depletion  Patellar Region (Muscle Loss): moderate depletion  Anterior Thigh Region (Muscle Loss): moderate depletion  Posterior Calf Region (Muscle Loss): moderate depletion            Severe Weight Loss (Malnutrition): greater than 7.5% in 3 months    Nutrition Follow-Up    RD Follow-up?: Yes

## 2019-06-17 NOTE — PT/OT/SLP PROGRESS
"Occupational Therapy  Treatment    Siomara Flores   MRN: [id number]   Admitting Diagnosis: Closed displaced intertrochanteric fracture of left femur    OT Date of Treatment: 06/17/19       Billable Minutes:  Self Care/Home Management 8 and Therapeutic Exercise 37    General Precautions: Standard, fall  Orthopedic Precautions: LUE non weight bearing  Braces: Sling and swathe(sling and swathe for comfort only; L wrist brace off for rx.)         Subjective:  Communicated with patient prior to session.  *"I feel weak."    Pain/Comfort  Pain Rating 1: 0/10  Pain Rating Post-Intervention 1: 0/10    Objective:   Patient seated in bedside chair.    Occupational Performance:    Functional Mobility/Transfers:  · Patient completed Sit <> Stand Transfer with stand by assistance  with  no assistive device   · Patient completed chair <> wheelchair Transfer using Stand Pivot technique with stand by assistance with no assistive device  · Patient completed wheelchair <> Mat Stand Pivot technique with stand by assistance with no assistive device  · Patient completed Toilet Transfer ambulation technique with stand by assistance with  hemiwalker  · Functional Mobility: Patient performed functional mobility using hemiwalker with ABS throughout room.    Activities of Daily Living:  · Toileting: minimum assistance for clothing management     AMPA 6 Click:  AMPA Total Score: 18    OT Exercises: LUE AROM supination/pronation x 10; AAROM LUE shoulder flexion/extension, external rotation, horizontal adduction in supine and side lying x 10 reps each to increase functional use of LUE. RUE AROM exercises using 1 lb dumbbell x 3 sets 10 reps each to increase RUE strength needed for ADLs and t/f.     Patient left up in chair with call button in reach    ASSESSMENT:  Siomara Flores is a 86 y.o. female with a medical diagnosis of Closed displaced intertrochanteric fracture of left femur and presents with the deficits listed below. Patient will " benefit from continued OT services to increase functional use of LUE.    Rehab identified problem list/impairments: weakness, impaired endurance, impaired self care skills, impaired functional mobilty, gait instability, impaired balance, decreased upper extremity function, decreased lower extremity function, pain, orthopedic precautions    Rehab potential is good    Activity tolerance: Good    Discharge recommendations: home with home health     Barriers to discharge: Decreased caregiver support     Equipment recommendations: walker, papi, commode, tub bench(Need to confirm with family (Patient reports she has equipment from her ))     GOALS:   Multidisciplinary Problems     Occupational Therapy Goals        Problem: Occupational Therapy Goal    Goal Priority Disciplines Outcome Interventions   Occupational Therapy Goal     OT, PT/OT Ongoing (interventions implemented as appropriate)    Description:  Goals to be met by: 6/17/2019    Patient will increase functional independence with ADLs by performing:  LUE shoulder flexion 0-100* with aarom.  LUE shoulder flexion 0-120* prom  LUE elbow flexion and extension WNL arom  LUE wrist extension 0-50* arom  LUE wrist flexion 0-45* arom  UE Dressing with Minimal Assistance. Met 6/14/19  LE Dressing with Minimal Assistance. Met  Grooming while standing at sink with Stand-by Assistance. Met 3/13/19  Toileting from bedside commode with Minimal Assistance for hygiene and clothing management. Met 6/3/2019  Bathing with Minimal Assistance.-MET 5/26/2019  Supine to sit with Stand-by Assistance /c HOB flat and no handrails.  Stand pivot transfers with Contact Guard Assistance.-MET. 5/26/2019  Toilet transfer to bedside commode with Contact Guard Assistance.-met 5/26/2019  Right Upper extremity exercise program 3 x 10 reps per handout, with independence.  Patient will complete a standing activity for 8 min with S in order to perform self care tasks.   Met                           Plan:  Patient to be seen 5 x/week to address the above listed problems via therapeutic exercises, therapeutic activities, self-care/home management  Plan of Care expires: 06/10/19  Plan of Care reviewed with: patient    ASIM Lobo  06/17/2019

## 2019-06-18 VITALS
TEMPERATURE: 97 F | RESPIRATION RATE: 19 BRPM | HEIGHT: 60 IN | HEART RATE: 80 BPM | OXYGEN SATURATION: 98 % | WEIGHT: 75.19 LBS | DIASTOLIC BLOOD PRESSURE: 61 MMHG | BODY MASS INDEX: 14.76 KG/M2 | SYSTOLIC BLOOD PRESSURE: 142 MMHG

## 2019-06-18 LAB
ANION GAP SERPL CALC-SCNC: 11 MMOL/L (ref 8–16)
BASOPHILS # BLD AUTO: 0.07 K/UL (ref 0–0.2)
BASOPHILS NFR BLD: 1.3 % (ref 0–1.9)
BUN SERPL-MCNC: 22 MG/DL (ref 8–23)
CALCIUM SERPL-MCNC: 9.2 MG/DL (ref 8.7–10.5)
CHLORIDE SERPL-SCNC: 98 MMOL/L (ref 95–110)
CO2 SERPL-SCNC: 29 MMOL/L (ref 23–29)
CREAT SERPL-MCNC: 0.6 MG/DL (ref 0.5–1.4)
DIFFERENTIAL METHOD: ABNORMAL
EOSINOPHIL # BLD AUTO: 0.2 K/UL (ref 0–0.5)
EOSINOPHIL NFR BLD: 3.9 % (ref 0–8)
ERYTHROCYTE [DISTWIDTH] IN BLOOD BY AUTOMATED COUNT: 18.1 % (ref 11.5–14.5)
EST. GFR  (AFRICAN AMERICAN): >60 ML/MIN/1.73 M^2
EST. GFR  (NON AFRICAN AMERICAN): >60 ML/MIN/1.73 M^2
GLUCOSE SERPL-MCNC: 86 MG/DL (ref 70–110)
HCT VFR BLD AUTO: 32.4 % (ref 37–48.5)
HGB BLD-MCNC: 9.8 G/DL (ref 12–16)
IMM GRANULOCYTES # BLD AUTO: 0.01 K/UL (ref 0–0.04)
IMM GRANULOCYTES NFR BLD AUTO: 0.2 % (ref 0–0.5)
LYMPHOCYTES # BLD AUTO: 1.3 K/UL (ref 1–4.8)
LYMPHOCYTES NFR BLD: 24.5 % (ref 18–48)
MAGNESIUM SERPL-MCNC: 2.1 MG/DL (ref 1.6–2.6)
MCH RBC QN AUTO: 27.7 PG (ref 27–31)
MCHC RBC AUTO-ENTMCNC: 30.2 G/DL (ref 32–36)
MCV RBC AUTO: 92 FL (ref 82–98)
MONOCYTES # BLD AUTO: 0.5 K/UL (ref 0.3–1)
MONOCYTES NFR BLD: 9.9 % (ref 4–15)
NEUTROPHILS # BLD AUTO: 3.2 K/UL (ref 1.8–7.7)
NEUTROPHILS NFR BLD: 60.2 % (ref 38–73)
NRBC BLD-RTO: 0 /100 WBC
PHOSPHATE SERPL-MCNC: 3.6 MG/DL (ref 2.7–4.5)
PLATELET # BLD AUTO: 255 K/UL (ref 150–350)
PMV BLD AUTO: 11.1 FL (ref 9.2–12.9)
POTASSIUM SERPL-SCNC: 4 MMOL/L (ref 3.5–5.1)
RBC # BLD AUTO: 3.54 M/UL (ref 4–5.4)
SODIUM SERPL-SCNC: 138 MMOL/L (ref 136–145)
WBC # BLD AUTO: 5.38 K/UL (ref 3.9–12.7)

## 2019-06-18 PROCEDURE — 25000003 PHARM REV CODE 250: Performed by: NURSE PRACTITIONER

## 2019-06-18 PROCEDURE — 80048 BASIC METABOLIC PNL TOTAL CA: CPT

## 2019-06-18 PROCEDURE — 85025 COMPLETE CBC W/AUTO DIFF WBC: CPT

## 2019-06-18 PROCEDURE — 84100 ASSAY OF PHOSPHORUS: CPT

## 2019-06-18 PROCEDURE — 83735 ASSAY OF MAGNESIUM: CPT

## 2019-06-18 PROCEDURE — 25000242 PHARM REV CODE 250 ALT 637 W/ HCPCS: Performed by: HOSPITALIST

## 2019-06-18 PROCEDURE — 25000003 PHARM REV CODE 250: Performed by: HOSPITALIST

## 2019-06-18 PROCEDURE — 36415 COLL VENOUS BLD VENIPUNCTURE: CPT

## 2019-06-18 RX ADMIN — ACETAMINOPHEN 1000 MG: 500 TABLET ORAL at 09:06

## 2019-06-18 RX ADMIN — TIOTROPIUM BROMIDE 18 MCG: 18 CAPSULE ORAL; RESPIRATORY (INHALATION) at 09:06

## 2019-06-18 RX ADMIN — SENNOSIDES AND DOCUSATE SODIUM 1 TABLET: 8.6; 5 TABLET ORAL at 09:06

## 2019-06-18 RX ADMIN — POLYETHYLENE GLYCOL 3350 17 G: 17 POWDER, FOR SOLUTION ORAL at 09:06

## 2019-06-18 RX ADMIN — VERAPAMIL HYDROCHLORIDE 80 MG: 80 TABLET, FILM COATED ORAL at 09:06

## 2019-06-18 RX ADMIN — ATENOLOL 12.5 MG: 25 TABLET ORAL at 09:06

## 2019-06-18 RX ADMIN — FERROUS SULFATE TAB EC 325 MG (65 MG FE EQUIVALENT) 325 MG: 325 (65 FE) TABLET DELAYED RESPONSE at 09:06

## 2019-06-18 NOTE — PLAN OF CARE
[facility]     Department of Hospital Medicine     [address] [place] [address]     [phone] after hours  [phone] fax       NURSING HOME ORDERS    06/17/2019    Admit to Nursing Home:  Regular Bed                                                    Diagnoses:  Active Hospital Problems    Diagnosis  POA    *Closed displaced intertrochanteric fracture of left femur [S72.142A]  Yes    Severe malnutrition [E43]  Yes    Acute blood loss anemia [D62]  Yes    Fall [W19.XXXA]  Yes    Closed fracture of left proximal humerus [S42.202A]  Yes    Refusal of blood transfusions as patient is Faith [Z53.1]  Not Applicable    Drug-induced bradycardia [R00.1, T50.905A]  Yes    Hypertension [I10]  Yes    SVT (supraventricular tachycardia) [I47.1]  Yes    Bronchiectasis without complication [J47.9]  Yes      Resolved Hospital Problems   No resolved problems to display.       Patient is homebound due to:  Closed displaced intertrochanteric fracture of left femur    Allergies:  Review of patient's allergies indicates:   Allergen Reactions    Aspirin (bulk) Other (See Comments)     Developed gastric ulcer in past when taking aspirin per patient       Vitals:       Once weekly        Diet: regular diet    Supplement:  1 can every three times a day with meals                         Boost plus    Acitivities:    - Up in a chair each morning as tolerated   - Ambulate with assistance to bathroom   - Scheduled walks once each shift (every 8 hours)   - May ambulate independently   - May use walker, cane, or self-propelled wheelchair   - Weight bearing:   HIP:         -range of motion as tolerated     - weight bearing as tolerated  SHOULDER:     - range of motion: PROm x 1 weeks then AAROM x 2 weeks then AROM    - NWB  WRIST:      - NWB       -continue brace, remove for range of motion gentle. range of motion of fingers,.    LABS:  Per facility  protocol     CMP, CBC each month for 3 months   Pre-albumin each month for 3 months   TSH every year     Nursing Precautions:         - Fall precautions per nursing home protocol   - Decubitus precautions:        -  for positioning   - Pressure reducing foam mattress   - Turn patient every two hours. Use wedge pillows to anchor patient    CONSULTS:    Physical Therapy to evaluate and treat     Occupational Therapy to evaluate and treat     Nutrition to evaluate and recommend diet        Medications: Discontinue all previous medication orders, if any. See new list below.     Siomara Flores   Home Medication Instructions POLO:[id number]    Printed on:06/17/19 2004   Medication Information                      acetaminophen (TYLENOL) 500 MG tablet  Take 2 tablets (1,000 mg total) by mouth 2 (two) times daily as needed for Pain.             atenolol (TENORMIN) 25 MG tablet  Take 0.5 tablets (12.5 mg total) by mouth once daily.             CALCIUM CARBONATE/VITAMIN D3 (CALCIUM 600 + D,3, ORAL)  Take 1 tablet by mouth once daily.             ferrous sulfate 325 (65 FE) MG EC tablet  Take 1 tablet (325 mg total) by mouth once daily.             polyethylene glycol (GLYCOLAX) 17 gram PwPk  Take 17 g by mouth once daily.             senna-docusate 8.6-50 mg (PERICOLACE) 8.6-50 mg per tablet  Take 1 tablet by mouth 2 (two) times daily as needed for Constipation.             tiotropium (SPIRIVA) 18 mcg inhalation capsule  Inhale 1 capsule (18 mcg total) into the lungs once daily. Controller             verapamil (CALAN) 80 MG tablet  Take 1 tablet (80 mg total) by mouth 3 (three) times daily.                       _________________________________  Edel Ibrahim NP  06/17/2019

## 2019-06-18 NOTE — PROGRESS NOTES
[facility]                                  Skilled Nursing Facility                   Progress Note     Admit Date: 2019  RAVI 2019  Principal Problem:  Closed displaced intertrochanteric fracture of left femur HPI obtained from patient interview and chart review     Visit Date: 2019    Chief Complaint:  Revaluation of medical treatment and therapy status: Lab review, BP monitoring, D/C coordination    HPI:   Mrs. Flores is a 86 year old. female Muslim with PMHx of  Bronchiectasis, SVT who presents to SNF following a hospitalization for left intertrochanteric, left proximal humerus, and left distal radius fracture s/p left femur intramedullary marc after a mechanical fall. [place] [facility] [place] . Dr. Owusu performed a Cephalomedullary nail fixation of left intertrochanteric femur fracture on 5/3/19.     Interval history:  Lab work from today reviewed, WBC count 5.38, K+ 4.0, BUN/Cr 22/0.6, Mg 2, all stable. H&H stable at 9/32 (10.2/34). Patient's 24 hr blood pressure range is 110-142 systolic/53-61, stable. NH orders entered for SW to be sent to facility.  Patient progessing well with PT/OT. Patient medically stable. Continuing to follow and treat all acute and chronic conditions.    Past Medical History: Patient has a past medical history of Bronchiectasis without complication, Hypertension, and Supraventricular tachycardia.    Past Surgical History: Patient has a past surgical history that includes Cholecystectomy; Tonsillectomy; and Intramedullary rodding of femur (Left, 5/3/2019).    Social History: Patient reports that she has never smoked. She has never used smokeless tobacco. She reports that she does not drink alcohol or use drugs.  Patient's ill   about a month ago    Family History:  No family significant history to report    Allergies: Patient is allergic to aspirin  (bulk).    ROS  Constitutional: Negative for fever or fatigue.   Eyes: Negative for blurred vision, double vision and discharge.   Respiratory: Negative for cough, shortness of breath and wheezing.    Cardiovascular: Negative for chest pain, palpitations, claudication, and leg swelling.   Gastrointestinal: Negative for abdominal pain, constipation, diarrhea, nausea and vomiting.   Genitourinary: Negative for dysuria, frequency and urgency.   Musculoskeletal:  + generalized weakness, pain to left arm and hip. Negative for back pain and myalgias.   Skin: Negative for itching and rash.   Neurological: Negative for dizziness, speech change, and headaches.   Psychiatric/Behavioral: + for depression. The patient is not nervous/anxious.      PEx  Temp:  [97.4 °F (36.3 °C)-97.8 °F (36.6 °C)]   Pulse:  [80-89]   Resp:  [17-19]   BP: (110-142)/(53-63)   SpO2:  [98 %-99 %]      Constitutional: Patient appears frail  and in no distress, frail   Head: Normocephalic and atraumatic.   Eyes: Pupils are equal, round, and reactive to light.   Neck: Normal range of motion. Neck supple.   Cardiovascular: Normal rate, regular rhythm and normal heart sounds.    Pulmonary/Chest: Effort normal and breath sounds are clear  Abdominal: Soft. Bowel sounds are normal.   Musculoskeletal: decreased range of motion to left arm, wrist brace/splint in place.   Neurological: Alert and oriented to person, place, and time.   Skin: Skin is warm and dry. Patient has skin overgrowth to scalp. Incisions 2cm to left hip and 3cm to left leg- well-approximated no erythema-healing well, no swelling noted to periwound area  Psychiatric: mood is normal and improving daily      Assessment and Plan:    Discharge planning  - 6/7 patient's sister-in-law is looking at a facility today for her.  She will update us on her choice so that we can set up for stay post SNF DC.   - 6/11 TB skin test and CXR ordered for nursing home placement   -6/18 NH orders entered for SW  to be sent to facility.     Closed displaced intratrochanteric fracture of left femur  s/p left Cephalomedullary nail fixation of left  femur fracture on 5/3/19  Postoperative pain  - WBAT to LLE, NWB to LUE  - 5/14 initiated acetaminophen 1 g q.8 hours  - 5/15 pain is better controlled today with scheduled Tylenol  - 6/3 patient went to her ortho follow-up appointment on 05/31-    HIP:          -range of motion as tolerated     - weight bearing as tolerated  SHOULDER:      - range of motion: PROm x 1 weeks then AAROM x 2 weeks then AROM     - NWB  WRIST:      - NWB    -continue brace, remove for range of motion gentle. range of motion of fingers   - 6/11 decrease scheduled acetaminophen to 1 g b.i.d;  Per Ortho- Patient to maintain NWB to LUE and WEIGHT BEAR AS TOLERATED to LLE. Patient is to return to clinic as scheduled on 6/28/19, appointment with SAGE Rodarte  -6/18 Lab work from today reviewed, WBC count 5.38, K+ 4.0, BUN/Cr 22/0.6, Mg 2, all stable.     Hx of HTN  - currently patient is experiencing hypotension   - 5/13 decrease losartan from 50 mg to 25 mg daily, continue atenolol 25 mg daily.  Holding parameters in place to hold for systolic blood pressure less than 100.   - 5/14 holding home losartan at this time. patient's 24 hr blood pressure 102/49 to 119/50.   - 5/15 decrease atenolol to 12.5 mg daily; 24 hr blood pressure today is 95/46 to 108/54.    - 5/16 BP slightly improved with the decrease in atenolol dose. Patient's 24 hr blood pressure range is 98/48 to 129/62  - 5/20 patient's 24 hr blood pressure ranges 96/50 to 121/57  - 5/23 patient's 24 hr blood pressure ranges 107/52 to 149/67, the systolic of 149 is isolated and due to increase in pain. Will continue to monitor blood pressures.   - 5/27 patient's 24 hr blood pressure range is 136/52 to 106/50  - 6/11 patient's 24 hr blood pressure range is 100/49 to 128/62  -6/18 patient's 24 hr blood pressure range is 110-142  systolic/53-61,stable    Acute blood loss anemia  - continue ferrous sulfate 325 mg t.i.d.  - patient is a [employer] who refuses blood transfusions  - Post-op H/H 6.9/21.8 with baseline 8.4/26.9  - pediatric blood draws only, avoid IVF  - 5/13 initiated procrit 20,000 units   - 5/16 H&H improved to 7.0/23 from 6.3/20 after Procrit injection and continue iron supplementation  - 5/20 H&H improved to 7.7/25  - 5/23 H&H improved to 8.1/26  - 5/27 H&H improved to 8.9/30  - 6/11 H&H improved to 10.2/34  -6/18 H&H stable at 9.8/32.4    CONTINUE     Severe malnutrition  - Energy Intake: <75% of estimated energy requirement for > 3 months  - Body Fat Depletion: moderate depletion of orbitals and triceps and buccal area  - Muscle Mass Depletion: severe depletion of clavicle region and scapular region moderate at temples, hands and calf  - Weight Loss: > 7.5% % x < 3 months   - Continue regular diet, honor food intolerances, recommend change boost breeze to boost plus TID no chocolate  per pt request.  - 5/23 patient states better appetite increased p.o. consumption    Onychogryphosis  - likely from  Insufficient consumtion of essential nutrients  - 5/14- Message sent to  and appointment scheduled are to make patient a Podiatry appointment to cut down toenails, patient's toenails are so long and painful it hurts her to walk. - patient to see Podiatry tomorrow morning  - 5/15 patient was able to see provider today and Podiatry.  I got her an appointment Friday before her ortho follow-up to have her toes cut.  - 5/20 patient went to podiatry appointment in all toenails were trimmed to a normal length.  Patient's toenail pain is significantly improved    Hx of SVT  - continue verapamil 80 mg TID  - 5/16 with reduction of atenolol.  Heart rate is increased to the 90s.  Will continue to assess blood pressure and heart rate daily.     Seborrheic Keratosis   - 5/15 differential diagnosis includes melanoma-  ambulatory referral to Dermatology placed.  Lesion appears as a skin overgrowth to scalp that is waxy brown and tan in color, slightly raise with a bumpy appearance.  Lesion is about  5 cm in diameter and circular.     Debility   - Continue with PT/OT for gait training and strengthening and restoration of ADL's   - Encourage mobility, OOB in chair, and early ambulation as appropriate  - Fall precautions   - Monitor for bowel and bladder dysfunction  - Monitor for and prevent skin breakdown and pressure ulcers  - Continue DVT prophylaxis with  enoxaparin 30 mg daily    Hx of Bronchiectasis  - continue duo nebs q.4 hours while awake and tiotropium 18mcg daily       Future Appointments   Date Time Provider Department Center   6/28/2019 10:45 AM Mirna Crews PA-C [facility] Jayden Angeles NP    DOS: 6/18/2019

## 2019-06-18 NOTE — NURSING
Report call to Erendira nurse at [facility].  is now here to pick patient up. Transfer without any problems. Patient is awake alert and oriented x 4.

## 2019-06-19 NOTE — HPI
Admit Date: 2019  RAVI 2019  Principal Problem:  Closed displaced intertrochanteric fracture of left femur HPI obtained from patient interview and chart review      Visit Date: 2019     Chief Complaint:  Revaluation of medical treatment and therapy status: Lab review, BP monitoring, D/C coordination     HPI:   Mrs. Flores is a 86 year old. 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 She was admitted to [facility] [place] from  until . Dr. Owusu performed a Cephalomedullary nail fixation of left intertrochanteric femur fracture on 5/3/19.      Interval history:  Lab work from today reviewed, WBC count 5.38, K+ 4.0, BUN/Cr 22/0.6, Mg 2, all stable. H&H stable at 9/32 (10.2/34). Patient's 24 hr blood pressure range is 110-142 systolic/53-61, stable. NH orders entered for SW to be sent to facility.  Patient progessing well with PT/OT. Patient medically stable. Continuing to follow and treat all acute and chronic conditions.     Past Medical History: Patient has a past medical history of Bronchiectasis without complication, Hypertension, and Supraventricular tachycardia.     Past Surgical History: Patient has a past surgical history that includes Cholecystectomy; Tonsillectomy; and Intramedullary rodding of femur (Left, 5/3/2019).     Social History: Patient reports that she has never smoked. She has never used smokeless tobacco. She reports that she does not drink alcohol or use drugs.  Patient's ill   about a month ago     Family History:  No family significant history to report     Allergies: Patient is allergic to aspirin (bulk).     ROS  Constitutional: Negative for fever or fatigue.   Eyes: Negative for blurred vision, double vision and discharge.   Respiratory: Negative for cough, shortness of  breath and wheezing.    Cardiovascular: Negative for chest pain, palpitations, claudication, and leg swelling.   Gastrointestinal: Negative for abdominal pain, constipation, diarrhea, nausea and vomiting.   Genitourinary: Negative for dysuria, frequency and urgency.   Musculoskeletal:  + generalized weakness, pain to left arm and hip. Negative for back pain and myalgias.   Skin: Negative for itching and rash.   Neurological: Negative for dizziness, speech change, and headaches.   Psychiatric/Behavioral: + for depression. The patient is not nervous/anxious.       PEx  Temp:  [97.4 °F (36.3 °C)-97.8 °F (36.6 °C)]   Pulse:  [80-89]   Resp:  [17-19]   BP: (110-142)/(53-63)   SpO2:  [98 %-99 %]      Constitutional: Patient appears frail  and in no distress, frail   Head: Normocephalic and atraumatic.   Eyes: Pupils are equal, round, and reactive to light.   Neck: Normal range of motion. Neck supple.   Cardiovascular: Normal rate, regular rhythm and normal heart sounds.    Pulmonary/Chest: Effort normal and breath sounds are clear  Abdominal: Soft. Bowel sounds are normal.   Musculoskeletal: decreased range of motion to left arm, wrist brace/splint in place.   Neurological: Alert and oriented to person, place, and time.   Skin: Skin is warm and dry. Patient has skin overgrowth to scalp. Incisions 2cm to left hip and 3cm to left leg- well-approximated no erythema-healing well, no swelling noted to periwound area  Psychiatric: mood is normal and improving daily        Assessment and Plan:     Discharge planning  - 6/7 patient's sister-in-law is looking at a facility today for her.  She will update us on her choice so that we can set up for stay post SNF DC.   - 6/11 TB skin test and CXR ordered for nursing home placement   -6/18 NH orders entered for SW to be sent to facility.      Closed displaced intratrochanteric fracture of left femur  s/p left Cephalomedullary nail fixation of left  femur fracture on  5/3/19  Postoperative pain  - WBAT to LLE, NWB to LUE  - 5/14 initiated acetaminophen 1 g q.8 hours  - 5/15 pain is better controlled today with scheduled Tylenol  - 6/3 patient went to her ortho follow-up appointment on 05/31-    HIP:          -range of motion as tolerated     - weight bearing as tolerated  SHOULDER:      - range of motion: PROm x 1 weeks then AAROM x 2 weeks then AROM     - NWB  WRIST:      - NWB    -continue brace, remove for range of motion gentle. range of motion of fingers   - 6/11 decrease scheduled acetaminophen to 1 g b.i.d;  Per Ortho- Patient to maintain NWB to LUE and WEIGHT BEAR AS TOLERATED to LLE. Patient is to return to clinic as scheduled on 6/28/19, appointment with SAGE Rodarte  -6/18 Lab work from today reviewed, WBC count 5.38, K+ 4.0, BUN/Cr 22/0.6, Mg 2, all stable.      Hx of HTN  - currently patient is experiencing hypotension   - 5/13 decrease losartan from 50 mg to 25 mg daily, continue atenolol 25 mg daily.  Holding parameters in place to hold for systolic blood pressure less than 100.   - 5/14 holding home losartan at this time. patient's 24 hr blood pressure 102/49 to 119/50.   - 5/15 decrease atenolol to 12.5 mg daily; 24 hr blood pressure today is 95/46 to 108/54.    - 5/16 BP slightly improved with the decrease in atenolol dose. Patient's 24 hr blood pressure range is 98/48 to 129/62  - 5/20 patient's 24 hr blood pressure ranges 96/50 to 121/57  - 5/23 patient's 24 hr blood pressure ranges 107/52 to 149/67, the systolic of 149 is isolated and due to increase in pain. Will continue to monitor blood pressures.   - 5/27 patient's 24 hr blood pressure range is 136/52 to 106/50  - 6/11 patient's 24 hr blood pressure range is 100/49 to 128/62  -6/18 patient's 24 hr blood pressure range is 110-142 systolic/53-61,stable     Acute blood loss anemia  - continue ferrous sulfate 325 mg t.i.d.  - patient is a [employer] who refuses blood transfusions  - Post-op H/H 6.9/21.8  with baseline 8.4/26.9  - pediatric blood draws only, avoid IVF  - 5/13 initiated procrit 20,000 units   - 5/16 H&H improved to 7.0/23 from 6.3/20 after Procrit injection and continue iron supplementation  - 5/20 H&H improved to 7.7/25  - 5/23 H&H improved to 8.1/26  - 5/27 H&H improved to 8.9/30  - 6/11 H&H improved to 10.2/34  -6/18 H&H stable at 9.8/32.4     CONTINUE      Severe malnutrition  - Energy Intake: <75% of estimated energy requirement for > 3 months  - Body Fat Depletion: moderate depletion of orbitals and triceps and buccal area  - Muscle Mass Depletion: severe depletion of clavicle region and scapular region moderate at temples, hands and calf  - Weight Loss: > 7.5% % x < 3 months   - Continue regular diet, honor food intolerances, recommend change boost breeze to boost plus TID no chocolate  per pt request.  - 5/23 patient states better appetite increased p.o. consumption     Onychogryphosis  - likely from  Insufficient consumtion of essential nutrients  - 5/14- Message sent to  and appointment scheduled are to make patient a Podiatry appointment to cut down toenails, patient's toenails are so long and painful it hurts her to walk. - patient to see Podiatry tomorrow morning  - 5/15 patient was able to see provider today and Podiatry.  I got her an appointment Friday before her ortho follow-up to have her toes cut.  - 5/20 patient went to podiatry appointment in all toenails were trimmed to a normal length.  Patient's toenail pain is significantly improved     Hx of SVT  - continue verapamil 80 mg TID  - 5/16 with reduction of atenolol.  Heart rate is increased to the 90s.  Will continue to assess blood pressure and heart rate daily.      Seborrheic Keratosis   - 5/15 differential diagnosis includes melanoma- ambulatory referral to Dermatology placed.  Lesion appears as a skin overgrowth to scalp that is waxy brown and tan in color, slightly raise with a bumpy appearance.  Lesion is  about  5 cm in diameter and circular.      Debility   - Continue with PT/OT for gait training and strengthening and restoration of ADL's   - Encourage mobility, OOB in chair, and early ambulation as appropriate  - Fall precautions   - Monitor for bowel and bladder dysfunction  - Monitor for and prevent skin breakdown and pressure ulcers  - Continue DVT prophylaxis with  enoxaparin 30 mg daily     Hx of Bronchiectasis  - continue duo nebs q.4 hours while awake and tiotropium 18mcg daily                Future Appointments   Date Time Provider Department Center   6/28/2019 10:45 AM Mirna Crews PA-C [facility] Jayden Angeles, NP

## 2019-06-19 NOTE — DISCHARGE SUMMARY
[facility] [place] [facility] [place] [facility]  Discharge Summary      Patient Name: Siomara Flores  MRN: [id number]  Admission Date: 5/9/2019  Hospital Length of Stay: 40 days  Discharge Date and Time: 6/18/2019  5:00 PM  Attending Physician: No att. providers found   Discharging Provider: Roxi Angeles NP  Primary Care Provider: Vince Wray MD      HPI:   Mrs. Flores is a 86 year old. female Anglican with PMHx of  Bronchiectasis, SVT who presents to SNF following a hospitalization for left intertrochanteric, left proximal humerus, and left distal radius fracture s/p left femur intramedullary marc after a mechanical fall. [place] [facility] [place]9. Dr. Owusu performed a Cephalomedullary nail fixation of left intertrochanteric femur fracture on 5/3/19.      Hospital Course:   Patient progressed well with PT and OT. Patient had no significant events during their stay at SNF. Patient decided to go live at [facility] post D/C. Follow up appointment have been set up. All prescriptions and discharge instructions were given to patient.     Interval history:  Lab work from day of discharge was reviewed, WBC count 5.38, K+ 4.0, BUN/Cr 22/0.6, Mg 2, all stable. H&H stable at 9/32 (10.2/34). Patient's 24 hr blood pressure range is 110-142 systolic/53-61, stable.     PEx  Temp:  [97.4 °F (36.3 °C)-97.8 °F (36.6 °C)]   Pulse:  [80-89]   Resp:  [17-19]   BP: (110-142)/(53-63)   SpO2:  [98 %-99 %]      Constitutional: Patient appears frail  and in no distress, frail   Head: Normocephalic and atraumatic.   Eyes: Pupils are equal, round, and reactive to light.   Neck: Normal range of motion. Neck supple.   Cardiovascular: Normal rate, regular rhythm and normal heart sounds.    Pulmonary/Chest: Effort normal and breath sounds are clear  Abdominal: Soft. Bowel sounds are normal.   Musculoskeletal: decreased range of motion to left arm, wrist brace/splint in place.   Neurological: Alert  and oriented to person, place, and time.   Skin: Skin is warm and dry. Patient has skin overgrowth to scalp. Incisions 2cm to left hip and 3cm to left leg- well-approximated no erythema-healing well, no swelling noted to periwound area  Psychiatric: mood is normal and improving daily      Consults:   Consults (From admission, onward)        Status Ordering Provider     Inpatient consult to Registered Dietitian/Nutritionist  Once     Provider:  (Not yet assigned)    RAMAKRISHNA Quarles          No new Assessment & Plan notes have been filed under this hospital service since the last note was generated.  Service: Hospital Medicine    Final Active Diagnoses:    Diagnosis Date Noted POA    PRINCIPAL PROBLEM:  Closed displaced intertrochanteric fracture of left femur [S72.142A] 05/02/2019 Yes    Severe malnutrition [E43] 05/10/2019 Yes    Acute blood loss anemia [D62] 05/03/2019 Yes    Fall [W19.XXXA] 05/02/2019 Yes    Closed fracture of left proximal humerus [S42.202A] 05/02/2019 Yes    Refusal of blood transfusions as patient is Worship [Z53.1] 05/02/2019 Not Applicable    Drug-induced bradycardia [R00.1, T50.905A] 11/04/2015 Yes    Hypertension [I10] 06/18/2014 Yes    SVT (supraventricular tachycardia) [I47.1] 08/01/2012 Yes    Bronchiectasis without complication [J47.9] 08/01/2012 Yes      Problems Resolved During this Admission:       Discharged Condition: good    [place]Health [place] [facility] [place]    Follow Up:  Follow-up Information     [facility].    Specialties:  Nursing Home Agency, SNF Agency  Why:  Patient's discharge destination  Contact information:  [place] [place] [address]  [phone]                 Patient Instructions:      Ambulatory Referral to Dermatology   Referral Priority: Routine Referral Type: Consultation   Referral Reason: Specialty Services Required   Requested Specialty: Dermatology   Number of Visits Requested: 1        Significant Diagnostic Studies: Labs: All labs within the past 24 hours have been reviewed    Pending Diagnostic Studies:     None         Medications:  Reconciled Home Medications:      Medication List      START taking these medications    acetaminophen 500 MG tablet  Commonly known as:  TYLENOL  Take 2 tablets (1,000 mg total) by mouth 2 (two) times daily as needed for Pain.     ferrous sulfate 325 (65 FE) MG EC tablet  Take 1 tablet (325 mg total) by mouth once daily.     polyethylene glycol 17 gram Pwpk  Commonly known as:  GLYCOLAX  Take 17 g by mouth once daily.     senna-docusate 8.6-50 mg 8.6-50 mg per tablet  Commonly known as:  PERICOLACE  Take 1 tablet by mouth 2 (two) times daily as needed for Constipation.     tiotropium 18 mcg inhalation capsule  Commonly known as:  SPIRIVA  Inhale 1 capsule (18 mcg total) into the lungs once daily. Controller     verapamil 80 MG tablet  Commonly known as:  CALAN  Take 1 tablet (80 mg total) by mouth 3 (three) times daily.  Replaces:  verapamil 240 MG CR tablet        CHANGE how you take these medications    atenolol 25 MG tablet  Commonly known as:  TENORMIN  Take 0.5 tablets (12.5 mg total) by mouth once daily.  What changed:  how much to take        CONTINUE taking these medications    CALCIUM 600 + D(3) ORAL  Take 1 tablet by mouth once daily.        STOP taking these medications    albuterol 2.5 mg /3 mL (0.083 %) nebulizer solution  Commonly known as:  PROVENTIL     doxycycline 100 MG tablet  Commonly known as:  VIBRA-TABS     IPRATROPIUM BROMIDE (BULK) MISC     losartan 100 MG tablet  Commonly known as:  COZAAR     verapamil 240 MG CR tablet  Commonly known as:  CALAN-SR  Replaced by:  verapamil 80 MG tablet            Assessment and Plan:     Discharge planning  - 6/7 patient's sister-in-law is looking at a facility today for her.  She will update us on her choice so that we can set up for stay post SNF DC.   - 6/11 TB skin test and CXR ordered for  nursing home placement   -6/18 NH orders entered for SW to be sent to facility today.      Closed displaced intratrochanteric fracture of left femur  s/p left Cephalomedullary nail fixation of left  femur fracture on 5/3/19  Postoperative pain  - WBAT to LLE, NWB to LUE  - 5/14 initiated acetaminophen 1 g q.8 hours  - 5/15 pain is better controlled today with scheduled Tylenol  - 6/3 patient went to her ortho follow-up appointment on 05/31-    HIP:          -range of motion as tolerated     - weight bearing as tolerated  SHOULDER:      - range of motion: PROm x 1 weeks then AAROM x 2 weeks then AROM     - NWB  WRIST:      - NWB    -continue brace, remove for range of motion gentle. range of motion of fingers   - 6/11 decrease scheduled acetaminophen to 1 g b.i.d;  Per Ortho- Patient to maintain NWB to LUE and WEIGHT BEAR AS TOLERATED to LLE. Patient is to return to clinic as scheduled on 6/28/19, appointment with SAGE Rodarte  -6/18 Lab work from today reviewed, WBC count 5.38, K+ 4.0, BUN/Cr 22/0.6, Mg 2, all stable.      Hx of HTN  - currently patient is experiencing hypotension   - 5/13 decrease losartan from 50 mg to 25 mg daily, continue atenolol 25 mg daily.  Holding parameters in place to hold for systolic blood pressure less than 100.   - 5/14 holding home losartan at this time. patient's 24 hr blood pressure 102/49 to 119/50.   - 5/15 decrease atenolol to 12.5 mg daily; 24 hr blood pressure today is 95/46 to 108/54.    - 5/16 BP slightly improved with the decrease in atenolol dose. Patient's 24 hr blood pressure range is 98/48 to 129/62  - 5/20 patient's 24 hr blood pressure ranges 96/50 to 121/57  - 5/23 patient's 24 hr blood pressure ranges 107/52 to 149/67, the systolic of 149 is isolated and due to increase in pain. Will continue to monitor blood pressures.   - 5/27 patient's 24 hr blood pressure range is 136/52 to 106/50  - 6/11 patient's 24 hr blood pressure range is 100/49 to 128/62  -6/18 patient's  24 hr blood pressure range is 110-142 systolic/53-61,stable     Acute blood loss anemia  - continue ferrous sulfate 325 mg t.i.d.  - patient is a Anglican who refuses blood transfusions  - Post-op H/H 6.9/21.8 with baseline 8.4/26.9  - pediatric blood draws only, avoid IVF  - 5/13 initiated procrit 20,000 units   - 5/16 H&H improved to 7.0/23 from 6.3/20 after Procrit injection and continue iron supplementation  - 5/20 H&H improved to 7.7/25  - 5/23 H&H improved to 8.1/26  - 5/27 H&H improved to 8.9/30  - 6/11 H&H improved to 10.2/34  -6/18 H&H stable at 9.8/32.4     Severe malnutrition  - Energy Intake: <75% of estimated energy requirement for > 3 months  - Body Fat Depletion: moderate depletion of orbitals and triceps and buccal area  - Muscle Mass Depletion: severe depletion of clavicle region and scapular region moderate at temples, hands and calf  - Weight Loss: > 7.5% % x < 3 months   - Continue regular diet, honor food intolerances, recommend change boost breeze to boost plus TID no chocolate  per pt request.  - 5/23 patient states better appetite increased p.o. consumption     Onychogryphosis  - likely from  Insufficient consumtion of essential nutrients  - 5/14- Message sent to  and appointment scheduled are to make patient a Podiatry appointment to cut down toenails, patient's toenails are so long and painful it hurts her to walk. - patient to see Podiatry tomorrow morning  - 5/15 patient was able to see provider today and Podiatry.  I got her an appointment Friday before her ortho follow-up to have her toes cut.  - 5/20 patient went to podiatry appointment in all toenails were trimmed to a normal length.  Patient's toenail pain is significantly improved     Hx of SVT  - continue verapamil 80 mg TID  - 5/16 with reduction of atenolol.  Heart rate is increased to the 90s.  Will continue to assess blood pressure and heart rate daily.      Seborrheic Keratosis   - 5/15 differential  diagnosis includes melanoma- ambulatory referral to Dermatology placed.  Lesion appears as a skin overgrowth to scalp that is waxy brown and tan in color, slightly raise with a bumpy appearance.  Lesion is about  5 cm in diameter and circular.      Debility   - Continue with PT/OT for gait training and strengthening and restoration of ADL's   - Encourage mobility, OOB in chair, and early ambulation as appropriate  - Fall precautions   - Monitor for bowel and bladder dysfunction  - Monitor for and prevent skin breakdown and pressure ulcers  - Continue DVT prophylaxis with  enoxaparin 30 mg daily     Hx of Bronchiectasis  - continue duo nebs q.4 hours while awake and tiotropium 18mcg daily        Time spent on the discharge of patient: 55 minutes  Patient was seen and examined on the date of discharge and determined to be suitable for discharge.         Roxi Angeles NP  Department of Hospital Medicine  [facility] [place] [facility] [place] [facility] Nursing Care

## 2019-06-28 ENCOUNTER — HOSPITAL ENCOUNTER (OUTPATIENT)
Dept: RADIOLOGY | Facility: HOSPITAL | Age: 84
Discharge: HOME | End: 2019-06-28
Attending: PHYSICIAN ASSISTANT
Payer: MEDICARE

## 2019-06-28 ENCOUNTER — OFFICE VISIT (OUTPATIENT)
Dept: ORTHOPEDICS | Facility: CLINIC | Age: 84
End: 2019-06-28
Payer: MEDICARE

## 2019-06-28 DIAGNOSIS — S72.142D CLOSED DISPLACED INTERTROCHANTERIC FRACTURE OF LEFT FEMUR WITH ROUTINE HEALING, SUBSEQUENT ENCOUNTER: ICD-10-CM

## 2019-06-28 DIAGNOSIS — S52.502D CLOSED FRACTURE OF DISTAL END OF LEFT RADIUS WITH ROUTINE HEALING, UNSPECIFIED FRACTURE MORPHOLOGY, SUBSEQUENT ENCOUNTER: Primary | ICD-10-CM

## 2019-06-28 DIAGNOSIS — S42.295A OTHER CLOSED NONDISPLACED FRACTURE OF PROXIMAL END OF LEFT HUMERUS, INITIAL ENCOUNTER: ICD-10-CM

## 2019-06-28 DIAGNOSIS — S52.502A CLOSED FRACTURE OF DISTAL END OF LEFT RADIUS, UNSPECIFIED FRACTURE MORPHOLOGY, INITIAL ENCOUNTER: ICD-10-CM

## 2019-06-28 PROCEDURE — 73552 X-RAY EXAM OF FEMUR 2/>: CPT | Mod: TC,LT

## 2019-06-28 PROCEDURE — 99024 PR POST-OP FOLLOW-UP VISIT: ICD-10-PCS | Mod: S$GLB,,, | Performed by: PHYSICIAN ASSISTANT

## 2019-06-28 PROCEDURE — 99024 POSTOP FOLLOW-UP VISIT: CPT | Mod: S$GLB,,, | Performed by: PHYSICIAN ASSISTANT

## 2019-06-28 PROCEDURE — 73552 X-RAY EXAM OF FEMUR 2/>: CPT | Mod: 26,LT,, | Performed by: RADIOLOGY

## 2019-06-28 PROCEDURE — 73552 XR FEMUR 2 VIEW LEFT: ICD-10-PCS | Mod: 26,LT,, | Performed by: RADIOLOGY

## 2019-06-28 PROCEDURE — 73030 X-RAY EXAM OF SHOULDER: CPT | Mod: TC,LT

## 2019-06-28 PROCEDURE — 73110 XR WRIST COMPLETE 3 VIEWS LEFT: ICD-10-PCS | Mod: 26,LT,, | Performed by: RADIOLOGY

## 2019-06-28 PROCEDURE — 73110 X-RAY EXAM OF WRIST: CPT | Mod: TC,LT

## 2019-06-28 PROCEDURE — 73030 XR SHOULDER COMPLETE 2 OR MORE VIEWS LEFT: ICD-10-PCS | Mod: 26,LT,, | Performed by: RADIOLOGY

## 2019-06-28 PROCEDURE — 99999 PR PBB SHADOW E&M-EST. PATIENT-LVL III: CPT | Mod: PBBFAC,,, | Performed by: PHYSICIAN ASSISTANT

## 2019-06-28 PROCEDURE — 99999 PR PBB SHADOW E&M-EST. PATIENT-LVL III: ICD-10-PCS | Mod: PBBFAC,,, | Performed by: PHYSICIAN ASSISTANT

## 2019-06-28 PROCEDURE — 73110 X-RAY EXAM OF WRIST: CPT | Mod: 26,LT,, | Performed by: RADIOLOGY

## 2019-06-28 PROCEDURE — 73030 X-RAY EXAM OF SHOULDER: CPT | Mod: 26,LT,, | Performed by: RADIOLOGY

## 2019-06-28 NOTE — PROGRESS NOTES
Ms. Flores is 86-year-old female who fell from a standing height resulting in an osteoporotic pathologic left intertrochanteric femur fracture treated with CM nail on 05/03/2019 as well as non-displaced left proximal humerus fracture and left distal radius fracture.     Ms. Flores is here today for a post-operative visit after a   Cephalomedullary nail fixation of left intertrochanteric femur fracture  by Dr. Bruce on 05/03/2019.  As well as follow up for non-operative treatment of left proximal humerus and distal radius fractures.     IMPLANTS:  Synthes trochanteric fixation nail advanced 11 x 360 mm with 80 mm hip screw and single distal interlocking screw.    Interval History:    she reports that she is doing ok.  Pain is controlled.  she is  taking pain medication.    She is at nursing home. She on transfer her Pt was on hold reports it willbegin again on Monday. .   she denies fever, chills, and sweats since the time of the surgery.     Physical exam: arrived to clinic in wheelchair  WRIST: brace remove, no TTP at distal radius, able to make a full fist, decreased range of motion of fingers in all planes.   ELBOW: full range of motion  Shoulder: range of motion flexion 70 with mild pain, no TTP  HIP: no TTP, FROM of knee and ankle.     RADS:   WRIST:Generalized osteopenia.  Alignment is satisfactory and joint spaces are fairly well maintained.  No definite fracture or dislocation is seen.  No callus formation identified.  Mild degenerative changes are present.  No soft tissue swelling appreciated.    SHOULDER:There is a healing left humeral neck fracture, unchanged since prior radiograph and in proper position.  Redemonstration of mild to moderate degenerative changes of the left shoulder.  No dislocation.  No significant soft tissue edema or joint effusion.  Aortic calcifications are present at the arch.    FEMUR: ORIF at the left femur with unchanged position and alignment    Assessment:  Post-op visit ( 10  weeks)    Plan:  Current care, treatment plan, precautions, activity level/ modifications, limitations, rehabilitation exercises and proposed future treatment were discussed with the patient. We discussed the need to monitor for changes in symptoms and condition and report them to the physician.  Discussed importance of compliance with all appointments and follow up examinations.   - The patient was advised to keep the incision clean and dry for the next 24 hours after which she may wash the area with antibacterial soap in the shower. Will not submerge until the incision is completely healed  -Patient was advised to monitor wound closely and multiple times daily for any problems. Call clinic immediately or report to ED for immediate medical attention for any complications including reopening of wound, drainage, purulence, redness, streaking, odor, pain out of proportion, fever, chills, etc.   -PT/OT, SNF, Patient is responsible to establish and continue care   HIP:      -range of motion as tolerated     - weight bearing as tolerated   SHOULDER:     - range of motion as tolerated    - slowly advance   WRIST:     - slowly advance weight    - range of motion as tolerated    - pain medication: no refill needed,    Pain medication refill policy provided to patient for review.   - Patient is to return to clinic in at 1 year estuardo.  At time x-ray of her femur, wrist and 2 view shoulder AP scap Y is needed       If there are any questions prior to scheduled follow up, the patient was instructed to contact the office

## 2019-09-06 NOTE — ADDENDUM NOTE
Addendum  created 05/05/19 1415 by Gonzales Quintero MD    Charge Capture section accepted, Cosign clinical note with attestation       Hospital chart Psyckes/Hospital chart

## 2019-11-24 ENCOUNTER — HOSPITAL ENCOUNTER (EMERGENCY)
Facility: HOSPITAL | Age: 84
Discharge: HOME | End: 2019-11-25
Attending: EMERGENCY MEDICINE
Payer: MEDICARE

## 2019-11-24 DIAGNOSIS — R04.2 HEMOPTYSIS: ICD-10-CM

## 2019-11-24 DIAGNOSIS — J47.1 BRONCHIECTASIS WITH ACUTE EXACERBATION: Primary | ICD-10-CM

## 2019-11-24 DIAGNOSIS — K92.0 HEMATEMESIS: ICD-10-CM

## 2019-11-24 PROCEDURE — 99284 EMERGENCY DEPT VISIT MOD MDM: CPT | Mod: ,,, | Performed by: EMERGENCY MEDICINE

## 2019-11-24 PROCEDURE — 99284 EMERGENCY DEPT VISIT MOD MDM: CPT | Mod: 25

## 2019-11-24 PROCEDURE — 99284 PR EMERGENCY DEPT VISIT,LEVEL IV: ICD-10-PCS | Mod: ,,, | Performed by: EMERGENCY MEDICINE

## 2019-11-25 ENCOUNTER — TELEPHONE (OUTPATIENT)
Dept: PULMONOLOGY | Facility: CLINIC | Age: 84
End: 2019-11-25

## 2019-11-25 VITALS
WEIGHT: 80 LBS | BODY MASS INDEX: 15.71 KG/M2 | HEART RATE: 82 BPM | RESPIRATION RATE: 18 BRPM | DIASTOLIC BLOOD PRESSURE: 66 MMHG | OXYGEN SATURATION: 95 % | SYSTOLIC BLOOD PRESSURE: 146 MMHG | TEMPERATURE: 98 F | HEIGHT: 60 IN

## 2019-11-25 LAB
ALBUMIN SERPL BCP-MCNC: 3.1 G/DL (ref 3.5–5.2)
ALP SERPL-CCNC: 76 U/L (ref 55–135)
ALT SERPL W/O P-5'-P-CCNC: 5 U/L (ref 10–44)
ANION GAP SERPL CALC-SCNC: 7 MMOL/L (ref 8–16)
AST SERPL-CCNC: 19 U/L (ref 10–40)
BASOPHILS # BLD AUTO: 0.08 K/UL (ref 0–0.2)
BASOPHILS NFR BLD: 1.3 % (ref 0–1.9)
BILIRUB SERPL-MCNC: 0.3 MG/DL (ref 0.1–1)
BUN SERPL-MCNC: 18 MG/DL (ref 8–23)
CALCIUM SERPL-MCNC: 9.2 MG/DL (ref 8.7–10.5)
CHLORIDE SERPL-SCNC: 98 MMOL/L (ref 95–110)
CO2 SERPL-SCNC: 32 MMOL/L (ref 23–29)
CREAT SERPL-MCNC: 0.7 MG/DL (ref 0.5–1.4)
DIFFERENTIAL METHOD: ABNORMAL
EOSINOPHIL # BLD AUTO: 0.3 K/UL (ref 0–0.5)
EOSINOPHIL NFR BLD: 4.7 % (ref 0–8)
ERYTHROCYTE [DISTWIDTH] IN BLOOD BY AUTOMATED COUNT: 13.4 % (ref 11.5–14.5)
EST. GFR  (AFRICAN AMERICAN): >60 ML/MIN/1.73 M^2
EST. GFR  (NON AFRICAN AMERICAN): >60 ML/MIN/1.73 M^2
GLUCOSE SERPL-MCNC: 108 MG/DL (ref 70–110)
HCT VFR BLD AUTO: 34.2 % (ref 37–48.5)
HGB BLD-MCNC: 10.6 G/DL (ref 12–16)
IMM GRANULOCYTES # BLD AUTO: 0.01 K/UL (ref 0–0.04)
IMM GRANULOCYTES NFR BLD AUTO: 0.2 % (ref 0–0.5)
INR PPP: 0.9 (ref 0.8–1.2)
LYMPHOCYTES # BLD AUTO: 1.4 K/UL (ref 1–4.8)
LYMPHOCYTES NFR BLD: 23.6 % (ref 18–48)
MCH RBC QN AUTO: 28 PG (ref 27–31)
MCHC RBC AUTO-ENTMCNC: 31 G/DL (ref 32–36)
MCV RBC AUTO: 91 FL (ref 82–98)
MONOCYTES # BLD AUTO: 0.5 K/UL (ref 0.3–1)
MONOCYTES NFR BLD: 9 % (ref 4–15)
NEUTROPHILS # BLD AUTO: 3.7 K/UL (ref 1.8–7.7)
NEUTROPHILS NFR BLD: 61.2 % (ref 38–73)
NRBC BLD-RTO: 0 /100 WBC
PLATELET # BLD AUTO: 218 K/UL (ref 150–350)
PMV BLD AUTO: 9.5 FL (ref 9.2–12.9)
POTASSIUM SERPL-SCNC: 4.1 MMOL/L (ref 3.5–5.1)
PROCALCITONIN SERPL IA-MCNC: 0.03 NG/ML
PROT SERPL-MCNC: 7.9 G/DL (ref 6–8.4)
PROTHROMBIN TIME: 9.9 SEC (ref 9–12.5)
RBC # BLD AUTO: 3.78 M/UL (ref 4–5.4)
SODIUM SERPL-SCNC: 137 MMOL/L (ref 136–145)
WBC # BLD AUTO: 6.01 K/UL (ref 3.9–12.7)

## 2019-11-25 PROCEDURE — 84145 PROCALCITONIN (PCT): CPT

## 2019-11-25 PROCEDURE — 85610 PROTHROMBIN TIME: CPT

## 2019-11-25 PROCEDURE — 80053 COMPREHEN METABOLIC PANEL: CPT

## 2019-11-25 PROCEDURE — 85025 COMPLETE CBC W/AUTO DIFF WBC: CPT

## 2019-11-25 RX ORDER — MOXIFLOXACIN HYDROCHLORIDE 400 MG/1
400 TABLET ORAL DAILY
Qty: 5 TABLET | Refills: 0 | Status: SHIPPED | OUTPATIENT
Start: 2019-11-25 | End: 2019-11-30

## 2019-11-25 NOTE — ED TRIAGE NOTES
Siomara Flores, a 86 y.o. female presents to the ED w/ complaint of hemoptysis - coughing up small amount of bright red blood in her sputum. States started tonight. Reports hx of similar a few years ago - hx bronchiectasis.     Triage note:  Chief Complaint   Patient presents with    Hematemesis     hx of bronchiectasis     Review of patient's allergies indicates:   Allergen Reactions    Aspirin (bulk) Other (See Comments)     Developed gastric ulcer in past when taking aspirin per patient     Past Medical History:   Diagnosis Date    Bronchiectasis without complication     Hypertension     Supraventricular tachycardia

## 2019-11-25 NOTE — TELEPHONE ENCOUNTER
----- Message from Brynn Jj sent at 11/25/2019  8:29 AM CST -----  Contact: Ms Chairez/ friend   [phone]  Type:Appointment Request    Name of Caller:patient seen in ER was referred to see Pulmonary.   Patient need appointment    When is the first available appointment?  Symptoms:     Would the patient rather a call back or a response via [employer]? call  Best Call Back Number:[phone]  Additional Information:

## 2019-11-25 NOTE — TELEPHONE ENCOUNTER
I left Ms Chairez a voicemail to let her know of scheduled consult appointment with Dr Velarde on 11/27/19 at 11am.

## 2019-11-25 NOTE — ED PROVIDER NOTES
Source of History:  Patient    Chief complaint:  Hematemesis (hx of bronchiectasis)    HPI:  Siomara Flores is a 86 y.o. female with history of bronchiectasis, SVT, hypertension presenting to emergency room with complaint of hemoptysis.  Please note that patient's chief complaint was incorrectly entered by triage, she does not have hematemesis.    Patient states that she has been dealing with a cough that is productive of white to yellow sputum over the past 2 weeks.  She is not having shortness of breath, fatigue, fevers, chills, or chest pain.    She is not a smoker.  She states that she did have hemoptysis due to bronchiectasis several years ago, and has not seen a pulmonologist since then.    This evening she had 1 episode of hemoptysis.  No other complaints at this time, patient feels well. She denies bleeding from other areas.  There is no hematemesis, vomiting of any kind, abdominal pain, or blood in stool.    ROS: As per HPI and below:  Review of Systems   Constitutional: Negative for fever.   HENT: Negative for sore throat.    Eyes: Negative for double vision.   Respiratory: Positive for cough, hemoptysis and sputum production. Negative for shortness of breath.    Cardiovascular: Negative for chest pain.   Gastrointestinal: Negative for abdominal pain and vomiting.   Genitourinary: Negative for dysuria.   Musculoskeletal: Negative for falls.   Skin: Negative for rash.   Neurological: Negative for headaches.     Review of patient's allergies indicates:   Allergen Reactions    Aspirin (bulk) Other (See Comments)     Developed gastric ulcer in past when taking aspirin per patient       No current facility-administered medications on file prior to encounter.      Current Outpatient Medications on File Prior to Encounter   Medication Sig Dispense Refill    acetaminophen (TYLENOL) 500 MG tablet Take 2 tablets (1,000 mg total) by mouth 2 (two) times daily as needed for Pain.  0    atenolol (TENORMIN) 25 MG  tablet Take 0.5 tablets (12.5 mg total) by mouth once daily. 15 tablet 11    CALCIUM CARBONATE/VITAMIN D3 (CALCIUM 600 + D,3, ORAL) Take 1 tablet by mouth once daily.      polyethylene glycol (GLYCOLAX) 17 gram PwPk Take 17 g by mouth once daily.  0    senna-docusate 8.6-50 mg (PERICOLACE) 8.6-50 mg per tablet Take 1 tablet by mouth 2 (two) times daily as needed for Constipation.      tiotropium (SPIRIVA) 18 mcg inhalation capsule Inhale 1 capsule (18 mcg total) into the lungs once daily. Controller 1 Box 11    verapamil (CALAN) 80 MG tablet Take 1 tablet (80 mg total) by mouth 3 (three) times daily. 90 tablet 11       PMH:  As per HPI and below:  Past Medical History:   Diagnosis Date    Bronchiectasis without complication     Hypertension     Supraventricular tachycardia      Past Surgical History:   Procedure Laterality Date    CHOLECYSTECTOMY      INTRAMEDULLARY RODDING OF FEMUR Left 5/3/2019    Procedure: INSERTION, INTRAMEDULLARY MARTHA, FEMUR;  Surgeon: Teodoro Bruce MD;  Location: [address];  Service: Orthopedics;  Laterality: Left;    TONSILLECTOMY         Social History     Socioeconomic History    Marital status:      Spouse name: Not on file    Number of children: Not on file    Years of education: Not on file    Highest education level: Not on file   Occupational History    Not on file   Social Needs    Financial resource strain: Not on file    Food insecurity:     Worry: Not on file     Inability: Not on file    Transportation needs:     Medical: Not on file     Non-medical: Not on file   Tobacco Use    Smoking status: Never Smoker    Smokeless tobacco: Never Used   Substance and Sexual Activity    Alcohol use: No    Drug use: No    Sexual activity: Not on file   Lifestyle    Physical activity:     Days per week: Not on file     Minutes per session: Not on file    Stress: Not on file   Relationships    Social connections:     Talks on phone: Not on file     Gets  together: Not on file     Attends Zoroastrian service: Not on file     Active member of club or organization: Not on file     Attends meetings of clubs or organizations: Not on file     Relationship status: Not on file   Other Topics Concern    Not on file   Social History Narrative    Not on file       No family history on file.    Physical Exam:    Vitals:    11/25/19 0111   BP:    Pulse: 82   Resp:    Temp:      Gen: No acute distress. Thin.  Mental Status:  Alert and oriented x 3.  Appropriate, conversant.  Skin: Warm, dry. No rashes seen.  Eyes: No conjunctival injection.  ENT: Oropharynx clear.    Pulm: Clear to auscultation bilaterally.  Good air movement.  No wheezes. No increased work of breathing.  CV: Regular rate. Regular rhythm. Normal peripheral perfusion. No lower extremity edema.  Abd: Soft.  Not distended.  Nontender.  No guarding.  No rebound.  MSK: Good range of motion all joints.  No deformities.    Neck supple.  No meningismus.  Neuro: Awake. Speech normal. No focal neuro deficit observed. Cranial nerves intact. Motor grossly intact.  Sensation grossly intact.  Cerebellar intact.      Laboratory Studies:  Labs Reviewed   CBC W/ AUTO DIFFERENTIAL - Abnormal; Notable for the following components:       Result Value    RBC 3.78 (*)     Hemoglobin 10.6 (*)     Hematocrit 34.2 (*)     Mean Corpuscular Hemoglobin Conc 31.0 (*)     All other components within normal limits   COMPREHENSIVE METABOLIC PANEL - Abnormal; Notable for the following components:    CO2 32 (*)     Albumin 3.1 (*)     ALT 5 (*)     Anion Gap 7 (*)     All other components within normal limits   PROTIME-INR   PROCALCITONIN     X-rays (independently interpreted by me):  Stable from previous, no infiltrate, no mass    Chart reviewed.     Imaging Results          X-Ray Chest PA And Lateral (Final result)  Result time 11/25/19 00:19:53    Final result by Collin Santiago MD (11/25/19 00:19:53)                 Impression:      No  acute cardiopulmonary process.    Stable chronic findings, as above.      Electronically signed by: Collin Santiago MD  Date:    11/25/2019  Time:    00:19             Narrative:    EXAMINATION:  XR CHEST PA AND LATERAL    CLINICAL HISTORY:  Hematemesis    TECHNIQUE:  PA and lateral views of the chest were performed.    COMPARISON:  June 11, 2019.    FINDINGS:  Diffuse coarse interstitial markings and biapical pleural thickening appears similar to prior.    Heart and lungs appear unchanged when allowing for differences in technique and positioning.    Regional osseous structures are unchanged.  Remote proximal left humeral fracture again noted.    Surgical clips in the right upper quadrant.                              Medications Given:  Medications - No data to display    MDM:    86 y.o. female with complaint of several days of cough productive of yellow and white sputum, and 1 episode of hemoptysis.  She is afebrile, stable, nontoxic.  She is well appearing with clear lungs.    Patient did bring the tissue with hemoptysis in it, it is not massive hemoptysis, it is a small amount of bright red blood.    X-ray is stable, no evidence of mass.  Labs are unremarkable. I suspect a bronchiectasis flare.  No evidence of tuberculosis, pulmonary embolism, massive hemoptysis, neoplasm, or any other life-threatening abnormality today.  Will prescribe antibiotics, place referral for pulmonology so the patient may have appropriate follow-up.    Patient and her friends that are present in the room are comfortable with plan, agreeable with outpatient management.    Diagnostic Impression:    1. Bronchiectasis with acute exacerbation    2. Hematemesis    3. Hemoptysis      Patient and/or family understands the plan and is in agreement, verbalized understanding, questions answered    Edel Rosas MD  Emergency Medicine           Edel Rosas MD  11/25/19 0556

## 2019-11-25 NOTE — DISCHARGE INSTRUCTIONS
Diagnosis:  Bronchiectasis exacerbation    Tests today showed:   Labs Reviewed   CBC W/ AUTO DIFFERENTIAL - Abnormal; Notable for the following components:       Result Value    RBC 3.78 (*)     Hemoglobin 10.6 (*)     Hematocrit 34.2 (*)     Mean Corpuscular Hemoglobin Conc 31.0 (*)     All other components within normal limits   COMPREHENSIVE METABOLIC PANEL - Abnormal; Notable for the following components:    CO2 32 (*)     Albumin 3.1 (*)     ALT 5 (*)     Anion Gap 7 (*)     All other components within normal limits   PROTIME-INR   PROCALCITONIN     Imaging Results              X-Ray Chest PA And Lateral (Final result)  Result time 11/25/19 00:19:53      Final result by Collin Santiago MD (11/25/19 00:19:53)                   Impression:      No acute cardiopulmonary process.    Stable chronic findings, as above.      Electronically signed by: Collin Santiago MD  Date:    11/25/2019  Time:    00:19               Narrative:    EXAMINATION:  XR CHEST PA AND LATERAL    CLINICAL HISTORY:  Hematemesis    TECHNIQUE:  PA and lateral views of the chest were performed.    COMPARISON:  June 11, 2019.    FINDINGS:  Diffuse coarse interstitial markings and biapical pleural thickening appears similar to prior.    Heart and lungs appear unchanged when allowing for differences in technique and positioning.    Regional osseous structures are unchanged.  Remote proximal left humeral fracture again noted.    Surgical clips in the right upper quadrant.                                    Take the prescribed antibiotic as directed.    Follow-Up Plan:  - Follow-up with primary care doctor within 3 - 5 days  - Additional testing and/or evaluation as directed by your primary doctor  - Referral was placed for pulmonology follow-up    Return to the Emergency Department for symptoms including but not limited to: worsening symptoms, shortness of breath or chest pain, coughing up a large amount of blood, vomiting with inability to hold  down fluids, fevers greater than 100.4°F, passing out/fainting/unconsciousness, or other concerning symptoms.

## 2019-11-25 NOTE — ED NOTES
Pt identifiers Siomara MONROY Mark checked and correct  LOC: The patient is awake, alert, aware of environment with an appropriate affect. Oriented x4, speaking appropriately  APPEARANCE: Pt resting comfortably, in no acute distress, pt is clean and well groomed, clothing properly fastened  SKIN: Skin warm, dry and intact, normal skin turgor, moist mucus membranes  RESPIRATORY: Airway is open and patent, respirations are spontaneous, even and unlabored, normal effort and rate. + hemoptysis   CARDIAC: regular rate, no peripheral edema noted, capillary refill < 3 seconds, bilateral radial pulses 2+  ABDOMEN: Soft, nontender, nondistended.   NEUROLOGIC: PERRL, facial expression is symmetrical, patient moving all extremities spontaneously, normal sensation in all extremities when touched with a finger.  Follows all commands appropriately  MUSCULOSKELETAL: No obvious deformities.

## 2019-11-27 ENCOUNTER — OFFICE VISIT (OUTPATIENT)
Dept: PULMONOLOGY | Facility: CLINIC | Age: 84
End: 2019-11-27
Payer: MEDICARE

## 2019-11-27 VITALS
SYSTOLIC BLOOD PRESSURE: 112 MMHG | DIASTOLIC BLOOD PRESSURE: 52 MMHG | WEIGHT: 80.69 LBS | OXYGEN SATURATION: 94 % | HEART RATE: 85 BPM | BODY MASS INDEX: 15.84 KG/M2 | HEIGHT: 60 IN

## 2019-11-27 DIAGNOSIS — R04.2 HEMOPTYSIS: ICD-10-CM

## 2019-11-27 DIAGNOSIS — R64 CACHEXIA: ICD-10-CM

## 2019-11-27 DIAGNOSIS — J47.1 BRONCHIECTASIS WITH (ACUTE) EXACERBATION: Primary | ICD-10-CM

## 2019-11-27 PROCEDURE — 99204 PR OFFICE/OUTPT VISIT, NEW, LEVL IV, 45-59 MIN: ICD-10-PCS | Mod: S$PBB,,, | Performed by: INTERNAL MEDICINE

## 2019-11-27 PROCEDURE — 1159F MED LIST DOCD IN RCRD: CPT | Mod: ,,, | Performed by: INTERNAL MEDICINE

## 2019-11-27 PROCEDURE — 87186 SC STD MICRODIL/AGAR DIL: CPT

## 2019-11-27 PROCEDURE — 87106 FUNGI IDENTIFICATION YEAST: CPT

## 2019-11-27 PROCEDURE — 1159F PR MEDICATION LIST DOCUMENTED IN MEDICAL RECORD: ICD-10-PCS | Mod: ,,, | Performed by: INTERNAL MEDICINE

## 2019-11-27 PROCEDURE — 99213 OFFICE O/P EST LOW 20 MIN: CPT | Mod: PBBFAC | Performed by: INTERNAL MEDICINE

## 2019-11-27 PROCEDURE — 99204 OFFICE O/P NEW MOD 45 MIN: CPT | Mod: S$PBB,,, | Performed by: INTERNAL MEDICINE

## 2019-11-27 PROCEDURE — 87070 CULTURE OTHR SPECIMN AEROBIC: CPT

## 2019-11-27 PROCEDURE — 99999 PR PBB SHADOW E&M-EST. PATIENT-LVL III: ICD-10-PCS | Mod: PBBFAC,,, | Performed by: INTERNAL MEDICINE

## 2019-11-27 PROCEDURE — 87077 CULTURE AEROBIC IDENTIFY: CPT

## 2019-11-27 PROCEDURE — 99999 PR PBB SHADOW E&M-EST. PATIENT-LVL III: CPT | Mod: PBBFAC,,, | Performed by: INTERNAL MEDICINE

## 2019-11-27 PROCEDURE — 87205 SMEAR GRAM STAIN: CPT

## 2019-11-27 RX ORDER — OMEPRAZOLE 20 MG/1
CAPSULE, DELAYED RELEASE ORAL
COMMUNITY

## 2019-11-27 RX ORDER — LOSARTAN POTASSIUM 100 MG/1
TABLET ORAL
COMMUNITY

## 2019-11-27 RX ORDER — PREDNISONE 20 MG/1
40 TABLET ORAL DAILY
Qty: 10 TABLET | Refills: 0 | Status: SHIPPED | OUTPATIENT
Start: 2019-11-27 | End: 2019-12-02

## 2019-11-27 NOTE — PROGRESS NOTES
Subjective:       Patient ID: Siomara Flores is a 86 y.o. female.    Chief Complaint: Bronchiectasis    86 year old NHP with a history of bronchiectasis diagnosed approximately 10 years ago when she presented with hemoptysis.  Presented to ED again with hemoptysis.  Has improved with antibiotics.  Was not prescribed steroids.  Hemoptysis was reported by patient be around 30cc.   Described as being mixed with phlegm.  No fevers.  Has gained weight.  Good appetite.      At end of October, treated with azithromycin x five days for cough and phlegm.  Completing five days of avelox.   On spiriva daily    Hemoptysis is improving but is worse at night.  Slowly resolving.     Review of Systems   Constitutional: Negative for night sweats.   HENT: Negative for trouble swallowing.    Respiratory: Positive for shortness of breath (mild) and use of rescue inhaler (with improvement of symptoms). Negative for choking.    Cardiovascular: Negative for chest pain and leg swelling.   Genitourinary: Negative for difficulty urinating.   Endocrine: Negative for cold intolerance and heat intolerance.    Musculoskeletal: Negative for arthralgias.   Gastrointestinal: Positive for acid reflux.   Neurological: Negative for headaches.   Hematological: Negative for adenopathy.   Psychiatric/Behavioral: Negative for confusion.       Past medical and surgical history reviewed.  Social and family history reviewed.  Allergies and medications reviewed.  Lifetime nonsmoker.  Previously followed by Dr. Escalera for bronchiectasis.    Previously has been on NAC  Objective:       Vitals:    11/27/19 1053   BP: (!) 112/52   BP Location: Left arm   Patient Position: Sitting   Pulse: 85   SpO2: (!) 94%   Weight: 36.6 kg (80 lb 11 oz)   Height: 5' (1.524 m)     Physical Exam   Constitutional: She is oriented to person, place, and time. She appears cachectic.   HENT:   Head: Normocephalic.   Nose: Nose normal.   Neck: Normal range of motion. Neck supple.    Cardiovascular: Normal rate.   Pulmonary/Chest: Symmetric chest wall expansion and effort normal.   Bronchial breath sounds.   Abdominal: Soft. Bowel sounds are normal. She exhibits no mass. There is no hepatosplenomegaly.   Musculoskeletal: Normal range of motion. She exhibits no edema.   Lymphadenopathy: No supraclavicular adenopathy is present.     She has no cervical adenopathy.   Neurological: She is alert and oriented to person, place, and time.   Skin: Skin is warm and dry.   Psychiatric: She has a normal mood and affect.     Personal Diagnostic Review  CT of chest performed on 5/2012 without contrast revealed right middle lobe bronchiectasis  CXR is unchanged from previous..  No flowsheet data found.      Assessment:       1. Bronchiectasis with (acute) exacerbation    2. Cachexia    3. Hemoptysis        Outpatient Encounter Medications as of 11/27/2019   Medication Sig Dispense Refill    acetaminophen (TYLENOL) 500 MG tablet Take 2 tablets (1,000 mg total) by mouth 2 (two) times daily as needed for Pain.  0    atenolol (TENORMIN) 25 MG tablet Take 0.5 tablets (12.5 mg total) by mouth once daily. 15 tablet 11    CALCIUM CARBONATE/VITAMIN D3 (CALCIUM 600 + D,3, ORAL) Take 1 tablet by mouth once daily.      losartan (COZAAR) 100 MG tablet TAKE ONE TABLET BY MOUTH ONCE DAILY      moxifloxacin (AVELOX) 400 mg tablet Take 1 tablet (400 mg total) by mouth once daily. for 5 days 5 tablet 0    omeprazole (PRILOSEC) 20 MG capsule 1 capsule      polyethylene glycol (GLYCOLAX) 17 gram PwPk Take 17 g by mouth once daily.  0    senna-docusate 8.6-50 mg (PERICOLACE) 8.6-50 mg per tablet Take 1 tablet by mouth 2 (two) times daily as needed for Constipation.      tiotropium (SPIRIVA) 18 mcg inhalation capsule Inhale 1 capsule (18 mcg total) into the lungs once daily. Controller 1 Box 11    verapamil (CALAN) 80 MG tablet Take 1 tablet (80 mg total) by mouth 3 (three) times daily. 90 tablet 11    predniSONE  (DELTASONE) 20 MG tablet Take 2 tablets (40 mg total) by mouth once daily. for 5 days 10 tablet 0     No facility-administered encounter medications on file as of 11/27/2019.      Orders Placed This Encounter   Procedures    Culture, Respiratory with Gram Stain    AFB Culture & Smear     Plan:     Problem List Items Addressed This Visit     Bronchiectasis with (acute) exacerbation - Primary    Overview     Complete avelox  Sputum cultures obtained for atypical bacteria  Prednisone for five days.    Will call with results.    Continue spiriva  Will restart NAC         Relevant Orders    Culture, Respiratory with Gram Stain    AFB Culture & Smear    Cachexia    Overview     Frail appearing         Hemoptysis    Overview     Due to bronchiectasis.    Counseled on disease.  States it is improving.  If should worsen, would need to go to ED for evaluation.             [place] [id number] [address] [phone]

## 2019-11-27 NOTE — LETTER
November 27, 2019      Edel Rosas MD  [address] [facility] [address]           [facility] Pulmonary [facility]  [address]  [facility] [address]  Phone: [phone]          Patient: Siomara Flores   MR Number: [id number]   YOB: 1933   Date of Visit: 11/27/2019       Dear Dr. Edel Rosas:    Thank you for referring Siomara Flores to me for evaluation. Attached you will find relevant portions of my assessment and plan of care.    If you have questions, please do not hesitate to call me. I look forward to following Siomara Flores along with you.    Sincerely,    Norma Velarde MD    Enclosure  CC:  No Recipients    If you would like to receive this communication electronically, please contact [email or web address] or [phone] to request more information on [email or web address] Link access.    For providers and/or their staff who would like to refer a patient to [facility], please contact us through our one-stop-shop provider referral line, [facility], at [phone].    If you feel you have received this communication in error or would no longer like to receive these types of communications, please e-mail [email or web address]

## 2019-12-02 ENCOUNTER — TELEPHONE (OUTPATIENT)
Dept: PULMONOLOGY | Facility: CLINIC | Age: 84
End: 2019-12-02

## 2019-12-02 LAB
BACTERIA SPEC AEROBE CULT: ABNORMAL
GRAM STN SPEC: ABNORMAL

## 2019-12-02 NOTE — TELEPHONE ENCOUNTER
Called and spoke with nurse taking care of Ms. Flores at the [facility] and she stated that the pt was not having hemoptysis at this time.

## 2020-03-26 ENCOUNTER — TELEPHONE (OUTPATIENT)
Dept: PULMONOLOGY | Facility: CLINIC | Age: 85
End: 2020-03-26

## 2020-03-26 NOTE — TELEPHONE ENCOUNTER
I tried to call back patients sister with the number provided, no answer and she did not have her voicemail set up for me to leave a message.

## 2020-03-26 NOTE — TELEPHONE ENCOUNTER
----- Message from Aniceto Magana sent at 3/26/2020 12:46 PM CDT -----  Contact: Sister  Pt is asking for a call back, no reason given.       Contact Info

## 2020-03-27 ENCOUNTER — TELEPHONE (OUTPATIENT)
Dept: PULMONOLOGY | Facility: CLINIC | Age: 85
End: 2020-03-27

## 2020-03-27 NOTE — TELEPHONE ENCOUNTER
Skip JIMENEZ called and given order for N-acetylcysteine BID 1200mg for COPD, pt had a new bottle in her room so they will begin giving it to her and will look for a way to order it for her

## 2020-11-30 ENCOUNTER — HOSPITAL ENCOUNTER (EMERGENCY)
Facility: HOSPITAL | Age: 85
Discharge: NURSING, REHAB OR LONG-TERM CARE | End: 2020-11-30
Attending: EMERGENCY MEDICINE
Payer: MEDICARE

## 2020-11-30 VITALS
RESPIRATION RATE: 21 BRPM | OXYGEN SATURATION: 96 % | WEIGHT: 75 LBS | DIASTOLIC BLOOD PRESSURE: 73 MMHG | HEART RATE: 74 BPM | HEIGHT: 60 IN | TEMPERATURE: 98 F | BODY MASS INDEX: 14.72 KG/M2 | SYSTOLIC BLOOD PRESSURE: 165 MMHG

## 2020-11-30 DIAGNOSIS — R04.2 HEMOPTYSIS: Primary | ICD-10-CM

## 2020-11-30 DIAGNOSIS — R06.02 SOB (SHORTNESS OF BREATH): ICD-10-CM

## 2020-11-30 LAB
ANION GAP SERPL CALC-SCNC: 10 MMOL/L (ref 8–16)
BASOPHILS # BLD AUTO: 0.07 K/UL (ref 0–0.2)
BASOPHILS NFR BLD: 0.8 % (ref 0–1.9)
BUN SERPL-MCNC: 11 MG/DL (ref 8–23)
CALCIUM SERPL-MCNC: 9.3 MG/DL (ref 8.7–10.5)
CHLORIDE SERPL-SCNC: 98 MMOL/L (ref 95–110)
CO2 SERPL-SCNC: 31 MMOL/L (ref 23–29)
CREAT SERPL-MCNC: 0.7 MG/DL (ref 0.5–1.4)
CTP QC/QA: YES
DIFFERENTIAL METHOD: ABNORMAL
EOSINOPHIL # BLD AUTO: 0.4 K/UL (ref 0–0.5)
EOSINOPHIL NFR BLD: 4 % (ref 0–8)
ERYTHROCYTE [DISTWIDTH] IN BLOOD BY AUTOMATED COUNT: 12.6 % (ref 11.5–14.5)
EST. GFR  (AFRICAN AMERICAN): >60 ML/MIN/1.73 M^2
EST. GFR  (NON AFRICAN AMERICAN): >60 ML/MIN/1.73 M^2
GLUCOSE SERPL-MCNC: 101 MG/DL (ref 70–110)
HCT VFR BLD AUTO: 34.9 % (ref 37–48.5)
HGB BLD-MCNC: 10.8 G/DL (ref 12–16)
IMM GRANULOCYTES # BLD AUTO: 0.03 K/UL (ref 0–0.04)
IMM GRANULOCYTES NFR BLD AUTO: 0.3 % (ref 0–0.5)
LYMPHOCYTES # BLD AUTO: 2.2 K/UL (ref 1–4.8)
LYMPHOCYTES NFR BLD: 24.4 % (ref 18–48)
MCH RBC QN AUTO: 28.5 PG (ref 27–31)
MCHC RBC AUTO-ENTMCNC: 30.9 G/DL (ref 32–36)
MCV RBC AUTO: 92 FL (ref 82–98)
MONOCYTES # BLD AUTO: 0.7 K/UL (ref 0.3–1)
MONOCYTES NFR BLD: 8 % (ref 4–15)
NEUTROPHILS # BLD AUTO: 5.5 K/UL (ref 1.8–7.7)
NEUTROPHILS NFR BLD: 62.5 % (ref 38–73)
NRBC BLD-RTO: 0 /100 WBC
PLATELET # BLD AUTO: 171 K/UL (ref 150–350)
PMV BLD AUTO: 10.6 FL (ref 9.2–12.9)
POTASSIUM SERPL-SCNC: 3.5 MMOL/L (ref 3.5–5.1)
RBC # BLD AUTO: 3.79 M/UL (ref 4–5.4)
SARS-COV-2 RDRP RESP QL NAA+PROBE: NEGATIVE
SODIUM SERPL-SCNC: 139 MMOL/L (ref 136–145)
WBC # BLD AUTO: 8.8 K/UL (ref 3.9–12.7)

## 2020-11-30 PROCEDURE — 99284 EMERGENCY DEPT VISIT MOD MDM: CPT | Mod: ,,, | Performed by: PHYSICIAN ASSISTANT

## 2020-11-30 PROCEDURE — 99284 PR EMERGENCY DEPT VISIT,LEVEL IV: ICD-10-PCS | Mod: ,,, | Performed by: PHYSICIAN ASSISTANT

## 2020-11-30 PROCEDURE — 99283 EMERGENCY DEPT VISIT LOW MDM: CPT | Mod: 25

## 2020-11-30 PROCEDURE — 85025 COMPLETE CBC W/AUTO DIFF WBC: CPT

## 2020-11-30 PROCEDURE — 80048 BASIC METABOLIC PNL TOTAL CA: CPT

## 2020-11-30 PROCEDURE — U0002 COVID-19 LAB TEST NON-CDC: HCPCS | Performed by: PHYSICIAN ASSISTANT

## 2020-11-30 NOTE — ED TRIAGE NOTES
87 year old femlae presents to ED via EMS from [facility] reporting a few episodes of hemoptysis. Pt denies cp/SOB and reports having this before last year. [email or web address]

## 2020-11-30 NOTE — ED PROVIDER NOTES
Encounter Date: 11/30/2020       History     Chief Complaint   Patient presents with    Hemoptysis     Per EMS coughing up combination of spit and blood but bright red X1hr minutes. Denies SOB, chest pain.        87-year-old female with pmhx bronchiectasis, [employer], HTN, SVT presents with a chief complaint of hemoptysis.  This began 2 days ago.  She experienced 1 episode of a tiny amount 2 days ago and then again tonight.  She describes the episode as being bright red blood.  This occurred once 2 days ago and once again tonight.  She denies any chest pain or shortness of breath.  She denies any fevers or chills.  She had similar presentation to the ED around this time last year.  She did not follow-up with pulmonology.  Upon my initial assessment she appears well, nontoxic and nondistressed.         Review of patient's allergies indicates:   Allergen Reactions    Aspirin (bulk) Other (See Comments)     Developed gastric ulcer in past when taking aspirin per patient     Past Medical History:   Diagnosis Date    Bronchiectasis without complication     Hypertension     Supraventricular tachycardia      Past Surgical History:   Procedure Laterality Date    CHOLECYSTECTOMY      INTRAMEDULLARY RODDING OF FEMUR Left 5/3/2019    Procedure: INSERTION, INTRAMEDULLARY MARTHA, FEMUR;  Surgeon: Teodoro Bruce MD;  Location: [facility] OR [address] [place] [address];  Service: Orthopedics;  Laterality: Left;    TONSILLECTOMY       No family history on file.  Social History     Tobacco Use    Smoking status: Never Smoker    Smokeless tobacco: Never Used   Substance Use Topics    Alcohol use: No    Drug use: No     Review of Systems   Constitutional: Negative for fever.   HENT: Negative for sore throat.    Respiratory: Positive for cough. Negative for shortness of breath.    Cardiovascular: Negative for chest pain.   Gastrointestinal: Negative for nausea.   Genitourinary: Negative for dysuria.   Musculoskeletal: Negative for back pain.    Skin: Negative for rash.   Neurological: Negative for weakness.   Hematological: Does not bruise/bleed easily.       Physical Exam     Initial Vitals [11/30/20 0041]   BP Pulse Resp Temp SpO2   (!) 175/75 84 20 98 °F (36.7 °C) 97 %      MAP       --         Physical Exam    Constitutional: Vital signs are normal. She appears well-developed and well-nourished. She is not diaphoretic. No distress.   HENT:   Head: Normocephalic and atraumatic.   Right Ear: Hearing and external ear normal.   Left Ear: Hearing and external ear normal.   Posterior oropharynx is clear   Eyes: Conjunctivae are normal.   Cardiovascular: Normal rate and regular rhythm.   Pulmonary/Chest:   Clear on exam   Abdominal: Soft. Normal appearance and bowel sounds are normal.   Musculoskeletal: Normal range of motion.   Neurological: She is alert and oriented to person, place, and time.   Skin: Skin is warm and intact.   Psychiatric: She has a normal mood and affect. Her speech is normal and behavior is normal. Cognition and memory are normal.         ED Course   Procedures  Labs Reviewed   CBC W/ AUTO DIFFERENTIAL - Abnormal; Notable for the following components:       Result Value    RBC 3.79 (*)     Hemoglobin 10.8 (*)     Hematocrit 34.9 (*)     MCHC 30.9 (*)     All other components within normal limits   BASIC METABOLIC PANEL   SARS-COV-2 RDRP GENE          Imaging Results          X-Ray Chest AP Portable (Final result)  Result time 11/30/20 01:33:01    Final result by Lauryn Saenz MD (11/30/20 01:33:01)                 Impression:      No acute cardiopulmonary abnormalities.    Stable appearing reticulonodular diffuse bilateral lung opacities as compared to multiple previous exams suggesting chronic changes.      Electronically signed by: Lauryn Saenz  Date:    11/30/2020  Time:    01:33             Narrative:    EXAMINATION:  XR CHEST AP PORTABLE    CLINICAL HISTORY:  Shortness of breath    TECHNIQUE:  Single frontal view of the  chest was performed.    COMPARISON:  11/25/2019, 11/24/2019, 06/11/2019 chest radiographs    FINDINGS:  The cardiac silhouette is stable and not significantly enlarged.  Moderate aortic atherosclerosis in the arch is present.    Patient is slightly rotated to the left and there are bilateral diffuse reticulonodular opacities not appreciably changed since prior exams allowing for differences in positioning and technique.  There is no acute costophrenic angle blunting to suggest pleural effusion.  Bilateral apical pleural thickening greater on the right is noted.  Osseous structures grossly appear intact.                                 Medical Decision Making:   History:   Old Medical Records: I decided to obtain old medical records.  Initial Assessment:     87-year-old female presenting to the ER with 1 episode of hemoptysis  Differential Diagnosis:    Bronchiectasis, pneumonia, anemia  Independently Interpreted Test(s):   I have ordered and independently interpreted X-rays - see summary below.       <> Summary of X-Ray Reading(s):   No acute cardiopulmonary findings  Clinical Tests:   Lab Tests: Ordered and Reviewed  Radiological Study: Ordered and Reviewed  ED Management:   Plan:   patient with 1 episode of hemoptysis today and then again 2 days ago.  She is not hypoxic tachycardic and she does not have chest pain.  I doubt she has a PE.    I will get basic labs and a chest x-ray then reassess.  I will continue to monitor.    No acute findings on labs or chest x-ray.  Patient has remained hemodynamically stable in the ED and her symptoms have not been persistent, no episodes of hemoptysis while in the ED.  She will be discharged home and recommended to follow-up with pulmonary medicine.  Return precautions given. Her Oxygen Sats have been in the 90s while in the ED. She does use O2 @ the nursing home when needed. She is not symptomatic while in the ED and non distressed.                               Clinical  Impression:       ICD-10-CM ICD-9-CM   1. Hemoptysis  R04.2 786.30   2. SOB (shortness of breath)  R06.02 786.05                          ED Disposition Condition    Discharge Stable        ED Prescriptions     None        Follow-up Information     Follow up With Specialties Details Why Contact Info    Vince Wray MD Internal Medicine   [address] [facility]  [address]  [facility] [address]  [phone]                                         Aftab Pugh PA-C  11/30/20 0140       Aftab Pugh PA-C  11/30/20 0201

## 2020-12-03 ENCOUNTER — OFFICE VISIT (OUTPATIENT)
Dept: PULMONOLOGY | Facility: CLINIC | Age: 85
End: 2020-12-03
Payer: MEDICARE

## 2020-12-03 VITALS
DIASTOLIC BLOOD PRESSURE: 78 MMHG | HEIGHT: 60 IN | BODY MASS INDEX: 15.12 KG/M2 | OXYGEN SATURATION: 95 % | WEIGHT: 77 LBS | HEART RATE: 78 BPM | SYSTOLIC BLOOD PRESSURE: 139 MMHG

## 2020-12-03 DIAGNOSIS — J47.0 BRONCHIECTASIS WITH ACUTE LOWER RESPIRATORY INFECTION: ICD-10-CM

## 2020-12-03 DIAGNOSIS — R09.82 POST-NASAL DRIP: ICD-10-CM

## 2020-12-03 DIAGNOSIS — R04.2 HEMOPTYSIS: ICD-10-CM

## 2020-12-03 DIAGNOSIS — J47.1 BRONCHIECTASIS WITH (ACUTE) EXACERBATION: Primary | ICD-10-CM

## 2020-12-03 PROCEDURE — 99214 PR OFFICE/OUTPT VISIT, EST, LEVL IV, 30-39 MIN: ICD-10-PCS | Mod: S$PBB,,, | Performed by: INTERNAL MEDICINE

## 2020-12-03 PROCEDURE — 87015 SPECIMEN INFECT AGNT CONCNTJ: CPT

## 2020-12-03 PROCEDURE — 99213 OFFICE O/P EST LOW 20 MIN: CPT | Mod: PBBFAC | Performed by: INTERNAL MEDICINE

## 2020-12-03 PROCEDURE — 87205 SMEAR GRAM STAIN: CPT

## 2020-12-03 PROCEDURE — 87070 CULTURE OTHR SPECIMN AEROBIC: CPT

## 2020-12-03 PROCEDURE — 99214 OFFICE O/P EST MOD 30 MIN: CPT | Mod: S$PBB,,, | Performed by: INTERNAL MEDICINE

## 2020-12-03 PROCEDURE — 99999 PR PBB SHADOW E&M-EST. PATIENT-LVL III: CPT | Mod: PBBFAC,,, | Performed by: INTERNAL MEDICINE

## 2020-12-03 PROCEDURE — 87206 SMEAR FLUORESCENT/ACID STAI: CPT

## 2020-12-03 PROCEDURE — 99999 PR PBB SHADOW E&M-EST. PATIENT-LVL III: ICD-10-PCS | Mod: PBBFAC,,, | Performed by: INTERNAL MEDICINE

## 2020-12-03 PROCEDURE — 87116 MYCOBACTERIA CULTURE: CPT

## 2020-12-03 RX ORDER — PREDNISONE 20 MG/1
40 TABLET ORAL DAILY
Qty: 10 TABLET | Refills: 0 | OUTPATIENT
Start: 2020-12-03 | End: 2020-12-03

## 2020-12-03 RX ORDER — ALBUTEROL SULFATE 0.83 MG/ML
SOLUTION RESPIRATORY (INHALATION)
COMMUNITY
Start: 2020-11-05

## 2020-12-03 RX ORDER — PREDNISONE 20 MG/1
40 TABLET ORAL DAILY
Qty: 10 TABLET | Refills: 0 | Status: SHIPPED | OUTPATIENT
Start: 2020-12-03 | End: 2020-12-03

## 2020-12-03 RX ORDER — KETOCONAZOLE 20 MG/ML
SHAMPOO, SUSPENSION TOPICAL
COMMUNITY
Start: 2020-11-23

## 2020-12-03 RX ORDER — FLUTICASONE PROPIONATE AND SALMETEROL 50; 250 UG/1; UG/1
1 POWDER RESPIRATORY (INHALATION) 2 TIMES DAILY
Qty: 1 EACH | Refills: 11 | Status: SHIPPED | OUTPATIENT
Start: 2020-12-03

## 2020-12-03 RX ORDER — FLUTICASONE PROPIONATE AND SALMETEROL 50; 250 UG/1; UG/1
1 POWDER RESPIRATORY (INHALATION) 2 TIMES DAILY
Qty: 1 EACH | Refills: 11 | OUTPATIENT
Start: 2020-12-03 | End: 2020-12-03

## 2020-12-03 RX ORDER — FLUTICASONE PROPIONATE 50 MCG
2 SPRAY, SUSPENSION (ML) NASAL DAILY
Qty: 16 G | Refills: 6 | Status: SHIPPED | OUTPATIENT
Start: 2020-12-03 | End: 2021-01-02

## 2020-12-03 RX ORDER — PREDNISONE 20 MG/1
40 TABLET ORAL DAILY
Qty: 10 TABLET | Refills: 0 | Status: SHIPPED | OUTPATIENT
Start: 2020-12-03 | End: 2020-12-08

## 2020-12-03 RX ORDER — FLUTICASONE PROPIONATE 50 MCG
2 SPRAY, SUSPENSION (ML) NASAL DAILY
Qty: 16 G | Refills: 6 | OUTPATIENT
Start: 2020-12-03 | End: 2020-12-03

## 2020-12-03 RX ORDER — MOXIFLOXACIN HYDROCHLORIDE 400 MG/1
400 TABLET ORAL DAILY
Qty: 5 TABLET | Refills: 0 | OUTPATIENT
Start: 2020-12-03 | End: 2020-12-03

## 2020-12-03 RX ORDER — MOXIFLOXACIN HYDROCHLORIDE 400 MG/1
400 TABLET ORAL DAILY
Qty: 5 TABLET | Refills: 0 | Status: SHIPPED | OUTPATIENT
Start: 2020-12-03 | End: 2020-12-08

## 2020-12-03 RX ORDER — ATORVASTATIN CALCIUM 10 MG/1
TABLET, FILM COATED ORAL
COMMUNITY
Start: 2020-12-02

## 2020-12-03 RX ORDER — FLUTICASONE PROPIONATE AND SALMETEROL 50; 250 UG/1; UG/1
POWDER RESPIRATORY (INHALATION)
COMMUNITY
Start: 2020-11-20 | End: 2020-12-03 | Stop reason: SDUPTHER

## 2020-12-03 NOTE — PROGRESS NOTES
Subjective:       Patient ID: Siomara Flores is a 87 y.o. female.    Chief Complaint: Hemoptysis and Bronchiectasis    87 year old with a h/o bronchiectasis.  States that she is always clearing her throat.  Presented to ED this weekend complaining of hemoptysis.  Which has slowly resolved and completely stopped by Wednesday.  Denies dyspnea on exertion or wheezing.  In a wheelchair due to typically walking in a walker.      Review of Systems   Constitutional: Negative for fever, weight loss and appetite change.   Respiratory: Positive for hemoptysis.    Neurological: Negative for headaches.   Hematological: Negative for adenopathy.   Psychiatric/Behavioral: Negative for confusion.       Sputum culture positive for pseudomonas   Does not appear to be on Advair BID.  Uses albuterol two times daily.  Objective:       Vitals:    12/03/20 1551   BP: 139/78   BP Location: Left arm   Patient Position: Sitting   Pulse: 78   SpO2: 95%   Weight: 34.9 kg (77 lb)   Height: 5' (1.524 m)   3# weight loss in a year (weighed 80#).    Physical Exam   Constitutional: She is oriented to person, place, and time. She appears cachectic. No distress.   Cardiovascular: Normal rate and regular rhythm.   Pulmonary/Chest: She has rales (right base).   Neurological: She is alert and oriented to person, place, and time.   Skin: Skin is warm and dry.   Psychiatric: She has a normal mood and affect.        Personal Diagnostic Review  Chest x-ray: bilateral cephalization of blood vessels concerning for edema.    Pulmonary Function Tests 12/3/2020   SpO2 95   Height 60   Weight 1232   BMI (Calculated) 15   Some recent data might be hidden         Assessment:       1. Bronchiectasis with acute lower respiratory infection    2. Hemoptysis    3. Bronchiectasis with (acute) exacerbation        Outpatient Encounter Medications as of 12/3/2020   Medication Sig Dispense Refill    acetaminophen (TYLENOL) 500 MG tablet Take 2 tablets (1,000 mg total) by  mouth 2 (two) times daily as needed for Pain.  0    ADVAIR DISKUS 250-50 mcg/dose diskus inhaler       albuterol (PROVENTIL) 2.5 mg /3 mL (0.083 %) nebulizer solution       atenolol (TENORMIN) 25 MG tablet Take 0.5 tablets (12.5 mg total) by mouth once daily. 15 tablet 11    atorvastatin (LIPITOR) 10 MG tablet       CALCIUM CARBONATE/VITAMIN D3 (CALCIUM 600 + D,3, ORAL) Take 1 tablet by mouth once daily.      ketoconazole (NIZORAL) 2 % shampoo       losartan (COZAAR) 100 MG tablet TAKE ONE TABLET BY MOUTH ONCE DAILY      omeprazole (PRILOSEC) 20 MG capsule 1 capsule      polyethylene glycol (GLYCOLAX) 17 gram PwPk Take 17 g by mouth once daily.  0    senna-docusate 8.6-50 mg (PERICOLACE) 8.6-50 mg per tablet Take 1 tablet by mouth 2 (two) times daily as needed for Constipation.      predniSONE (DELTASONE) 20 MG tablet Take 2 tablets (40 mg total) by mouth once daily. for 5 days 10 tablet 0    tiotropium (SPIRIVA) 18 mcg inhalation capsule Inhale 1 capsule (18 mcg total) into the lungs once daily. Controller 1 Box 11    verapamil (CALAN) 80 MG tablet Take 1 tablet (80 mg total) by mouth 3 (three) times daily. 90 tablet 11     No facility-administered encounter medications on file as of 12/3/2020.      Orders Placed This Encounter   Procedures    AFB Culture & Smear    Culture, Respiratory with Gram Stain     Plan:     Problem List Items Addressed This Visit     Bronchiectasis with (acute) exacerbation    Overview     Tolerated 5 days of avelox, will begin again although ineffective against pseudomonas.  40 mg Prednisone x 5 days    Restart advair 250/50 BID    Sputum cultures obtained for atypical bacteria  Prednisone for five days.    Will call with results.    Continue spiriva  Will restart NAC         Hemoptysis    Overview     Due to bronchiectasis.  Now resolved. Expectorated green phlegm while in clinic and will culture.    Counseled on disease.  States it is improving.  If should worsen, would  need to go to ED for evaluation.           Other Visit Diagnoses     Bronchiectasis with acute lower respiratory infection    -  Primary    Relevant Medications    predniSONE (DELTASONE) 20 MG tablet    Other Relevant Orders    AFB Culture & Smear    Culture, Respiratory with Gram Stain

## 2020-12-05 LAB
BACTERIA SPEC AEROBE CULT: NORMAL
BACTERIA SPEC AEROBE CULT: NORMAL
GRAM STN SPEC: NORMAL

## 2021-02-04 LAB
ACID FAST MOD KINY STN SPEC: NORMAL
MYCOBACTERIUM SPEC QL CULT: NORMAL

## 2023-11-29 NOTE — PT/OT/SLP PROGRESS
Occupational Therapy  Treatment    Siomara Flores   MRN: [id number]   Admitting Diagnosis: Closed displaced intertrochanteric fracture of left femur    OT Date of Treatment: 06/14/19       Billable Minutes:  Self Care/Home Management 30    General Precautions: Standard, fall  Orthopedic Precautions: LUE non weight bearing  Braces: Sling and swathe(sling and swathe for comfort only; L wrist brace off for rx.)         Subjective:  Communicated with patient prior to session.    Pain/Comfort  Pain Rating 1: 0/10  Pain Rating Post-Intervention 1: 0/10    Objective:   Patient seated in bedside chair.    Occupational Performance:    Functional Mobility/Transfers:  · Patient completed Sit <> Stand Transfer with stand by assistance  with  hemiwalker   · Patient completed  Shower Transfer ambulation technique with stand by assistance with hemiwalker  · Functional Mobility: Patient performed functional mobility in room ~20 feet using hemiwalker with SBA.    Activities of Daily Living:  · Grooming: set up asssitance combing hair seated in bedside chair  · Bathing: minimum assistance to bathe feet seated on shower bench  · Upper Body Dressing: supervision donning gown while standing with hemiwalker and min(A) to jose alejandro/doff L wrist splint  · Lower Body Dressing: minimum assistance pull panties and pants over hips in syanding and total assistance don socks    AMPA 6 Click:  [facility] Total Score: 18    Patient left up in chair with call button in reach    ASSESSMENT:  Siomara Flores is a 86 y.o. female with a medical diagnosis of Closed displaced intertrochanteric fracture of left femur and presents with the deficits listed below. Patient's independence continues to improve. Will benefit from continued OT services to further increase independence with ADLs.     Rehab identified problem list/impairments: weakness, impaired endurance, impaired self care skills, impaired functional mobilty, gait instability, impaired balance, decreased  Physical Therapy    Discharge Summary    Name: Marta Butler  MRN: [id number]  : 1942  Date: 23    Discharge Summary: PT    Discharge Information: Date of discharge 2023, Date of last visit 2023, Date of evaluation 2023, Number of attended visits 8, Referred by Diana Florez DO, and Referred for impaired gait, R groin pain    Therapy Summary: Patient demonstrated consistent progress with PT POC progressing towards PT goals, improving strength, mobility, reduction of pain levels, as well as provided HEP. Goals unable to be formally assessed, as patient out of town mourning the passing of her sister. Her insurance authorization expires and patient is aware to return for follow up with referring provider should she need therapy services in the future.    Discharge Status: See last daily note.     Rehab Discharge Reason: Patient out of town due to sisters death, unable to complete last visit within insurance authorization period (ends 2023). Contacted patient and she is comfortable continuing with her HEP Independently a this time and is aware to return for follow up with referring provider should she need therapy services in the future.   upper extremity function, decreased lower extremity function, pain, orthopedic precautions    Rehab potential is good    Activity tolerance: Good    Discharge recommendations: home with home health     Barriers to discharge: Decreased caregiver support     Equipment recommendations: walker, papi, commode, tub bench(Need to confirm with family (Patient reports she has equipment from her ))     GOALS:   Multidisciplinary Problems     Occupational Therapy Goals        Problem: Occupational Therapy Goal    Goal Priority Disciplines Outcome Interventions   Occupational Therapy Goal     OT, PT/OT Ongoing (interventions implemented as appropriate)    Description:  Goals to be met by: 6/17/2019    Patient will increase functional independence with ADLs by performing:  LUE shoulder flexion 0-100* with aarom.  LUE shoulder flexion 0-120* prom  LUE elbow flexion and extension WNL arom  LUE wrist extension 0-50* arom  LUE wrist flexion 0-45* arom  UE Dressing with Minimal Assistance. Met 6/14/19  LE Dressing with Minimal Assistance.  Grooming while standing at sink with Stand-by Assistance. Met 3/13/19  Toileting from bedside commode with Minimal Assistance for hygiene and clothing management. Met 6/3/2019  Bathing with Minimal Assistance.-MET 5/26/2019  Supine to sit with Stand-by Assistance /c HOB flat and no handrails.  Stand pivot transfers with Contact Guard Assistance.-MET. 5/26/2019  Toilet transfer to bedside commode with Contact Guard Assistance.-met 5/26/2019  Right Upper extremity exercise program 3 x 10 reps per handout, with independence.  Patient will complete a standing activity for 8 min with S in order to perform self care tasks.   Met                         Plan:  Patient to be seen 5 x/week to address the above listed problems via therapeutic exercises, therapeutic activities, self-care/home management  Plan of Care expires: 06/10/19  Plan of Care reviewed with: patient    Marisa Keita,  ASIM  06/14/2019

## (undated) DEVICE — SEE MEDLINE ITEM 157150

## (undated) DEVICE — BLADE SURG CARBON STEEL #10

## (undated) DEVICE — NAIL TFNA 130DEG 11MM L STRL
Type: IMPLANTABLE DEVICE | Site: FEMUR | Status: NON-FUNCTIONAL
Removed: 2019-05-03

## (undated) DEVICE — TRAY MINOR ORTHO

## (undated) DEVICE — SUT 0 VICRYL / CT-1

## (undated) DEVICE — DRAPE STERI U-SHAPED 47X51IN

## (undated) DEVICE — SPONGE LAP 18X18 PREWASHED

## (undated) DEVICE — SUT ETHILON 3/0 18IN PS-1

## (undated) DEVICE — DRAPE PLASTIC U 60X72

## (undated) DEVICE — GAUZE SPONGE 4X4 12PLY

## (undated) DEVICE — DRAPE C-ARMOR EQUIPMENT COVER

## (undated) DEVICE — DRAPE C ARM 42 X 120 10/BX

## (undated) DEVICE — WIRE GUIDE 3.2MM 400MM
Type: IMPLANTABLE DEVICE | Site: FEMUR | Status: NON-FUNCTIONAL
Removed: 2019-05-03

## (undated) DEVICE — BIT DRILL QC 3FLUTED 4.2X145 S

## (undated) DEVICE — SUT D SPECIAL VICRYL 2-0

## (undated) DEVICE — TAPE SURG DURAPORE 2 X10YD

## (undated) DEVICE — APPLICATOR CHLORAPREP ORN 26ML

## (undated) DEVICE — DRAPE IOBAN 2 STERI

## (undated) DEVICE — KIT PT CARE HANA PROFX SSXT

## (undated) DEVICE — DRESSING AQUACEL AG ADV 3.5X6